# Patient Record
Sex: FEMALE | Race: WHITE | Employment: OTHER | URBAN - METROPOLITAN AREA
[De-identification: names, ages, dates, MRNs, and addresses within clinical notes are randomized per-mention and may not be internally consistent; named-entity substitution may affect disease eponyms.]

---

## 2017-01-13 ENCOUNTER — HOSPITAL ENCOUNTER (OUTPATIENT)
Dept: NUCLEAR MEDICINE | Age: 82
Discharge: HOME | End: 2017-01-13
Attending: ORTHOPAEDIC SURGERY
Payer: MEDICARE

## 2017-01-13 DIAGNOSIS — S32.000A COMPRESSION OF LUMBAR VERTEBRA (HCC): ICD-10-CM

## 2017-01-13 PROCEDURE — 78300 BONE IMAGING LIMITED AREA: CPT

## 2017-01-20 ENCOUNTER — OFFICE VISIT (OUTPATIENT)
Dept: INTERNAL MEDICINE CLINIC | Age: 82
End: 2017-01-20

## 2017-01-20 VITALS
WEIGHT: 111 LBS | BODY MASS INDEX: 20.43 KG/M2 | DIASTOLIC BLOOD PRESSURE: 66 MMHG | OXYGEN SATURATION: 99 % | HEART RATE: 84 BPM | SYSTOLIC BLOOD PRESSURE: 124 MMHG | HEIGHT: 62 IN | RESPIRATION RATE: 16 BRPM | TEMPERATURE: 98.3 F

## 2017-01-20 DIAGNOSIS — M51.36 DDD (DEGENERATIVE DISC DISEASE), LUMBAR: Primary | ICD-10-CM

## 2017-01-20 DIAGNOSIS — G62.9 NEUROPATHY: ICD-10-CM

## 2017-01-20 DIAGNOSIS — Z71.89 ACP (ADVANCE CARE PLANNING): ICD-10-CM

## 2017-01-20 NOTE — MR AVS SNAPSHOT
Visit Information Date & Time Provider Department Dept. Phone Encounter #  
 1/20/2017 12:30 PM Otoniel King, [address] [place] [address] Internal Medicine [phone] [id number] Follow-up Instructions Return in about 6 months (around 7/20/2017) for Regular follow up. Upcoming Health Maintenance Date Due DTaP/Tdap/Td series (1 - Tdap) 9/16/1956 ZOSTER VACCINE AGE 60> 9/16/1995 Pneumococcal 65+ High/Highest Risk (1 of 2 - PCV13) 9/16/2000 MEDICARE YEARLY EXAM 9/16/2000 GLAUCOMA SCREENING Q2Y 2/8/2018 Allergies as of 1/20/2017  Review Complete On: 1/20/2017 By: Otoniel King MD  
  
 Severity Noted Reaction Type Reactions Latex  06/21/2016    Itching Dilaudid [Hydromorphone (Bulk)] High 05/19/2010    Other (comments) ? loss of consciousness Altace [Ramipril]  12/12/2010    Unknown (comments) Ambien [Zolpidem]  12/12/2010    Unknown (comments) Ascensia Autodisc Test [Blood Sugar Diagnostic, Disc-type]  12/12/2010    Unknown (comments) Aspirin  12/12/2010    Unknown (comments) Astelin [Azelastine]  12/12/2010    Unknown (comments) Atenolol  12/12/2010    Unknown (comments) Banex La  12/12/2010    Unknown (comments) Benicar [Olmesartan]  12/12/2010    Unknown (comments) Carbocaine [Mepivacaine]  12/12/2010    Unknown (comments) Cleocin [Clindamycin Hcl]  03/25/2009    Hives Codeine  12/12/2010    Nausea and Vomiting Codeine-asa-salicyl-acetam-caf  70/33/2645    Other (comments) Stomach pain Cortisporin [Neomy-polymyxinb-bacitracin-hc]  12/12/2010    Unknown (comments) Cymbalta [Duloxetine]  12/12/2010    Other (comments) Cytotec [Misoprostol]  12/12/2010    Unknown (comments) Yair Oats [Propoxyphene N-acetaminophen]  12/12/2010    Unknown (comments) Darvon [Propoxyphene]  12/12/2010    Unknown (comments) Dilaudid [Hydromorphone]  01/03/2011    Unknown (comments) Patient states that she was unable to respond, but was breathing Effexor [Venlafaxine]  12/12/2010    Unknown (comments) Epinephrine  12/12/2010    Unknown (comments) Heart racing Estra-d  12/12/2010    Unknown (comments) Estrace [Estradiol]  12/12/2010    Unknown (comments) Fentanyl  12/12/2010    Nausea and Vomiting, Unknown (comments)  
 shakes Flexeril [Cyclobenzaprine]  12/12/2010    Unable to Obtain \"drugs me\" Flonase [Fluticasone]  12/12/2010    Unknown (comments) Flunisolide  12/12/2010    Unknown (comments) Guiatussin W/codeine  12/12/2010    Unknown (comments) Ketek [Telithromycin]  03/25/2009    Nausea Only Klonopin [Clonazepam]  12/12/2010    Unknown (comments) Lexapro [Escitalopram]  12/12/2010    Nausea and Vomiting Lodine [Etodolac]  12/12/2010    Nausea and Vomiting Morphine  12/12/2010    Unknown (comments) Nsaids (Non-steroidal Anti-inflammatory Drug)  04/12/2010    Nausea and Vomiting Other Medication  12/12/2010    Unknown (comments) Magnolia Lopes Paxil [Paroxetine Hcl]  12/12/2010    Nausea and Vomiting Pcn [Penicillins]  03/25/2009    Hives Percocet [Oxycodone-acetaminophen]  12/12/2010    Unknown (comments) Percodan [Oxycodone Hcl-oxycodone-asa]  12/12/2010    Unknown (comments) Pneumococcal 23-davida Ps Vaccine  09/14/2012    Swelling Proctocream-hc [Hydrocortisone]  12/12/2010    Unknown (comments) Prozac [Fluoxetine]  04/12/2010    Nausea Only Suicidal visions Pseudofed  12/12/2010    Unknown (comments) Quinidine  12/12/2010    Unknown (comments) Reglan [Metoclopramide]  12/12/2010    Unknown (comments) Remeron [Mirtazapine]  12/12/2010    Other (comments) Resaid  12/12/2010    Other (comments)  
 inflammed eyes Robaxin [Methocarbamol]  12/12/2010    Unknown (comments) Sonata [Zaleplon]  12/12/2010    Unknown (comments) Sudafed [Pseudoephedrine Hcl]  12/12/2010    Unknown (comments) Sulfa (Sulfonamide Antibiotics)  03/25/2009    Hives Surmontil [Trimipramine]  12/12/2010    Nausea and Vomiting Talwin [Pentazocine Lactate]  12/12/2010    Unknown (comments) Toradol [Ketorolac Tromethamine]  12/12/2010    Nausea and Vomiting Ultram [Tramadol]  12/12/2010    Unknown (comments) Vasoconstrictor Drug  12/12/2010    Unknown (comments) Vigamox [Moxifloxacin]  06/21/2016    Rash, Swelling Vioxx [Rofecoxib]  12/12/2010    Unknown (comments) Voltaren [Diclofenac Sodium]  06/21/2016    Itching Wellbutrin [Bupropion Hcl]  12/12/2010    Nausea and Vomiting Wygesic  12/12/2010    Unknown (comments) Zelnorm [Tegaserod Hydrogen Maleate]  12/12/2010    Unknown (comments) Zoloft [Sertraline]  12/12/2010    Nausea and Vomiting Current Immunizations  Reviewed on 1/20/2017 Name Date Influenza High Dose Vaccine PF 10/14/2016 Influenza Vaccine Whole 10/22/2012, 10/5/2011 Reviewed by Marija Reese RN on 1/20/2017 at 12:42 PM  
You Were Diagnosed With   
  
 Codes Comments DDD (degenerative disc disease), lumbar    -  Primary ICD-10-CM: M51.36 
ICD-9-CM: 722.52 Neuropathy     ICD-10-CM: G62.9 ICD-9-CM: 355.9 ACP (advance care planning)     ICD-10-CM: Z71.89 ICD-9-CM: V65.49 Vitals BP Pulse Temp Resp Height(growth percentile) Weight(growth percentile) 124/66 84 98.3 °F (36.8 °C) (Oral) 16 5' 2\" (1.575 m) 111 lb (50.3 kg) SpO2 BMI OB Status Smoking Status 99% 20.3 kg/m2 Hysterectomy Never Smoker Vitals History BMI and BSA Data Body Mass Index Body Surface Area  
 20.3 kg/m 2 1.48 m 2 Preferred Pharmacy Pharmacy Name Phone [facility] # [address] [phone] Your Updated Medication List  
  
   
This list is accurate as of: 1/20/17  1:32 PM.  Always use your most recent med list.  
 amLODIPine 5 mg tablet Commonly known as:  Voncile Oxford Take 1 Tab by mouth daily. * ATIVAN 0.5 mg tablet Generic drug:  LORazepam  
Take 0.25 mg by mouth daily. 0.25 mg every morning. 1 mg at bedtime. 0.25 mg PRN. * LORazepam 1 mg tablet Commonly known as:  ATIVAN  
  
 b complex vitamins tablet Take 1 Tab by mouth daily. B.infantis-B.ani-B.long-B.bifi 10-15 mg Tbec Take  by mouth. bismuth subsalicylate 897 mg Chew Commonly known as:  PEPTO-BISMOL Take 2 Tabs by mouth every three (3) hours as needed. calcium carbonate 300 mg (750 mg) chewable tablet Commonly known as:  TUMS EX Take 1 Tab by mouth daily. cholecalciferol 1,000 unit Cap Commonly known as:  VITAMIN D3 Take  by mouth daily. FIBER CHOICE PO Take  by mouth. hyoscyamine SL 0.125 mg SL tablet Commonly known as:  LEVSIN/SL  
0.125 mg by SubLINGual route Before breakfast, lunch, and dinner. metoprolol tartrate 25 mg tablet Commonly known as:  LOPRESSOR Take one quarter tablet by mouth everyday MIRALAX 17 gram packet Generic drug:  polyethylene glycol Take 17 g by mouth daily as needed. raNITIdine 150 mg tablet Commonly known as:  ZANTAC Take 150 mg by mouth two (2) times a day. * TYLENOL EXTRA STRENGTH 500 mg tablet Generic drug:  acetaminophen Take 500 mg by mouth two (2) times a day. * TYLENOL ARTHRITIS 650 mg CR tablet Generic drug:  acetaminophen Take 650 mg by mouth two (2) times a day. * Notice: This list has 4 medication(s) that are the same as other medications prescribed for you. Read the directions carefully, and ask your doctor or other care provider to review them with you. We Performed the Following REFERRAL TO PHYSICAL THERAPY [QOG87 Custom] Comments:  
 Please evaluate patient for back pain. Follow-up Instructions Return in about 6 months (around 7/20/2017) for Regular follow up. Referral Information Referral ID Referred By Referred To  
  
 [id number] LEXIE GOLD Not Available Visits Status Start Date End Date 1 New Request 1/20/17 1/20/18 If your referral has a status of pending review or denied, additional information will be sent to support the outcome of this decision. Patient Instructions PHYSICAL THERAPY [address] [facility] [address] [facility]. [address]  
[phone] L[facility] [place] [address] [facility] [address] [place], [address] [facility] [address]  
[phone] Physical Therapy & Sports Performance at [facility] [address] [place], [address]  
[phone] [facility] [address] [facility], [address]  
[phone] [facility] [address] [facility] [address]  
[phone]  
[address] [facility]  
[phone] [facility] [address]  
[phone] [facility] [address] [facility] ([place])  
[phone] [facility] Multiple Locations  
[id number] [facility]! Dear [place]: 
Thank you for requesting a [employer] account. Our records indicate that you have previously registered for a [employer] account but its currently inactive. Please call our [employer] support line at [phone]. Additional Information If you have questions, please visit the Frequently Asked Questions section of the [employer] website at [email or web address] Remember, [employer] is NOT to be used for urgent needs. For medical emergencies, dial 911. Now available from your iPhone and Android! Please provide this summary of care documentation to your next provider. Your primary care clinician is listed as Richrd Paget. If you have any questions after today's visit, please call [phone].

## 2017-01-20 NOTE — PROGRESS NOTES
Arvind Sellers is a 80 y.o. female who was seen in clinic today (1/20/2017). She RTC w/ her . Assessment & Plan:  [place] was seen today for hypertension. Diagnoses and all orders for this visit:    DDD (degenerative disc disease), lumbar- this is an acute on chronic problem, differential dx reviewed with the patient, sounds muscular more than radicular (due to injections not helping). No med options due allergies. Will treat with TENS unit, heat, and stretching.     -     REFERRAL TO PHYSICAL THERAPY    Neuropathy- this is a chronic problem. Will treat with nothing. Reviewed Gabapentin & Lyrica, but refused due to concerns about allergies. Red flags were reviewed with the patient to RTC or notify me, expected time course for resolution reviewed. ACP (advance care planning)- End of Life Planning: This was discussed with her today and she has an advanced directive - a copy has been provided. Reviewed DNR/DNI and patient is not interested. Body mass index (BMI) 20.0-20.9, adult- I have reviewed/discussed the below normal BMI with the patient and spouse. I have recommended the following interventions: nothing. The plan is as follows: I have counseled this patient on diet and exercise regimens. .             Follow-up Disposition:  Return in about 6 months (around 7/20/2017) for Regular follow up.       ----------------------------------------------------------------------    Subjective:  Chronic Pain  She RTC today to discuss her arthralgias that is primarily affecting the back. This is new to me, but a chronic issue. She has seen ortho (Dr Loni Petit). She has seen pain specialist (Dr Vinod Quesada). She reports having injections & ablations w/o any help. She reports pain has been worse over the last 3 weeks. She is not using TENS unit. She reports severe, throbbing pain. Located on upper L buttock. No radiation. She reports she is not able to do her normal daily activities. Neurological Review  She is here today to talk about neuropathy. This is a chronic problem, symptoms are slightly worse, and have been present for > 1 year. Associated symptoms: sharp pains, shooting up the legs at time. No changes since last visit. She has plantar fasciitis and this is improving. She feels like her balance if off slightly. Medical treatment has included: she is massaging & keeping her feet most.         Immunizations:    Influenza: up to date. Tetanus: she reports allergy. Shingles: she has received (waiting on records). Pneumonia: not UTD, allergic. Cancer screening:    Cervical: reviewed guidelines, n/a. Breast: reviewed guidelines, UTD, reviewed stopping is she desires, she will think about it. Colon: n/a. Prior to Admission medications    Medication Sig Start Date End Date Taking? Authorizing Provider   amLODIPine (NORVASC) 5 mg tablet Take 1 Tab by mouth daily. 10/25/16  Yes Radha Aguilear MD   metoprolol tartrate (LOPRESSOR) 25 mg tablet Take one quarter tablet by mouth everyday 7/18/16  Yes Ally Farias MD   LORazepam (ATIVAN) 1 mg tablet  4/28/16  Yes Historical Provider   hyoscyamine SL (LEVSIN/SL) 0.125 mg SL tablet 0.125 mg by SubLINGual route Before breakfast, lunch, and dinner. Yes Historical Provider   ranitidine (ZANTAC) 150 mg tablet Take 150 mg by mouth two (2) times a day. Yes Historical Provider   bismuth subsalicylate (PEPTO-BISMOL) 262 mg chew Take 2 Tabs by mouth every three (3) hours as needed. Yes Historical Provider   calcium carbonate (TUMS EX) 300 mg (750 mg) chewable tablet Take 1 Tab by mouth daily. Yes Historical Provider   cholecalciferol (VITAMIN D3) 1,000 unit cap Take  by mouth daily. Yes Historical Provider   b complex vitamins tablet Take 1 Tab by mouth daily. Yes Historical Provider   FIBER CHOICE PO Take  by mouth. Yes Historical Provider   LORazepam (ATIVAN) 0.5 mg tablet Take 0.25 mg by mouth daily.  0.25 mg every morning. 1 mg at bedtime. 0.25 mg PRN. Yes Historical Provider   polyethylene glycol (MIRALAX) 17 gram packet Take 17 g by mouth daily as needed. Yes Historical Provider   acetaminophen (TYLENOL ARTHRITIS) 650 mg CR tablet Take 650 mg by mouth two (2) times a day. Yes Historical Provider   acetaminophen (TYLENOL EXTRA STRENGTH) 500 mg tablet Take 500 mg by mouth two (2) times a day. Yes Historical Provider   B.infantis-B.ani-B.long-B.bifi 10-15 mg TbEC Take  by mouth. Historical Provider          Allergies   Allergen Reactions    Latex Itching    Dilaudid [Hydromorphone (Bulk)] Other (comments)     ? loss of consciousness    Altace [Ramipril] Unknown (comments)    Ambien [Zolpidem] Unknown (comments)    Ascensia Autodisc Test [Blood Sugar Diagnostic, Disc-Type] Unknown (comments)    Aspirin Unknown (comments)    Astelin [Azelastine] Unknown (comments)    Atenolol Unknown (comments)    Banex La Unknown (comments)    Benicar [Olmesartan] Unknown (comments)    Carbocaine [Mepivacaine] Unknown (comments)    Cleocin [Clindamycin Hcl] Hives    Codeine Nausea and Vomiting    Codeine-Asa-Salicyl-Acetam-Caf Other (comments)     Stomach pain    Cortisporin [Neomy-Polymyxinb-Bacitracin-Hc] Unknown (comments)    Cymbalta [Duloxetine] Other (comments)    Cytotec [Misoprostol] Unknown (comments)    Darvocet A500 [Propoxyphene N-Acetaminophen] Unknown (comments)    Darvon [Propoxyphene] Unknown (comments)    Dilaudid [Hydromorphone] Unknown (comments)     Patient states that she was unable to respond, but was breathing    Effexor [Venlafaxine] Unknown (comments)    Epinephrine Unknown (comments)     Heart racing    Estra-D Unknown (comments)    Estrace [Estradiol] Unknown (comments)    Fentanyl Nausea and Vomiting and Unknown (comments)     shakes    Flexeril [Cyclobenzaprine] Unable to Obtain     \"drugs me\"    Flonase [Fluticasone] Unknown (comments)    Flunisolide Unknown (comments)  Guiatussin W/Codeine Unknown (comments)    Ketek [Telithromycin] Nausea Only    Klonopin [Clonazepam] Unknown (comments)    Lexapro [Escitalopram] Nausea and Vomiting    Lodine [Etodolac] Nausea and Vomiting    Morphine Unknown (comments)    Nsaids (Non-Steroidal Anti-Inflammatory Drug) Nausea and Vomiting    Other Medication Unknown (comments)     Gareth Alford    Paxil [Paroxetine Hcl] Nausea and Vomiting    Pcn [Penicillins] Hives    Percocet [Oxycodone-Acetaminophen] Unknown (comments)    Percodan [Oxycodone Hcl-Oxycodone-Asa] Unknown (comments)    Pneumococcal 23-Mary Ps Vaccine Swelling    Proctocream-Hc [Hydrocortisone] Unknown (comments)    Prozac [Fluoxetine] Nausea Only     Suicidal visions      Pseudofed Unknown (comments)    Quinidine Unknown (comments)    Reglan [Metoclopramide] Unknown (comments)    Remeron [Mirtazapine] Other (comments)    Resaid Other (comments)     inflammed eyes    Robaxin [Methocarbamol] Unknown (comments)    Sonata [Zaleplon] Unknown (comments)    Sudafed [Pseudoephedrine Hcl] Unknown (comments)    Sulfa (Sulfonamide Antibiotics) Hives    Surmontil [Trimipramine] Nausea and Vomiting    Talwin [Pentazocine Lactate] Unknown (comments)    Toradol [Ketorolac Tromethamine] Nausea and Vomiting    Ultram [Tramadol] Unknown (comments)    Vasoconstrictor Drug Unknown (comments)    Vigamox [Moxifloxacin] Rash and Swelling    Vioxx [Rofecoxib] Unknown (comments)    Voltaren [Diclofenac Sodium] Itching    Wellbutrin [Bupropion Hcl] Nausea and Vomiting    Wygesic Unknown (comments)    Zelnorm [Tegaserod Hydrogen Maleate] Unknown (comments)    Zoloft [Sertraline] Nausea and Vomiting           ROS      Objective:   Physical Exam      Visit Vitals    /66    Pulse 84    Temp 98.3 °F (36.8 °C) (Oral)    Resp 16    Ht 5' 2\" (1.575 m)    Wt 111 lb (50.3 kg)    SpO2 99%    BMI 20.3 kg/m2         Disclaimer:  Advised her to call back or return to office if symptoms worsen/change/persist.  Discussed expected course/resolution/complications of diagnosis in detail with patient. Medication risks/benefits/costs/interactions/alternatives discussed with patient. She was given an after visit summary which includes diagnoses, current medications, & vitals. She expressed understanding with the diagnosis and plan.         Carolina Manley MD

## 2017-01-20 NOTE — PATIENT INSTRUCTIONS
PHYSICAL THERAPY     [facility]   [address]    [place], [address] [place] [address] Road   [phone]     [facility] [address] [facility] [address] [facility], [id number] [place] [address]   [phone]     Physical Therapy & Sports Performance at [facility] [address], [address]   [place], [place]   [phone]       [facility]   [address] [place]   [phone]     [facility] [address]   [address]    [phone]   [address]   [phone]     [facility]   [address]   [phone]     [address] ([place])   [phone]     [facility]   Multiple Locations   [phone]

## 2017-01-21 NOTE — ACP (ADVANCE CARE PLANNING)
Advance Care Planning (ACP) Provider Note - Comprehensive     Date of ACP Conversation: 01/20/17  Persons included in Conversation:  patient and family  Length of ACP Conversation in minutes: <16 minutes (Non-Billable)    Authorized Decision Maker (if patient is incapable of making informed decisions): This person is:  Healthcare Agent/Medical Power of  under Advance Directive        General ACP for ALL Patients with Decision Making Capacity:  Importance of advance care planning, including choosing a healthcare agent to communicate patient's healthcare decisions if patient lost the ability to make decisions, such as after a sudden illness or accident  Understanding of the healthcare agent role was assessed and information provided  Opportunity offered to explore how cultural, Latter day, spiritual, or personal beliefs would affect decisions for future care     For Serious or Chronic Illness:  Her medical problems were reviewed with them. Understanding of medical condition    Understanding of CPR, goals and expected outcomes, benefits and burdens discussed. Information on CPR success rates provided (e.g. for CPR in hospital, survival to d/c at two weeks is 22%, for chronically ill or elderly/frail survival is less than 3%); Individual asked to communicate understanding of information in his/her own words. Explored fears and concerns regarding CPR or possible outcomes    Interventions Provided:  Reviewed existing Advance Directive   Recommended communicating the plan and making copies for the healthcare agent, personal physician, and others as appropriate (e.g., health system)  Recommended review of completed ACP document annually or upon change in health status      she has an advanced directive - a copy has been provided & still reflects her wishes. Reviewed DNR/DNI and patient is not interested but will think about it.

## 2017-01-23 ENCOUNTER — TELEPHONE (OUTPATIENT)
Dept: INTERNAL MEDICINE CLINIC | Age: 82
End: 2017-01-23

## 2017-01-23 NOTE — TELEPHONE ENCOUNTER
Called pt and notified her she can see any physical therapist. Her referral is not for anyone specific. Pt verbalized understanding.

## 2017-02-27 ENCOUNTER — HOSPITAL ENCOUNTER (OUTPATIENT)
Dept: MRI IMAGING | Age: 82
Discharge: HOME | End: 2017-02-27
Attending: SPECIALIST
Payer: MEDICARE

## 2017-02-27 DIAGNOSIS — M47.816 DEGENERATIVE ARTHRITIS OF LUMBAR SPINE: ICD-10-CM

## 2017-02-27 PROCEDURE — 72148 MRI LUMBAR SPINE W/O DYE: CPT

## 2017-03-08 LAB
CREATININE, EXTERNAL: 1.17
HBA1C MFR BLD HPLC: 5.9 %
MICROALBUMIN UR TEST STR-MCNC: 8.8 MG/DL

## 2017-04-12 DIAGNOSIS — I10 ESSENTIAL HYPERTENSION, BENIGN: ICD-10-CM

## 2017-04-13 RX ORDER — METOPROLOL TARTRATE 25 MG/1
TABLET, FILM COATED ORAL
Qty: 90 TAB | Refills: 1 | Status: SHIPPED | OUTPATIENT
Start: 2017-04-13 | End: 2018-05-22 | Stop reason: SDUPTHER

## 2017-05-08 DIAGNOSIS — I10 ESSENTIAL HYPERTENSION, BENIGN: ICD-10-CM

## 2017-05-08 RX ORDER — AMLODIPINE BESYLATE 5 MG/1
TABLET ORAL
Qty: 90 TAB | Refills: 1 | Status: SHIPPED | OUTPATIENT
Start: 2017-05-08 | End: 2017-11-07 | Stop reason: SDUPTHER

## 2017-07-14 ENCOUNTER — OFFICE VISIT (OUTPATIENT)
Dept: INTERNAL MEDICINE CLINIC | Age: 82
End: 2017-07-14

## 2017-07-14 VITALS
HEART RATE: 74 BPM | SYSTOLIC BLOOD PRESSURE: 134 MMHG | TEMPERATURE: 97.8 F | BODY MASS INDEX: 20.24 KG/M2 | RESPIRATION RATE: 20 BRPM | HEIGHT: 62 IN | WEIGHT: 110 LBS | DIASTOLIC BLOOD PRESSURE: 66 MMHG | OXYGEN SATURATION: 96 %

## 2017-07-14 DIAGNOSIS — K31.84 GASTROPARESIS: ICD-10-CM

## 2017-07-14 DIAGNOSIS — E78.1 HYPERTRIGLYCERIDEMIA: ICD-10-CM

## 2017-07-14 DIAGNOSIS — F41.8 DEPRESSION WITH ANXIETY: ICD-10-CM

## 2017-07-14 DIAGNOSIS — Z23 ENCOUNTER FOR IMMUNIZATION: ICD-10-CM

## 2017-07-14 DIAGNOSIS — R73.03 PRE-DIABETES: ICD-10-CM

## 2017-07-14 DIAGNOSIS — Z80.9 FAMILY HISTORY OF CANCER: ICD-10-CM

## 2017-07-14 DIAGNOSIS — K58.0 IRRITABLE BOWEL SYNDROME WITH DIARRHEA: ICD-10-CM

## 2017-07-14 DIAGNOSIS — Z71.89 ACP (ADVANCE CARE PLANNING): ICD-10-CM

## 2017-07-14 DIAGNOSIS — Z00.00 MEDICARE ANNUAL WELLNESS VISIT, SUBSEQUENT: Primary | ICD-10-CM

## 2017-07-14 DIAGNOSIS — I10 ESSENTIAL HYPERTENSION, BENIGN: ICD-10-CM

## 2017-07-14 DIAGNOSIS — Z13.39 SCREENING FOR ALCOHOLISM: ICD-10-CM

## 2017-07-14 DIAGNOSIS — Z13.31 SCREENING FOR DEPRESSION: ICD-10-CM

## 2017-07-14 NOTE — PROGRESS NOTES
Aurora Ulloa is a 80 y.o. female who was seen in clinic today (7/14/2017) for a full physical.      Assessment & Plan:   Diagnoses and all orders for this visit:    1. Medicare annual wellness visit, subsequent    2. ACP (advance care planning)    3. Encounter for immunization  -     diph,Pertuss,Acell,,Tet Vac-PF (ADACEL) 2 Lf-(2.5-5-3-5 mcg)-5Lf/0.5 mL susp; 0.5 mL by IntraMUSCular route once for 1 dose. 4. Screening for alcoholism    5. Screening for depression  -     Depression Screen Annual    6. Family history of cancer- she reports brother has some blood cancer, getting transfusions every 2 wks, father did also. -     CBC W/O DIFF    7. Pre-diabetes- stable, no meds, will defer to labs at this time, will work on diet    8. Hypertriglyceridemia- reviewed previous labs, cont w/ diet, no meds, reassured     9. Irritable bowel syndrome with diarrhea- uncontrolled, fluctuating, defer to GI, watch for diet triggers    10. Gastroparesis- difficult to gauge control, sounds like it is fluctuating, reviewed diet triggers    11. Essential hypertension, benign- well controlled, continue current treatment as she is taking her meds as directed & without any side effects. 12. Depression with anxiety- stable, not ideally controlled, declined trial of meds, reviewed counseling if it gets worse         Follow-up Disposition:  Return in about 1 year (around 7/14/2018). ------------------------------------------------------------------------------------------    Subjective: Annual Wellness Visit- Subsequent Visit    End of Life Planning: This was discussed with her today and she has an advanced directive - a copy has been provided. Reviewed DNR/DNI and patient is interested in remaining a DNR, no changes made. She reports having DNR at home, nothing on file her, she will bring me a copy.       Depression Screen:  PHQ over the last two weeks 7/14/2017   PHQ Not Done -   Little interest or pleasure in doing things Not at all   Feeling down, depressed or hopeless Nearly every day   Total Score PHQ 2 3   Trouble falling or staying asleep, or sleeping too much Nearly every day   Feeling tired or having little energy Nearly every day   Poor appetite or overeating Not at all   Feeling bad about yourself - or that you are a failure or have let yourself or your family down Not at all   Trouble concentrating on things such as school, work, reading or watching TV Not at all   Moving or speaking so slowly that other people could have noticed; or the opposite being so fidgety that others notice Not at all   Thoughts of being better off dead, or hurting yourself in some way Not at all   PHQ 9 Score 9   How difficult have these problems made it for you to do your work, take care of your home and get along with others Somewhat difficult         Fall Risk:   Fall Risk Assessment, last 12 mths 7/14/2017   Able to walk? Yes   Fall in past 12 months? No   Fall with injury? -   Number of falls in past 12 months -   Fall Risk Score -       Abuse Screen:  Abuse Screening Questionnaire 7/14/2017   Do you ever feel afraid of your partner? N   Are you in a relationship with someone who physically or mentally threatens you? N   Is it safe for you to go home? Y       Alcohol Risk Factor Screening: On any occasion during the past 3 months, have you had more than 3 drinks containing alcohol? No  Do you average more than 7 drinks per week? No    Hearing Loss:  wears hearing aides, wears rarely    Activities of Daily Living:  Self-care. Requires assistance with: no ADLs  Exercise: moderately active    Cognition Screen:  appropriate for age attention/concentration and appropriate safety awareness    Adult Nutrition Screen:  No risk factors noted. Health Maintenance  Daily Aspirin: n/a  Bone Density: UTD - done in 10/15  Glaucoma Screening: UTD    Immunizations:    Influenza: up to date. Tetanus: not UTD- script provided.   Shingles: up to date.  Pneumonia: not up to date - she has an allergy. Cancer screening:    Cervical: n/a, reviewed guidelines. Breast: n/a, reviewed guidelines. Colon: n/a, guidelines reviewed. Patient Care Team:  Namrata French MD as PCP - General (Internal Medicine)  Jez Barrientos MD (Gastroenterology)  Alec Mansfield MD (Nephrology)  Juliet Junior MD (Dermatology)  Benny Lewis MD (Ophthalmology)  Dorathy Kanner, NP (Psychiatry)  Erasmo Cid (Pain Management)       The following sections were reviewed & updated as appropriate: PMH, PSH, FH, and SH. Patient Active Problem List   Diagnosis Code    Essential hypertension, benign I10    DDD (degenerative disc disease), cervical M50.30    Hypertriglyceridemia E78.1    Depression with anxiety F41.8    Allergic rhinitis J30.9    DDD (degenerative disc disease), lumbar M51.36    Other and unspecified noninfectious gastroenteritis and colitis K52.9    CKD (chronic kidney disease) stage 3, GFR 30-59 ml/min N18.3    Pre-diabetes R73.03    History of pulmonary embolism Z86.711    Gastroparesis K31.84    Spinal stenosis, lumbar M48.06    Fibromyalgia M79.7    Neuropathy ([address] G62.9    Gastroesophageal reflux disease without esophagitis K21.9    Irritable bowel syndrome with diarrhea K58.0          Prior to Admission medications    Medication Sig Start Date End Date Taking? Authorizing Provider   amLODIPine (NORVASC) 5 mg tablet TAKE 1 TAB BY MOUTH DAILY. 5/8/17  Yes Namrata French MD   metoprolol tartrate (LOPRESSOR) 25 mg tablet TAKE ONE-QUARTER TABLET BY MOUTH EVERY DAY 4/13/17  Yes Namrata French MD   LORazepam (ATIVAN) 1 mg tablet  4/28/16  Yes Historical Provider   hyoscyamine SL (LEVSIN/SL) 0.125 mg SL tablet 0.125 mg by SubLINGual route Before breakfast, lunch, and dinner. Yes Historical Provider   B.infantis-B.ani-B.long-B.bifi 10-15 mg TbEC Take  by mouth.    Yes Historical Provider   ranitidine (ZANTAC) 150 mg tablet Take 150 mg by mouth two (2) times a day. Yes Historical Provider   bismuth subsalicylate (PEPTO-BISMOL) 262 mg chew Take 2 Tabs by mouth every three (3) hours as needed. Yes Historical Provider   cholecalciferol (VITAMIN D3) 1,000 unit cap Take  by mouth daily. Yes Historical Provider   b complex vitamins tablet Take 1 Tab by mouth daily. Yes Historical Provider   FIBER CHOICE PO Take  by mouth. Yes Historical Provider   LORazepam (ATIVAN) 0.5 mg tablet Take 0.25 mg by mouth daily. 0.25 mg every morning. 1 mg at bedtime. 0.25 mg PRN. Yes Historical Provider   polyethylene glycol (MIRALAX) 17 gram packet Take 17 g by mouth daily as needed. Yes Historical Provider   acetaminophen (TYLENOL EXTRA STRENGTH) 500 mg tablet Take 500 mg by mouth two (2) times a day. Yes Historical Provider          Allergies   Allergen Reactions    Latex Itching    Dilaudid [Hydromorphone (Bulk)] Other (comments)     ? loss of consciousness    Altace [Ramipril] Unknown (comments)    Ambien [Zolpidem] Unknown (comments)    [employer] Test [Blood Sugar Diagnostic, Disc-Type] Unknown (comments)    Aspirin Unknown (comments)    Astelin [Azelastine] Unknown (comments)    Atenolol Unknown (comments)    Banex La Unknown (comments)    Benicar [Olmesartan] Unknown (comments)    Carbocaine [Mepivacaine] Unknown (comments)    Cleocin [Clindamycin Hcl] Hives    Codeine Nausea and Vomiting    Codeine-Asa-Salicyl-Acetam-Caf Other (comments)     Stomach pain    Cortisporin [Neomy-Polymyxinb-Bacitracin-Hc] Unknown (comments)    Cymbalta [Duloxetine] Other (comments)    Cytotec [Misoprostol] Unknown (comments)    Darvocet A500 [Propoxyphene N-Acetaminophen] Unknown (comments)    Darvon [Propoxyphene] Unknown (comments)    Dilaudid [Hydromorphone] Unknown (comments)     Patient states that she was unable to respond, but was breathing    Effexor [Venlafaxine] Unknown (comments)    Epinephrine Unknown (comments)     Heart racing    Estra-D Unknown (comments)    Estrace [Estradiol] Unknown (comments)    Fentanyl Nausea and Vomiting and Unknown (comments)     shakes    Flexeril [Cyclobenzaprine] Unable to Obtain     \"drugs me\"    Flonase [Fluticasone] Unknown (comments)    Flunisolide Unknown (comments)    Guiatussin W/Codeine Unknown (comments)    Ketek [Telithromycin] Nausea Only    Klonopin [Clonazepam] Unknown (comments)    Lexapro [Escitalopram] Nausea and Vomiting    Lodine [Etodolac] Nausea and Vomiting    Morphine Unknown (comments)    Nsaids (Non-Steroidal Anti-Inflammatory Drug) Nausea and Vomiting    Other Medication Unknown (comments)     Gareth Alford    Paxil [Paroxetine Hcl] Nausea and Vomiting    Pcn [Penicillins] Hives    Percocet [Oxycodone-Acetaminophen] Unknown (comments)    Percodan [Oxycodone Hcl-Oxycodone-Asa] Unknown (comments)    Pneumococcal 23-Mary Ps Vaccine Swelling    Proctocream-Hc [Hydrocortisone] Unknown (comments)    Prozac [Fluoxetine] Nausea Only     Suicidal visions      Pseudofed Unknown (comments)    Quinidine Unknown (comments)    Reglan [Metoclopramide] Unknown (comments)    Remeron [Mirtazapine] Other (comments)    Resaid Other (comments)     inflammed eyes    Robaxin [Methocarbamol] Unknown (comments)    Sonata [Zaleplon] Unknown (comments)    Sudafed [Pseudoephedrine Hcl] Unknown (comments)    Sulfa (Sulfonamide Antibiotics) Hives    Surmontil [Trimipramine] Nausea and Vomiting    Talwin [Pentazocine Lactate] Unknown (comments)    Toradol [Ketorolac Tromethamine] Nausea and Vomiting    Ultram [Tramadol] Unknown (comments)    Vasoconstrictor Drug Unknown (comments)    Vigamox [Moxifloxacin] Rash and Swelling    Vioxx [Rofecoxib] Unknown (comments)    Voltaren [Diclofenac Sodium] Itching    Wellbutrin [Bupropion Hcl] Nausea and Vomiting    Wygesic Unknown (comments)    Zelnorm [Tegaserod Hydrogen Maleate] Unknown (comments)  Zoloft [Sertraline] Nausea and Vomiting              Review of Systems   Constitutional: Negative for malaise/fatigue and weight loss. Respiratory: Negative for cough and shortness of breath. Cardiovascular: Negative for chest pain, palpitations and leg swelling. Gastrointestinal: Positive for constipation, diarrhea and nausea. Negative for abdominal pain, heartburn and vomiting. Genitourinary: Negative for frequency. Musculoskeletal: Negative for joint pain and myalgias. Skin: Negative for rash. Neurological: Positive for tingling (both feet). Negative for sensory change, focal weakness and headaches. Psychiatric/Behavioral: Negative for depression. The patient is not nervous/anxious and does not have insomnia. Objective:   Physical Exam   Constitutional: No distress. thin   Eyes: Conjunctivae are normal. No scleral icterus. Cardiovascular: Regular rhythm and normal heart sounds. No murmur heard. Pulmonary/Chest: Effort normal and breath sounds normal. She has no wheezes. She has no rales. Abdominal: Bowel sounds are normal. She exhibits no mass. There is no hepatosplenomegaly. There is tenderness in the right upper quadrant and epigastric area. Musculoskeletal: She exhibits no edema. Psychiatric: Her mood appears anxious. Visit Vitals    /66    Pulse 74    Temp 97.8 °F (36.6 °C) (Oral)    Resp 20    Ht 5' 1.8\" (1.57 m)    Wt 110 lb (49.9 kg)    SpO2 96%    BMI 20.25 kg/m2          Advised her to call back or return to office if symptoms worsen/change/persist.  Discussed expected course/resolution/complications of diagnosis in detail with patient. Medication risks/benefits/costs/interactions/alternatives discussed with patient. She was given an after visit summary which includes diagnoses, current medications, & vitals. She expressed understanding with the diagnosis and plan.         Monica Fry MD

## 2017-07-14 NOTE — PATIENT INSTRUCTIONS
Medicare Wellness Visit, Female    The best way to live healthy is to have a healthy lifestyle by eating a well-balanced diet, exercising regularly, limiting alcohol and stopping smoking. Regular physical exams and screening tests are another way to keep healthy. Preventive exams provided by your health care provider can find health problems before they become diseases or illnesses. Preventive services including immunizations, screening tests, monitoring and exams can help you take care of your own health. All people over age 72 should have a pneumovax  and and a prevnar shot to prevent pneumonia. These are once in a lifetime unless you and your provider decide differently. All people over 65 should have a yearly flu shot and a tetanus vaccine every 10 years. A bone mass density to screen for osteoporosis or thinning of the bones should be done every 2 years after 65. Screening for diabetes mellitus with a blood sugar test should be done every year. Glaucoma is a disease of the eye due to increased ocular pressure that can lead to blindness and it should be done every year by an eye professional.    Cardiovascular screening tests that check for elevated lipids (fatty part of blood) which can lead to heart disease and strokes should be done every 5 years. Colorectal screening that evaluates for blood or polyps in your colon should be done yearly as a stool test or every five years as a flexible sigmoidoscope or every 10 years as a colonoscopy up to age 76. Breast cancer screening with a mammogram is recommended biennially  for women age 54-69. Screening for cervical cancer with a pap smear and pelvic exam is recommended for women after age 72 years every 2 years up to age 79 or when the provider and patient decide to stop. If there is a history of cervical abnormalities or other increased risk for cancer then the test is recommended yearly.     Hepatitis C screening is also recommended for anyone born between [birth year] through [address]. A shingles vaccine is also recommended once in a lifetime after age 61. Your Medicare Wellness Exam is recommended annually. Here is a list of your current Health Maintenance items with a due date:  Health Maintenance Due   Topic Date Due    DTaP/Tdap/Td  (1 - Tdap) 09/16/1956    Shingles Vaccine  09/16/1995    Annual Well Visit  09/16/2000            Advance Directives: Care Instructions  Your Care Instructions  An advance directive is a legal way to state your wishes at the end of your life. It tells your family and your doctor what to do if you can no longer say what you want. There are two main types of advance directives. You can change them any time that your wishes change. · A living will tells your family and your doctor your wishes about life support and other treatment. · A durable power of  for health care lets you name a person to make treatment decisions for you when you can't speak for yourself. This person is called a health care agent. If you do not have an advance directive, decisions about your medical care may be made by a doctor or a  who doesn't know you. It may help to think of an advance directive as a gift to the people who care for you. If you have one, they won't have to make tough decisions by themselves. Follow-up care is a key part of your treatment and safety. Be sure to make and go to all appointments, and call your doctor if you are having problems. It's also a good idea to know your test results and keep a list of the medicines you take. How can you care for yourself at home? · Discuss your wishes with your loved ones and your doctor. This way, there are no surprises. · Many states have a unique form. Or you might use a universal form that has been approved by many states. This kind of form can sometimes be completed and stored online.  Your electronic copy will then be available wherever you have a connection to the Internet. In most cases, doctors will respect your wishes even if you have a form from a different state. · You don't need a  to do an advance directive. But you may want to get legal advice. · Think about these questions when you prepare an advance directive:  ¨ Who do you want to make decisions about your medical care if you are not able to? Many people choose a family member or close friend. ¨ Do you know enough about life support methods that might be used? If not, talk to your doctor so you understand. ¨ What are you most afraid of that might happen? You might be afraid of having pain, losing your independence, or being kept alive by machines. ¨ Where would you prefer to die? Choices include your home, a hospital, or a nursing home. ¨ Would you like to have information about hospice care to support you and your family? ¨ Do you want to donate organs when you die? ¨ Do you want certain Rastafarian practices performed before you die? If so, put your wishes in the advance directive. · Read your advance directive every year, and make changes as needed. When should you call for help? Be sure to contact your doctor if you have any questions. Where can you learn more? Go to http://yolyGlobevestorjackie.info/. Enter R264 in the search box to learn more about \"Advance Directives: Care Instructions. \"  Current as of: November 17, 2016  Content Version: 11.3  © 3773-1352 [employer]. Care instructions adapted under license by [employer] (which disclaims liability or warranty for this information).  If you have questions about a medical condition or this instruction, always ask your healthcare professional. [address] any warranty or liability for your use of this information.    -----------------------------  EXAM:  DUPLEX CAROTID BILATERAL     INDICATION:  h/o abnormal screening test     COMPARISON:None.     TECHNIQUE:      Duplex gray scale and color Doppler sonography of the carotid arteries was  performed and multiple static images were obtained.      FINDINGS:     CAROTID DOPPLER SCREEN:     Common Carotid Artery (CCA) peak systolic velocities:    Right: 103.8    Left:  127.5  Internal Carotid Artery (ICA) peak systolic velocities:         Right: 75.3     Left: 85.7  ICA/CCA Peak Systolic Ratios:                                            Right:  0.7    Left: 0.7        % STENOSIS-----ICA PEAK VELOCITY-----ICA/CCA  ----0-50-----------<125 cm/s----------------------<2.0  ----50-69----------125-230 cm/s ---------------------<2.0-4.0  ---->70---------->230 cm/s --------------------->4. 0        There is minimal plaque formation in the left carotid bifurcation. There is no  significant plaque in the right carotid bifurcation. The vertebral arteries  demonstrate bilateral antegrade flow.     IMPRESSION  IMPRESSION:     Minimal plaque in the left carotid bifurcation. No flow-limiting stenosis.   Bilateral antegrade vertebral artery flow.

## 2017-07-14 NOTE — MR AVS SNAPSHOT
Visit Information Date & Time Provider Department Dept. Phone Encounter #  
 7/14/2017  2:00 PM Valdo Chong, [facility] Internal Medicine [phone] [id number] Follow-up Instructions Return in about 1 year (around 7/14/2018). Upcoming Health Maintenance Date Due DTaP/Tdap/Td series (1 - Tdap) 9/16/1956 ZOSTER VACCINE AGE 60> 9/16/1995 MEDICARE YEARLY EXAM 9/16/2000 Pneumococcal 65+ Low/Medium Risk (1 of 2 - PCV13) 7/14/2018* INFLUENZA AGE 9 TO ADULT 8/1/2017 GLAUCOMA SCREENING Q2Y 2/8/2018 *Topic was postponed. The date shown is not the original due date. Allergies as of 7/14/2017  Review Complete On: 7/14/2017 By: Valdo Chong MD  
  
 Severity Noted Reaction Type Reactions Latex  06/21/2016    Itching Dilaudid [Hydromorphone (Bulk)] High 05/19/2010    Other (comments) ? loss of consciousness Altace [Ramipril]  12/12/2010    Unknown (comments) Ambien [Zolpidem]  12/12/2010    Unknown (comments) Ascensia Autodisc Test [Blood Sugar Diagnostic, Disc-type]  12/12/2010    Unknown (comments) Aspirin  12/12/2010    Unknown (comments) Astelin [Azelastine]  12/12/2010    Unknown (comments) Atenolol  12/12/2010    Unknown (comments) Banex La  12/12/2010    Unknown (comments) Benicar [Olmesartan]  12/12/2010    Unknown (comments) Carbocaine [Mepivacaine]  12/12/2010    Unknown (comments) Cleocin [Clindamycin Hcl]  03/25/2009    Hives Codeine  12/12/2010    Nausea and Vomiting Codeine-asa-salicyl-acetam-caf  39/49/9639    Other (comments) Stomach pain Cortisporin [Neomy-polymyxinb-bacitracin-hc]  12/12/2010    Unknown (comments) Cymbalta [Duloxetine]  12/12/2010    Other (comments) Cytotec [Misoprostol]  12/12/2010    Unknown (comments) Daisy Kaska [Propoxyphene N-acetaminophen]  12/12/2010    Unknown (comments) Darvon [Propoxyphene]  12/12/2010    Unknown (comments) Dilaudid [Hydromorphone]  01/03/2011    Unknown (comments) Patient states that she was unable to respond, but was breathing Effexor [Venlafaxine]  12/12/2010    Unknown (comments) Epinephrine  12/12/2010    Unknown (comments) Heart racing Estra-d  12/12/2010    Unknown (comments) Estrace [Estradiol]  12/12/2010    Unknown (comments) Fentanyl  12/12/2010    Nausea and Vomiting, Unknown (comments)  
 shakes Flexeril [Cyclobenzaprine]  12/12/2010    Unable to Obtain \"drugs me\" Flonase [Fluticasone]  12/12/2010    Unknown (comments) Flunisolide  12/12/2010    Unknown (comments) Guiatussin W/codeine  12/12/2010    Unknown (comments) Ketek [Telithromycin]  03/25/2009    Nausea Only Klonopin [Clonazepam]  12/12/2010    Unknown (comments) Lexapro [Escitalopram]  12/12/2010    Nausea and Vomiting Lodine [Etodolac]  12/12/2010    Nausea and Vomiting Morphine  12/12/2010    Unknown (comments) Nsaids (Non-steroidal Anti-inflammatory Drug)  04/12/2010    Nausea and Vomiting Other Medication  12/12/2010    Unknown (comments) Ricki Halo Paxil [Paroxetine Hcl]  12/12/2010    Nausea and Vomiting Pcn [Penicillins]  03/25/2009    Hives Percocet [Oxycodone-acetaminophen]  12/12/2010    Unknown (comments) Percodan [Oxycodone Hcl-oxycodone-asa]  12/12/2010    Unknown (comments) Pneumococcal 23-davida Ps Vaccine  09/14/2012    Swelling Proctocream-hc [Hydrocortisone]  12/12/2010    Unknown (comments) Prozac [Fluoxetine]  04/12/2010    Nausea Only Suicidal visions Pseudofed  12/12/2010    Unknown (comments) Quinidine  12/12/2010    Unknown (comments) Reglan [Metoclopramide]  12/12/2010    Unknown (comments) Remeron [Mirtazapine]  12/12/2010    Other (comments) Resaid  12/12/2010    Other (comments)  
 inflammed eyes Robaxin [Methocarbamol]  12/12/2010    Unknown (comments) Sonata [Zaleplon]  12/12/2010    Unknown (comments) Sudafed [Pseudoephedrine Hcl]  12/12/2010    Unknown (comments) Sulfa (Sulfonamide Antibiotics)  03/25/2009    Hives Surmontil [Trimipramine]  12/12/2010    Nausea and Vomiting Talwin [Pentazocine Lactate]  12/12/2010    Unknown (comments) Toradol [Ketorolac Tromethamine]  12/12/2010    Nausea and Vomiting Ultram [Tramadol]  12/12/2010    Unknown (comments) Vasoconstrictor Drug  12/12/2010    Unknown (comments) Vigamox [Moxifloxacin]  06/21/2016    Rash, Swelling Vioxx [Rofecoxib]  12/12/2010    Unknown (comments) Voltaren [Diclofenac Sodium]  06/21/2016    Itching Wellbutrin [Bupropion Hcl]  12/12/2010    Nausea and Vomiting Wygesic  12/12/2010    Unknown (comments) Zelnorm [Tegaserod Hydrogen Maleate]  12/12/2010    Unknown (comments) Zoloft [Sertraline]  12/12/2010    Nausea and Vomiting Current Immunizations  Reviewed on 7/14/2017 Name Date Influenza High Dose Vaccine PF 10/14/2016 Influenza Vaccine Whole 10/22/2012, 10/5/2011 Td 6/25/2007 Zoster Vaccine, Live 1/1/2010 Reviewed by Cady Boone RN on 7/14/2017 at  2:06 PM  
 Reviewed by Lonnie Mcgarry MD on 7/14/2017 at  2:30 PM  
 Reviewed by Lonnie Mcgarry MD on 7/14/2017 at  2:30 PM  
You Were Diagnosed With   
  
 Codes Comments Medicare annual wellness visit, subsequent    -  Primary ICD-10-CM: Z00.00 ICD-9-CM: V70.0 ACP (advance care planning)     ICD-10-CM: Z71.89 ICD-9-CM: V65.49 Encounter for immunization     ICD-10-CM: Y22 ICD-9-CM: V03.89 Screening for alcoholism     ICD-10-CM: Z13.89 ICD-9-CM: V79.1 Screening for depression     ICD-10-CM: Z13.89 ICD-9-CM: V79.0 Family history of cancer     ICD-10-CM: Z80.9 ICD-9-CM: V16.9 Pre-diabetes     ICD-10-CM: R73.03 
ICD-9-CM: 790.29 Hypertriglyceridemia     ICD-10-CM: E78.1 ICD-9-CM: 272.1 Irritable bowel syndrome with diarrhea     ICD-10-CM: K58.0 ICD-9-CM: 466.7 Gastroparesis     ICD-10-CM: K31.84 ICD-9-CM: 536.3 Essential hypertension, benign     ICD-10-CM: I10 
ICD-9-CM: 401.1 Vitals BP Pulse Temp Resp Height(growth percentile) Weight(growth percentile) 134/66 74 97.8 °F (36.6 °C) (Oral) 20 5' 1.8\" (1.57 m) 110 lb (49.9 kg) SpO2 BMI OB Status Smoking Status 96% 20.25 kg/m2 Hysterectomy Never Smoker BMI and BSA Data Body Mass Index Body Surface Area  
 20.25 kg/m 2 1.48 m 2 Preferred Pharmacy Pharmacy Name Phone [facility], I[facility]. [phone] Your Updated Medication List  
  
   
This list is accurate as of: 7/14/17  3:03 PM.  Always use your most recent med list. amLODIPine 5 mg tablet Commonly known as:  Didi Lamar TAKE 1 TAB BY MOUTH DAILY. * ATIVAN 0.5 mg tablet Generic drug:  LORazepam  
Take 0.25 mg by mouth daily. 0.25 mg every morning. 1 mg at bedtime. 0.25 mg PRN. * LORazepam 1 mg tablet Commonly known as:  ATIVAN  
  
 b complex vitamins tablet Take 1 Tab by mouth daily. B.infantis-B.ani-B.long-B.bifi 10-15 mg Tbec Take  by mouth. bismuth subsalicylate 080 mg Chew Commonly known as:  PEPTO-BISMOL Take 2 Tabs by mouth every three (3) hours as needed. cholecalciferol 1,000 unit Cap Commonly known as:  VITAMIN D3 Take  by mouth daily. diph,Pertuss(Acell),Tet Vac-PF 2 Lf-(2.5-5-3-5 mcg)-5Lf/0.5 mL susp Commonly known as:  ADACEL  
0.5 mL by IntraMUSCular route once for 1 dose. FIBER CHOICE PO Take  by mouth. hyoscyamine SL 0.125 mg SL tablet Commonly known as:  LEVSIN/SL  
0.125 mg by SubLINGual route Before breakfast, lunch, and dinner. metoprolol tartrate 25 mg tablet Commonly known as:  LOPRESSOR  
TAKE ONE-QUARTER TABLET BY MOUTH EVERY DAY  
  
 MIRALAX 17 gram packet Generic drug:  polyethylene glycol Take 17 g by mouth daily as needed. raNITIdine 150 mg tablet Commonly known as:  ZANTAC Take 150 mg by mouth two (2) times a day. TYLENOL EXTRA STRENGTH 500 mg tablet Generic drug:  acetaminophen Take 500 mg by mouth two (2) times a day. * Notice: This list has 2 medication(s) that are the same as other medications prescribed for you. Read the directions carefully, and ask your doctor or other care provider to review them with you. Prescriptions Printed Refills diph,Pertuss,Acell,,Tet Vac-PF (ADACEL) 2 Lf-(2.5-5-3-5 mcg)-5Lf/0.5 mL susp 0 Si.5 mL by IntraMUSCular route once for 1 dose. Class: Print Route: IntraMUSCular We Performed the Following CBC W/O DIFF [70891 CPT(R)] [id number] Follow-up Instructions Return in about 1 year (around 2018). Patient Instructions Medicare Wellness Visit, Female The best way to live healthy is to have a healthy lifestyle by eating a well-balanced diet, exercising regularly, limiting alcohol and stopping smoking. Regular physical exams and screening tests are another way to keep healthy. Preventive exams provided by your health care provider can find health problems before they become diseases or illnesses. Preventive services including immunizations, screening tests, monitoring and exams can help you take care of your own health. All people over age 72 should have a pneumovax  and and a prevnar shot to prevent pneumonia. These are once in a lifetime unless you and your provider decide differently. All people over 65 should have a yearly flu shot and a tetanus vaccine every 10 years. A bone mass density to screen for osteoporosis or thinning of the bones should be done every 2 years after 65.  
 
Screening for diabetes mellitus with a blood sugar test should be done every year. Glaucoma is a disease of the eye due to increased ocular pressure that can lead to blindness and it should be done every year by an eye professional. 
 
Cardiovascular screening tests that check for elevated lipids (fatty part of blood) which can lead to heart disease and strokes should be done every 5 years. Colorectal screening that evaluates for blood or polyps in your colon should be done yearly as a stool test or every five years as a flexible sigmoidoscope or every 10 years as a colonoscopy up to age 76. Breast cancer screening with a mammogram is recommended biennially  for women age 54-69. Screening for cervical cancer with a pap smear and pelvic exam is recommended for women after age 72 years every 2 years up to age 79 or when the provider and patient decide to stop. If there is a history of cervical abnormalities or other increased risk for cancer then the test is recommended yearly. Hepatitis C screening is also recommended for anyone born between 80 through [address]. A shingles vaccine is also recommended once in a lifetime after age 61. Your Medicare Wellness Exam is recommended annually. Here is a list of your current Health Maintenance items with a due date: 
Health Maintenance Due Topic Date Due  
 DTaP/Tdap/Td  (1 - Tdap) 09/16/1956  Shingles Vaccine  09/16/1995  Annual Well Visit  09/16/2000 Advance Directives: Care Instructions Your Care Instructions An advance directive is a legal way to state your wishes at the end of your life. It tells your family and your doctor what to do if you can no longer say what you want. There are two main types of advance directives. You can change them any time that your wishes change. · A living will tells your family and your doctor your wishes about life support and other treatment.  
· A durable power of  for health care lets you name a person to make treatment decisions for you when you can't speak for yourself. This person is called a health care agent. If you do not have an advance directive, decisions about your medical care may be made by a doctor or a  who doesn't know you. It may help to think of an advance directive as a gift to the people who care for you. If you have one, they won't have to make tough decisions by themselves. Follow-up care is a key part of your treatment and safety. Be sure to make and go to all appointments, and call your doctor if you are having problems. It's also a good idea to know your test results and keep a list of the medicines you take. How can you care for yourself at home? · Discuss your wishes with your loved ones and your doctor. This way, there are no surprises. · Many states have a unique form. Or you might use a universal form that has been approved by many states. This kind of form can sometimes be completed and stored online. Your electronic copy will then be available wherever you have a connection to the Internet. In most cases, doctors will respect your wishes even if you have a form from a different state. · You don't need a  to do an advance directive. But you may want to get legal advice. · Think about these questions when you prepare an advance directive: ¨ Who do you want to make decisions about your medical care if you are not able to? Many people choose a family member or close friend. ¨ Do you know enough about life support methods that might be used? If not, talk to your doctor so you understand. ¨ What are you most afraid of that might happen? You might be afraid of having pain, losing your independence, or being kept alive by machines. ¨ Where would you prefer to die? Choices include your home, a hospital, or a nursing home. ¨ Would you like to have information about hospice care to support you and your family? ¨ Do you want to donate organs when you die? ¨ Do you want certain [employer] practices performed before you die? If so, put your wishes in the advance directive. · Read your advance directive every year, and make changes as needed. When should you call for help? Be sure to contact your doctor if you have any questions. Where can you learn more? Go to http://yoly-ajckie.info/. Enter R264 in the search box to learn more about \"Advance Directives: Care Instructions. \" Current as of: November 17, 2016 Content Version: 11.3 © 5887-6754 [employer]. Care instructions adapted under license by [employer] (which disclaims liability or warranty for this information). If you have questions about a medical condition or this instruction, always ask your healthcare professional. [address] any warranty or liability for your use of this information. 
 
----------------------------- EXAM:  DUPLEX CAROTID BILATERAL 
  
INDICATION:  h/o abnormal screening test 
  
COMPARISON:None. 
  
TECHNIQUE:  
  
Duplex gray scale and color Doppler sonography of the carotid arteries was 
performed and multiple static images were obtained.  
  
FINDINGS: 
  
CAROTID DOPPLER SCREEN: 
  
Common Carotid Artery (CCA) peak systolic velocities:    Right: 103.8    Left: 
127.5 Internal Carotid Artery (ICA) peak systolic velocities:         Right: 75.3 Left: 85.7 ICA/CCA Peak Systolic Ratios:                                            Right: 
0.7    Left: 0.7 
  
  
% STENOSIS-----ICA PEAK VELOCITY-----ICA/CCA 
----0-50-----------<125 cm/s----------------------<2.0 
----50-69----------125-230 cm/s ---------------------<2.0-4.0 
---->70---------->230 cm/s --------------------->4. 0 
  
  
There is minimal plaque formation in the left carotid bifurcation. There is no 
significant plaque in the right carotid bifurcation. The vertebral arteries 
demonstrate bilateral antegrade flow. 
  
IMPRESSION IMPRESSION: 
  
 Minimal plaque in the left carotid bifurcation. No flow-limiting stenosis. Bilateral antegrade vertebral artery flow. 
  
 
 
  
Introducing hospitals & HEALTH SERVICES! Dear [place]: 
Thank you for requesting a [employer] account. Our records indicate that you have previously registered for a [employer] account but its currently inactive. Please call our [employer] support line at [phone]. Additional Information If you have questions, please visit the Frequently Asked Questions section of the [employer] website at [email or web address] Remember, [employer] is NOT to be used for urgent needs. For medical emergencies, dial 911. Now available from your iPhone and Android! Please provide this summary of care documentation to your next provider. Your primary care clinician is listed as Jose Contreras. If you have any questions after today's visit, please call [phone].

## 2017-07-15 NOTE — ACP (ADVANCE CARE PLANNING)
Advance Care Planning (ACP) Provider Note - Comprehensive     Date of ACP Conversation: 07/14/17  Persons included in Conversation:  patient      Authorized Decision Maker (if patient is incapable of making informed decisions): This person is:  Healthcare Agent/Medical Power of  under Advance Directive        General ACP for ALL Patients with Decision Making Capacity:  Importance of advance care planning, including choosing a healthcare agent to communicate patient's healthcare decisions if patient lost the ability to make decisions, such as after a sudden illness or accident  Understanding of the healthcare agent role was assessed and information provided  Opportunity offered to explore how cultural, Faith, spiritual, or personal beliefs would affect decisions for future care  Exploration of values, goals, and preferences if recovery is not expected, even with continued medical treatment in the event of: Imminent death or severe, permanent brain injury     For Serious or Chronic Illness:  Her medical problems were reviewed with them. Understanding of CPR, goals and expected outcomes, benefits and burdens discussed. Information on CPR success rates provided (e.g. for CPR in hospital, survival to d/c at two weeks is 22%, for chronically ill or elderly/frail survival is less than 3%); Individual asked to communicate understanding of information in his/her own words. Understanding of medical conditions    Interventions Provided:  Reviewed existing Advance Directive   Recommended communicating the plan and making copies for the healthcare agent, personal physician, and others as appropriate (e.g., health system)  Recommended review of completed ACP document annually or upon change in health status      This was discussed with her today and she has an advanced directive - a copy has been provided. Reviewed DNR/DNI and patient is interested in remaining a DNR, no changes made.   She reports having DNR at home, nothing on file her, she will bring me a copy.

## 2017-08-02 ENCOUNTER — APPOINTMENT (OUTPATIENT)
Dept: GENERAL RADIOLOGY | Age: 82
DRG: 372 | End: 2017-08-02
Attending: NURSE PRACTITIONER
Payer: MEDICARE

## 2017-08-02 ENCOUNTER — APPOINTMENT (OUTPATIENT)
Dept: CT IMAGING | Age: 82
DRG: 372 | End: 2017-08-02
Attending: NURSE PRACTITIONER
Payer: MEDICARE

## 2017-08-02 ENCOUNTER — HOSPITAL ENCOUNTER (INPATIENT)
Age: 82
LOS: 2 days | Discharge: HOME | DRG: 372 | End: 2017-08-04
Attending: EMERGENCY MEDICINE | Admitting: HOSPITALIST
Payer: MEDICARE

## 2017-08-02 ENCOUNTER — TELEPHONE (OUTPATIENT)
Dept: INTERNAL MEDICINE CLINIC | Age: 82
End: 2017-08-02

## 2017-08-02 DIAGNOSIS — G45.9 TRANSIENT CEREBRAL ISCHEMIA, UNSPECIFIED TYPE: ICD-10-CM

## 2017-08-02 DIAGNOSIS — A04.72 C. DIFFICILE DIARRHEA: Primary | ICD-10-CM

## 2017-08-02 DIAGNOSIS — G45.1 HEMISPHERIC CAROTID ARTERY SYNDROME: ICD-10-CM

## 2017-08-02 LAB
ALBUMIN SERPL BCP-MCNC: 3.7 G/DL (ref 3.5–5)
ALBUMIN/GLOB SERPL: 1.2 {RATIO} (ref 1.1–2.2)
ALP SERPL-CCNC: 44 U/L (ref 45–117)
ALT SERPL-CCNC: 21 U/L (ref 12–78)
ANION GAP BLD CALC-SCNC: 7 MMOL/L (ref 5–15)
APPEARANCE UR: CLEAR
AST SERPL W P-5'-P-CCNC: 21 U/L (ref 15–37)
ATRIAL RATE: 63 BPM
BACTERIA URNS QL MICRO: NEGATIVE /HPF
BASOPHILS # BLD AUTO: 0 K/UL (ref 0–0.1)
BASOPHILS # BLD: 0 % (ref 0–1)
BILIRUB SERPL-MCNC: 0.6 MG/DL (ref 0.2–1)
BILIRUB UR QL: NEGATIVE
BUN SERPL-MCNC: 16 MG/DL (ref 6–20)
BUN/CREAT SERPL: 12 (ref 12–20)
C DIFF TOX GENS STL QL NAA+PROBE: POSITIVE
CALCIUM SERPL-MCNC: 9.3 MG/DL (ref 8.5–10.1)
CALCULATED P AXIS, ECG09: 72 DEGREES
CALCULATED R AXIS, ECG10: 70 DEGREES
CALCULATED T AXIS, ECG11: 74 DEGREES
CHLORIDE SERPL-SCNC: 98 MMOL/L (ref 97–108)
CO2 SERPL-SCNC: 26 MMOL/L (ref 21–32)
COLOR UR: NORMAL
CREAT SERPL-MCNC: 1.37 MG/DL (ref 0.55–1.02)
DIAGNOSIS, 93000: NORMAL
EOSINOPHIL # BLD: 0 K/UL (ref 0–0.4)
EOSINOPHIL NFR BLD: 1 % (ref 0–7)
EPITH CASTS URNS QL MICRO: NORMAL /LPF
ERYTHROCYTE [DISTWIDTH] IN BLOOD BY AUTOMATED COUNT: 12.7 % (ref 11.5–14.5)
GLOBULIN SER CALC-MCNC: 3 G/DL (ref 2–4)
GLUCOSE BLD STRIP.AUTO-MCNC: 107 MG/DL (ref 65–100)
GLUCOSE SERPL-MCNC: 91 MG/DL (ref 65–100)
GLUCOSE UR STRIP.AUTO-MCNC: NEGATIVE MG/DL
HCT VFR BLD AUTO: 34.6 % (ref 35–47)
HGB BLD-MCNC: 12 G/DL (ref 11.5–16)
HGB UR QL STRIP: NEGATIVE
HYALINE CASTS URNS QL MICRO: NORMAL /LPF (ref 0–5)
INR BLD: 1 (ref 0.9–1.2)
KETONES UR QL STRIP.AUTO: NEGATIVE MG/DL
LEUKOCYTE ESTERASE UR QL STRIP.AUTO: NEGATIVE
LIPASE SERPL-CCNC: 205 U/L (ref 73–393)
LYMPHOCYTES # BLD AUTO: 32 % (ref 12–49)
LYMPHOCYTES # BLD: 1.9 K/UL (ref 0.8–3.5)
MCH RBC QN AUTO: 30.8 PG (ref 26–34)
MCHC RBC AUTO-ENTMCNC: 34.7 G/DL (ref 30–36.5)
MCV RBC AUTO: 88.9 FL (ref 80–99)
MONOCYTES # BLD: 0.6 K/UL (ref 0–1)
MONOCYTES NFR BLD AUTO: 10 % (ref 5–13)
NEUTS SEG # BLD: 3.3 K/UL (ref 1.8–8)
NEUTS SEG NFR BLD AUTO: 57 % (ref 32–75)
NITRITE UR QL STRIP.AUTO: NEGATIVE
P-R INTERVAL, ECG05: 198 MS
PH UR STRIP: 7 [PH] (ref 5–8)
PLATELET # BLD AUTO: 263 K/UL (ref 150–400)
POTASSIUM SERPL-SCNC: 4 MMOL/L (ref 3.5–5.1)
PROT SERPL-MCNC: 6.7 G/DL (ref 6.4–8.2)
PROT UR STRIP-MCNC: NEGATIVE MG/DL
Q-T INTERVAL, ECG07: 398 MS
QRS DURATION, ECG06: 76 MS
QTC CALCULATION (BEZET), ECG08: 407 MS
RBC # BLD AUTO: 3.89 M/UL (ref 3.8–5.2)
RBC #/AREA URNS HPF: NORMAL /HPF (ref 0–5)
SERVICE CMNT-IMP: ABNORMAL
SODIUM SERPL-SCNC: 131 MMOL/L (ref 136–145)
SP GR UR REFRACTOMETRY: <1.005 (ref 1–1.03)
TROPONIN I SERPL-MCNC: <0.04 NG/ML
UROBILINOGEN UR QL STRIP.AUTO: 0.2 EU/DL (ref 0.2–1)
VENTRICULAR RATE, ECG03: 63 BPM
WBC # BLD AUTO: 5.8 K/UL (ref 3.6–11)
WBC URNS QL MICRO: NORMAL /HPF (ref 0–4)

## 2017-08-02 PROCEDURE — 85610 PROTHROMBIN TIME: CPT

## 2017-08-02 PROCEDURE — 87045 FECES CULTURE AEROBIC BACT: CPT | Performed by: NURSE PRACTITIONER

## 2017-08-02 PROCEDURE — 74011000250 HC RX REV CODE- 250: Performed by: HOSPITALIST

## 2017-08-02 PROCEDURE — 82962 GLUCOSE BLOOD TEST: CPT

## 2017-08-02 PROCEDURE — 74011250636 HC RX REV CODE- 250/636: Performed by: HOSPITALIST

## 2017-08-02 PROCEDURE — 36415 COLL VENOUS BLD VENIPUNCTURE: CPT | Performed by: NURSE PRACTITIONER

## 2017-08-02 PROCEDURE — 85025 COMPLETE CBC W/AUTO DIFF WBC: CPT | Performed by: NURSE PRACTITIONER

## 2017-08-02 PROCEDURE — 74176 CT ABD & PELVIS W/O CONTRAST: CPT

## 2017-08-02 PROCEDURE — 84484 ASSAY OF TROPONIN QUANT: CPT | Performed by: NURSE PRACTITIONER

## 2017-08-02 PROCEDURE — 93005 ELECTROCARDIOGRAM TRACING: CPT

## 2017-08-02 PROCEDURE — 96360 HYDRATION IV INFUSION INIT: CPT

## 2017-08-02 PROCEDURE — 81001 URINALYSIS AUTO W/SCOPE: CPT | Performed by: NURSE PRACTITIONER

## 2017-08-02 PROCEDURE — 65660000000 HC RM CCU STEPDOWN

## 2017-08-02 PROCEDURE — 74011250637 HC RX REV CODE- 250/637: Performed by: HOSPITALIST

## 2017-08-02 PROCEDURE — 83690 ASSAY OF LIPASE: CPT | Performed by: NURSE PRACTITIONER

## 2017-08-02 PROCEDURE — 99285 EMERGENCY DEPT VISIT HI MDM: CPT

## 2017-08-02 PROCEDURE — 70450 CT HEAD/BRAIN W/O DYE: CPT

## 2017-08-02 PROCEDURE — 74011250636 HC RX REV CODE- 250/636: Performed by: EMERGENCY MEDICINE

## 2017-08-02 PROCEDURE — 71010 XR CHEST PORT: CPT

## 2017-08-02 PROCEDURE — 80053 COMPREHEN METABOLIC PANEL: CPT | Performed by: NURSE PRACTITIONER

## 2017-08-02 PROCEDURE — 87493 C DIFF AMPLIFIED PROBE: CPT | Performed by: NURSE PRACTITIONER

## 2017-08-02 RX ORDER — FAMOTIDINE 20 MG/1
20 TABLET, FILM COATED ORAL DAILY
Status: DISCONTINUED | OUTPATIENT
Start: 2017-08-03 | End: 2017-08-04 | Stop reason: HOSPADM

## 2017-08-02 RX ORDER — LORAZEPAM 1 MG/1
1 TABLET ORAL
Status: DISCONTINUED | OUTPATIENT
Start: 2017-08-02 | End: 2017-08-04 | Stop reason: HOSPADM

## 2017-08-02 RX ORDER — CALCIUM CARBONATE 200(500)MG
300 TABLET,CHEWABLE ORAL DAILY
Status: DISCONTINUED | OUTPATIENT
Start: 2017-08-03 | End: 2017-08-04 | Stop reason: HOSPADM

## 2017-08-02 RX ORDER — SODIUM CHLORIDE 9 MG/ML
100 INJECTION, SOLUTION INTRAVENOUS CONTINUOUS
Status: DISCONTINUED | OUTPATIENT
Start: 2017-08-02 | End: 2017-08-04 | Stop reason: HOSPADM

## 2017-08-02 RX ORDER — LORAZEPAM 0.5 MG/1
0.5 TABLET ORAL
COMMUNITY
End: 2019-08-30

## 2017-08-02 RX ORDER — BISMUTH SUBSALICYLATE 262 MG/1
2 TABLET, CHEWABLE ORAL
COMMUNITY
End: 2017-08-04

## 2017-08-02 RX ORDER — CALCIUM CARBONATE 750 MG/1
1 TABLET, CHEWABLE ORAL DAILY
COMMUNITY
End: 2018-12-06

## 2017-08-02 RX ORDER — MELATONIN
1000 DAILY
COMMUNITY
End: 2019-12-02

## 2017-08-02 RX ORDER — DEXTROMETHORPHAN HYDROBROMIDE, GUAIFENESIN 5; 100 MG/5ML; MG/5ML
650 LIQUID ORAL
COMMUNITY
End: 2019-08-30 | Stop reason: SDUPTHER

## 2017-08-02 RX ORDER — LORAZEPAM 1 MG/1
1 TABLET ORAL
COMMUNITY
End: 2017-08-04

## 2017-08-02 RX ORDER — LORAZEPAM 0.5 MG/1
0.5 TABLET ORAL
Status: DISCONTINUED | OUTPATIENT
Start: 2017-08-03 | End: 2017-08-04 | Stop reason: HOSPADM

## 2017-08-02 RX ORDER — RANITIDINE 150 MG/1
150 TABLET, FILM COATED ORAL
COMMUNITY
End: 2019-12-02

## 2017-08-02 RX ORDER — ACETAMINOPHEN 325 MG/1
650 TABLET ORAL
Status: DISCONTINUED | OUTPATIENT
Start: 2017-08-02 | End: 2017-08-04 | Stop reason: HOSPADM

## 2017-08-02 RX ORDER — AMLODIPINE BESYLATE 5 MG/1
5 TABLET ORAL DAILY
Status: DISCONTINUED | OUTPATIENT
Start: 2017-08-03 | End: 2017-08-04 | Stop reason: HOSPADM

## 2017-08-02 RX ORDER — HYOSCYAMINE SULFATE 0.12 MG/1
0.12 TABLET SUBLINGUAL
COMMUNITY
End: 2019-12-02

## 2017-08-02 RX ORDER — RANITIDINE 150 MG/1
150 TABLET, FILM COATED ORAL
Status: DISCONTINUED | OUTPATIENT
Start: 2017-08-02 | End: 2017-08-02 | Stop reason: CLARIF

## 2017-08-02 RX ORDER — MELATONIN
1000 DAILY
Status: DISCONTINUED | OUTPATIENT
Start: 2017-08-03 | End: 2017-08-04 | Stop reason: HOSPADM

## 2017-08-02 RX ORDER — MULTIVIT WITH MINERALS/HERBS
1 TABLET ORAL DAILY
Status: DISCONTINUED | OUTPATIENT
Start: 2017-08-03 | End: 2017-08-04 | Stop reason: HOSPADM

## 2017-08-02 RX ORDER — METRONIDAZOLE 500 MG/100ML
500 INJECTION, SOLUTION INTRAVENOUS EVERY 8 HOURS
Status: DISCONTINUED | OUTPATIENT
Start: 2017-08-02 | End: 2017-08-04 | Stop reason: CLARIF

## 2017-08-02 RX ADMIN — LORAZEPAM 1 MG: 1 TABLET ORAL at 21:29

## 2017-08-02 RX ADMIN — METRONIDAZOLE 500 MG: 500 INJECTION, SOLUTION INTRAVENOUS at 18:36

## 2017-08-02 RX ADMIN — VANCOMYCIN HYDROCHLORIDE 250 MG: 1 INJECTION, POWDER, LYOPHILIZED, FOR SOLUTION INTRAVENOUS at 18:35

## 2017-08-02 RX ADMIN — SODIUM CHLORIDE 1000 ML: 900 INJECTION, SOLUTION INTRAVENOUS at 14:33

## 2017-08-02 RX ADMIN — SODIUM CHLORIDE 100 ML/HR: 900 INJECTION, SOLUTION INTRAVENOUS at 18:03

## 2017-08-02 NOTE — ED NOTES
Pt 20G IV in R AC is patent. Site is clean dry and intact. VSS. O2 sats 98% on RA. NIH 0 at this time. Pt in stable condition at the time of transport to inpatient.  Pt to [facility] on monitor with paramedic

## 2017-08-02 NOTE — Clinical Note
Patient Class[de-identified] Observation [095] Type of Bed: Telemetry [19] Reason for Observation: work up for diarrhea and TIA Admitting Diagnosis: TIA (transient ischemic attack) [454011] Admitting Physician: Maddi Doll [[id number]] Attending Physician: Maddi Doll [[id number]]

## 2017-08-02 NOTE — PROGRESS NOTES
Admission Medication Reconciliation:    Information obtained from: Patient, patient's list (see electronically uploaded file on 8/2)    Significant PMH/Disease States:   Past Medical History:   Diagnosis Date    Allergic rhinitis, cause unspecified     Anxiety     Back pain     Cancer (HCC)     basal cell face, back, neck    Cancer ([facility] [address]     squamous cell leg    DJD (degenerative joint disease) of cervical spine     Fibromyalgia     Hypercholesterolemia     Hypertension     Hyponatremia     IBS (irritable bowel syndrome)     Irritable bowel syndrome with diarrhea 6/29/2016    GI- Dr Tali Deras Kidney disease, chronic, stage III (GFR 30-59 ml/min) 6/2011    Dr. Valderrama [facility]       Chief Complaint for this Admission:  Abdominal Pain      Allergies:  Latex; Dilaudid [hydromorphone (bulk)]; Altace [ramipril]; Ambien [zolpidem]; Ascensia autodisc test [blood sugar diagnostic, disc-type]; Aspirin; Astelin [azelastine]; Atenolol; Banex la; Arnetha Crease; Carbocaine [mepivacaine]; Cleocin [clindamycin hcl]; Codeine; Codeine-asa-salicyl-acetam-caf; Cortisporin [neomy-polymyxinb-bacitracin-hc]; Cymbalta [duloxetine]; Cytotec [misoprostol]; Darvocet a500 [propoxyphene n-acetaminophen]; Darvon [propoxyphene]; Dilaudid [hydromorphone]; Effexor [venlafaxine]; Epinephrine; Estra-d; Estrace [estradiol]; Fentanyl; Flexeril [cyclobenzaprine]; Flonase [fluticasone]; Flunisolide; Guiatussin w/codeine; Ketek [telithromycin]; Bonnita Hof; Lexapro [escitalopram]; Lodine [etodolac]; Morphine; Nsaids (non-steroidal anti-inflammatory drug); Other medication; Paxil [paroxetine hcl]; Pcn [penicillins]; Percocet [oxycodone-acetaminophen]; Percodan [oxycodone hcl-oxycodone-asa]; Pneumococcal 23-davida ps vaccine; Proctocream-hc [hydrocortisone]; Prozac [fluoxetine]; Pseudofed; Quinidine; Reglan [metoclopramide]; Remeron [mirtazapine]; Resaid; Robaxin [methocarbamol]; Sonata [zaleplon];  Sudafed [pseudoephedrine hcl]; Sulfa (sulfonamide antibiotics); Surmontil [trimipramine]; Talwin [pentazocine lactate]; Toradol [ketorolac tromethamine]; Ultram [tramadol]; Vasoconstrictor drug; Vigamox [moxifloxacin]; Vioxx [rofecoxib]; Voltaren [diclofenac sodium]; Wellbutrin [bupropion hcl]; Wygesic; Zelnorm [tegaserod hydrogen maleate]; and Zoloft [sertraline]    Prior to Admission Medications:   Prior to Admission Medications   Prescriptions Last Dose Informant Patient Reported? Taking? BENEFIBER, GUAR GUM, PO 2017 at Unknown time  Yes Yes   Sig: Take  by mouth every morning. FERROUS FUMARATE/VIT BCOMP,C (SUPER B COMPLEX PO) 2017 at Unknown time  Yes Yes   Sig: Take  by mouth daily (after dinner). LACTOBACILLUS ACIDOPHILUS (PROBIOTIC PO) 2017 at Unknown time  Yes Yes   Sig: Take  by mouth daily. LORazepam (ATIVAN) 0.5 mg tablet 2017 at AM  Yes Yes   Sig: Take 0.5 mg by mouth every morning. LORazepam (ATIVAN) 1 mg tablet 2017 at PM  Yes Yes   Sig: Take 1 mg by mouth nightly. acetaminophen (TYLENOL ARTHRITIS PAIN) 650 mg CR tablet 2017 at PM  Yes Yes   Sig: Take 650 mg by mouth every six (6) hours as needed for Pain. amLODIPine (NORVASC) 5 mg tablet 2017 at AM  No Yes   Sig: TAKE 1 TAB BY MOUTH DAILY. bismuth subsalicylate (PEPTO-BISMOL) 262 mg chew 2017 at AM  Yes Yes   Sig: Take 2 Tabs by mouth every three (3) hours as needed. calcium carbonate (TUMS) 300 mg (750 mg) chewable tablet 2017 at Unknown time  Yes Yes   Sig: Take 1 Tab by mouth daily. cholecalciferol (VITAMIN D3) 1,000 unit tablet 2017 at Unknown time  Yes Yes   Sig: Take 1,000 Units by mouth daily. hyoscyamine SL (LEVSIN/SL) 0.125 mg SL tablet 2017 at AM  Yes Yes   Si.125 mg by SubLINGual route Before breakfast, lunch, and dinner.    metoprolol tartrate (LOPRESSOR) 25 mg tablet 2017 at PM  No Yes   Sig: TAKE ONE-QUARTER TABLET BY MOUTH EVERY DAY   polyethylene glycol (MIRALAX) 17 gram packet Unknown at Unknown time  Yes No   Sig: Take 17 g by mouth daily as needed. raNITIdine (ZANTAC) 150 mg tablet Unknown at Unknown time  Yes No   Sig: Take 150 mg by mouth Before breakfast and dinner. Facility-Administered Medications: None         Comments/Recommendations:   Patient was engaged during the interview. She only reported taking Pepto-Bismol this morning. When questioned on her drug allergies, she deferred  to her preprinted drug allergy list (see Media).     Pt reported \"cutting back\" on some of her medications (except: for lorazepam, metoprolol, amlodipine, hyoscyamine)        Padilla Killian  Student Pharmacist, Class of 2017

## 2017-08-02 NOTE — ED NOTES
Pt speaking in incomprehensible words onset 1155. Tiarra Cleary NP notified and at bedside. Code S called. Pt to CT on monitor with RN.  [facility] [[email or web address]] 5

## 2017-08-02 NOTE — PROGRESS NOTES
Primary Nurse Sammy Chapa RN and Cathy Sullivan RN performed a dual skin assessment on this patient No impairment noted  Magan score is 23

## 2017-08-02 NOTE — ED PROVIDER NOTES
HPI Comments: This is an 79 y/o female PMHx fibromyalgia, anxiety, IBS, hyponatremia, DJD, HTN, allergic rhinitis, hypercholesterolemia, skin cancer, back pain, CKD, IBS, gastroparesis, PE, depression, c. diff who presents to the ED with a chief complaint of diarrhea. Patient states onset of diarrhea 5 days ago. She has several non bloody loose stools in the morning with improvement of the diarrhea over the course of the day. She notes associated lower abdominal discomfort that feels like cramping. She rates current pain as 5/10. She notes associated nausea. She denies vomiting, urinary sx, CP, SOB, sick contacts, recent abx use. She has taken Pepto Bismol for her sx. SurgHx: rectocele repair, hysterectomy, knee arthroscopy, bladder suspension, knee replacement, cholecystectomy, EGD and colonoscopy  SocHx: non smoker,      Patient is a 80 y.o. female presenting with abdominal pain. Abdominal Pain    Associated symptoms include diarrhea and nausea. Pertinent negatives include no fever, no vomiting, no dysuria and no chest pain.         Past Medical History:   Diagnosis Date    Allergic rhinitis, cause unspecified     Anxiety     Back pain     Cancer (HCC)     basal cell face, back, neck    Cancer (HCC)     squamous cell leg    DJD (degenerative joint disease) of cervical spine     Fibromyalgia     Hypercholesterolemia     Hypertension     Hyponatremia     IBS (irritable bowel syndrome)     Irritable bowel syndrome with diarrhea 6/29/2016    GI- Dr [facility]    Kidney disease, chronic, stage III (GFR 30-59 ml/min) 6/2011    Dr. Yumiko Rodríguez) Braydon Yoder       Past Surgical History:   Procedure Laterality Date    COLONOSCOPY  1/20/11    diverticulosis    EGD  1/20/11    schatzki's ring--clear on barium swallow 7/11    HX CHOLECYSTECTOMY  5/10    HX HYSTERECTOMY      hysterectomy    HX KNEE ARTHROSCOPY Left 1990    HX KNEE REPLACEMENT Right 9/29/09    HX OTHER SURGICAL      skin cancer removed: L leg    HX UROLOGICAL  1995    bladder suspension    REPAIR OF RECTOCELE           Family History:   Problem Relation Age of Onset    Cancer Mother      lung    Cancer Maternal Aunt      lung    Cancer Maternal Uncle      lung    Cancer Sister      breast    Other Brother      has to have blood tx every other week    Cancer Son 36     prostate, recurrence 7 yrs later   [employer] Arthritis-osteo Son      hips    Arthritis-osteo Daughter     Arthritis-osteo Daughter        Social History     Social History    Marital status:      Spouse name: N/A    Number of children: N/A    Years of education: N/A     Occupational History    Not on file. Social History Main Topics    Smoking status: Never Smoker    Smokeless tobacco: Never Used    Alcohol use No      Comment: rarely    Drug use: No    Sexual activity: No     Other Topics Concern    Not on file     Social History Narrative         ALLERGIES: Latex; Dilaudid [hydromorphone (bulk)]; Altace [ramipril]; Ambien [zolpidem]; Ascensia autodisc test [blood sugar diagnostic, disc-type]; Aspirin; Astelin [azelastine]; Atenolol; Banex la; Gonzella Older; Carbocaine [mepivacaine]; Cleocin [clindamycin hcl]; Codeine; Codeine-asa-salicyl-acetam-caf; Cortisporin [neomy-polymyxinb-bacitracin-hc]; Cymbalta [duloxetine]; Cytotec [misoprostol]; Darvocet a500 [propoxyphene n-acetaminophen]; Darvon [propoxyphene]; Dilaudid [hydromorphone]; Effexor [venlafaxine]; Epinephrine; Estra-d; Estrace [estradiol]; Fentanyl; Flexeril [cyclobenzaprine]; Flonase [fluticasone]; Flunisolide; Guiatussin w/codeine; Ketek [telithromycin]; Billie Fraction; Lexapro [escitalopram]; Lodine [etodolac]; Morphine; Nsaids (non-steroidal anti-inflammatory drug); Other medication; Paxil [paroxetine hcl]; Pcn [penicillins]; Percocet [oxycodone-acetaminophen]; Percodan [oxycodone hcl-oxycodone-asa]; Pneumococcal 23-davida ps vaccine; Proctocream-hc [hydrocortisone];  Prozac [fluoxetine]; Pseudofed; Quinidine; Reglan [metoclopramide]; Remeron [mirtazapine]; Resaid; Robaxin [methocarbamol]; Sonata [zaleplon]; Sudafed [pseudoephedrine hcl]; Sulfa (sulfonamide antibiotics); Surmontil [trimipramine]; Talwin [pentazocine lactate]; Toradol [ketorolac tromethamine]; Ultram [tramadol]; Vasoconstrictor drug; Vigamox [moxifloxacin]; Vioxx [rofecoxib]; Voltaren [diclofenac sodium]; Wellbutrin [bupropion hcl]; Wygesic; Zelnorm [tegaserod hydrogen maleate]; and Zoloft [sertraline]    Review of Systems   Constitutional: Negative for fever. Respiratory: Negative for shortness of breath. Cardiovascular: Negative for chest pain. Gastrointestinal: Positive for abdominal pain, diarrhea and nausea. Negative for blood in stool and vomiting. Genitourinary: Negative for dysuria. Allergic/Immunologic: Negative for immunocompromised state. All other systems reviewed and are negative. Vitals:    08/02/17 1057   BP: 141/75   Pulse: 77   Resp: 16   Temp: 97.8 °F (36.6 °C)   SpO2: 100%   Weight: 48.5 kg (107 lb)   Height: 5' 1.75\" (1.568 m)            Physical Exam   Constitutional: She is oriented to person, place, and time. She appears well-developed and well-nourished. Appears uncomfortable  Non toxic   HENT:   Head: Normocephalic and atraumatic. Eyes: Conjunctivae are normal. Right eye exhibits no discharge. Left eye exhibits no discharge. Neck: Normal range of motion. Neck supple. Cardiovascular: Normal rate, regular rhythm and normal heart sounds. Exam reveals no gallop and no friction rub. No murmur heard. Pulmonary/Chest: Effort normal and breath sounds normal. No respiratory distress. She has no wheezes. She has no rales. Abdominal: Soft. Bowel sounds are normal. She exhibits no distension. There is no rebound and no guarding. LLQ, suprapubic, RLQ tenderness  Soft  No peritoneal signs   Musculoskeletal: Normal range of motion. She exhibits no edema or tenderness. Neurological: She is alert and oriented to person, place, and time. She has normal strength. She displays no atrophy and no tremor. She exhibits normal muscle tone. She displays no seizure activity. GCS eye subscore is 4. GCS verbal subscore is 5. GCS motor subscore is 6. Skin: Skin is warm and dry. She is not diaphoretic. Psychiatric: She has a normal mood and affect. Her behavior is normal. Judgment and thought content normal.   Nursing note and vitals reviewed. MDM  Number of Diagnoses or Management Options     Amount and/or Complexity of Data Reviewed  Clinical lab tests: reviewed and ordered  Tests in the radiology section of CPT®: ordered and reviewed  Discuss the patient with other providers: yes (ED attending, Dr. Duane Ritchie)  Independent visualization of images, tracings, or specimens: yes      ED Course     79 y/o female with lower abd pain, nausea, diarrhea x 5 days. CT abd/pelvis, cbc, cmp, lipase, urinalysis ordered. Progress note: At  RN notified me of onset of aphasia. I went immediately to the bedside, pt moderately aphasic. Patient also with limb ataxia. NIH 5. Code S called immediately, patient to CT. RN to CT with patient. Symptoms resolved completely at 1209. Patient re-evaluated. Aphasia resolved, all sx resolved. Normal strength, no drift. Pt resting comfortably. No distress. I spoke with Dr. Charlene Cardoza, tele neurologist. As sx have resolved, does not require acute intervention but recommends pursuing further TIA workup, re-consult if any new sx/changes/concerns. Will do frequent neuro checks. Pt was discussed with ED attending Dr. Duane Ritchie. Joe Vaz NP      79 y/o with abd pain and diarrhea - C.diff positive - will admit  Also with aphasic episode. D/w hospitalist Dr. Magdiel Harry  the teleneurologist's recommendation of pursuing stroke workup. Joe Vaz NP  4:54 PM      Procedures        ED EKG interpretation: 1226  Rhythm: normal sinus rhythm; and regular .  Rate (approx.): 63 bpm; Axis: normal; ST/T wave: non-specific changes; Note written by Azra Welch. Alphia Leyden, as dictated by Mu Hester MD 12:29 PM

## 2017-08-02 NOTE — IP AVS SNAPSHOT
[address] [facility]. [address] 
[phone] Patient: Colby Galvez MRN: [id number] You are allergic to the following Allergen Reactions Latex Itching Dilaudid (Hydromorphone (Bulk)) Other (comments) ? loss of consciousness Altace (Ramipril) Unknown (comments) Ambien (Zolpidem) Unknown (comments) Ascensia Autodisc Test (Blood Sugar Diagnostic, Disc-Type) Unknown (comments) Aspirin Unknown (comments) Astelin (Azelastine) Unknown (comments) Atenolol Unknown (comments) Banex La Unknown (comments) Benicar (Olmesartan) Unknown (comments) Carbocaine (Mepivacaine) Unknown (comments) Cleocin (Clindamycin Hcl) Hives Codeine Nausea and Vomiting Codeine-Asa-Salicyl-Acetam-Caf Other (comments) Stomach pain Cortisporin (Neomy-Polymyxinb-Bacitracin-Hc) Unknown (comments) Cymbalta (Duloxetine) Other (comments) Cytotec (Misoprostol) Unknown (comments) Dilaudid (Hydromorphone) Unknown (comments) Patient states that she was unable to respond, but was breathing Effexor (Venlafaxine) Unknown (comments) Epinephrine Unknown (comments) Heart racing Estra-D Unknown (comments) Estrace (Estradiol) Unknown (comments) Fentanyl Nausea and Vomiting Unknown (comments)  
 shakes Flexeril (Cyclobenzaprine) Unable to Obtain \"drugs me\" Flonase (Fluticasone) Unknown (comments) Flunisolide Unknown (comments) Guiatussin W/Codeine Unknown (comments) Ketek (Telithromycin) Nausea Only Klonopin (Clonazepam) Unknown (comments) Lexapro (Escitalopram) Nausea and Vomiting Lodine (Etodolac) Nausea and Vomiting Morphine Unknown (comments) Nsaids (Non-Steroidal Anti-Inflammatory Drug) Nausea and Vomiting Other Medication Unknown (comments) Gideon Car Paxil (Paroxetine Hcl) Nausea and Vomiting Pcn (Penicillins) Hives Percocet (Oxycodone-Acetaminophen) Unknown (comments) Percodan (Oxycodone Hcl-Oxycodone-Asa) Unknown (comments) Pneumococcal 23-Mary Ps Vaccine Swelling Proctocream-Hc (Hydrocortisone) Unknown (comments) Prozac (Fluoxetine) Nausea Only Suicidal visions Pseudoephedrine Other (comments) Quinidine Unknown (comments) Reglan (Metoclopramide) Unknown (comments) Remeron (Mirtazapine) Other (comments) Resaid Other (comments)  
 inflammed eyes Robaxin (Methocarbamol) Unknown (comments) Sonata (Zaleplon) Unknown (comments) Sulfa (Sulfonamide Antibiotics) Hives Surmontil (Trimipramine) Nausea and Vomiting Talwin (Pentazocine Lactate) Unknown (comments) Toradol (Ketorolac Tromethamine) Nausea and Vomiting Ultram (Tramadol) Unknown (comments) Vasoconstrictor Drug Unknown (comments) Vigamox (Moxifloxacin) Rash Swelling Vioxx (Rofecoxib) Unknown (comments) Voltaren (Diclofenac Sodium) Itching Wellbutrin (Bupropion Hcl) Nausea and Vomiting Zelnorm (Tegaserod Hydrogen Maleate) Unknown (comments) Zoloft (Sertraline) Nausea and Vomiting Recent Documentation Height Weight Breastfeeding? BMI OB Status Smoking Status 1.575 m 56.1 kg No 22.63 kg/m2 Hysterectomy Never Smoker Unresulted Labs Order Current Status CULTURE, STOOL Preliminary result Emergency Contacts Name Discharge Info Relation Home Work Mobile Andrew Longoria DISCHARGE CAREGIVER [3] Spouse [3] [phone] Ocde Soulier  Daughter [21] [phone] About your hospitalization You were admitted on:  August 2, 2017 You last received care in the:  [facility] 6S NEURO-SCI TELE You were discharged on:  August 4, 2017 Unit phone number:  [phone] Why you were hospitalized Your primary diagnosis was:  C. Difficile Diarrhea Your diagnoses also included:  Tia (Transient Ischemic Attack) Providers Seen During Your Hospitalizations Provider Role Specialty Primary office phone Krupa Rincon MD Attending Provider Emergency Medicine [phone] Echo Marsh MD Attending Provider Internal Medicine [phone] Sesar Barajas MD Attending Provider Internal Medicine [phone] Your Primary Care Physician (PCP) Primary Care Physician Office Phone Office Fax Nora Cortes [phone] Follow-up Information Follow up With Details Comments Contact [facility] Go on 8/8/2017 Appointment Time:  4:00 pm [address] [facility] [address] 
[phone] Pa Laughlin DO  follow up in 4-6 weeks [address] Neurology [facility] at [facility] [address] [place] [address] 
[phone] Erickson Ayala MD  follow up in 2 weeks 200 [facility] [address] [place] [address] 
[phone] Bang Alvarez MD   [place] [address] [address] [facility] Internal Medicine [facility] 57 
[phone] Your Appointments Tuesday August 08, 2017  4:00 PM EDT TRANSITIONAL CARE MANAGEMENT with Bang Alvarez MD  
[facility] Internal Medicine [address] [place] [address]) [address] [facility]  
[phone] Current Discharge Medication List  
  
START taking these medications Dose & Instructions Dispensing Information Comments Morning Noon Evening Bedtime  
 aspirin delayed-release 81 mg tablet Your last dose was: Your next dose is: Dose:  81 mg Take 1 Tab by mouth daily. Quantity:  30 Tab Refills:  0  
     
   
   
   
  
 atorvastatin 20 mg tablet Commonly known as:  LIPITOR Your last dose was: Your next dose is:    
   
   
 Dose:  20 mg Take 1 Tab by mouth daily. Quantity:  30 Tab Refills:  0  
     
   
   
   
  
 metroNIDAZOLE 500 mg tablet Commonly known as:  FLAGYL Your last dose was: Your next dose is:    
   
   
 Dose:  500 mg Take 1 Tab by mouth three (3) times daily for 12 days. Quantity:  36 Tab Refills:  0  
     
   
   
   
  
 vancomycin 250 mg capsule Commonly known as:  [employer] Your last dose was: Your next dose is:    
   
   
 Dose:  250 mg Take 1 Cap by mouth four (4) times daily for 12 days. Quantity:  48 Cap Refills:  0 CONTINUE these medications which have CHANGED Dose & Instructions Dispensing Information Comments Morning Noon Evening Bedtime ATIVAN 0.5 mg tablet Generic drug:  LORazepam  
What changed:  Another medication with the same name was removed. Continue taking this medication, and follow the directions you see here. Your last dose was: Your next dose is:    
   
   
 Dose:  0.5 mg Take 0.5 mg by mouth every morning. Refills:  0 CONTINUE these medications which have NOT CHANGED Dose & Instructions Dispensing Information Comments Morning Noon Evening Bedtime  
 amLODIPine 5 mg tablet Commonly known as:  Remonia Grates Your last dose was: Your next dose is: TAKE 1 TAB BY MOUTH DAILY. Quantity:  90 Tab Refills:  1 BENEFIBER (GUAR GUM) PO Your last dose was: Your next dose is: Take  by mouth every morning. Refills:  0 LEVSIN/SL 0.125 mg SL tablet Generic drug:  hyoscyamine SL Your last dose was: Your next dose is: Dose:  0.125 mg  
0.125 mg by SubLINGual route Before breakfast, lunch, and dinner. Refills:  0  
     
   
   
   
  
 metoprolol tartrate 25 mg tablet Commonly known as:  LOPRESSOR Your last dose was: Your next dose is: TAKE ONE-QUARTER TABLET BY MOUTH EVERY DAY Quantity:  90 Tab Refills:  1 PROBIOTIC PO Your last dose was: Your next dose is: Take  by mouth daily. Refills:  0 SUPER B COMPLEX PO Your last dose was: Your next dose is: Take  by mouth daily (after dinner). Refills:  0  
     
   
   
   
  
 TUMS 300 mg (750 mg) chewable tablet Generic drug:  calcium carbonate Your last dose was: Your next dose is:    
   
   
 Dose:  1 Tab Take 1 Tab by mouth daily. Refills:  0  
     
   
   
   
  
 TYLENOL ARTHRITIS PAIN 650 mg CR tablet Generic drug:  acetaminophen Your last dose was: Your next dose is:    
   
   
 Dose:  650 mg Take 650 mg by mouth every six (6) hours as needed for Pain. Refills:  0  
     
   
   
   
  
 VITAMIN D3 1,000 unit tablet Generic drug:  cholecalciferol Your last dose was: Your next dose is:    
   
   
 Dose:  1000 Units Take 1,000 Units by mouth daily. Refills:  0  
     
   
   
   
  
 ZANTAC 150 mg tablet Generic drug:  raNITIdine Your last dose was: Your next dose is:    
   
   
 Dose:  150 mg Take 150 mg by mouth Before breakfast and dinner. Refills:  0 STOP taking these medications   
 bismuth subsalicylate 861 mg Chew Commonly known as:  PEPTO-BISMOL MIRALAX 17 gram packet Generic drug:  polyethylene glycol Where to Get Your Medications Information on where to get these meds will be given to you by the nurse or doctor. ! Ask your nurse or doctor about these medications aspirin delayed-release 81 mg tablet  
 atorvastatin 20 mg tablet  
 metroNIDAZOLE 500 mg tablet  
 vancomycin 250 mg capsule Discharge Instructions Discharge Instructions PATIENT ID: Efrain Le MRN: [id number] YOB: 1935 DATE OF ADMISSION: 8/2/2017 10:57 AM   
DATE OF DISCHARGE: 8/4/2017 PRIMARY CARE PROVIDER: Ryland Ledezma MD  
 
ATTENDING PHYSICIAN: Dale Marino MD 
DISCHARGING PROVIDER: Cortney Doyle NP To contact this individual call [phone] and ask the  to page. If unavailable ask to be transferred the Adult Hospitalist Department. DISCHARGE DIAGNOSES: C-Diff CONSULTATIONS: IP CONSULT TO HOSPITALIST 
IP CONSULT TO NEUROLOGY PROCEDURES/SURGERIES: * No surgery found * PENDING TEST RESULTS:  
At the time of discharge the following test results are still pending: none FOLLOW UP APPOINTMENTS:  
Follow-up Information Follow up With Details Comments Contact [facility] INTERNAL MEDICINE  on 8/8/2017 Appointment Time:  4:00 pm 330 [facility] [address] [facility] [address] 
[phone] Nikos Aguirre DO  follow up in 4-6 weeks [address] [place] [address] Neurology Clinic at [address] 
[phone] Liseth Valenzuela MD  follow up in 2 weeks [address] [place] [address] [address] [place] [address] 
[phone] ADDITIONAL CARE RECOMMENDATIONS: 
Complete antibiotics for your C-Diff, make sure to finish all of them, follow up with Dr. Nayeli He in 2 weeks Your MRI was negative for stroke, take a baby aspirin daily for stroke prevention and atorvastatin 20 mg daily for your LDL which is your bad cholesterol, your level was 86.2, the recommendation is below 70. DIET: As you were previously ACTIVITY: As previouisly WOUND CARE: none EQUIPMENT needed: none DISCHARGE MEDICATIONS: 
 See Medication Reconciliation Form · It is important that you take the medication exactly as they are prescribed. · Keep your medication in the bottles provided by the pharmacist and keep a list of the medication names, dosages, and times to be taken in your wallet. · Do not take other medications without consulting your doctor. NOTIFY YOUR PHYSICIAN FOR ANY OF THE FOLLOWING:  
Fever over 101 degrees for 24 hours. Chest pain, shortness of breath, fever, chills, nausea, vomiting, diarrhea, change in mentation, falling, weakness, bleeding. Severe pain or pain not relieved by medications. Or, any other signs or symptoms that you may have questions about. DISPOSITION: 
xx  Home With: 
 OT  PT  [facility]  RN  
  
 SNF/Inpatient Rehab/LTAC Independent/assisted living Hospice Other: CDMP Checked:  
Yes x PROBLEM LIST Updated: 
Yes x Signed:  
Jac Bumpers, NP 
8/4/2017 
9:42 AM 
 
 
 
  
Clostridium Difficile Colitis: Care Instructions Your Care Instructions Clostridium difficile (also called C. difficile) are bacteria that can cause swelling and irritation of the large intestine, or colon. This inflammation is also called colitis. It can cause diarrhea, fever, and belly cramps. You may get C. difficile colitis if you take antibiotics. The infection is most common in people who are taking antibiotics while in the hospital. It is also common in older people in hospitals and nursing homes. Severe disease could cause the colon to swell to many times its normal size (toxic megacolon). This can cause death and needs emergency treatment. You may have a swollen belly that is painful or tender, a rapid heartbeat, and a fever. Follow-up care is a key part of your treatment and safety. Be sure to make and go to all appointments, and call your doctor if you are having problems. It's also a good idea to know your test results and keep a list of the medicines you take. How can you care for yourself at home? · Your doctor may give you antibiotics to treat C. difficile colitis. If your doctor prescribes an antibiotic, he or she will give you a different antibiotic than the one that caused your infection. Take your antibiotics as directed. Do not stop taking them just because you feel better. You need to take the full course of antibiotics. · To prevent dehydration, drink plenty of fluids, enough so that your urine is light yellow or clear like water. Choose water and other caffeine-free clear liquids until you feel better. If you have kidney, heart, or liver disease and have to limit fluids, talk with your doctor before you increase the amount of fluids you drink. · Begin eating small amounts of mild foods, if you feel like it. Try yogurt that has live cultures of lactobacillus (check the label). ¨ Avoid spicy foods, fruits, alcohol, and caffeine until 48 hours after all symptoms go away. ¨ Avoid chewing gum that contains sorbitol. ¨ Avoid dairy products (except for yogurt with lactobacillus) while you have diarrhea and for 3 days after symptoms go away. · To prevent the spread of C. difficile, practice good hygiene. Keep your hands clean by washing them well and often with soap and clean, running water. Alcohol-based hand sanitizers do not kill C. difficile. When should you call for help? Call 911 if: 
· You passed out (lost consciousness). Call your doctor now or seek immediate medical care if: 
· You have a fever over 101°F or shaking chills. · You feel lightheaded or have a fast heart rate. · You pass stools that are almost always bloody. · You have signs of needing more fluids. You have sunken eyes and a dry mouth, and you pass only a little dark urine. · You have severe belly pain with or without bloating. · You have severe vomiting and cannot keep down liquids. · You are not passing any stools or gas. Watch closely for changes in your health, and be sure to contact your doctor if: · You do not get better as expected. Where can you learn more? Go to http://yoly-jackie.info/. Enter ([phone] in the search box to learn more about \"Clostridium Difficile Colitis: Care Instructions. \" Current as of: March 3, 2017 Content Version: 11.3 © 9007-0226 [employer]. Care instructions adapted under license by [employer] (which disclaims liability or warranty for this information). If you have questions about a medical condition or this instruction, always ask your healthcare professional. [address] any warranty or liability for your use of this information. Vancomycin (By mouth) Vancomycin (tsc-pje-ECW-sin) Treats infections. Brand Name(s): Vancocin, Vancomycin HCl Novaplus There may be other brand names for this medicine. When This Medicine Should Not Be Used: Do not use this medicine if you have had an allergic reaction to vancomycin. How to Use This Medicine:  
Capsule, Liquid · Your doctor will tell you how much medicine to use. Do not use more than directed. · Swallow the capsule whole. Do not crush, open, or chew it. · Shake the oral liquid well before each use. Measure the oral liquid medicine with a marked measuring spoon, oral syringe, or medicine cup. · You may mix your oral liquid dose with 2 tablespoons of water. You may add a flavoring syrup to improve the taste of the medicine. · Take all of the medicine in your prescription to clear up your infection, even if you feel better after the first few doses. If a dose is missed: · Take a dose as soon as you remember. If it is almost time for your next dose, wait until then and take a regular dose. Do not take extra medicine to make up for a missed dose. How to Store and Dispose of This Medicine: · Store your capsules at room temperature, away from heat, moisture, and direct light. · Store the oral liquid in a refrigerator for up to 14 days.  Do not freeze. After 14 days throw away any medicine you do not use. · Ask your pharmacist, doctor, or health caregiver about the best way to dispose of any outdated medicine or medicine no longer needed. · Keep all medicine out of the reach of children. Never share your medicine with anyone. Drugs and Foods to Avoid: Ask your doctor or pharmacist before using any other medicine, including over-the-counter medicines, vitamins, and herbal products. · Make sure your doctor knows if you also use amikacin, gentamicin, streptomycin, Amikin®, or Garamycin®. Warnings While Using This Medicine: · Make sure your doctor knows if you are pregnant or breastfeeding or if you have kidney disease, hearing problems, or other bowel problems. · Check with your doctor right away if you have the following symptoms during or after treatment with this medicine: bloody urine, change in how much or how often you urinate, nausea, vomiting, swelling, or weakness. These could be symptoms of kidney or electrolyte problems. · Check with your doctor right away if you have changes in hearing, dizziness or ringing in the ears, or a feeling of movement or spinning. These may be symptoms of damage to your ears. · Call your doctor if your symptoms do not improve or if they get worse. · Your doctor will do lab tests at regular visits to check on the effects of this medicine. Keep all appointments. · Use this medicine only to treat the infection your doctor has prescribed it for. Do not use this medicine for any infection or condition that has not been checked by a doctor. This medicine will not treat the flu or the common cold. Possible Side Effects While Using This Medicine:  
Call your doctor right away if you notice any of these side effects: · Allergic reaction: Itching or hives, swelling in your face or hands, swelling or tingling in your mouth or throat, chest tightness, trouble breathing · Blistering, peeling, or red skin rash · Bloody urine, change in how much or how often you urinate · Loss of hearing, ringing or buzzing in the ears, dizziness · Dry mouth, increased thirst, muscle cramps, unusual tiredness or weakness · Fever · Swelling of your feet or lower legs If you notice these less serious side effects, talk with your doctor: · Back pain · Diarrhea, nausea, vomiting, gas, or stomach pain · Headache If you notice other side effects that you think are caused by this medicine, tell your doctor. Call your doctor for medical advice about side effects. You may report side effects to FDA at 3-588-FDA-4368 © 2017 [address] Information is for End User's use only and may not be sold, redistributed or otherwise used for commercial purposes. The above information is an  only. It is not intended as medical advice for individual conditions or treatments. Talk to your doctor, nurse or pharmacist before following any medical regimen to see if it is safe and effective for you. Metronidazole (By mouth) Metronidazole (met-annette-NICOLETTE-da-zole) Treats bacterial infections. Brand Name(s): Flagyl, Flagyl 375, Flagyl ER There may be other brand names for this medicine. When This Medicine Should Not Be Used: This medicine is not right for everyone. Do not use if you had an allergic reaction to metronidazole or similar medicines. Do not use this medicine to treat trichomoniasis if you are in the first 3 months of pregnancy. How to Use This Medicine:  
Capsule, Tablet, Long Acting Tablet · Take this medicine as directed, and take it at the same time each day. · Capsule or Tablet: Take with food or milk to avoid stomach upset. · Extended-release tablet: Take it on an empty stomach, 1 hour before or 2 hours after a meal. 
· Swallow the extended-release tablet whole. Do not crush, break, or chew it.  
· Take all of the medicine in your prescription to clear up your infection, even if you feel better after the first few doses. · Missed dose: Take a dose as soon as you remember. If it is almost time for your next dose, wait until then and take a regular dose. Do not take extra medicine to make up for a missed dose. · Store the medicine in a closed container at room temperature, away from heat, moisture, and direct light. Drugs and Foods to Avoid: Ask your doctor or pharmacist before using any other medicine, including over-the-counter medicines, vitamins, and herbal products. · Do not use this medicine if you have taken disulfiram within the last 2 weeks. Do not drink alcohol or use medicine that contains alcohol or propylene glycol while using this medicine and for at least 3 days after you have finished metronidazole treatment. · Some foods and medicines can affect how metronidazole works. Tell your doctor if you are using busulfan, cimetidine, lithium, phenobarbital, phenytoin, or warfarin or another blood thinner. Warnings While Using This Medicine: · Tell your doctor if you are pregnant or breastfeeding, or if you have kidney disease, liver disease, blood or bone marrow problems, oral thrush, yeast infection, or a history of seizures. · This medicine may cause the following problems: 
¨ Brain or nervous system problems, including seizures, meningitis, or vision problems · Trichomoniasis treatment:  Your doctor may want to also treat your sexual partner, even if he or she has no symptoms. Also, you may want to use a condom during sexual intercourse. These measures will help keep you from getting the infection back again from your partner. If you have any questions, ask your doctor. · Call your doctor if your symptoms do not improve or if they get worse. · Your doctor will do lab tests at regular visits to check on the effects of this medicine. Keep all appointments.  
· Tell any doctor or dentist who treats you that you are using this medicine. This medicine may affect certain medical test results. · Keep all medicine out of the reach of children. Never share your medicine with anyone. Possible Side Effects While Using This Medicine:  
Call your doctor right away if you notice any of these side effects: · Allergic reaction: Itching or hives, swelling in your face or hands, swelling or tingling in your mouth or throat, chest tightness, trouble breathing · Confusion, drowsiness, fever, headache, loss of appetite, nausea or vomiting, stiff neck or back · Dizziness, problems with muscle control, clumsiness, shakiness, trouble talking · Fever, chills, cough, sore throat, and body aches · Numbness, tingling, or burning pain in your hands, arms, legs, or feet · Seizures If you notice these less serious side effects, talk with your doctor: · Mild nausea · Unusual or unpleasant taste in your mouth If you notice other side effects that you think are caused by this medicine, tell your doctor. Call your doctor for medical advice about side effects. You may report side effects to FDA at 2-647-FDA-1921 © 2017 [facility] Information is for End User's use only and may not be sold, redistributed or otherwise used for commercial purposes. The above information is an  only. It is not intended as medical advice for individual conditions or treatments. Talk to your doctor, nurse or pharmacist before following any medical regimen to see if it is safe and effective for you. Atorvastatin (By mouth) Atorvastatin (a-tor-va-STAT-in) Treats high cholesterol and triglyceride levels. Reduces the risk of angina, stroke, heart attack, or certain heart and blood vessel problems. This medicine is a statin. Brand Name(s): Lipitor There may be other brand names for this medicine. When This Medicine Should Not Be Used: This medicine is not right for everyone.  Do not use it if you had an allergic reaction to atorvastatin, if you have active liver disease, or if you are pregnant or breastfeeding. How to Use This Medicine:  
Tablet · Take your medicine as directed. Your dose may need to be changed several times to find what works best for you. · Take this medicine at the same time each day. · Swallow the tablet whole. Do not break it. · Missed dose: Take a dose as soon as you remember. If it is less than 12 hours until your next dose, skip the missed dose and take the next dose at the regular time. Do not take 2 doses of this medicine within 12 hours. · Read and follow the patient instructions that come with this medicine. Talk to your doctor or pharmacist if you have any questions. · Store the medicine in a closed container at room temperature, away from heat, moisture, and direct light. Drugs and Foods to Avoid: Ask your doctor or pharmacist before using any other medicine, including over-the-counter medicines, vitamins, and herbal products. · Some medicines can affect how atorvastatin works. Tell your doctor if you also use birth control pills, boceprevir, cimetidine, colchicine, cyclosporine, digoxin, niacin, rifampin, spironolactone, telaprevir, medicine to treat an infection, or medicine to treat HIV/AIDS. Warnings While Using This Medicine: · It is not safe to take this medicine during pregnancy. It could harm an unborn baby. Tell your doctor right away if you become pregnant. · Tell your doctor if you have kidney disease, diabetes, muscle pain or weakness, thyroid problems, have recently had a stroke or TIA (transient ischemic attack), or have a history of liver disease. Tell your doctor if you drink grapefruit juice or drink alcohol regularly. · This medicine can cause muscle problems, which can lead to kidney problems. · Tell any doctor or dentist who treats you that you use this medicine.  You may need to stop using it if you have surgery, have an injury, or develop serious health problems. · Your doctor will do lab tests at regular visits to check on the effects of this medicine. Keep all appointments. · Keep all medicine out of the reach of children. Never share your medicine with anyone. Possible Side Effects While Using This Medicine:  
Call your doctor right away if you notice any of these side effects: · Allergic reaction: Itching or hives, swelling in your face or hands, swelling or tingling in your mouth or throat, chest tightness, trouble breathing · Blistering, peeling, red skin rash · Change in how much or how often you urinate · Dark urine or pale stools, nausea, vomiting, loss of appetite, stomach pain, yellow skin or eyes · Fever · Muscle pain, tenderness, or weakness · Unusual tiredness If you notice these less serious side effects, talk with your doctor: · Diarrhea · Joint pain If you notice other side effects that you think are caused by this medicine, tell your doctor. Call your doctor for medical advice about side effects. You may report side effects to FDA at 8-108-KOY-4103 © 2017 [facility] Information is for End User's use only and may not be sold, redistributed or otherwise used for commercial purposes. The above information is an  only. It is not intended as medical advice for individual conditions or treatments. Talk to your doctor, nurse or pharmacist before following any medical regimen to see if it is safe and effective for you. Discharge Orders None Introducing [facility]! Dear [place]: 
Thank you for requesting a [facility] account. Our records indicate that you have previously registered for a [facility] account but its currently inactive. Please call our [facility] support line at [phone]. Additional Information If you have questions, please visit the Frequently Asked Questions section of the [facility] website at [email or web address] Remember, MyChart is NOT to be used for urgent needs. For medical emergencies, dial 911. Now available from your iPhone and Android! General Information Please provide this summary of care documentation to your next provider. Patient Signature:  ____________________________________________________________ Date:  ____________________________________________________________  
  
Sissy Lapine Provider Signature:  ____________________________________________________________ Date:  ____________________________________________________________

## 2017-08-02 NOTE — ED TRIAGE NOTES
Triage note: pt with lower abdomen pain, nausea and diarrhea since Friday . Pt with history of IBS and gastroparesis.

## 2017-08-02 NOTE — CONSULTS
[address] [facility] [address]    [facility]       Name:  Maira Foster   MR#:  [id number]   :  1935   Account #:  [de-identified]    Date of Consultation:  2017   Date of Adm:  2017       CHIEF COMPLAINT: Diarrhea and aphasia, transient. HISTORY OF PRESENT ILLNESS: The patient has a history of IBS   with alternating diarrhea and constipation along with gastroparesis,   who has a history in the distant past of H pylori as well as a history of a   Campylobacter and salmonella gastroenteritis. The patient noted since   Friday, the patient noted over the last 4-5 days, multiple persistent   watery loose stools without blood, some cramping, some nausea that   was exceeding the number stools that she normally and it was very   different in character. She denies any fever or chills. There has been   no vomiting. The pain cramping has been 5/10 in severity. She denies   any urinary symptoms, no chest pain, no shortness of breath,   emphatically she denies any antibiotic use. No visits to nursing homes,   no hospitals. Her  has got no symptoms. She was in a family   gathering prior to the onset of this last weekend and no one has had   the same symptoms. Pepto-Bismol was used for the symptoms and   was unsuccessful. She has not tried any other agents. The patient just   had stools collected and she is positive for C difficile. PAST MEDICAL HISTORY: Extensive and includes fibromyalgia,   anxiety disorder, IBS - constipation alternating with diarrhea,   hyponatremia, DJD, hypertension, allergic rhinitis,   hypercholesterolemia, skin cancer, back pain, chronic kidney disease,   gastroparesis, pulmonary embolus, depression, history of Clostridium   difficile, GERD, Schatzki ring, diverticulosis, history of ischemic colitis,   high blood pressure. PAST SURGICAL HISTORY: Positive for hysterectomy and   gallbladder.       While in the emergency room, the patient had dysarthria with   expressive aphasia as well as some focal weakness which cleared   while still in the emergency room and while undergoing a CT scan of   the head. She is in the process of undergoing further workup for this. The patient's last colonoscopy and EGD was in 2011 and   demonstrated severe sigmoid diverticulosis with severe redundancy,   but no polyps or ischemia was noted at that time and an EGD that   demonstrated a 4-cm hiatal hernia, Schatzki ring that required dilation   up to 45 mm. FAMILY HISTORY: Positive for lung cancer, breast cancer, prostate   cancer, osteomyelitis. SOCIAL HISTORY: She is . Nonsmoker, nondrinker. ALLERGIES: MULTIPLE AND YOU ARE REFERRED TO THE ER   LIST OF ALLERGIES. REVIEW OF SYSTEMS   As noted above, otherwise negative. PHYSICAL EXAMINATION   VITAL SIGNS: Temperature is 98.3, pulse 70, blood pressure is   144/56, respirations 17. GENERAL: Elderly woman, alert, oriented x3, in no acute distress with   no focal weakness. SKIN: Warm and dry. No petechia or ecchymoses. HEENT: Sclerae are anicteric. Conjunctivae are pink. Moist mucous   membranes. NECK: Supple. There is no adenopathy in neck or groin. CHEST: Clear to auscultation and percussion. HEART: Reveals a regular rate and rhythm. ABDOMEN: Soft with active bowel sounds. She does have some mild   lower abdominal tenderness more severe than some very mild upper   abdominal tenderness. EXTREMITIES: Without cyanosis, clubbing, or edema. LABORATORY DATA: WBC is 5.8, hemoglobin is 12.0 with   normochromic, normocytic indices. PT/INR is 1.0. The CMP reveals a   creatinine of 1.37. GFR of 37. Normal liver function tests, normal   troponin and normal lipase. Stool for C difficile is positive. Stool for   other bacterial pathogens are pending.      CT of the abdomen and pelvis demonstrates a small hiatal hernia,   normal small bowel, and normal colon, normal appendix and peritoneum. Chest x-ray, possible calcified granuloma in the right lung   base. CT of the head demonstrates mild small vessel ischemic   changes, right basal ganglia lacunar infarcts, acute abnormalities   identified. An EKG shows normal sinus rhythm, nonspecific ST   abnormality. IMPRESSION AND PLAN:    1. Clostridium difficile colitis, mild. Agree with vancomycin p.o. and   Flagyl IV for treatment. 2. Irritable bowel syndrome alternating diarrhea and constipation. 3. Gastroparesis. 4. Possible transient ischemic attack, workup is in progress. 5. Hypertension. MD Sam Hopkins / Avinash Yañez   D:  08/02/2017   18:48   T:  08/02/2017   19:40   Job #:  [id number]

## 2017-08-02 NOTE — ROUTINE PROCESS
TRANSFER - OUT REPORT:    Verbal report given to [facility]) on [place]  [facility]       Report consisted of patients Situation, Background, Assessment and   Recommendations(SBAR). Information from the following report(s) SBAR, ED Summary, MAR, Recent Results and Cardiac Rhythm NSR was reviewed with the receiving nurse. Lines:   Peripheral IV 08/02/17 Right Antecubital (Active)   Site Assessment Clean, dry, & intact 8/2/2017 11:46 AM   Phlebitis Assessment 0 8/2/2017 11:46 AM   Infiltration Assessment 0 8/2/2017 11:46 AM   Dressing Status Clean, dry, & intact 8/2/2017 11:46 AM   Dressing Type 4 X 4;Tape;Transparent 8/2/2017 11:46 AM   Hub Color/Line Status Pink;Flushed;Patent 8/2/2017 11:46 AM   Action Taken Blood drawn 8/2/2017 11:46 AM        Opportunity for questions and clarification was provided.       Patient transported with:   monitor   emt

## 2017-08-02 NOTE — IP AVS SNAPSHOT
[address] 
[phone] Patient: José Luis Jarvis MRN: [id number] Current Discharge Medication List  
  
START taking these medications Dose & Instructions Dispensing Information Comments Morning Noon Evening Bedtime  
 aspirin delayed-release 81 mg tablet Your last dose was: Your next dose is:    
   
   
 Dose:  81 mg Take 1 Tab by mouth daily. Quantity:  30 Tab Refills:  0  
     
   
   
   
  
 atorvastatin 20 mg tablet Commonly known as:  LIPITOR Your last dose was: Your next dose is:    
   
   
 Dose:  20 mg Take 1 Tab by mouth daily. Quantity:  30 Tab Refills:  0  
     
   
   
   
  
 metroNIDAZOLE 500 mg tablet Commonly known as:  FLAGYL Your last dose was: Your next dose is:    
   
   
 Dose:  500 mg Take 1 Tab by mouth three (3) times daily for 12 days. Quantity:  36 Tab Refills:  0  
     
   
   
   
  
 vancomycin 250 mg capsule Commonly known as:  [employer] Your last dose was: Your next dose is:    
   
   
 Dose:  250 mg Take 1 Cap by mouth four (4) times daily for 12 days. Quantity:  48 Cap Refills:  0 CONTINUE these medications which have CHANGED Dose & Instructions Dispensing Information Comments Morning Noon Evening Bedtime ATIVAN 0.5 mg tablet Generic drug:  LORazepam  
What changed:  Another medication with the same name was removed. Continue taking this medication, and follow the directions you see here. Your last dose was: Your next dose is:    
   
   
 Dose:  0.5 mg Take 0.5 mg by mouth every morning. Refills:  0 CONTINUE these medications which have NOT CHANGED Dose & Instructions Dispensing Information Comments Morning Noon Evening Bedtime  
 amLODIPine 5 mg tablet Commonly known as:  [place] Your last dose was: Your next dose is: TAKE 1 TAB BY MOUTH DAILY. Quantity:  90 Tab Refills:  1 BENEFIBER (GUAR GUM) PO Your last dose was: Your next dose is: Take  by mouth every morning. Refills:  0 LEVSIN/SL 0.125 mg SL tablet Generic drug:  hyoscyamine SL Your last dose was: Your next dose is:    
   
   
 Dose:  0.125 mg  
0.125 mg by SubLINGual route Before breakfast, lunch, and dinner. Refills:  0  
     
   
   
   
  
 metoprolol tartrate 25 mg tablet Commonly known as:  LOPRESSOR Your last dose was: Your next dose is: TAKE ONE-QUARTER TABLET BY MOUTH EVERY DAY Quantity:  90 Tab Refills:  1 PROBIOTIC PO Your last dose was: Your next dose is: Take  by mouth daily. Refills:  0 SUPER B COMPLEX PO Your last dose was: Your next dose is: Take  by mouth daily (after dinner). Refills:  0  
     
   
   
   
  
 TUMS 300 mg (750 mg) chewable tablet Generic drug:  calcium carbonate Your last dose was: Your next dose is:    
   
   
 Dose:  1 Tab Take 1 Tab by mouth daily. Refills:  0  
     
   
   
   
  
 TYLENOL ARTHRITIS PAIN 650 mg CR tablet Generic drug:  acetaminophen Your last dose was: Your next dose is:    
   
   
 Dose:  650 mg Take 650 mg by mouth every six (6) hours as needed for Pain. Refills:  0  
     
   
   
   
  
 VITAMIN D3 1,000 unit tablet Generic drug:  cholecalciferol Your last dose was: Your next dose is:    
   
   
 Dose:  1000 Units Take 1,000 Units by mouth daily. Refills:  0  
     
   
   
   
  
 ZANTAC 150 mg tablet Generic drug:  raNITIdine Your last dose was:     
   
Your next dose is:    
   
   
 Dose:  150 mg  
 Take 150 mg by mouth Before breakfast and dinner. Refills:  0 STOP taking these medications   
 bismuth subsalicylate 033 mg Chew Commonly known as:  PEPTO-BISMOL MIRALAX 17 gram packet Generic drug:  polyethylene glycol Where to Get Your Medications Information on where to get these meds will be given to you by the nurse or doctor. ! Ask your nurse or doctor about these medications  
  aspirin delayed-release 81 mg tablet  
 atorvastatin 20 mg tablet  
 metroNIDAZOLE 500 mg tablet  
 vancomycin 250 mg capsule

## 2017-08-02 NOTE — TELEPHONE ENCOUNTER
Pt is calling the office stating she has had diarrhea since 5 am last Friday morning. Pt tried to get an appt with her GI doctor but has not been able to get an appt to be seen. Informed pt can she come to our office today to be seen and pt stated no that she would prefer to go to ED. Informed pt yes she should go because of possible dehydration. Pt stated ok she will go to ED.

## 2017-08-03 ENCOUNTER — APPOINTMENT (OUTPATIENT)
Dept: MRI IMAGING | Age: 82
DRG: 372 | End: 2017-08-03
Attending: HOSPITALIST
Payer: MEDICARE

## 2017-08-03 LAB
ANION GAP BLD CALC-SCNC: 9 MMOL/L (ref 5–15)
BUN SERPL-MCNC: 9 MG/DL (ref 6–20)
BUN/CREAT SERPL: 8 (ref 12–20)
CALCIUM SERPL-MCNC: 8.2 MG/DL (ref 8.5–10.1)
CHLORIDE SERPL-SCNC: 106 MMOL/L (ref 97–108)
CHOLEST SERPL-MCNC: 186 MG/DL
CO2 SERPL-SCNC: 23 MMOL/L (ref 21–32)
CREAT SERPL-MCNC: 1.12 MG/DL (ref 0.55–1.02)
EST. AVERAGE GLUCOSE BLD GHB EST-MCNC: 126 MG/DL
GLUCOSE SERPL-MCNC: 88 MG/DL (ref 65–100)
HBA1C MFR BLD: 6 % (ref 4.2–6.3)
HDLC SERPL-MCNC: 86 MG/DL
HDLC SERPL: 2.2 {RATIO} (ref 0–5)
LDLC SERPL CALC-MCNC: 86.2 MG/DL (ref 0–100)
LIPID PROFILE,FLP: NORMAL
POTASSIUM SERPL-SCNC: 3.6 MMOL/L (ref 3.5–5.1)
SODIUM SERPL-SCNC: 138 MMOL/L (ref 136–145)
TRIGL SERPL-MCNC: 69 MG/DL (ref ?–150)
VLDLC SERPL CALC-MCNC: 13.8 MG/DL

## 2017-08-03 PROCEDURE — 36415 COLL VENOUS BLD VENIPUNCTURE: CPT | Performed by: HOSPITALIST

## 2017-08-03 PROCEDURE — 83036 HEMOGLOBIN GLYCOSYLATED A1C: CPT | Performed by: HOSPITALIST

## 2017-08-03 PROCEDURE — 74011250637 HC RX REV CODE- 250/637: Performed by: HOSPITALIST

## 2017-08-03 PROCEDURE — 93880 EXTRACRANIAL BILAT STUDY: CPT

## 2017-08-03 PROCEDURE — 80048 BASIC METABOLIC PNL TOTAL CA: CPT

## 2017-08-03 PROCEDURE — 74011250637 HC RX REV CODE- 250/637: Performed by: FAMILY MEDICINE

## 2017-08-03 PROCEDURE — 93306 TTE W/DOPPLER COMPLETE: CPT

## 2017-08-03 PROCEDURE — 80061 LIPID PANEL: CPT | Performed by: HOSPITALIST

## 2017-08-03 PROCEDURE — 97116 GAIT TRAINING THERAPY: CPT | Performed by: PHYSICAL THERAPIST

## 2017-08-03 PROCEDURE — 65660000000 HC RM CCU STEPDOWN

## 2017-08-03 PROCEDURE — 70551 MRI BRAIN STEM W/O DYE: CPT

## 2017-08-03 PROCEDURE — 74011250636 HC RX REV CODE- 250/636: Performed by: HOSPITALIST

## 2017-08-03 PROCEDURE — 97161 PT EVAL LOW COMPLEX 20 MIN: CPT | Performed by: PHYSICAL THERAPIST

## 2017-08-03 PROCEDURE — 74011000250 HC RX REV CODE- 250: Performed by: HOSPITALIST

## 2017-08-03 RX ORDER — ATORVASTATIN CALCIUM 20 MG/1
20 TABLET, FILM COATED ORAL DAILY
Status: DISCONTINUED | OUTPATIENT
Start: 2017-08-04 | End: 2017-08-04 | Stop reason: HOSPADM

## 2017-08-03 RX ORDER — ASPIRIN 81 MG/1
81 TABLET ORAL DAILY
Status: DISCONTINUED | OUTPATIENT
Start: 2017-08-04 | End: 2017-08-04 | Stop reason: HOSPADM

## 2017-08-03 RX ADMIN — METRONIDAZOLE 500 MG: 500 INJECTION, SOLUTION INTRAVENOUS at 01:06

## 2017-08-03 RX ADMIN — SODIUM CHLORIDE 100 ML/HR: 900 INJECTION, SOLUTION INTRAVENOUS at 07:19

## 2017-08-03 RX ADMIN — LORAZEPAM 1 MG: 1 TABLET ORAL at 22:25

## 2017-08-03 RX ADMIN — METRONIDAZOLE 500 MG: 500 INJECTION, SOLUTION INTRAVENOUS at 17:01

## 2017-08-03 RX ADMIN — VANCOMYCIN HYDROCHLORIDE 250 MG: 1 INJECTION, POWDER, LYOPHILIZED, FOR SOLUTION INTRAVENOUS at 12:52

## 2017-08-03 RX ADMIN — LORAZEPAM 0.5 MG: 0.5 TABLET ORAL at 07:21

## 2017-08-03 RX ADMIN — VANCOMYCIN HYDROCHLORIDE 250 MG: 1 INJECTION, POWDER, LYOPHILIZED, FOR SOLUTION INTRAVENOUS at 07:48

## 2017-08-03 RX ADMIN — VANCOMYCIN HYDROCHLORIDE 250 MG: 1 INJECTION, POWDER, LYOPHILIZED, FOR SOLUTION INTRAVENOUS at 19:00

## 2017-08-03 RX ADMIN — VANCOMYCIN HYDROCHLORIDE 250 MG: 1 INJECTION, POWDER, LYOPHILIZED, FOR SOLUTION INTRAVENOUS at 01:04

## 2017-08-03 RX ADMIN — METRONIDAZOLE 500 MG: 500 INJECTION, SOLUTION INTRAVENOUS at 09:28

## 2017-08-03 RX ADMIN — VITAMIN D, TAB 1000IU (100/BT) 1000 UNITS: 25 TAB at 09:30

## 2017-08-03 RX ADMIN — AMLODIPINE BESYLATE 5 MG: 5 TABLET ORAL at 09:31

## 2017-08-03 RX ADMIN — FAMOTIDINE 20 MG: 20 TABLET ORAL at 09:32

## 2017-08-03 NOTE — PROGRESS NOTES
Hospitalist Progress Note  Noel Gong NP  Office: [phone]  Cell: [phone]      Date of Service:  8/3/2017  NAME:  Pedro Arenas  :  1935  MRN:  [id number]      Admission Summary:   Mrs. Patricia Deal is an 80-year-old  with PMHx of  fibromyalgia, anxiety, IBS, DJD, HTN,   skin cancer, chronic back pain, CKD, gastroparesis, history of PE, depression and C. Difficile who presented to the ED on  with cc of abdominal pain and diarrhea 5 days' duration. Interval history / Subjective:      No diarrhea since yesterday. No focal neuro deficits on exam. Denies any neuro sx's  Pending MRI  Asking to go home tomorrow morning    Assessment & Plan:     Diarrhea 2/2 C. difficile infection  - pt does have a hx of previous c diff ~ 10 years ago. Also has hx c diff and IBS  - + c diff dna  - continue ivf and abx  - GI following    TIA  - witnessed dysarthria and ataxia x 15 minutes in the ED, pt claims dysarthria was r/t dry mouth and throat  - ct head negative  - pending MRI brain  - A1c 6.0  - lipid panel reviewed, ldl 86.2  - pending neuro consult  - ef 60%, NRWA, no shunt     CKD Stage III  - follows with Dr. Becerra Back  - cr 1.12 today from 1.37    Mild Hyponatremia: Resolved  - likely 2/2 diarrhea due to intravascular volume depletion. Dehydration  - 2/2 diarrhea  - continue IVF    HTN  - continue home medication, Norvasc and metoprolol    Hx of anxiety/depression: stable  - continue home lorazepam    Hx of gastroparesis  - gi following    Code status: Full  DVT prophylaxis: [address] discussed with: Patient/Family  Disposition: lives with  at home      Hospital Problems  Date Reviewed: 2017          Codes Class Noted POA    TIA (transient ischemic attack) ICD-10-CM: G45.9  ICD-9-CM: 435.9  2017 Unknown        * (Principal)C.  difficile diarrhea ICD-10-CM: A04.7  ICD-9-CM: 008.45  2017 Unknown Review of Systems:   Denies diarrhea today  Denies abd pain, n/v  Denies fevers, chills, rigors  Denies cp/sob        Vital Signs:    Last 24hrs VS reviewed since prior progress note. Most recent are:  Visit Vitals    /55    Pulse 71    Temp 97.5 °F (36.4 °C)    Resp 14    Ht 5' 2\" (1.575 m)    Wt 46.9 kg (103 lb 6.4 oz)    SpO2 100%    Breastfeeding No    BMI 18.91 kg/m2         Intake/Output Summary (Last 24 hours) at 08/03/17 1614  Last data filed at 08/02/17 2045   Gross per 24 hour   Intake              370 ml   Output              300 ml   Net               70 ml        Physical Examination:             Constitutional:  No acute distress, cooperative, pleasant    ENT:  Oral mucous moist, oropharynx benign. Neck supple   Resp:  CTA bilaterally. No wheezing/rhonchi/rales. No accessory muscle use   CV:  Regular rhythm, normal rate, no murmurs, gallops, rubs    GI:  Soft, non distended, non tender. normoactive bowel sounds, no hepatosplenomegaly     Musculoskeletal:  No edema, warm, 2+ pulses throughout    Neurologic:  Moves all extremities. AAOx3, CN II-XII reviewed     Psych:  Good insight, Not anxious nor agitated. Skin:  Good turgor, no rashes or ulcers       Data Review:    Review and/or order of clinical lab test  Review and/or order of tests in the radiology section of CPT  Review and/or order of tests in the medicine section of CPT      Labs:     Recent Labs      08/02/17   1141   WBC  5.8   HGB  12.0   HCT  34.6*   PLT  263     Recent Labs      08/03/17   0318  08/02/17   1141   NA  138  131*   K  3.6  4.0   CL  106  98   CO2  23  26   BUN  9  16   CREA  1.12*  1.37*   GLU  88  91   CA  8.2*  9.3     Recent Labs      08/02/17   1141   SGOT  21   ALT  21   AP  44*   TBILI  0.6   TP  6.7   ALB  3.7   GLOB  3.0   LPSE  205     Recent Labs      08/02/17   1217   INR  1.0      No results for input(s): FE, TIBC, PSAT, FERR in the last 72 hours.    No results found for: FOL, RBCF   No results for input(s): PH, PCO2, PO2 in the last 72 hours.   Recent Labs      08/02/17   1141   TROIQ  <0.04     Lab Results   Component Value Date/Time    Cholesterol, total 186 08/03/2017 03:18 AM    HDL Cholesterol 86 08/03/2017 03:18 AM    LDL, calculated 86.2 08/03/2017 03:18 AM    Triglyceride 69 08/03/2017 03:18 AM    CHOL/HDL Ratio 2.2 08/03/2017 03:18 AM     Lab Results   Component Value Date/Time    Glucose (POC) 107 08/02/2017 12:16 PM     Lab Results   Component Value Date/Time    Color YELLOW/STRAW 08/02/2017 11:41 AM    Appearance CLEAR 08/02/2017 11:41 AM    Specific gravity <1.005 08/02/2017 11:41 AM    pH (UA) 7.0 08/02/2017 11:41 AM    Protein NEGATIVE  08/02/2017 11:41 AM    Glucose NEGATIVE  08/02/2017 11:41 AM    Ketone NEGATIVE  08/02/2017 11:41 AM    Bilirubin NEGATIVE  08/02/2017 11:41 AM    Urobilinogen 0.2 08/02/2017 11:41 AM    Nitrites NEGATIVE  08/02/2017 11:41 AM    Leukocyte Esterase NEGATIVE  08/02/2017 11:41 AM    Epithelial cells FEW 08/02/2017 11:41 AM    Bacteria NEGATIVE  08/02/2017 11:41 AM    WBC 0-4 08/02/2017 11:41 AM    RBC 0-5 08/02/2017 11:41 AM         Medications Reviewed:     Current Facility-Administered Medications   Medication Dose Route Frequency    [START ON 8/4/2017] aspirin delayed-release tablet 81 mg  81 mg Oral DAILY    acetaminophen (TYLENOL) tablet 650 mg  650 mg Oral Q6H PRN    amLODIPine (NORVASC) tablet 5 mg  5 mg Oral DAILY    calcium carbonate (TUMS) chewable tablet 300 mg [elemental]  300 mg Oral DAILY    cholecalciferol (VITAMIN D3) tablet 1,000 Units  1,000 Units Oral DAILY    vitamin B complex tablet  1 Tab Oral DAILY    vancomycin 50 mg/mL oral solution (compounded) 250 mg  250 mg Oral Q6H    metroNIDAZOLE (FLAGYL) IVPB premix 500 mg  500 mg IntraVENous Q8H    0.9% sodium chloride infusion  100 mL/hr IntraVENous CONTINUOUS    meperidine (DEMEROL) injection 12.5 mg  12.5 mg IntraVENous Q4H PRN    LORazepam (ATIVAN) tablet 0.5 mg  0.5 mg Oral 7am    LORazepam (ATIVAN) tablet 1 mg  1 mg Oral QHS    famotidine (PEPCID) tablet 20 mg  20 mg Oral DAILY     ______________________________________________________________________  EXPECTED LENGTH OF STAY: 4d 7h  ACTUAL LENGTH OF STAY:          1                 Neda Torres V, NP

## 2017-08-03 NOTE — CONSULTS
NEUROLOGY CONSULT NOTE    Name Efrain Le Age 80 y.o. MRN [id number]  1935     Consulting Physician: Dale Marino MD      Chief Complaint:  Aphasia     Assessment:     · Principal Problem:  ·   C. difficile diarrhea (2017)  ·   · Active Problems:  ·   TIA (transient ischemic attack) (2017)  ·   ·   80year old female h/o IBS, gastroparesis, pre-DM, HTN, CKD, fibromyalgia, anxiety d/o, remote PE admitted with Cdiff. Neurology consulted due to transient episode of expressive aphasia, possible dysarthria noted in the ED on admission lasting < 5 minutes with complete resolution. CT head without acute focal ischemia, however noted remote R BG infarction. Neurological examination is presently non-focal.  Presentation is concerning for TIA of the left hemisphere. She has a long list of allergies to medication including GI upset with ASA in the past but is willing to re-attempt antiplatelet therapy for stroke prevention. Recommendations:   MRI Brain, CUS pending  Start ASA 81mg, low dose statin therapy  Stroke education  No skilled needs  Please arrange for neurology F/U in 4-6 weeks    Thank you very much for this referral. I appreciate the opportunity to participate in this patient's care. History of Present Illness: This is a 80 y.o.  right handed  female, we were asked to see for aphasia. PMH notable for IBS, gastroparesis, pre-DM, HTN, CKD, fibromyalgia, anxiety d/o, remote PE. She is admitted due to C diff and associated diarrhea/abdominal pain. While in the ED, code stroke called due to acute onset expressive aphasia, dysarthria and reported ataxia lasting <5 minutes total without recurrence since admission. Denied associated focal weakness, numbness, vision loss or headache. No known h/o stroke, although review of head CT did reveal remote R BG infarct. Evidence of hyponatremia on admission labs. She feels she is at her baseline today.   She does not take antiplatelet therapy at home (reports a h/o GI upset on medication). Allergies   Allergen Reactions    Latex Itching    Dilaudid [Hydromorphone (Bulk)] Other (comments)     ? loss of consciousness    Altace [Ramipril] Unknown (comments)    Ambien [Zolpidem] Unknown (comments)    Asc[employer]ia [employer] Test [Blood Sugar Diagnostic, Disc-Type] Unknown (comments)    Aspirin Unknown (comments)    Astelin [Azelastine] Unknown (comments)    Atenolol Unknown (comments)    Banex La Unknown (comments)    Benicar [Olmesartan] Unknown (comments)    Carbocaine [Mepivacaine] Unknown (comments)    Cleocin [Clindamycin Hcl] Hives    Codeine Nausea and Vomiting    Codeine-Asa-Salicyl-Acetam-Caf Other (comments)     Stomach pain    Cortisporin [Neomy-Polymyxinb-Bacitracin-Hc] Unknown (comments)    Cymbalta [Duloxetine] Other (comments)    Cytotec [Misoprostol] Unknown (comments)    Dilaudid [Hydromorphone] Unknown (comments)     Patient states that she was unable to respond, but was breathing    Effexor [Venlafaxine] Unknown (comments)    Epinephrine Unknown (comments)     Heart racing    Estra-D Unknown (comments)    Estrace [Estradiol] Unknown (comments)    Fentanyl Nausea and Vomiting and Unknown (comments)     shakes    Flexeril [Cyclobenzaprine] Unable to Obtain     \"drugs me\"    Flonase [Fluticasone] Unknown (comments)    Flunisolide Unknown (comments)    Guiatussin W/Codeine Unknown (comments)    Ketek [Telithromycin] Nausea Only    Klonopin [Clonazepam] Unknown (comments)    Lexapro [Escitalopram] Nausea and Vomiting    Lodine [Etodolac] Nausea and Vomiting    Morphine Unknown (comments)    Nsaids (Non-Steroidal Anti-Inflammatory Drug) Nausea and Vomiting    Other Medication Unknown (comments)     Gareth Alford    Paxil [Paroxetine Hcl] Nausea and Vomiting    Pcn [Penicillins] Hives    Percocet [Oxycodone-Acetaminophen] Unknown (comments)    Percodan [Oxycodone Hcl-Oxycodone-Asa] Unknown (comments)    Pneumococcal 23-Mary Ps Vaccine Swelling    Proctocream-Hc [Hydrocortisone] Unknown (comments)    Prozac [Fluoxetine] Nausea Only     Suicidal visions      Pseudoephedrine Other (comments)    Quinidine Unknown (comments)    Reglan [Metoclopramide] Unknown (comments)    Remeron [Mirtazapine] Other (comments)    Resaid Other (comments)     inflammed eyes    Robaxin [Methocarbamol] Unknown (comments)    Sonata [Zaleplon] Unknown (comments)    Sulfa (Sulfonamide Antibiotics) Hives    Surmontil [Trimipramine] Nausea and Vomiting    Talwin [Pentazocine Lactate] Unknown (comments)    Toradol [Ketorolac Tromethamine] Nausea and Vomiting    Ultram [Tramadol] Unknown (comments)    Vasoconstrictor Drug Unknown (comments)    Vigamox [Moxifloxacin] Rash and Swelling    Vioxx [Rofecoxib] Unknown (comments)    Voltaren [Diclofenac Sodium] Itching    Wellbutrin [Bupropion Hcl] Nausea and Vomiting    Zelnorm [Tegaserod Hydrogen Maleate] Unknown (comments)    Zoloft [Sertraline] Nausea and Vomiting        Prior to Admission medications    Medication Sig Start Date End Date Taking? Authorizing Provider   LORazepam (ATIVAN) 1 mg tablet Take 1 mg by mouth nightly. Yes Historical Provider   LORazepam (ATIVAN) 0.5 mg tablet Take 0.5 mg by mouth every morning. Yes Historical Provider   hyoscyamine SL (LEVSIN/SL) 0.125 mg SL tablet 0.125 mg by SubLINGual route Before breakfast, lunch, and dinner. Yes Historical Provider   acetaminophen (TYLENOL ARTHRITIS PAIN) 650 mg CR tablet Take 650 mg by mouth every six (6) hours as needed for Pain. Yes Historical Provider   LACTOBACILLUS ACIDOPHILUS (PROBIOTIC PO) Take  by mouth daily. Yes Historical Provider   bismuth subsalicylate (PEPTO-BISMOL) 262 mg chew Take 2 Tabs by mouth every three (3) hours as needed. Yes Historical Provider   calcium carbonate (TUMS) 300 mg (750 mg) chewable tablet Take 1 Tab by mouth daily.    Yes Historical Provider   cholecalciferol (VITAMIN D3) 1,000 unit tablet Take 1,000 Units by mouth daily. Yes Historical Provider   FERROUS FUMARATE/VIT BCOMP,C (SUPER B COMPLEX PO) Take  by mouth daily (after dinner). Yes Historical Provider   BENEFIBER, GUAR GUM, PO Take  by mouth every morning. Yes Historical Provider   amLODIPine (NORVASC) 5 mg tablet TAKE 1 TAB BY MOUTH DAILY. 5/8/17  Yes Lonnie Mcgarry MD   metoprolol tartrate (LOPRESSOR) 25 mg tablet TAKE ONE-QUARTER TABLET BY MOUTH EVERY DAY 4/13/17  Yes Lonnie Mcgarry MD   raNITIdine (ZANTAC) 150 mg tablet Take 150 mg by mouth Before breakfast and dinner. Historical Provider   polyethylene glycol (MIRALAX) 17 gram packet Take 17 g by mouth daily as needed.     Historical Provider       Past Medical History:   Diagnosis Date    Allergic rhinitis, cause unspecified     Anxiety     Back pain     Cancer ([facility] [address]     basal cell face, back, neck    Cancer (HCC)     squamous cell leg    DJD (degenerative joint disease) of cervical spine     Fibromyalgia     Hypercholesterolemia     Hypertension     Hyponatremia     IBS (irritable bowel syndrome)     Irritable bowel syndrome with diarrhea 6/29/2016    GI- Dr Sav Toscano    Kidney disease, chronic, stage III (GFR 30-59 ml/min) 6/2011    Dr. Luis Roberts) [facility]        Past Surgical History:   Procedure Laterality Date    COLONOSCOPY  1/20/11    diverticulosis    EGD  1/20/11    schatzki's ring--clear on barium swallow 7/11    HX CHOLECYSTECTOMY  5/10    HX HYSTERECTOMY      hysterectomy    HX KNEE ARTHROSCOPY Left 1990    HX KNEE REPLACEMENT Right 9/29/09    HX OTHER SURGICAL      skin cancer removed: L leg    HX UROLOGICAL  1995    bladder suspension    REPAIR OF RECTOCELE          Social History   Substance Use Topics    Smoking status: Never Smoker    Smokeless tobacco: Never Used    Alcohol use No      Comment: rarely        Family History   Problem Relation Age of Onset    Cancer Mother      lung    Cancer Maternal Aunt      lung    Cancer Maternal Uncle      lung    Cancer Sister      breast    Other Brother      has to have blood tx every other week    Cancer Son 36     prostate, recurrence 7 yrs later   [de-identified] Arthritis-osteo Son      hips    Arthritis-osteo Daughter     Arthritis-osteo Daughter         Review of Systems:   Comprehensive review of systems performed and negative except for as listed above. Exam:     Visit Vitals    /55    Pulse 71    Temp 97.5 °F (36.4 °C)    Resp 14    Ht 5' 2\" (1.575 m)    Wt 46.9 kg (103 lb 6.4 oz)    SpO2 100%    Breastfeeding No    BMI 18.91 kg/m2        General: Well developed, well nourished. Patient in no apparent distress   Head: Normocephalic, atraumatic, anicteric sclera   Lungs:  Clear to auscultation bilaterally, No wheezes or rubs   Cardiac: Regular rate and rhythm with no murmurs. Abd: Bowel sounds were audible. No tenderness on palpation   Ext: No pedal edema   Skin: No overt signs of rash     Neurological Exam:  Mental Status: Alert and oriented to person place and time   Speech: Fluent no aphasia or dysarthria. Cranial Nerves:   Intact visual fields. Facial sensation is normal. Facial movement is symmetric. Palate is midline. Normal sternocleidomastoid strength. Tongue is midline. Hearing is intact bilaterally. Eyes: PERRL, EOM's full, no nystagmus, no ptosis. Motor:  Full and symmetric strength of upper and lower proximal and distal muscles. Normal bulk and tone. Reflexes:   Deep tendon reflexes 1+/4 and symmetrical.  Plantar response is downgoing b/l. Sensory:   Symmetrically intact  with no perceived deficits modalities involving small or large fibers. Gait:  Gait is deferred    Tremor:   No tremor noted. Cerebellar:  Finger to nose and heel over shin to knee was demonstrated competently   Neurovascular: No carotid bruits. No JVD       Imaging  CT Results (maximum last 3):     Results from Hospital Encounter encounter on 08/02/17   CT ABD PELV WO CONT   Narrative EXAM:  CT ABD PELV WO CONT    INDICATION: low abdominal pain    COMPARISON: 2013    CONTRAST:  None. TECHNIQUE:   Thin axial images were obtained through the abdomen and pelvis. Coronal and  sagittal reconstructions were generated. Oral contrast was not administered. CT  dose reduction was achieved through use of a standardized protocol tailored for  this examination and automatic exposure control for dose modulation. The absence of intravenous contrast material reduces the sensitivity for  evaluation of the solid parenchymal organs of the abdomen. FINDINGS:   LUNG BASES: Clear. INCIDENTALLY IMAGED HEART AND MEDIASTINUM: Unremarkable. LIVER: No mass or biliary dilatation. Calcification in spleen is unchanged. GALLBLADDER: Patient status post cholecystectomy  SPLEEN: No mass. PANCREAS: No mass or ductal dilatation. ADRENALS: Unremarkable. KIDNEYS/URETERS: No mass, calculus, or hydronephrosis. STOMACH: Unremarkable. There is a small hiatal hernia. SMALL BOWEL: No dilatation or wall thickening. COLON: No dilatation or wall thickening. APPENDIX: Unremarkable. PERITONEUM: No ascites or pneumoperitoneum. RETROPERITONEUM: No lymphadenopathy or aortic aneurysm. Atherosclerotic changes  are noted. REPRODUCTIVE ORGANS: Patient status post hysterectomy. URINARY BLADDER: No mass or calculus. BONES: No destructive bone lesion. There is facet arthropathy lumbar spine. ADDITIONAL COMMENTS: N/A         Impression IMPRESSION:    1. No acute abnormality identified. CT CODE NEURO HEAD WO CONTRAST   Narrative EXAM:  CT CODE NEURO HEAD WO CONTRAST    INDICATION:   Acute altered mental status and altered speech    COMPARISON: 7/23/2007. CONTRAST:  None. TECHNIQUE: Unenhanced CT of the head was performed using 5 mm images. Brain and  bone windows were generated.   CT dose reduction was achieved through use of a  standardized protocol tailored for this examination and automatic exposure  control for dose modulation. FINDINGS:  The ventricles and sulci are normal in size, shape and configuration and  midline. Mild small vessel ischemic changes are seen in the periventricular  white matter. Right basal ganglia lacunar infarct is noted. There is no  intracranial hemorrhage, extra-axial collection, mass, mass effect or midline  shift. The basilar cisterns are open. No acute infarct is identified. The bone  windows demonstrate no abnormalities. The visualized portions of the paranasal  sinuses and mastoid air cells are clear. Vascular calcification is evident. Impression IMPRESSION: Mild small vessel ischemic changes. Right basal ganglia lacunar  infarcts. No acute abnormality is identified. Lab Review  Lab Results   Component Value Date/Time    WBC 5.8 08/02/2017 11:41 AM    HCT 34.6 08/02/2017 11:41 AM    HGB 12.0 08/02/2017 11:41 AM    PLATELET 941 56/93/5012 11:41 AM     Lab Results   Component Value Date/Time    Sodium 138 08/03/2017 03:18 AM    Potassium 3.6 08/03/2017 03:18 AM    Chloride 106 08/03/2017 03:18 AM    CO2 23 08/03/2017 03:18 AM    Glucose 88 08/03/2017 03:18 AM    BUN 9 08/03/2017 03:18 AM    Creatinine 1.12 08/03/2017 03:18 AM    Calcium 8.2 08/03/2017 03:18 AM     No components found for: TROPQUANT  No results found for: EVERTON    Signed:  Olu Damon DO  8/3/2017  4:54 PM

## 2017-08-03 NOTE — PROGRESS NOTES
Spoke with Brianne Arriaga, Nurse Navigator at [facility]/Patient's PCP at [phone]. Appointment made for Tuesday, August 8th at 4pm.    AVS updated.     Segundo Ritter RN

## 2017-08-03 NOTE — PROCEDURES
[address]  *** FINAL REPORT ***    Name: Lorie Jacobo  MRN: [id number]    Inpatient  : 16 Sep 1935  HIS Order #: [id number]  [address] [facility] Visit #: [id number]  Date: 03 Aug 2017    TYPE OF TEST: Cerebrovascular Duplex    REASON FOR TEST  Transient ischemic attacks    Right Carotid:-             Proximal               Mid                 Distal  cm/s  Systolic  Diastolic  Systolic  Diastolic  Systolic  Diastolic  CCA:     51.5       8.0                            75.0       9.0  Bulb:  ECA:     67.0       4.0  ICA:     70.0      10.0                            45.0      14.0  ICA/CCA:  0.9       1.1    ICA Stenosis:    Right Vertebral:-  Finding: Antegrade  Sys:       74.0  Fay:       12.0    Right Subclavian:    Left Carotid:-            Proximal                Mid                 Distal  cm/s  Systolic  Diastolic  Systolic  Diastolic  Systolic  Diastolic  CCA:     72.0      11.0                            73.0      10.0  Bulb:  ECA:     79.0       5.0  ICA:     62.0      14.0                            74.0      21.0  ICA/CCA:  0.8       1.4    ICA Stenosis:    Left Vertebral:-  Finding: Antegrade  Sys:       51.0  Fay:        8.0    Left Subclavian:    INTERPRETATION/FINDINGS  PROCEDURE:  Color duplex ultrasound imaging of extracranial  cerebrovascular arteries. FINDINGS:       Right:  Internal carotid velocity is within normal limits. There   is narrowing of the internal carotid flow channel on color Doppler  imaging and  non-calcific plaque on B-mode imaging, consistent with  less than 50 percent stenosis (lower portion of the 0 to 49 percent  range). The common and external carotid arteries are patent and  without evidence of hemodynamically significant stenosis. Left:  Internal carotid velocity is within normal limits, there  is no observed narrowing of the flow channel on color Doppler imaging,   and no plaque is visualized on B-mode imaging.   The common and  external carotid arteries are patent and without evidence of  hemodynamically significant stenosis. IMPRESSION:  Consistent with minimal stenosis of the right internal  carotid and no stenosis of the left internal carotid. Vertebrals are  patent with antegrade flow. ADDITIONAL COMMENTS    I have personally reviewed the data relevant to the interpretation of  this  study.     TECHNOLOGIST: Nellie Rice RVT  Signed: 08/03/2017 06:59 PM    PHYSICIAN: Alize Urias., MD  Signed: 08/04/2017 07:35 AM

## 2017-08-03 NOTE — PROGRESS NOTES
[address]  [address] [facility] [address] [facility], [address]  [phone]                GI PROGRESS NOTE  Vonda Rivers, [facility]  Work Cell: [phone]      NAME:   Shane Kulkarni   :    1935   MRN:    [id number]     Assessment/Plan   1. C. Difficile colitis- symptoms have improved. No further episodes of diarrhea thus far today. - Continue Flagyl and Vancomycin  2. IBS with alternating diarrhea and constipation  3. Gastroparesis  4. Possible TIA- with transient aphasia yesterday. No recurrence of symptoms.   - MRI of brain pending  5. Hypertension      Patient Active Problem List   Diagnosis Code    Essential hypertension, benign I10    DDD (degenerative disc disease), cervical M50.30    Hypertriglyceridemia E78.1    Depression with anxiety F41.8    Allergic rhinitis J30.9    DDD (degenerative disc disease), lumbar M51.36    Other and unspecified noninfectious gastroenteritis and colitis K52.9    CKD (chronic kidney disease) stage 3, GFR 30-59 ml/min N18.3    Pre-diabetes R73.03    History of pulmonary embolism Z86.711    Gastroparesis K31.84    Spinal stenosis, lumbar M48.06    Fibromyalgia M79.7    Neuropathy (HCC) G62.9    Gastroesophageal reflux disease without esophagitis K21.9    Irritable bowel syndrome with diarrhea K58.0    TIA (transient ischemic attack) G45.9    C. difficile diarrhea A04.7       Subjective:     Feeling better. Reports some mild queasiness and abdominal cramping. Denies any further diarrhea.         Review of Systems    Constitutional: negative fever, negative chills, negative weight loss  Eyes:   negative visual changes  ENT:   negative sore throat, tongue or lip swelling  Respiratory:  negative cough, negative dyspnea  Cards:  negative for chest pain, palpitations, lower extremity edema  GI:   See HPI  :  negative for frequency, dysuria  Integument:  negative for rash and pruritus  Heme:  negative for easy bruising and gum/nose bleeding  Musculoskel: negative for myalgias, back pain and muscle weakness  Neuro: negative for headaches, dizziness, vertigo  Psych:  negative for feelings of anxiety, depression     Objective:     VITALS:   Last 24hrs VS reviewed since prior hospitalist progress note. Most recent are:  Visit Vitals    /56    Pulse 65    Temp 98.1 °F (36.7 °C)    Resp 20    Ht 5' 2\" (1.575 m)    Wt 46.9 kg (103 lb 6.4 oz)    SpO2 95%    Breastfeeding No    BMI 18.91 kg/m2       Intake/Output Summary (Last 24 hours) at 08/03/17 1113  Last data filed at 08/02/17 2045   Gross per 24 hour   Intake              370 ml   Output             1000 ml   Net             -630 ml        PHYSICAL EXAM:  General   well developed, elderly, thin, alert, in no acute distress  EENT  Normocephalic, Atraumatic, PERRLA, EOMI, sclera clear  Respiratory   Clear To Auscultation bilaterally - no wheezes, rales, rhonchi, or crackles  Cardiology  Regular Rate and Rythmn  - no murmurs, rubs or gallops  Abdominal  Soft, non-tender, non-distended, positive bowel sounds, no hepatosplenomegaly, no palpable mass  Extremities  No clubbing, cyanosis, or edema. Pulses intact. Neurological  No focal neurological deficits noted  Psychological  Oriented x 3. Normal affect.        Lab Data   Recent Results (from the past 12 hour(s))   LIPID PANEL    Collection Time: 08/03/17  3:18 AM   Result Value Ref Range    LIPID PROFILE          Cholesterol, total 186 <200 MG/DL    Triglyceride 69 <150 MG/DL    HDL Cholesterol 86 MG/DL    LDL, calculated 86.2 0 - 100 MG/DL    VLDL, calculated 13.8 MG/DL    CHOL/HDL Ratio 2.2 0 - 5.0     HEMOGLOBIN A1C WITH EAG    Collection Time: 08/03/17  3:18 AM   Result Value Ref Range    Hemoglobin A1c 6.0 4.2 - 6.3 %    Est. average glucose 556 mg/dL   METABOLIC PANEL, BASIC    Collection Time: 08/03/17  3:18 AM   Result Value Ref Range    Sodium 138 136 - 145 mmol/L    Potassium 3.6 3.5 - 5.1 mmol/L    Chloride 106 97 - 108 mmol/L    CO2 23 21 - 32 mmol/L    Anion gap 9 5 - 15 mmol/L    Glucose 88 65 - 100 mg/dL    BUN 9 6 - 20 MG/DL    Creatinine 1.12 (H) 0.55 - 1.02 MG/DL    BUN/Creatinine ratio 8 (L) 12 - 20      GFR est AA 57 (L) >60 ml/min/1.73m2    GFR est non-AA 47 (L) >60 ml/min/1.73m2    Calcium 8.2 (L) 8.5 - 10.1 MG/DL         Medications: Reviewed    PMH/SH reviewed - no change compared to H&P  Mid-Level Provider: Olga Rasmussen NP   Date/Time:  8/3/2017

## 2017-08-03 NOTE — PROGRESS NOTES
physical Therapy EVALUATION/DISCHARGE  Patient: Bart Nieto (80 y.o. female)  Date: 8/3/2017  Primary Diagnosis: TIA (transient ischemic attack)  C. difficile diarrhea        Precautions:   Contact  ASSESSMENT :  Based on the objective data described below, the patient presents with near baseline functional mobility. Prior to admit patient was independent with mobility and reports that she walks daily and swims approx 20 laps at the pool daily. Lives with family in a 1 level home with 3 KELLEN. Patient currently reports some weakness secondary to C-diff but otherwise \"moving well\". Currently independent with bed mobility and transfers. Amb approx 30 feet with steady gait with no overt LOB in the room. Observed patient reaching to floor and for objects out of base of support with no difficulty. Patient does not require skilled PT at this time. Please re-consult if patient status changes. Skilled physical therapy is not indicated at this time. PLAN :  Discharge Recommendations: None  Further Equipment Recommendations for Discharge: none     SUBJECTIVE:   Patient stated Aside from being weak I think I am doing fine.     OBJECTIVE DATA SUMMARY:   HISTORY:    Past Medical History:   Diagnosis Date    Allergic rhinitis, cause unspecified     Anxiety     Back pain     Cancer (HCC)     basal cell face, back, neck    Cancer (HCC)     squamous cell leg    DJD (degenerative joint disease) of cervical spine     Fibromyalgia     Hypercholesterolemia     Hypertension     Hyponatremia     IBS (irritable bowel syndrome)     Irritable bowel syndrome with diarrhea 6/29/2016    GI- Dr Zahraa Myers    Kidney disease, chronic, stage III (GFR 30-59 ml/min) 6/2011    Dr. Gerardo Matos     Past Surgical History:   Procedure Laterality Date    COLONOSCOPY  1/20/11    diverticulosis    EGD  1/20/11    schatzki's ring--clear on barium swallow 7/11    HX CHOLECYSTECTOMY  5/10    HX HYSTERECTOMY hysterectomy    HX KNEE ARTHROSCOPY Left 1990    HX KNEE REPLACEMENT Right 9/29/09    HX OTHER SURGICAL      skin cancer removed: L leg    HX UROLOGICAL  1995    bladder suspension    REPAIR OF RECTOCELE       Prior Level of Function/Home Situation: Independent with functional mobility at baseline  Personal factors and/or comorbidities impacting plan of care:     Home Situation  Home Environment: Private residence  # Steps to Enter: 3  One/Two Story Residence: One story  Living Alone: No  Support Systems: Family member(s)  Patient Expects to be Discharged to[de-identified] Private residence  Current DME Used/Available at Home: elvis Smith    EXAMINATION/PRESENTATION/DECISION MAKING:   Critical Behavior:  Neurologic State: Alert  Orientation Level: Oriented X4        Hearing: Auditory  Auditory Impairment: Hard of hearing, bilateral, Hearing aid(s)  Hearing Aids/Status: At home    Range Of Motion:  AROM: Generally decreased, functional                       Strength:    Strength: Generally decreased, functional      Functional Mobility:  Bed Mobility:     Supine to Sit: Independent  Sit to Supine: Independent  Scooting: Independent  Transfers:  Sit to Stand: Modified independent  Stand to Sit: Modified independent                       Balance:   Sitting: Intact  Standing: Intact  Ambulation/Gait Training:  Distance (ft): 30 Feet (ft)     Ambulation - Level of Assistance: Modified independent                 Base of Support: Widened     Speed/Emerald: Pace decreased (<100 feet/min); Slow  Step Length: Left shortened;Right shortened       Functional Measure:  Timed up and go:    Timed Get Up And Go Test: 8     Timed Up and Go and G-code impairment scale:  Percentage of Impairment CH    0%   CI    1-19% CJ    20-39% CK    40-59% CL    60-79% CM    80-99% CN     100%   Timed   Score 0-56 10 11-12 13-14 15-16 17-18 19 20       < than 10 seconds=Normal  Greater then 13.5 seconds (in elderly)=Increased fall risk Tomasz MARINELLI. Predicting the probability for falls in community dwelling older adults using the Timed Up and Go Test. Phys Ther. 2000;80:896-903. G codes: In compliance with CMSs Claims Based Outcome Reporting, the following G-code set was chosen for this patient based on their primary functional limitation being treated: The outcome measure chosen to determine the severity of the functional limitation was the Tug with a score of 8 secs which was correlated with the impairment scale. ? Mobility - Walking and Moving Around:     - CURRENT STATUS: CH - 0% impaired, limited or restricted    - GOAL STATUS: CH - 0% impaired, limited or restricted    - D/C STATUS:  CH - 0% impaired, limited or restricted           Pain:    no c/o pain     Activity Tolerance:   VSS  Please refer to the flowsheet for vital signs taken during this treatment. After treatment:   [x]   Patient left in no apparent distress sitting up in chair  []   Patient left in no apparent distress in bed  [x]   Call bell left within reach  [x]   Nursing notified  []   Caregiver present  []   Bed alarm activated    COMMUNICATION/EDUCATION:   Communication/Collaboration:  [x]   Fall prevention education was provided and the patient/caregiver indicated understanding. [x]   Patient/family have participated as able and agree with findings and recommendations. []   Patient is unable to participate in plan of care at this time.   Findings and recommendations were discussed with: Physical Therapist, Occupational Therapist and Registered Nurse    Thank you for this referral.  Florencio Block, PT, DPT   Time Calculation: 24 mins

## 2017-08-03 NOTE — PROGRESS NOTES
Bedside and Verbal shift change report given to Mona Cervantes (oncoming nurse) by Quang Pyle (offgoing nurse). Report included the following information SBAR, Kardex, ED Summary, Procedure Summary, Intake/Output, MAR, Recent Results, Med Rec Status and Cardiac Rhythm NSR.

## 2017-08-03 NOTE — PROGRESS NOTES
Occupational Therapy     Orders received for Occupational therapy and chart reviewed. Pt observed during physical therapy evaluation and demonstrated ability to ambulate in/out of the bathroom and complete toilet transfer/toileting/grooming standing at the sink as well as LB dressing tasks safely and independently. Pt typically lives at home with her  and reports  No concerns about discharging home. Recommend pt continue to ambulate to the bathroom with nursing supervision and sit OOB in the chair for all meals.     Will complete OT orders at this time; please re consult OT if there is a DECLINE in function    [employer] OTR/L

## 2017-08-03 NOTE — CDMP QUERY
Patient is noted to have a BMI of 18.91. Please clarify if this patient is:     =>Underweight  =>Cachexia  =>Failure to Thrive  =>Other explanation of clinical findings  =>Unable to determine (no explanation for clinical findings)    Presentation: 5' 2\", 103 lbs = BMI 18.91    Please clarify and document your clinical opinion in the progress notes and discharge summary, including the definitive and or presumptive diagnosis, (suspected or probable), related to the above clinical findings. Please include clinical findings supporting your diagnosis.    Thank [address] [place] [address] W. Yohana Rodríguez, [address] [place] [address] Rd     [phone]

## 2017-08-03 NOTE — PROGRESS NOTES
Consult received and appreciated. Reviewed chart and discussed case with nsg bedside. Patient with resolution of speech deficits while in ED and able to carry on a fluent conversation with nursing about meds bedside. Tolerating PO without difficulty. Formal SLP needs not identified at this time. Please re-consult if further needs arise. Thanks. Fady Lopez M.CD.  CCC-SLP

## 2017-08-03 NOTE — PROGRESS NOTES
Assumed care of pt this afternoon. Pt is down for testing. Pt is now back in bed resting with no known needs at this time. Spoke with patient and NP about pt allergie to aspirin, pt states it hurt her stomach once, but agrees to take.

## 2017-08-03 NOTE — INTERDISCIPLINARY ROUNDS
IDR/SLIDR Summary          Patient: Daria Pérez MRN: [id number]    Age: 80 y.o. YOB: 1935 Room[facility]Bed[facility]   Admit Diagnosis: TIA (transient ischemic attack)  C. difficile diarrhea  Principal Diagnosis: C. difficile diarrhea   Goals: IV antibotics  Readmission: NO  Quality Measure: Not applicable  VTE Prophylaxis: Mechanical  Influenza Vaccine screening completed? YES  Pneumococcal Vaccine screening completed? NO  Mobility needs: No   Nutrition plan:No  Consults:Case Management    Financial concerns:No  Escalated to ? YES  RRAT Score: 19   Interventions:H2H  Testing due for pt today?  NO  LOS: 1 days Expected length of stay 3 days  Discharge plan: to home   PCP: Natacha Damon MD  Transportation needs: No    Days before discharge:two or more days before discharge   Discharge disposition: Home    Signed:     Hali Hdez RN  8/3/2017  4:25 AM

## 2017-08-03 NOTE — PROGRESS NOTES
Bedside and Verbal shift change report given to Hali Bobo RN (oncoming nurse) by Staci Chacon RN (offgoing nurse). Report included the following information SBAR, Kardex, Intake/Output, MAR, Recent Results and Cardiac Rhythm SR 1degree AV block.

## 2017-08-03 NOTE — PROGRESS NOTES
Pt discussed in rounds. CM went to meet with the patient, but she was off the floor. CM spoke with her , Brittani Silva ([phone]) to introduce him to the role of CM and transition of care. He verbalized understanding. This pt lives at home with her . Per the , she was independent with ADL's and IADL's prior to admission, still driving. He stated that she has a walker, but does not use it presently. Pt has PT and OT consults. CM will wait for therapy recommendations. [address] Management Interventions  PCP Verified by CM: Yes (Dr. Galdino Ross [phone])  Transition of Care Consult (CM Consult): Discharge Planning  MyChart Signup: No  Discharge Durable Medical Equipment: No (Pt has a walker.)  Physical Therapy Consult: Yes  Occupational Therapy Consult: Yes  Speech Therapy Consult: Yes  Current Support Network: Lives with Spouse Augustin [place] [phone])  Confirm Follow Up Transport: Family  Plan discussed with Pt/Family/Caregiver: Yes  Freedom of Choice Offered:  Yes

## 2017-08-04 VITALS
RESPIRATION RATE: 12 BRPM | BODY MASS INDEX: 22.76 KG/M2 | SYSTOLIC BLOOD PRESSURE: 131 MMHG | TEMPERATURE: 98 F | WEIGHT: 123.7 LBS | OXYGEN SATURATION: 97 % | HEIGHT: 62 IN | HEART RATE: 69 BPM | DIASTOLIC BLOOD PRESSURE: 61 MMHG

## 2017-08-04 PROBLEM — G45.9 TIA (TRANSIENT ISCHEMIC ATTACK): Status: RESOLVED | Noted: 2017-08-02 | Resolved: 2017-08-04

## 2017-08-04 LAB
ANION GAP BLD CALC-SCNC: 9 MMOL/L (ref 5–15)
BACTERIA SPEC CULT: NORMAL
BUN SERPL-MCNC: 10 MG/DL (ref 6–20)
BUN/CREAT SERPL: 11 (ref 12–20)
C JEJUNI+C COLI AG STL QL: NEGATIVE
CALCIUM SERPL-MCNC: 7.5 MG/DL (ref 8.5–10.1)
CHLORIDE SERPL-SCNC: 111 MMOL/L (ref 97–108)
CO2 SERPL-SCNC: 23 MMOL/L (ref 21–32)
CREAT SERPL-MCNC: 0.9 MG/DL (ref 0.55–1.02)
E COLI SXT1+2 STL IA: NEGATIVE
GLUCOSE SERPL-MCNC: 96 MG/DL (ref 65–100)
POTASSIUM SERPL-SCNC: 3.5 MMOL/L (ref 3.5–5.1)
SERVICE CMNT-IMP: NORMAL
SODIUM SERPL-SCNC: 143 MMOL/L (ref 136–145)

## 2017-08-04 PROCEDURE — 36415 COLL VENOUS BLD VENIPUNCTURE: CPT

## 2017-08-04 PROCEDURE — 74011250637 HC RX REV CODE- 250/637: Performed by: HOSPITALIST

## 2017-08-04 PROCEDURE — 80048 BASIC METABOLIC PNL TOTAL CA: CPT

## 2017-08-04 PROCEDURE — 74011250637 HC RX REV CODE- 250/637: Performed by: PSYCHIATRY & NEUROLOGY

## 2017-08-04 PROCEDURE — 74011250637 HC RX REV CODE- 250/637: Performed by: FAMILY MEDICINE

## 2017-08-04 PROCEDURE — 74011000250 HC RX REV CODE- 250: Performed by: HOSPITALIST

## 2017-08-04 PROCEDURE — 74011250637 HC RX REV CODE- 250/637: Performed by: NURSE PRACTITIONER

## 2017-08-04 RX ORDER — METRONIDAZOLE 500 MG/1
500 TABLET ORAL 3 TIMES DAILY
Qty: 36 TAB | Refills: 0 | Status: SHIPPED | OUTPATIENT
Start: 2017-08-04 | End: 2017-08-16

## 2017-08-04 RX ORDER — ASPIRIN 81 MG/1
81 TABLET ORAL DAILY
Qty: 30 TAB | Refills: 0 | Status: SHIPPED
Start: 2017-08-04

## 2017-08-04 RX ORDER — VANCOMYCIN HYDROCHLORIDE 250 MG/1
250 CAPSULE ORAL 4 TIMES DAILY
Qty: 48 CAP | Refills: 0 | Status: SHIPPED | OUTPATIENT
Start: 2017-08-04 | End: 2017-08-08 | Stop reason: SINTOL

## 2017-08-04 RX ORDER — METRONIDAZOLE 250 MG/1
500 TABLET ORAL 3 TIMES DAILY
Status: DISCONTINUED | OUTPATIENT
Start: 2017-08-04 | End: 2017-08-04 | Stop reason: HOSPADM

## 2017-08-04 RX ORDER — ATORVASTATIN CALCIUM 20 MG/1
20 TABLET, FILM COATED ORAL DAILY
Qty: 30 TAB | Refills: 0 | Status: SHIPPED | OUTPATIENT
Start: 2017-08-04 | End: 2017-09-14

## 2017-08-04 RX ADMIN — METRONIDAZOLE 500 MG: 500 INJECTION, SOLUTION INTRAVENOUS at 01:15

## 2017-08-04 RX ADMIN — VANCOMYCIN HYDROCHLORIDE 250 MG: 1 INJECTION, POWDER, LYOPHILIZED, FOR SOLUTION INTRAVENOUS at 08:56

## 2017-08-04 RX ADMIN — FAMOTIDINE 20 MG: 20 TABLET ORAL at 08:47

## 2017-08-04 RX ADMIN — LORAZEPAM 0.5 MG: 0.5 TABLET ORAL at 07:00

## 2017-08-04 RX ADMIN — AMLODIPINE BESYLATE 5 MG: 5 TABLET ORAL at 08:48

## 2017-08-04 RX ADMIN — CALCIUM CARBONATE (ANTACID) CHEW TAB 500 MG 300 MG: 500 CHEW TAB at 08:47

## 2017-08-04 RX ADMIN — VANCOMYCIN HYDROCHLORIDE 250 MG: 1 INJECTION, POWDER, LYOPHILIZED, FOR SOLUTION INTRAVENOUS at 01:14

## 2017-08-04 RX ADMIN — VITAMIN D, TAB 1000IU (100/BT) 1000 UNITS: 25 TAB at 08:48

## 2017-08-04 RX ADMIN — Medication 1 TABLET: at 08:47

## 2017-08-04 RX ADMIN — METRONIDAZOLE 500 MG: 500 INJECTION, SOLUTION INTRAVENOUS at 09:41

## 2017-08-04 RX ADMIN — ATORVASTATIN CALCIUM 20 MG: 20 TABLET, FILM COATED ORAL at 08:48

## 2017-08-04 RX ADMIN — ASPIRIN 81 MG: 81 TABLET, COATED ORAL at 08:48

## 2017-08-04 NOTE — DISCHARGE SUMMARY
Discharge Summary       PATIENT ID: Hansel Fox  MRN: [id number]   YOB: 1935    DATE OF ADMISSION: 8/2/2017 10:57 AM    DATE OF DISCHARGE: 8/4/17   PRIMARY CARE PROVIDER: Lynn Lee MD     ATTENDING PHYSICIAN: Dr. Bruce Dominguez  DISCHARGING PROVIDER: Bang Otto NP    To contact this individual call [phone] and ask the  to page. If unavailable ask to be transferred the Adult Hospitalist Department. CONSULTATIONS: IP CONSULT TO NEUROLOGY    PROCEDURES/SURGERIES: * No surgery found *    ADMITTING DIAGNOSES & HOSPITAL COURSE:   Mrs. Ben Todd is an 66-year-old  with PMHx of  fibromyalgia, anxiety, IBS, DJD, HTN, skin cancer, chronic back pain, CKD, gastroparesis, history of PE, depression and C. Difficile who presented to the ED on 8/2 with cc of abdominal pain and diarrhea 5 days' duration. 8/4/2017  MRI negative for stroke, possible she had a TIA with witnessed dysarthria and ataxia x 15 minutes in the ED, pt claims dysarthria was r/t dry mouth and throat. Pt was positive for C diff and is d/c'ed home with flagyl and vancomycin. Has f/u with Dr. Albina Howell. DISCHARGE DIAGNOSES / PLAN:      Diarrhea 2/2 C. difficile infection  - pt does have a hx of previous c diff ~ 10 years ago.   Also has hx c diff and IBS  - + c diff dna  - continue ivf and abx  - GI following     TIA  - witnessed dysarthria and ataxia x 15 minutes in the ED, pt claims dysarthria was r/t dry mouth and throat  - ct head negative  - MRI brain negative  - A1c 6.0  - lipid panel reviewed, ldl 86.2  - pending neuro consult  - ef 60%, NRWA, no shunt      CKD Stage III  - follows with Dr. Terra Ac  - cr 1.12 today from 1.37     Mild Hyponatremia: Resolved  - likely 2/2 diarrhea due to intravascular volume depletion.      Dehydration - Resolved  - 2/2 diarrhea  - continue IVF     HTN  - continue home medication, Norvasc and metoprolol     Hx of anxiety/depression: stable  - continue home lorazepam     Hx of gastroparesis  - gi following          PENDING TEST RESULTS:   At the time of discharge the following test results are still pending: none    FOLLOW UP APPOINTMENTS:    Follow-up Information     Follow up With Details Comments Contact [facility] INTERNAL MEDICINE Go on 2017 Appointment Time:  4:00 pm [address] [place] [address] Dr Teresita Delacruz [phone]  [phone]    Nadiya Cortez, DO  follow up in 4-6 weeks [address] [facility] at [address] [facility]      Yarelis Arvizu MD  follow up in 2 weeks [address] [place] [address]  [id number] [place] [address], [facility] [facility]  [phone]             ADDITIONAL CARE RECOMMENDATIONS:  Complete antibiotics for your C-Diff, make sure to finish all of them, follow up with Dr. Odessa Apgar in 2 weeks  Your MRI was negative for stroke, take a baby aspirin daily for stroke prevention and atorvastatin 20 mg daily for your LDL which is your bad cholesterol, your level was 86.2, the recommendation is below 70.      DIET: As you were previously     ACTIVITY: As previouisly     WOUND CARE: none     EQUIPMENT needed: none      DISCHARGE MEDICATIONS:  Discharge Medication List as of 2017 10:01 AM      START taking these medications    Details   aspirin delayed-release 81 mg tablet Take 1 Tab by mouth daily. , No Print, Disp-30 Tab, R-0      atorvastatin (LIPITOR) 20 mg tablet Take 1 Tab by mouth daily. , Print, Disp-30 Tab, R-0      vancomycin (VANCOCIN) 250 mg capsule Take 1 Cap by mouth four (4) times daily for 12 days. , Print, Disp-48 Cap, R-0      metroNIDAZOLE (FLAGYL) 500 mg tablet Take 1 Tab by mouth three (3) times daily for 12 days. , Print, Disp-36 Tab, R-0         CONTINUE these medications which have NOT CHANGED    Details   LORazepam (ATIVAN) 0.5 mg tablet Take 0.5 mg by mouth every morning., Historical Med      hyoscyamine SL (LEVSIN/SL) 0.125 mg SL tablet 0.125 mg by SubLINGual route Before breakfast, lunch, and dinner., Historical Med      acetaminophen (TYLENOL ARTHRITIS PAIN) 650 mg CR tablet Take 650 mg by mouth every six (6) hours as needed for Pain., Historical Med      LACTOBACILLUS ACIDOPHILUS (PROBIOTIC PO) Take  by mouth daily. , Historical Med      raNITIdine (ZANTAC) 150 mg tablet Take 150 mg by mouth Before breakfast and dinner., Historical Med      calcium carbonate (TUMS) 300 mg (750 mg) chewable tablet Take 1 Tab by mouth daily. , Historical Med      cholecalciferol (VITAMIN D3) 1,000 unit tablet Take 1,000 Units by mouth daily. , Historical Med      FERROUS FUMARATE/VIT BCOMP,C (SUPER B COMPLEX PO) Take  by mouth daily (after dinner). , Historical Med      BENEFIBER, GUAR GUM, PO Take  by mouth every morning., Historical Med      amLODIPine (NORVASC) 5 mg tablet TAKE 1 TAB BY MOUTH DAILY., Normal, Disp-90 Tab, R-1      metoprolol tartrate (LOPRESSOR) 25 mg tablet TAKE ONE-QUARTER TABLET BY MOUTH EVERY DAY, Normal, Disp-90 Tab, R-1         STOP taking these medications       bismuth subsalicylate (PEPTO-BISMOL) 262 mg chew Comments:   Reason for Stopping:         polyethylene glycol (MIRALAX) 17 gram packet Comments:   Reason for Stopping:                 NOTIFY YOUR PHYSICIAN FOR ANY OF THE FOLLOWING:   Fever over 101 degrees for 24 hours. Chest pain, shortness of breath, fever, chills, nausea, vomiting, diarrhea, change in mentation, falling, weakness, bleeding. Severe pain or pain not relieved by medications. Or, any other signs or symptoms that you may have questions about.     DISPOSITION:  xx  Home With:   OT  PT  HH  RN       Long term SNF/Inpatient Rehab    Independent/assisted living    Hospice    Other:       PATIENT CONDITION AT DISCHARGE:     Functional status    Poor     Deconditioned    xx Independent      Cognition   xx  Lucid     Forgetful Dementia      Catheters/lines (plus indication)    Garcia     PICC     PEG    xx None      Code status    xx Full code     DNR      PHYSICAL EXAMINATION AT DISCHARGE:    Constitutional:  No acute distress, cooperative, pleasant    ENT:  Oral mucous moist, oropharynx benign. Neck supple   Resp:  CTA bilaterally. No wheezing/rhonchi/rales. No accessory muscle use   CV:  Regular rhythm, normal rate, no murmurs, gallops, rubs    GI:  Soft, non distended, non tender. normoactive bowel sounds, no hepatosplenomegaly     Musculoskeletal:  No edema, warm, 2+ pulses throughout    Neurologic:  Moves all extremities. AAOx3, CN II-XII reviewed                                             Psych:  Good insight, Not anxious nor agitated. Skin:  Good turgor, no rashes or ulcers      CHRONIC MEDICAL DIAGNOSES:  Problem List as of 8/4/2017  Date Reviewed: 8/4/2017          Codes Class Noted - Resolved    * (Principal)C.  difficile diarrhea ICD-10-CM: A04.7  ICD-9-CM: 008.45  8/2/2017 - Present        Irritable bowel syndrome with diarrhea ICD-10-CM: K58.0  ICD-9-CM: 564.1  6/29/2016 - Present    Overview Signed 6/29/2016  7:46 AM by Tiera Rodríguez MD     GI- Dr Rose Guerra             Gastroparesis ICD-10-CM: K31.84  ICD-9-CM: 536.3  6/22/2016 - Present        Spinal stenosis, lumbar ICD-10-CM: M48.06  ICD-9-CM: 724.02  6/22/2016 - Present        Fibromyalgia ICD-10-CM: M79.7  ICD-9-CM: 729.1  6/22/2016 - Present        Neuropathy ([address] ICD-10-CM: G62.9  ICD-9-CM: 355.9  6/22/2016 - Present    Overview Signed 6/22/2016  7:41 AM by Tiera Rodríguez MD     Both feet             Gastroesophageal reflux disease without esophagitis ICD-10-CM: K21.9  ICD-9-CM: 530.81  6/22/2016 - Present    Overview Signed 9/7/2016  6:17 AM by Tiera Rodríguez MD     + hiatal hernia, EGD in '11             History of pulmonary embolism ICD-10-CM: Z86.711  ICD-9-CM: V12.55  6/21/2016 - Present    Overview Signed 6/21/2016  6:15 AM by Syeda Isaac MD     Ad[facility] to [facility]             Pre-diabetes ICD-10-CM: R73.03  ICD-9-CM: 790.29  12/9/2011 - Present        CKD (chronic kidney disease) stage 3, GFR 30-59 ml/min ICD-10-CM: N18.3  ICD-9-CM: 585.3  7/21/2011 - Present        Other and unspecified noninfectious gastroenteritis and colitis ICD-10-CM: K52.9  ICD-9-CM: 558.9  12/14/2010 - Present        DDD (degenerative disc disease), lumbar ICD-10-CM: M51.36  ICD-9-CM: 722.52  10/12/2010 - Present    Overview Signed 10/12/2010  8:06 AM by Chico Cole             Allergic rhinitis ICD-10-CM: J30.9  ICD-9-CM: 477.9  10/11/2010 - Present        Depression with anxiety ICD-10-CM: F41.8  ICD-9-CM: 300.4  8/4/2010 - Present        Essential hypertension, benign ICD-10-CM: I10  ICD-9-CM: 401.1  4/12/2010 - Present        DDD (degenerative disc disease), cervical ICD-10-CM: M50.30  ICD-9-CM: 722.4  4/12/2010 - Present        Hypertriglyceridemia ICD-10-CM: E78.1  ICD-9-CM: 272.1  4/12/2010 - Present        RESOLVED: TIA (transient ischemic attack) ICD-10-CM: G45.9  ICD-9-CM: 435.9  8/2/2017 - 8/4/2017        RESOLVED: Advance directive on file ICD-10-CM: Z78.9  ICD-9-CM: V49.89  7/18/2016 - 1/20/2017        RESOLVED: Pulmonary emboli ([facility] [address] ICD-10-CM: I26.99  ICD-9-CM: 415.19  10/24/2013 - 10/28/2013        RESOLVED: PAD (peripheral artery disease) ([facility] [address] ICD-10-CM: I73.9  ICD-9-CM: 443.9  9/14/2012 - 9/15/2012              Greater than 30 minutes were spent with the patient on counseling and coordination of care    Signed:   Justine Torres NP  8/4/2017  4:51 PM

## 2017-08-04 NOTE — PROGRESS NOTES
Clinical Pharmacy Note: Re: IV to PO Automatic Conversion - Antibiotic    Please note: Anna Olvera medication: metronidazole has/have been changed from IV to PO based on the following criteria:    The patient:  1. Has received IV therapy for at least 48 hours   2. Has a functioning GI tract  - Taking scheduled oral medications  - Tolerating tube feeds at goal rate or a full liquid, soft, or regular diet         3. Is clinically stable        - Temperature < 100.4F for at least 24 hours        - WBC is trending down    This IV to PO conversion is based on the [employer] approved automatic conversion policy for eligible patients. Please call with questions.

## 2017-08-04 NOTE — DISCHARGE INSTRUCTIONS
Discharge Instructions       PATIENT ID: Aurora Ulloa  MRN: [id number]   YOB: 1935    DATE OF ADMISSION: 8/2/2017 10:57 AM    DATE OF DISCHARGE: 8/4/2017    PRIMARY CARE PROVIDER: Valdo Chong MD     ATTENDING PHYSICIAN: Sunita Lopez MD  DISCHARGING PROVIDER: Vern Queen NP    To contact this individual call [phone] and ask the  to page. If unavailable ask to be transferred the Adult Hospitalist Department. DISCHARGE DIAGNOSES: C-Diff    CONSULTATIONS: IP CONSULT TO HOSPITALIST  IP CONSULT TO NEUROLOGY    PROCEDURES/SURGERIES: * No surgery found *    PENDING TEST RESULTS:   At the time of discharge the following test results are still pending: none    FOLLOW UP APPOINTMENTS:   Follow-up Information     Follow up With Details Comments Contact [facility]  on 8/8/2017 Appointment Time:  4:00 pm [address] [place] [id number]  [facility], DO  follow up in 4-6 weeks [address] [facility] at [address] [place] [address]      Juliano Jacobs MD  follow up in 2 weeks [place] [address]  [phone]             ADDITIONAL CARE RECOMMENDATIONS:  Complete antibiotics for your C-Diff, make sure to finish all of them, follow up with Dr. Shaunna Coleman in 2 weeks  Your MRI was negative for stroke, take a baby aspirin daily for stroke prevention and atorvastatin 20 mg daily for your LDL which is your bad cholesterol, your level was 86.2, the recommendation is below 70. DIET: As you were previously    ACTIVITY: As previouisly    WOUND CARE: none    EQUIPMENT needed: none      DISCHARGE MEDICATIONS:   See Medication Reconciliation Form    · It is important that you take the medication exactly as they are prescribed.    · Keep your medication in the bottles provided by the pharmacist and keep a list of the medication names, dosages, and times to be taken in your wallet. · Do not take other medications without consulting your doctor. NOTIFY YOUR PHYSICIAN FOR ANY OF THE FOLLOWING:   Fever over 101 degrees for 24 hours. Chest pain, shortness of breath, fever, chills, nausea, vomiting, diarrhea, change in mentation, falling, weakness, bleeding. Severe pain or pain not relieved by medications. Or, any other signs or symptoms that you may have questions about. DISPOSITION:  xx  Home With:   OT  PT  HH  RN       SNF/Inpatient Rehab/LTAC    Independent/assisted living    Hospice    Other:     CDMP Checked:   Yes x     PROBLEM LIST Updated:  Yes x       Signed:   Fabriceedilson FISHER NP  8/4/2017  9:42 AM           Clostridium Difficile Colitis: Care Instructions  Your Care Instructions  Clostridium difficile (also called C. difficile) are bacteria that can cause swelling and irritation of the large intestine, or colon. This inflammation is also called colitis. It can cause diarrhea, fever, and belly cramps. You may get C. difficile colitis if you take antibiotics. The infection is most common in people who are taking antibiotics while in the hospital. It is also common in older people in hospitals and nursing homes. Severe disease could cause the colon to swell to many times its normal size (toxic megacolon). This can cause death and needs emergency treatment. You may have a swollen belly that is painful or tender, a rapid heartbeat, and a fever. Follow-up care is a key part of your treatment and safety. Be sure to make and go to all appointments, and call your doctor if you are having problems. It's also a good idea to know your test results and keep a list of the medicines you take. How can you care for yourself at home? · Your doctor may give you antibiotics to treat C. difficile colitis. If your doctor prescribes an antibiotic, he or she will give you a different antibiotic than the one that caused your infection.  Take your antibiotics as directed. Do not stop taking them just because you feel better. You need to take the full course of antibiotics. · To prevent dehydration, drink plenty of fluids, enough so that your urine is light yellow or clear like water. Choose water and other caffeine-free clear liquids until you feel better. If you have kidney, heart, or liver disease and have to limit fluids, talk with your doctor before you increase the amount of fluids you drink. · Begin eating small amounts of mild foods, if you feel like it. Try yogurt that has live cultures of lactobacillus (check the label). ¨ Avoid spicy foods, fruits, alcohol, and caffeine until 48 hours after all symptoms go away. ¨ Avoid chewing gum that contains sorbitol. ¨ Avoid dairy products (except for yogurt with lactobacillus) while you have diarrhea and for 3 days after symptoms go away. · To prevent the spread of C. difficile, practice good hygiene. Keep your hands clean by washing them well and often with soap and clean, running water. Alcohol-based hand sanitizers do not kill C. difficile. When should you call for help? Call 911 if:  · You passed out (lost consciousness). Call your doctor now or seek immediate medical care if:  · You have a fever over 101°F or shaking chills. · You feel lightheaded or have a fast heart rate. · You pass stools that are almost always bloody. · You have signs of needing more fluids. You have sunken eyes and a dry mouth, and you pass only a little dark urine. · You have severe belly pain with or without bloating. · You have severe vomiting and cannot keep down liquids. · You are not passing any stools or gas. Watch closely for changes in your health, and be sure to contact your doctor if:  · You do not get better as expected. Where can you learn more? Go to http://yoly-jackie.info/.   Enter [phone] [phone] in the search box to learn more about \"Clostridium Difficile Colitis: Care Instructions. \"  Current as of: March 3, 2017  Content Version: 11.3  © 8154-2043 [employer]. Care instructions adapted under license by [employer] (which disclaims liability or warranty for this information). If you have questions about a medical condition or this instruction, always ask your healthcare professional. [address] any warranty or liability for your use of this information. Vancomycin (By mouth)   Vancomycin (prg-pjl-DYT-sin)  Treats infections. Brand Name(s): Vancocin, Vancomycin HCl Novaplus   There may be other brand names for this medicine. When This Medicine Should Not Be Used:   Do not use this medicine if you have had an allergic reaction to vancomycin. How to Use This Medicine:   Capsule, Liquid  · Your doctor will tell you how much medicine to use. Do not use more than directed. · Swallow the capsule whole. Do not crush, open, or chew it. · Shake the oral liquid well before each use. Measure the oral liquid medicine with a marked measuring spoon, oral syringe, or medicine cup. · You may mix your oral liquid dose with 2 tablespoons of water. You may add a flavoring syrup to improve the taste of the medicine. · Take all of the medicine in your prescription to clear up your infection, even if you feel better after the first few doses. If a dose is missed:   · Take a dose as soon as you remember. If it is almost time for your next dose, wait until then and take a regular dose. Do not take extra medicine to make up for a missed dose. How to Store and Dispose of This Medicine:   · Store your capsules at room temperature, away from heat, moisture, and direct light. · Store the oral liquid in a refrigerator for up to 14 days. Do not freeze. After 14 days throw away any medicine you do not use. · Ask your pharmacist, doctor, or health caregiver about the best way to dispose of any outdated medicine or medicine no longer needed.   · Keep all medicine out of the reach of children. Never share your medicine with anyone. Drugs and Foods to Avoid:   Ask your doctor or pharmacist before using any other medicine, including over-the-counter medicines, vitamins, and herbal products. · Make sure your doctor knows if you also use amikacin, gentamicin, streptomycin, Amikin®, or Garamycin®. Warnings While Using This Medicine:   · Make sure your doctor knows if you are pregnant or breastfeeding or if you have kidney disease, hearing problems, or other bowel problems. · Check with your doctor right away if you have the following symptoms during or after treatment with this medicine: bloody urine, change in how much or how often you urinate, nausea, vomiting, swelling, or weakness. These could be symptoms of kidney or electrolyte problems. · Check with your doctor right away if you have changes in hearing, dizziness or ringing in the ears, or a feeling of movement or spinning. These may be symptoms of damage to your ears. · Call your doctor if your symptoms do not improve or if they get worse. · Your doctor will do lab tests at regular visits to check on the effects of this medicine. Keep all appointments. · Use this medicine only to treat the infection your doctor has prescribed it for. Do not use this medicine for any infection or condition that has not been checked by a doctor. This medicine will not treat the flu or the common cold.   Possible Side Effects While Using This Medicine:   Call your doctor right away if you notice any of these side effects:  · Allergic reaction: Itching or hives, swelling in your face or hands, swelling or tingling in your mouth or throat, chest tightness, trouble breathing  · Blistering, peeling, or red skin rash  · Bloody urine, change in how much or how often you urinate  · Loss of hearing, ringing or buzzing in the ears, dizziness  · Dry mouth, increased thirst, muscle cramps, unusual tiredness or weakness  · Fever  · Swelling of your feet or lower legs  If you notice these less serious side effects, talk with your doctor:   · Back pain  · Diarrhea, nausea, vomiting, gas, or stomach pain  · Headache  If you notice other side effects that you think are caused by this medicine, tell your doctor. Call your doctor for medical advice about side effects. You may report side effects to FDA at 4-450-HNI-9359  © 2017 [facility] Information is for End User's use only and may not be sold, redistributed or otherwise used for commercial purposes. The above information is an  only. It is not intended as medical advice for individual conditions or treatments. Talk to your doctor, nurse or pharmacist before following any medical regimen to see if it is safe and effective for you. Metronidazole (By mouth)   Metronidazole (met-annette-NICOLETTE-da-zole)  Treats bacterial infections. Brand Name(s): Flagyl, Flagyl 375, Flagyl ER   There may be other brand names for this medicine. When This Medicine Should Not Be Used: This medicine is not right for everyone. Do not use if you had an allergic reaction to metronidazole or similar medicines. Do not use this medicine to treat trichomoniasis if you are in the first 3 months of pregnancy. How to Use This Medicine:   Capsule, Tablet, Long Acting Tablet  · Take this medicine as directed, and take it at the same time each day. · Capsule or Tablet: Take with food or milk to avoid stomach upset. · Extended-release tablet: Take it on an empty stomach, 1 hour before or 2 hours after a meal.  · Swallow the extended-release tablet whole. Do not crush, break, or chew it. · Take all of the medicine in your prescription to clear up your infection, even if you feel better after the first few doses. · Missed dose: Take a dose as soon as you remember. If it is almost time for your next dose, wait until then and take a regular dose.  Do not take extra medicine to make up for a missed dose. · Store the medicine in a closed container at room temperature, away from heat, moisture, and direct light. Drugs and Foods to Avoid:   Ask your doctor or pharmacist before using any other medicine, including over-the-counter medicines, vitamins, and herbal products. · Do not use this medicine if you have taken disulfiram within the last 2 weeks. Do not drink alcohol or use medicine that contains alcohol or propylene glycol while using this medicine and for at least 3 days after you have finished metronidazole treatment. · Some foods and medicines can affect how metronidazole works. Tell your doctor if you are using busulfan, cimetidine, lithium, phenobarbital, phenytoin, or warfarin or another blood thinner. Warnings While Using This Medicine:   · Tell your doctor if you are pregnant or breastfeeding, or if you have kidney disease, liver disease, blood or bone marrow problems, oral thrush, yeast infection, or a history of seizures. · This medicine may cause the following problems:  ¨ Brain or nervous system problems, including seizures, meningitis, or vision problems  · Trichomoniasis treatment:  Your doctor may want to also treat your sexual partner, even if he or she has no symptoms. Also, you may want to use a condom during sexual intercourse. These measures will help keep you from getting the infection back again from your partner. If you have any questions, ask your doctor. · Call your doctor if your symptoms do not improve or if they get worse. · Your doctor will do lab tests at regular visits to check on the effects of this medicine. Keep all appointments. · Tell any doctor or dentist who treats you that you are using this medicine. This medicine may affect certain medical test results. · Keep all medicine out of the reach of children. Never share your medicine with anyone.   Possible Side Effects While Using This Medicine:   Call your doctor right away if you notice any of these side effects:  · Allergic reaction: Itching or hives, swelling in your face or hands, swelling or tingling in your mouth or throat, chest tightness, trouble breathing  · Confusion, drowsiness, fever, headache, loss of appetite, nausea or vomiting, stiff neck or back  · Dizziness, problems with muscle control, clumsiness, shakiness, trouble talking  · Fever, chills, cough, sore throat, and body aches  · Numbness, tingling, or burning pain in your hands, arms, legs, or feet  · Seizures  If you notice these less serious side effects, talk with your doctor:   · Mild nausea  · Unusual or unpleasant taste in your mouth  If you notice other side effects that you think are caused by this medicine, tell your doctor. Call your doctor for medical advice about side effects. You may report side effects to FDA at 1-879-FDA-3526  © 2017 [address] Information is for End User's use only and may not be sold, redistributed or otherwise used for commercial purposes. The above information is an  only. It is not intended as medical advice for individual conditions or treatments. Talk to your doctor, nurse or pharmacist before following any medical regimen to see if it is safe and effective for you. Atorvastatin (By mouth)   Atorvastatin (a-tor-va-STAT-in)  Treats high cholesterol and triglyceride levels. Reduces the risk of angina, stroke, heart attack, or certain heart and blood vessel problems. This medicine is a statin. Brand Name(s): Lipitor   There may be other brand names for this medicine. When This Medicine Should Not Be Used: This medicine is not right for everyone. Do not use it if you had an allergic reaction to atorvastatin, if you have active liver disease, or if you are pregnant or breastfeeding. How to Use This Medicine:   Tablet  · Take your medicine as directed. Your dose may need to be changed several times to find what works best for you.   · Take this medicine at the same time each day. · Swallow the tablet whole. Do not break it. · Missed dose: Take a dose as soon as you remember. If it is less than 12 hours until your next dose, skip the missed dose and take the next dose at the regular time. Do not take 2 doses of this medicine within 12 hours. · Read and follow the patient instructions that come with this medicine. Talk to your doctor or pharmacist if you have any questions. · Store the medicine in a closed container at room temperature, away from heat, moisture, and direct light. Drugs and Foods to Avoid:   Ask your doctor or pharmacist before using any other medicine, including over-the-counter medicines, vitamins, and herbal products. · Some medicines can affect how atorvastatin works. Tell your doctor if you also use birth control pills, boceprevir, cimetidine, colchicine, cyclosporine, digoxin, niacin, rifampin, spironolactone, telaprevir, medicine to treat an infection, or medicine to treat HIV/AIDS. Warnings While Using This Medicine:   · It is not safe to take this medicine during pregnancy. It could harm an unborn baby. Tell your doctor right away if you become pregnant. · Tell your doctor if you have kidney disease, diabetes, muscle pain or weakness, thyroid problems, have recently had a stroke or TIA (transient ischemic attack), or have a history of liver disease. Tell your doctor if you drink grapefruit juice or drink alcohol regularly. · This medicine can cause muscle problems, which can lead to kidney problems. · Tell any doctor or dentist who treats you that you use this medicine. You may need to stop using it if you have surgery, have an injury, or develop serious health problems. · Your doctor will do lab tests at regular visits to check on the effects of this medicine. Keep all appointments. · Keep all medicine out of the reach of children. Never share your medicine with anyone.   Possible Side Effects While Using This Medicine:   Call your doctor right away if you notice any of these side effects:  · Allergic reaction: Itching or hives, swelling in your face or hands, swelling or tingling in your mouth or throat, chest tightness, trouble breathing  · Blistering, peeling, red skin rash  · Change in how much or how often you urinate  · Dark urine or pale stools, nausea, vomiting, loss of appetite, stomach pain, yellow skin or eyes  · Fever  · Muscle pain, tenderness, or weakness  · Unusual tiredness  If you notice these less serious side effects, talk with your doctor:   · Diarrhea  · Joint pain  If you notice other side effects that you think are caused by this medicine, tell your doctor. Call your doctor for medical advice about side effects. You may report side effects to FDA at 5-838-FDA-5217  © 2017 [address] [employer] Information is for End User's use only and may not be sold, redistributed or otherwise used for commercial purposes. The above information is an  only. It is not intended as medical advice for individual conditions or treatments. Talk to your doctor, nurse or pharmacist before following any medical regimen to see if it is safe and effective for you.

## 2017-08-04 NOTE — PROGRESS NOTES
Assumed care of pt this am. Pt is alert and oriented with no known needs at this time. Pt is hopeful of discharge today. Pt discharged home by discharge nurse.

## 2017-08-04 NOTE — PROGRESS NOTES
[address] [facility] [address]  [address] [facility] [address] [place]  [place], [address]  [phone]                GI PROGRESS NOTE  Tamera Mclean, [employer]  Work Cell: [phone]      NAME:   Esme Chanel   :    1935   MRN:    [id number]     Assessment/Plan   1. C. Difficile colitis- symptoms have improved. No further episodes of diarrhea. - Continue Flagyl and Vancomycin  - Okay to be discharged from GI standpoint to keep already scheduled OV in 2 weeks   2. IBS with alternating diarrhea and constipation  3. Gastroparesis  4. Possible TIA- with transient aphasia yesterday. No recurrence of symptoms. MRI was unremarkable. 5. Hypertension      Patient Active Problem List   Diagnosis Code    Essential hypertension, benign I10    DDD (degenerative disc disease), cervical M50.30    Hypertriglyceridemia E78.1    Depression with anxiety F41.8    Allergic rhinitis J30.9    DDD (degenerative disc disease), lumbar M51.36    Other and unspecified noninfectious gastroenteritis and colitis K52.9    CKD (chronic kidney disease) stage 3, GFR 30-59 ml/min N18.3    Pre-diabetes R73.03    History of pulmonary embolism Z86.711    Gastroparesis K31.84    Spinal stenosis, lumbar M48.06    Fibromyalgia M79.7    Neuropathy (HCC) G62.9    Gastroesophageal reflux disease without esophagitis K21.9    Irritable bowel syndrome with diarrhea K58.0    TIA (transient ischemic attack) G45.9    C. difficile diarrhea A04.7       Subjective:     Feeling well and would like to go home. Denies any abdominal pain. Tolerating diet without nausea or vomiting. No further episodes of diarrhea. No fever or chills.        Review of Systems    Constitutional: negative fever, negative chills, negative weight loss  Eyes:   negative visual changes  ENT:   negative sore throat, tongue or lip swelling  Respiratory:  negative cough, negative dyspnea  Cards:  negative for chest pain, palpitations, lower extremity edema  GI:   See HPI  :  negative for frequency, dysuria  Integument:  negative for rash and pruritus  Heme:  negative for easy bruising and gum/nose bleeding  Musculoskel: negative for myalgias, back pain and muscle weakness  Neuro: negative for headaches, dizziness, vertigo  Psych:  negative for feelings of anxiety, depression     Objective:     VITALS:   Last 24hrs VS reviewed since prior hospitalist progress note. Most recent are:  Visit Vitals    /61 (BP 1 Location: Right arm, BP Patient Position: At rest)    Pulse 69    Temp 98 °F (36.7 °C)    Resp 12    Ht 5' 2\" (1.575 m)    Wt 56.1 kg (123 lb 11.2 oz)    SpO2 97%    Breastfeeding No    BMI 22.63 kg/m2     No intake or output data in the 24 hours ending 08/04/17 0911        PHYSICAL EXAM:  General   well developed, elderly, thin, alert, in no acute distress  EENT  Normocephalic, Atraumatic, PERRLA, EOMI, sclera clear  Respiratory   Clear To Auscultation bilaterally - no wheezes, rales, rhonchi, or crackles  Cardiology  Regular Rate and Rythmn  - no murmurs, rubs or gallops  Abdominal  Soft, non-tender, non-distended, positive bowel sounds, no hepatosplenomegaly, no palpable mass  Extremities  No clubbing, cyanosis, or edema. Pulses intact. Neurological  No focal neurological deficits noted  Psychological  Oriented x 3. Normal affect.        Lab Data   Recent Results (from the past 12 hour(s))   METABOLIC PANEL, BASIC    Collection Time: 08/04/17  1:24 AM   Result Value Ref Range    Sodium 143 136 - 145 mmol/L    Potassium 3.5 3.5 - 5.1 mmol/L    Chloride 111 (H) 97 - 108 mmol/L    CO2 23 21 - 32 mmol/L    Anion gap 9 5 - 15 mmol/L    Glucose 96 65 - 100 mg/dL    BUN 10 6 - 20 MG/DL    Creatinine 0.90 0.55 - 1.02 MG/DL    BUN/Creatinine ratio 11 (L) 12 - 20      GFR est AA >60 >60 ml/min/1.73m2    GFR est non-AA >60 >60 ml/min/1.73m2    Calcium 7.5 (L) 8.5 - 10.1 MG/DL         Medications: Reviewed    PMH/SH reviewed - no change compared to H&P  Mid-Level Provider: Alma Rosa Power NP   Date/Time:  8/4/2017

## 2017-08-04 NOTE — PROGRESS NOTES
Bedside and Verbal shift change report given to Antonella Russ (oncoming nurse) by Audrey Bella (offgoing nurse). Report included the following information SBAR, Kardex, Procedure Summary, MAR, Recent Results and Cardiac Rhythm NSR.

## 2017-08-04 NOTE — PROGRESS NOTES
I have reviewed discharge instructions with the patient. The patient verbalized understanding. IV and telemetry discontinued. Picked up by  at discharge.

## 2017-08-04 NOTE — INTERDISCIPLINARY ROUNDS
IDR/SLIDR Summary          Patient: Ashley Nava MRN: [id number]    Age: 80 y.o. YOB: 1935 Room/Bed: [facility]   Admit Diagnosis: TIA (transient ischemic attack)  C. difficile diarrhea  Principal Diagnosis: C. difficile diarrhea   Goals: Continue Antibiotic Treatment; No Incident of Loose Stool  Readmission: NO  Quality Measure: Not applicable  VTE Prophylaxis: Mechanical  Influenza Vaccine screening completed? YES  Pneumococcal Vaccine screening completed? NO  Mobility needs: No   Nutrition plan:No  Consults:Case Management    Financial concerns:No  Escalated to CM? NO  RRAT Score: 19   Interventions:Home Health  Testing due for pt today?  NO  LOS: 2 days Expected length of stay 3 days  Discharge plan: Home   PCP: Tish Talley MD  Transportation needs: No    Days before discharge:one day until discharge   Discharge disposition: Home    Signed:     Chaya Wilburn  8/4/2017  5:55 AM

## 2017-08-05 ENCOUNTER — PATIENT OUTREACH (OUTPATIENT)
Dept: INTERNAL MEDICINE CLINIC | Age: 82
End: 2017-08-05

## 2017-08-05 DIAGNOSIS — A04.72 CLOSTRIDIUM DIFFICILE DIARRHEA: Primary | ICD-10-CM

## 2017-08-05 DIAGNOSIS — R11.0 NAUSEA: ICD-10-CM

## 2017-08-05 NOTE — PROGRESS NOTES
NNTOCIP    NN Transitions of Care Note:  Hospital Follow Up for [facility] Admission from 8/2/17 - 8/4/17 for C-Diff/TIA. RRAT score: 19 Moderate  Total Hospitalizations/ED visits last 12 months? 0    Advance Medical Directive on file in EMR? yes has an advanced directive - a copy has been provided    This represents Transitions of Care because NN spoke with patient and/or caregiver within 1 business days of discharge. Pt's TCM follow up appt is scheduled with Dr. Malena Dunn for 30 minutes as noted below and is within 5 days of discharge. Future Appointments  Date Time Provider Americo Rizo   8/8/2017 4:00 PM Marium Mayer MD [address] [place] [address] Dr patient on 8/7/17 and verified with 2 identifiers. Reports that she started experiencing nausea on Sunday, 8/6/17 and stopped taking her vanco and flagyl. Now she has return of watery stools. She was currently eating some oatmeal when I called. Pt sounded anxious about this during call. Advised pt to stop eating and take her zantac, levsin and ativan and then wait 20 minutes before resuming meal and I would call her back. Reviewed with Dr. Malena Dunn and script for Zofran called to pt's pharmacy. Called pt again and she reported taking the zantac, levsin and ativan as directed. She was finishing eating a yogurt as well. Advised pt of script called to pharmacy for her and she will have her  . Provided instructions on zofran and reviewed her allergy list in detail. Pt was advised to start taking ASA due to possible TIA as well- ASA is on her allergy list and therefore she has not started since it was low dose. She would like to discuss this with Dr. Malena Dunn at her f/u appt tomorrow. Advised pt to take the rest of her morning meds including the antibx at this time. Red flags reviewed, she will call back with any questions or concerns.        Medical History:     Past Medical History:   Diagnosis Date    Allergic rhinitis, cause unspecified     Anxiety     Back pain     Cancer (HCC)     basal cell face, back, neck    Cancer (HCC)     squamous cell leg    DJD (degenerative joint disease) of cervical spine     Fibromyalgia     Hypercholesterolemia     Hypertension     Hyponatremia     IBS (irritable bowel syndrome)     Irritable bowel syndrome with diarrhea 6/29/2016    GI- Dr Kathy Stringer Kidney disease, chronic, stage III (GFR 30-59 ml/min) 6/2011    Dr. Josef Boswell) Garrick Babin        Diagnosed with C-Diff/TIA. Admitted to Hospitalist Service with consults from GI and Neuro. Dr. Hussein Landrum Neuro Consult Note:  Chief Complaint:  Aphasia      Assessment:      · Principal Problem:  ·   C. difficile diarrhea (8/2/2017)  ·    · Active Problems:  ·   TIA (transient ischemic attack) (8/2/2017)    80year old female h/o IBS, gastroparesis, pre-DM, HTN, CKD, fibromyalgia, anxiety d/o, remote PE admitted with Cdiff. Neurology consulted due to transient episode of expressive aphasia, possible dysarthria noted in the ED on admission lasting < 5 minutes with complete resolution. CT head without acute focal ischemia, however noted remote R BG infarction. Neurological examination is presently non-focal.  Presentation is concerning for TIA of the left hemisphere. She has a long list of allergies to medication including GI upset with ASA in the past but is willing to re-attempt antiplatelet therapy for stroke prevention. Recommendations:   MRI Brain, CUS pending  Start ASA 81mg, low dose statin therapy  Stroke education  No skilled needs  Please arrange for neurology F/U in 4-6 weeks     Thank you very much for this referral. I appreciate the opportunity to participate in this patient's care.      Devi Cheema NP GI Consult Note:  Assessment/Plan    1. C. Difficile colitis- symptoms have improved. No further episodes of diarrhea. - Continue Flagyl and Vancomycin  - Okay to be discharged from GI standpoint to keep already scheduled OV in 2 weeks   2.  IBS with alternating diarrhea and constipation  3. Gastroparesis  4. Possible TIA- with transient aphasia yesterday. No recurrence of symptoms. MRI was unremarkable. 5. Hypertension          Course of current Hospitalization (referenced by Marcio Brush NP Hospitalist Discharge Summary):    Discharge Summary         PATIENT ID: Pelon Boggs  MRN: [id number]   YOB: 1935    DATE OF ADMISSION: 8/2/2017 10:57 AM    DATE OF DISCHARGE: 8/4/17   PRIMARY CARE PROVIDER: Mary Coats MD      ATTENDING PHYSICIAN: Dr. Diana Polk  DISCHARGING PROVIDER: Sandra He NP    To contact this individual call [phone] and ask the  to page. If unavailable ask to be transferred the Adult Hospitalist Department.     CONSULTATIONS: IP CONSULT TO NEUROLOGY     PROCEDURES/SURGERIES: * No surgery found *     ADMITTING DIAGNOSES & HOSPITAL COURSE:   Mrs. Kimmie Otoole an 80-year-old  with PMHx of  fibromyalgia, anxiety, IBS, DJD, HTN, skin cancer, chronic back pain, CKD, gastroparesis, history of PE, depression and C. Difficile who presented to the ED on 8/2 with cc of abdominal pain and diarrhea 5 days' duration.     8/4/2017  MRI negative for stroke, possible she had a TIA with witnessed dysarthria and ataxia x 15 minutes in the ED, pt claims dysarthria was r/t dry mouth and throat. Pt was positive for C diff and is d/c'ed home with flagyl and vancomycin.  Has f/u with Dr. Joseph Phipps / PLAN:       Diarrhea 2/2 C. difficile infection  - pt does have a hx of previous c diff ~ 10 years ago. Itzel Parks has hx c diff and IBS  - + c diff dna  - continue ivf and abx  - GI following      TIA  - witnessed dysarthria and ataxia x 15 minutes in the ED, pt claims dysarthria was r/t dry mouth and throat  - ct head negative  - MRI brain negative  - A1c 6.0  - lipid panel reviewed, ldl 86.2  - pending neuro consult  - ef 60%, NRWA, no shunt       CKD Stage III  - follows with Dr. Sudheer Munoz  - cr 1.12 today from 1.37      Mild Hyponatremia: Resolved  - likely 2/2 diarrhea due to intravascular volume depletion.       Dehydration - Resolved  - 2/2 diarrhea  - continue IVF      HTN  - continue home medication, Norvasc and metoprolol      Hx of anxiety/depression: stable  - continue home lorazepam      Hx of gastroparesis  - gi following            PENDING TEST RESULTS:   At the time of discharge the following test results are still pending: none     FOLLOW UP APPOINTMENTS:    Follow-up Information     Follow up With Details Comments Contact [facility] Go on 8/8/2017 Appointment Time:  4:00 pm [address] [place] [address] Dr [address] [facility], DO   follow up in 4-6 weeks [facility] at [address]  [phone]     Merlin Mo, MD   follow up in 2 weeks [address] [place] [address]  [address] [place] [address] [address], [facility] [address] [place] [address] [address] [facility] [address] [phone]            ADDITIONAL CARE RECOMMENDATIONS:  Complete antibiotics for your C-Diff, make sure to finish all of them, follow up with Dr. Victorino Allen in 2 weeks  Your MRI was negative for stroke, take a baby aspirin daily for stroke prevention and atorvastatin 20 mg daily for your LDL which is your bad cholesterol, your level was 86.2, the recommendation is below 79.       DIET: As you were previously      ACTIVITY: As previouisly      WOUND CARE: none      EQUIPMENT needed: none        DISCHARGE MEDICATIONS:        Discharge Medication List as of 8/4/2017 10:01 AM             START taking these medications     Details   aspirin delayed-release 81 mg tablet Take 1 Tab by mouth daily. , No Print, Disp-30 Tab, R-0       atorvastatin (LIPITOR) 20 mg tablet Take 1 Tab by mouth daily. , Print, Disp-30 Tab, R-0       vancomycin (VANCOCIN) 250 mg capsule Take 1 Cap by mouth four (4) times daily for 12 days. , Print, Disp-48 Cap, R-0       metroNIDAZOLE (FLAGYL) 500 mg tablet Take 1 Tab by mouth three (3) times daily for 12 days. , Print, Disp-36 Tab, R-0                 CONTINUE these medications which have NOT CHANGED     Details   LORazepam (ATIVAN) 0.5 mg tablet Take 0.5 mg by mouth every morning., Historical Med       hyoscyamine SL (LEVSIN/SL) 0.125 mg SL tablet 0.125 mg by SubLINGual route Before breakfast, lunch, and dinner., Historical Med       acetaminophen (TYLENOL ARTHRITIS PAIN) 650 mg CR tablet Take 650 mg by mouth every six (6) hours as needed for Pain., Historical Med       LACTOBACILLUS ACIDOPHILUS (PROBIOTIC PO) Take  by mouth daily. , Historical Med       raNITIdine (ZANTAC) 150 mg tablet Take 150 mg by mouth Before breakfast and dinner., Historical Med       calcium carbonate (TUMS) 300 mg (750 mg) chewable tablet Take 1 Tab by mouth daily. , Historical Med       cholecalciferol (VITAMIN D3) 1,000 unit tablet Take 1,000 Units by mouth daily. , Historical Med       FERROUS FUMARATE/VIT BCOMP,C (SUPER B COMPLEX PO) Take  by mouth daily (after dinner). , Historical Med       BENEFIBER, GUAR GUM, PO Take  by mouth every morning., Historical Med       amLODIPine (NORVASC) 5 mg tablet TAKE 1 TAB BY MOUTH DAILY., Normal, Disp-90 Tab, R-1       metoprolol tartrate (LOPRESSOR) 25 mg tablet TAKE ONE-QUARTER TABLET BY MOUTH EVERY DAY, Normal, Disp-90 Tab, R-1                STOP taking these medications         bismuth subsalicylate (PEPTO-BISMOL) 262 mg chew Comments:   Reason for Stopping:            polyethylene glycol (MIRALAX) 17 gram packet Comments:   Reason for Stopping:                       NOTIFY YOUR PHYSICIAN FOR ANY OF THE FOLLOWING:   Fever over 101 degrees for 24 hours. Chest pain, shortness of breath, fever, chills, nausea, vomiting, diarrhea, change in mentation, falling, weakness, bleeding.  Severe pain or pain not relieved by medications. Or, any other signs or symptoms that you may have questions about.     DISPOSITION:  xx  Home With:    OT   PT   HH   RN        Long term SNF/Inpatient Rehab     Independent/assisted living     Hospice     Other:         PATIENT CONDITION AT DISCHARGE:      Functional status     Poor      Deconditioned    xx Independent       Cognition   xx  Lucid      Forgetful      Dementia       Catheters/lines (plus indication)     Garcia      PICC      PEG    xx None       Code status    xx Full code      DNR       PHYSICAL EXAMINATION AT DISCHARGE:     Constitutional:  No acute distress, cooperative, pleasant    ENT:  Oral mucous moist, oropharynx benign. Neck supple   Resp:  CTA bilaterally. No wheezing/rhonchi/rales. No accessory muscle use   CV:  Regular rhythm, normal rate, no murmurs, gallops, rubs    GI:  Soft, non distended, non tender. normoactive bowel sounds, no hepatosplenomegaly     Musculoskeletal:  No edema, warm, 2+ pulses throughout    Neurologic:  Moves all extremities.   AAOx3, CN II-XII reviewed                                             Psych:  Good insight, Not anxious nor agitated.                             Skin:  Good turgor, no rashes or ulcers       Signed:   Neda Torres V, NP  8/4/2017  4:51 PM         Significant Lab/Diagnostic Findings:   Lab Results  Component Value Date/Time   WBC 5.8 08/02/2017 11:41 AM   HGB 12.0 08/02/2017 11:41 AM   HCT 34.6 08/02/2017 11:41 AM   PLATELET 862 47/62/9870 11:41 AM   MCV 88.9 08/02/2017 11:41 AM     Lab Results   Component Value Date/Time    Sodium 143 08/04/2017 01:24 AM    Potassium 3.5 08/04/2017 01:24 AM    Chloride 111 08/04/2017 01:24 AM    CO2 23 08/04/2017 01:24 AM    Anion gap 9 08/04/2017 01:24 AM    Glucose 96 08/04/2017 01:24 AM    BUN 10 08/04/2017 01:24 AM    Creatinine 0.90 08/04/2017 01:24 AM    BUN/Creatinine ratio 11 08/04/2017 01:24 AM    GFR est AA >60 08/04/2017 01:24 AM    GFR est non-AA >60 08/04/2017 01:24 AM    Calcium 7.5 08/04/2017 01:24 AM    Bilirubin, total 0.6 08/02/2017 11:41 AM    ALT (SGPT) 21 08/02/2017 11:41 AM    AST (SGOT) 21 08/02/2017 11:41 AM    Alk. phosphatase 44 08/02/2017 11:41 AM    Protein, total 6.7 08/02/2017 11:41 AM    Albumin 3.7 08/02/2017 11:41 AM    Globulin 3.0 08/02/2017 11:41 AM    A-G Ratio 1.2 08/02/2017 11:41 AM      Home Health orders at discharge? no      Medication Reconciliation completed: yes New medications at discharge include Flagyl, Vanco, ASA, Atorvastatin  Prescription Medication total: >10 (pharmacy consult for polypharm needed?) potential     Support System consists of:  and 3 children    Barriers to care?   Understanding of medications and staying adherent on taking all meds    Adherence to previous treatment and likelihood for f/u: guarded

## 2017-08-06 ENCOUNTER — TELEPHONE (OUTPATIENT)
Dept: INTERNAL MEDICINE CLINIC | Age: 82
End: 2017-08-06

## 2017-08-07 ENCOUNTER — TELEPHONE (OUTPATIENT)
Dept: INTERNAL MEDICINE CLINIC | Age: 82
End: 2017-08-07

## 2017-08-07 RX ORDER — ONDANSETRON 4 MG/1
4 TABLET, ORALLY DISINTEGRATING ORAL
COMMUNITY
End: 2017-08-07 | Stop reason: CLARIF

## 2017-08-07 RX ORDER — ONDANSETRON 4 MG/1
4 TABLET, ORALLY DISINTEGRATING ORAL
Qty: 30 TAB | Refills: 1 | Status: SHIPPED | OUTPATIENT
Start: 2017-08-07 | End: 2017-09-14

## 2017-08-07 NOTE — TELEPHONE ENCOUNTER
Patient calls after getting out of the hospital 2 days ago for C diff. Her stools are now solid but frequent and she has nausea but no vomiting with the vancomycin and flagyl. Instructed her to take the meds with food and try drinking lots of liquids but if vomiting she should return to the ER. No other medication options at this point for the C diff. She understand and will do so.

## 2017-08-08 ENCOUNTER — OFFICE VISIT (OUTPATIENT)
Dept: INTERNAL MEDICINE CLINIC | Age: 82
End: 2017-08-08

## 2017-08-08 VITALS
OXYGEN SATURATION: 98 % | TEMPERATURE: 98.1 F | SYSTOLIC BLOOD PRESSURE: 124 MMHG | DIASTOLIC BLOOD PRESSURE: 62 MMHG | BODY MASS INDEX: 20.06 KG/M2 | RESPIRATION RATE: 16 BRPM | HEIGHT: 62 IN | WEIGHT: 109 LBS | HEART RATE: 70 BPM

## 2017-08-08 DIAGNOSIS — A04.72 C. DIFFICILE DIARRHEA: Primary | ICD-10-CM

## 2017-08-08 DIAGNOSIS — R11.0 NAUSEA: ICD-10-CM

## 2017-08-08 DIAGNOSIS — G45.8 OTHER SPECIFIED TRANSIENT CEREBRAL ISCHEMIAS: ICD-10-CM

## 2017-08-08 NOTE — MR AVS SNAPSHOT
Visit Information Date & Time Provider Department Dept. Phone Encounter #  
 8/8/2017  4:00 PM Michi Infante, [address] [facility] [phone] [id number] Follow-up Instructions Return if symptoms worsen or fail to improve. Your Appointments 9/15/2017 10:00 AM  
Follow Up with DO [facility]) Appt Note: f/u from the hospital 8/8/2017 CW; f/u from the hospital 8/8/2017 [address] [address]  
[phone]  
  
   
 [address] [place] [address] [address] [id number] Upcoming Health Maintenance Date Due Pneumococcal 65+ Low/Medium Risk (1 of 2 - PCV13) 9/16/2000 DTaP/Tdap/Td series (1 - Tdap) 6/26/2007 INFLUENZA AGE 9 TO ADULT 8/1/2017 GLAUCOMA SCREENING Q2Y 2/8/2018 MEDICARE YEARLY EXAM 7/15/2018 Allergies as of 8/8/2017  Review Complete On: 8/8/2017 By: iMchi Infante MD  
  
 Severity Noted Reaction Type Reactions Latex  06/21/2016    Itching Dilaudid [Hydromorphone (Bulk)] High 05/19/2010    Other (comments) ? loss of consciousness Altace [Ramipril]  12/12/2010    Unknown (comments) Ambien [Zolpidem]  12/12/2010    Unknown (comments) [employer]ens[employer] Autodisc Test [Blood Sugar Diagnostic, Disc-type]  12/12/2010    Unknown (comments) Aspirin  12/12/2010    Unknown (comments) Astelin [Azelastine]  12/12/2010    Unknown (comments) Atenolol  12/12/2010    Unknown (comments) Banex La  12/12/2010    Unknown (comments) Benicar [Olmesartan]  12/12/2010    Unknown (comments) Carbocaine [Mepivacaine]  12/12/2010    Unknown (comments) Cleocin [Clindamycin Hcl]  03/25/2009    Hives Codeine  12/12/2010    Nausea and Vomiting Codeine-asa-salicyl-acetam-caf  88/79/0714    Other (comments) Stomach pain Cortisporin [Neomy-polymyxinb-bacitracin-hc]  12/12/2010    Unknown (comments) Cymbalta [Duloxetine]  12/12/2010    Other (comments) Cytotec [Misoprostol]  12/12/2010    Unknown (comments) Dilaudid [Hydromorphone]  01/03/2011    Unknown (comments) Patient states that she was unable to respond, but was breathing Effexor [Venlafaxine]  12/12/2010    Unknown (comments) Epinephrine  12/12/2010    Unknown (comments) Heart racing Estra-d  12/12/2010    Unknown (comments) Estrace [Estradiol]  12/12/2010    Unknown (comments) Fentanyl  12/12/2010    Nausea and Vomiting, Unknown (comments)  
 shakes Flexeril [Cyclobenzaprine]  12/12/2010    Unable to Obtain \"drugs me\" Flonase [Fluticasone]  12/12/2010    Unknown (comments) Flunisolide  12/12/2010    Unknown (comments) Guiatussin W/codeine  12/12/2010    Unknown (comments) Ketek [Telithromycin]  03/25/2009    Nausea Only Klonopin [Clonazepam]  12/12/2010    Unknown (comments) Lexapro [Escitalopram]  12/12/2010    Nausea and Vomiting Lodine [Etodolac]  12/12/2010    Nausea and Vomiting Morphine  12/12/2010    Unknown (comments) Nsaids (Non-steroidal Anti-inflammatory Drug)  04/12/2010    Nausea and Vomiting Other Medication  12/12/2010    Unknown (comments) Aubrey Madhavis Paxil [Paroxetine Hcl]  12/12/2010    Nausea and Vomiting Pcn [Penicillins]  03/25/2009    Hives Percocet [Oxycodone-acetaminophen]  12/12/2010    Unknown (comments) Percodan [Oxycodone Hcl-oxycodone-asa]  12/12/2010    Unknown (comments) Pneumococcal 23-davida Ps Vaccine  09/14/2012    Swelling Proctocream-hc [Hydrocortisone]  12/12/2010    Unknown (comments) Prozac [Fluoxetine]  04/12/2010    Nausea Only Suicidal visions Pseudoephedrine  08/02/2017    Other (comments) Quinidine  12/12/2010    Unknown (comments) Reglan [Metoclopramide]  12/12/2010    Unknown (comments) Remeron [Mirtazapine]  12/12/2010    Other (comments) Resaid  12/12/2010    Other (comments) inflammed eyes Robaxin [Methocarbamol]  12/12/2010    Unknown (comments) Sonata [Zaleplon]  12/12/2010    Unknown (comments) Sulfa (Sulfonamide Antibiotics)  03/25/2009    Hives Surmontil [Trimipramine]  12/12/2010    Nausea and Vomiting Talwin [Pentazocine Lactate]  12/12/2010    Unknown (comments) Toradol [Ketorolac Tromethamine]  12/12/2010    Nausea and Vomiting Ultram [Tramadol]  12/12/2010    Unknown (comments) Vasoconstrictor Drug  12/12/2010    Unknown (comments) Vigamox [Moxifloxacin]  06/21/2016    Rash, Swelling Vioxx [Rofecoxib]  12/12/2010    Unknown (comments) Voltaren [Diclofenac Sodium]  06/21/2016    Itching Wellbutrin [Bupropion Hcl]  12/12/2010    Nausea and Vomiting Zelnorm [Tegaserod Hydrogen Maleate]  12/12/2010    Unknown (comments) Zoloft [Sertraline]  12/12/2010    Nausea and Vomiting Current Immunizations  Reviewed on 8/8/2017 Name Date Influenza High Dose Vaccine PF 10/14/2016 Influenza Vaccine Whole 10/22/2012, 10/5/2011 Td 6/25/2007 Zoster Vaccine, Live 1/1/2010 Reviewed by Elmo Gutierrez RN on 8/8/2017 at  4:01 PM  
You Were Diagnosed With   
  
 Codes Comments C. difficile diarrhea    -  Primary ICD-10-CM: A04.7 ICD-9-CM: 008.45 Nausea     ICD-10-CM: R11.0 ICD-9-CM: 787.02 Other specified transient cerebral ischemias     ICD-10-CM: G45.8 ICD-9-CM: 435.8 Vitals BP Pulse Temp Resp Height(growth percentile) Weight(growth percentile) 124/62 70 98.1 °F (36.7 °C) (Oral) 16 5' 2\" (1.575 m) 109 lb (49.4 kg) SpO2 BMI OB Status Smoking Status 98% 19.94 kg/m2 Hysterectomy Never Smoker Vitals History BMI and BSA Data Body Mass Index Body Surface Area 19.94 kg/m 2 1.47 m 2 Preferred Pharmacy Pharmacy Name Phone [facility] [address] [place], [facility] Caroline Braxton RDS. [phone] Your Updated Medication List  
  
 This list is accurate as of: 8/8/17  4:42 PM.  Always use your most recent med list. amLODIPine 5 mg tablet Commonly known as:  Unknown Morrill TAKE 1 TAB BY MOUTH DAILY. aspirin delayed-release 81 mg tablet Take 1 Tab by mouth daily. ATIVAN 0.5 mg tablet Generic drug:  LORazepam  
Take 0.5 mg by mouth every morning. atorvastatin 20 mg tablet Commonly known as:  LIPITOR Take 1 Tab by mouth daily. BENEFIBER (GUAR GUM) PO Take  by mouth every morning. LEVSIN/SL 0.125 mg SL tablet Generic drug:  hyoscyamine SL  
0.125 mg by SubLINGual route Before breakfast, lunch, and dinner. metoprolol tartrate 25 mg tablet Commonly known as:  LOPRESSOR  
TAKE ONE-QUARTER TABLET BY MOUTH EVERY DAY  
  
 metroNIDAZOLE 500 mg tablet Commonly known as:  FLAGYL Take 1 Tab by mouth three (3) times daily for 12 days. ondansetron 4 mg disintegrating tablet Commonly known as:  ZOFRAN ODT Take 1 Tab by mouth every eight (8) hours as needed for Nausea. PROBIOTIC PO Take  by mouth daily. SUPER B COMPLEX PO Take  by mouth daily (after dinner). TUMS 300 mg (750 mg) chewable tablet Generic drug:  calcium carbonate Take 1 Tab by mouth daily. TYLENOL ARTHRITIS PAIN 650 mg CR tablet Generic drug:  acetaminophen Take 650 mg by mouth every six (6) hours as needed for Pain. VITAMIN D3 1,000 unit tablet Generic drug:  cholecalciferol Take 1,000 Units by mouth daily. ZANTAC 150 mg tablet Generic drug:  raNITIdine Take 150 mg by mouth Before breakfast and dinner. Follow-up Instructions Return if symptoms worsen or fail to improve. Patient Instructions Learning About Clostridium Difficile Infection What is C. diff? Clostridium difficile (also called C. diff) is a type of bacteria. It can cause swelling and irritation of the colon (colitis).  Colitis can cause watery diarrhea, fever, loss of appetite, nausea, dehydration, and belly pain and tenderness. C. diff infection is most common in people who are taking antibiotics or have taken them in the past few weeks. It is also more likely to infect older people and people who are getting chemotherapy for cancer. Though colitis can be mild, it can become serious, especially for people who have a weak immune system. The large intestine normally contains many good bacteria that keep it healthy and do not cause disease. When you take an antibiotic to kill bacteria that do cause disease, your medicine may also kill the good bacteria. This can allow C. diff bacteria to grow and release harmful toxins. If you are still taking an antibiotic, your doctor may have you stop taking it, because it may have led to the C. diff infection. Your doctor may then treat C. diff colitis with a different antibiotic. It's important that your C. diff does not spread to other people. Preventing the spread of C. diff is a top health concern in hospitals and long-term care facilities. How does C. diff get passed to other people? C. diff can be passed from person to person. It can also be passed from person to object to person. If you have a C. diff infection, you can spread it if you don't wash your hands well enough after you use the bathroom. Anything you touch, like a door handle, bed rail, or phone, can then carry the bacteria. C. diff can live on objects for a very long time. C. diff infection spreads to other people when they touch an object that has C. diff on it and then use their hands to eat or rub their faces. Health care workers can pass C. diff from room to room in a hospital or a long-term care facility. Visitors can also spread it. How can you avoid spreading C. diff infection? When you have C. diff, you and everyone around you must take special care to avoid spreading it. You must use extra care after you use the bathroom. The best way to prevent spreading C. diff is to wash your hands well and often. The rubbing and rinsing action helps wash the bacteria from your skin. · Wash your hands with running water and soap. · Rub your hands together for at least 20 seconds. · Pay special attention to your wrists, the backs of your hands, between your fingers, and under your fingernails. · Don't touch the faucet with your hand. Use a paper towel to turn off the water. Don't use an alcohol-based hand  instead of washing your hands with soap and water.  will not kill C. diff. In the hospital 
· Ask your nurse if visitors need to wear special gowns and gloves. · Remind your visitors to wash their hands before they visit and as they leave your room. · Expect the hospital staff to use extra precautions. That means following safe hand-washing routines and wearing gloves and gowns. They may wear masks when cleaning. If it seems that a staff person isn't being as careful as you expect, ask what steps the hospital uses to control the spread of your infection. This might help remind him or her to be more careful. At home After your diarrhea is better, you are much less likely to spread a C. diff infection. Still, you must: 
· Regularly clean bathroom surfaces with bleach water to kill any C. diff spores. Use 1 part bleach to 10 parts water. · Wash your hands often and well, especially after you use the bathroom and before you make and eat food. · Remind everyone in your home to also wash hands often. If you start having diarrhea again, call your doctor right away. Your doctor will tell you when you no longer need to take special care to prevent spreading C. diff. Follow-up care is a key part of your treatment and safety. Be sure to make and go to all appointments, and call your doctor if you are having problems. It's also a good idea to know your test results and keep a list of the medicines you take. Where can you learn more? Go to http://yoly-jackie.info/. Enter W271 in the search box to learn more about \"Learning About Clostridium Difficile Infection. \" Current as of: March 3, 2017 Content Version: 11.3 © 4688-0037 [employer]. Care instructions adapted under license by [employer] (which disclaims liability or warranty for this information). If you have questions about a medical condition or this instruction, always ask your healthcare professional. [address] any warranty or liability for your use of this information. Introducing [facility]! Dear [place]: 
Thank you for requesting a [employer] account. Our records indicate that you have previously registered for a [employer] account but its currently inactive. Please call our [employer] support line at [phone]. Additional Information If you have questions, please visit the Frequently Asked Questions section of the [employer] website at [email or web address] Remember, [employer] is NOT to be used for urgent needs. For medical emergencies, dial 911. Now available from your iPhone and Android! Please provide this summary of care documentation to your next provider. Your primary care clinician is listed as Suhas Winchester. If you have any questions after today's visit, please call [phone].

## 2017-08-08 NOTE — PROGRESS NOTES
Nikolas Mckay is a 80 y.o. female who was seen in clinic today (8/8/2017). Assessment & Plan:  Diagnoses and all orders for this visit:    1. 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 Due to her extensive allergy list this is plausible. Reviewed expectations. She has f/u with GI already scheduled. 2. Nausea- new dx, was not an issue prior to hospitalization so likely med induced, will stop meds as above, will cont Zofran prn, will update me in 2-3 days. 3. 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 Strongly recommend to start ASA and statin. She is resistant but will start statin after GI issues improve. Red flags were reviewed with the patient to RTC or notify me, expected time course for resolution reviewed. Follow-up Disposition:  Return if symptoms worsen or fail to improve.       ----------------------------------------------------------------------    Subjective:  [place] was seen today for Hospital Follow Up. She was seen less then 1 month ago for a [facility]. Transition Care Management:    Admission: 8/2/2017  Discharge: 8/4/2017  Location: [facility]. Kendy's  Diagnosis: C. Diff colitis/diarrhea  Nurse Navigator note: reviewed    We reviewed the records. She presented with abdominal pain and diarrhea. She is taking her medications as directed & had the following side effects- nausea. Abdominal pain is improved but still present. She reports nausea is improved w/ zofran (she had to call to get a Rx) but is still present. It is constant. She reports having been on Flagyl in the past w/o side effects. She does not think she has been on Vancomycin before. She has f/u with Dr Danny Galeana in 2 weeks.     In the ER she had a TIA episode (dysphagia) lasing for < 30 minutes. Work up negative. Neurology records reviewed. Has not started ASA or Statin.    ----------------------------------------------------------------------      Prior to Admission medications    Medication Sig Start Date End Date Taking? Authorizing Provider   ondansetron (ZOFRAN ODT) 4 mg disintegrating tablet Take 1 Tab by mouth every eight (8) hours as needed for Nausea. 8/7/17  Yes Serafin Hashimoto, MD   vancomycin (VANCOCIN) 250 mg capsule Take 1 Cap by mouth four (4) times daily for 12 days. 8/4/17 8/16/17 Yes Neda FISHER NP   metroNIDAZOLE (FLAGYL) 500 mg tablet Take 1 Tab by mouth three (3) times daily for 12 days. 8/4/17 8/16/17 Yes Neda FISHER NP   LORazepam (ATIVAN) 0.5 mg tablet Take 0.5 mg by mouth every morning. Yes Historical Provider   hyoscyamine SL (LEVSIN/SL) 0.125 mg SL tablet 0.125 mg by SubLINGual route Before breakfast, lunch, and dinner. Yes Historical Provider   acetaminophen (TYLENOL ARTHRITIS PAIN) 650 mg CR tablet Take 650 mg by mouth every six (6) hours as needed for Pain. Yes Historical Provider   raNITIdine (ZANTAC) 150 mg tablet Take 150 mg by mouth Before breakfast and dinner. Yes Historical Provider   cholecalciferol (VITAMIN D3) 1,000 unit tablet Take 1,000 Units by mouth daily. Yes Historical Provider   FERROUS FUMARATE/VIT BCOMP,C (SUPER B COMPLEX PO) Take  by mouth daily (after dinner). Yes Historical Provider   BENEFIBER, GUAR GUM, PO Take  by mouth every morning. Yes Historical Provider   amLODIPine (NORVASC) 5 mg tablet TAKE 1 TAB BY MOUTH DAILY. 5/8/17  Yes Serafin Hashimoto, MD   metoprolol tartrate (LOPRESSOR) 25 mg tablet TAKE ONE-QUARTER TABLET BY MOUTH EVERY DAY 4/13/17  Yes Serafin Hashimoto, MD   aspirin delayed-release 81 mg tablet Take 1 Tab by mouth daily. 8/4/17   Neda FISHER NP   atorvastatin (LIPITOR) 20 mg tablet Take 1 Tab by mouth daily.  8/4/17   Nadiya Torres V, NP   LACTOBACILLUS ACIDOPHILUS (PROBIOTIC PO) Take  by mouth daily.    Historical Provider   calcium carbonate (TUMS) 300 mg (750 mg) chewable tablet Take 1 Tab by mouth daily. Historical Provider          Allergies   Allergen Reactions    Latex Itching    Dilaudid [Hydromorphone (Bulk)] Other (comments)     ? loss of consciousness    Altace [Ramipril] Unknown (comments)    Ambien [Zolpidem] Unknown (comments)    [employer] Autodisc Test [Blood Sugar Diagnostic, Disc-Type] Unknown (comments)    Aspirin Unknown (comments)    Astelin [Azelastine] Unknown (comments)    Atenolol Unknown (comments)    Banex La Unknown (comments)    Benicar [Olmesartan] Unknown (comments)    Carbocaine [Mepivacaine] Unknown (comments)    Cleocin [Clindamycin Hcl] Hives    Codeine Nausea and Vomiting    Codeine-Asa-Salicyl-Acetam-Caf Other (comments)     Stomach pain    Cortisporin [Neomy-Polymyxinb-Bacitracin-Hc] Unknown (comments)    Cymbalta [Duloxetine] Other (comments)    Cytotec [Misoprostol] Unknown (comments)    Dilaudid [Hydromorphone] Unknown (comments)     Patient states that she was unable to respond, but was breathing    Effexor [Venlafaxine] Unknown (comments)    Epinephrine Unknown (comments)     Heart racing    Estra-D Unknown (comments)    Estrace [Estradiol] Unknown (comments)    Fentanyl Nausea and Vomiting and Unknown (comments)     shakes    Flexeril [Cyclobenzaprine] Unable to Obtain     \"drugs me\"    Flonase [Fluticasone] Unknown (comments)    Flunisolide Unknown (comments)    Guiatussin W/Codeine Unknown (comments)    Ketek [Telithromycin] Nausea Only    Klonopin [Clonazepam] Unknown (comments)    Lexapro [Escitalopram] Nausea and Vomiting    Lodine [Etodolac] Nausea and Vomiting    Morphine Unknown (comments)    Nsaids (Non-Steroidal Anti-Inflammatory Drug) Nausea and Vomiting    Other Medication Unknown (comments)     Gareth Alford    Paxil [Paroxetine Hcl] Nausea and Vomiting    Pcn [Penicillins] Hives    Percocet [Oxycodone-Acetaminophen] Unknown (comments)    Percodan [Oxycodone Hcl-Oxycodone-Asa] Unknown (comments)    Pneumococcal 23-Mary Ps Vaccine Swelling    Proctocream-Hc [Hydrocortisone] Unknown (comments)    Prozac [Fluoxetine] Nausea Only     Suicidal visions      Pseudoephedrine Other (comments)    Quinidine Unknown (comments)    Reglan [Metoclopramide] Unknown (comments)    Remeron [Mirtazapine] Other (comments)    Resaid Other (comments)     inflammed eyes    Robaxin [Methocarbamol] Unknown (comments)    Sonata [Zaleplon] Unknown (comments)    Sulfa (Sulfonamide Antibiotics) Hives    Surmontil [Trimipramine] Nausea and Vomiting    Talwin [Pentazocine Lactate] Unknown (comments)    Toradol [Ketorolac Tromethamine] Nausea and Vomiting    Ultram [Tramadol] Unknown (comments)    Vasoconstrictor Drug Unknown (comments)    Vigamox [Moxifloxacin] Rash and Swelling    Vioxx [Rofecoxib] Unknown (comments)    Voltaren [Diclofenac Sodium] Itching    Wellbutrin [Bupropion Hcl] Nausea and Vomiting    Zelnorm [Tegaserod Hydrogen Maleate] Unknown (comments)    Zoloft [Sertraline] Nausea and Vomiting           Review of Systems   Constitutional: Negative for chills, fever, malaise/fatigue and weight loss. Respiratory: Negative for cough and shortness of breath. Cardiovascular: Negative for chest pain, palpitations and leg swelling. Gastrointestinal: Positive for abdominal pain, constipation and nausea. Negative for blood in stool, diarrhea, heartburn, melena and vomiting. Genitourinary: Negative for frequency. Neurological: Negative for headaches. Objective:   Physical Exam   Cardiovascular: Regular rhythm and normal heart sounds. No murmur heard. Pulmonary/Chest: Effort normal and breath sounds normal. She has no wheezes. She has no rales. Abdominal: Bowel sounds are normal. She exhibits no mass. There is no hepatosplenomegaly. There is no tenderness.          Visit Vitals    /62    Pulse 70    Temp 98.1 °F (36.7 °C) (Oral)    Resp 16    Ht 5' 2\" (1.575 m)    Wt 109 lb (49.4 kg)    SpO2 98%    BMI 19.94 kg/m2         Disclaimer:  Advised her to call back or return to office if symptoms worsen/change/persist.  Discussed expected course/resolution/complications of diagnosis in detail with patient. Medication risks/benefits/costs/interactions/alternatives discussed with patient. She was given an after visit summary which includes diagnoses, current medications, & vitals. She expressed understanding with the diagnosis and plan.         Ijeoma Renee MD

## 2017-08-08 NOTE — PATIENT INSTRUCTIONS
Learning About Clostridium Difficile Infection  What is C. diff? Clostridium difficile (also called C. diff) is a type of bacteria. It can cause swelling and irritation of the colon (colitis). Colitis can cause watery diarrhea, fever, loss of appetite, nausea, dehydration, and belly pain and tenderness. C. diff infection is most common in people who are taking antibiotics or have taken them in the past few weeks. It is also more likely to infect older people and people who are getting chemotherapy for cancer. Though colitis can be mild, it can become serious, especially for people who have a weak immune system. The large intestine normally contains many good bacteria that keep it healthy and do not cause disease. When you take an antibiotic to kill bacteria that do cause disease, your medicine may also kill the good bacteria. This can allow C. diff bacteria to grow and release harmful toxins. If you are still taking an antibiotic, your doctor may have you stop taking it, because it may have led to the C. diff infection. Your doctor may then treat C. diff colitis with a different antibiotic. It's important that your C. diff does not spread to other people. Preventing the spread of C. diff is a top health concern in hospitals and long-term care facilities. How does C. diff get passed to other people? C. diff can be passed from person to person. It can also be passed from person to object to person. If you have a C. diff infection, you can spread it if you don't wash your hands well enough after you use the bathroom. Anything you touch, like a door handle, bed rail, or phone, can then carry the bacteria. C. diff can live on objects for a very long time. C. diff infection spreads to other people when they touch an object that has C. diff on it and then use their hands to eat or rub their faces. Health care workers can pass C. diff from room to room in a hospital or a long-term care facility.  Visitors can also spread it. How can you avoid spreading C. diff infection? When you have C. diff, you and everyone around you must take special care to avoid spreading it. You must use extra care after you use the bathroom. The best way to prevent spreading C. diff is to wash your hands well and often. The rubbing and rinsing action helps wash the bacteria from your skin. · Wash your hands with running water and soap. · Rub your hands together for at least 20 seconds. · Pay special attention to your wrists, the backs of your hands, between your fingers, and under your fingernails. · Don't touch the faucet with your hand. Use a paper towel to turn off the water. Don't use an alcohol-based hand  instead of washing your hands with soap and water.  will not kill C. diff. In the hospital  · Ask your nurse if visitors need to wear special gowns and gloves. · Remind your visitors to wash their hands before they visit and as they leave your room. · Expect the hospital staff to use extra precautions. That means following safe hand-washing routines and wearing gloves and gowns. They may wear masks when cleaning. If it seems that a staff person isn't being as careful as you expect, ask what steps the hospital uses to control the spread of your infection. This might help remind him or her to be more careful. At home  After your diarrhea is better, you are much less likely to spread a C. diff infection. Still, you must:  · Regularly clean bathroom surfaces with bleach water to kill any C. diff spores. Use 1 part bleach to 10 parts water. · Wash your hands often and well, especially after you use the bathroom and before you make and eat food. · Remind everyone in your home to also wash hands often. If you start having diarrhea again, call your doctor right away. Your doctor will tell you when you no longer need to take special care to prevent spreading C. diff.   Follow-up care is a key part of your treatment and safety. Be sure to make and go to all appointments, and call your doctor if you are having problems. It's also a good idea to know your test results and keep a list of the medicines you take. Where can you learn more? Go to http://yoly-jackie.info/. Enter D681 in the search box to learn more about \"Learning About Clostridium Difficile Infection. \"  Current as of: March 3, 2017  Content Version: 11.3  © 6590-9670 [employer]. Care instructions adapted under license by [employer] (which disclaims liability or warranty for this information). If you have questions about a medical condition or this instruction, always ask your healthcare professional. [address] any warranty or liability for your use of this information.

## 2017-08-23 ENCOUNTER — PATIENT OUTREACH (OUTPATIENT)
Dept: INTERNAL MEDICINE CLINIC | Age: 82
End: 2017-08-23

## 2017-08-24 NOTE — PROGRESS NOTES
NNTOCIP    NN GABRIEL Post PCP Follow Up Note:  Pt attended TCM f/u appt with Dr. Diana Matos on 8/8/17. Called pt- verified with 2 identifiers. Reports she is feeling better- has completed antibx and had f/u with GI. She is still having some nausea, but has not needed any zofran today. Zofran is helping. Is having \"a couple\" loose stools daily, but no diarrhea. She is scheduled for f/u with Dr. Rafat Gannon on 9/15/17. She has started taking BASA but has not started statin at this point since she does not feel at baseline yet. She has started back swimming at [facility] a few times/week and states that this is helping to keep her \"sane\". She is very appreciative of the call and agreed to follow up call again after Labor Day weekend. She knows to call [facility] for any questions/concerns. Red flags reviewed. MD's pt assessment and plan stated,   Assessment & Plan:  Diagnoses and all orders for this visit:     1. 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 Due to her extensive allergy list this is plausible. Reviewed expectations. She has f/u with GI already scheduled.      2. Nausea- new dx, was not an issue prior to hospitalization so likely med induced, will stop meds as above, will cont Zofran prn, will update me in 2-3 days.     3. 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 Strongly recommend to start ASA and statin. She is resistant but will start statin after GI issues improve.   Red flags were reviewed with the patient to RTC or notify me, expected time course for resolution reviewed.       Future Appointments  Date Time Provider Americo Rizo   9/15/2017 10:00 AM Kranthi Blas, [address]

## 2017-09-07 ENCOUNTER — PATIENT OUTREACH (OUTPATIENT)
Dept: INTERNAL MEDICINE CLINIC | Age: 82
End: 2017-09-07

## 2017-09-07 NOTE — PROGRESS NOTES
[id number]    Transitional Care follow up call for completion of 30 day transition of care. Called pt- no answer- [facility] to [facility]. Received call back from pt- verified with 2 identifiers. States that she is doing better as far as diarrhea, but is actually having issues now with constipation. She has restarted benefiber- reminded her to drink plenty of fluids as this will help whenever taking fiber supp. She is still swimming a [employer] but is not back to her baseline workout activity. She is taking her ASA daily but has not started statin and is waiting to speak to Neuro prior to starting. She is scheduled with Dr. Yari Lopez on 9/15/17. Reviewed that she needs to be well informed of risks or taking vs not taking before making final decision. Scheduled her for her 1 year f/u with Dr. Camilo Wells. No other needs or concerns. GABIREL resolved.    Future Appointments  Date Time Provider Americo Rizo   9/15/2017 10:00 AM DO JACEK Vivas/ Usha George   7/17/2018 10:30 AM Tierra Morrison MD [id number] [facility]

## 2017-09-14 ENCOUNTER — OFFICE VISIT (OUTPATIENT)
Dept: NEUROLOGY | Age: 82
End: 2017-09-14

## 2017-09-14 VITALS
DIASTOLIC BLOOD PRESSURE: 70 MMHG | RESPIRATION RATE: 18 BRPM | BODY MASS INDEX: 20.12 KG/M2 | SYSTOLIC BLOOD PRESSURE: 120 MMHG | WEIGHT: 110 LBS | OXYGEN SATURATION: 98 % | HEART RATE: 73 BPM

## 2017-09-14 DIAGNOSIS — E78.1 HYPERTRIGLYCERIDEMIA: ICD-10-CM

## 2017-09-14 DIAGNOSIS — G45.1 HEMISPHERIC CAROTID ARTERY SYNDROME: Primary | ICD-10-CM

## 2017-09-14 DIAGNOSIS — I10 ESSENTIAL HYPERTENSION, BENIGN: ICD-10-CM

## 2017-09-14 DIAGNOSIS — R20.2 PARESTHESIA OF BOTH FEET: ICD-10-CM

## 2017-09-14 DIAGNOSIS — R20.9 SKIN SENSATION DISTURBANCE: ICD-10-CM

## 2017-09-14 DIAGNOSIS — R53.83 FATIGUE, UNSPECIFIED TYPE: ICD-10-CM

## 2017-09-14 DIAGNOSIS — R73.09 OTHER ABNORMAL GLUCOSE: ICD-10-CM

## 2017-09-14 NOTE — PROGRESS NOTES
Neurology Clinic Follow up Note    Patient ID:  Elizabeth Medrano  [id number]  47 y.o.  1935      Ms. Chelsea Rivera is here for follow up today of TIA       Last Appointment With Me:  Hospital F/U last seen 8/2/17    Transient episode of expressive aphasia, possible dysarthria noted in the ED on admission lasting < 5 minutes with complete resolution. Interval History:   Patient returns for hospital F/U TIA. Denies recurrence of aphasia, dysarthria. No focal weakness, paresthesias, vision abnormalities since discharge. She is compliant with ASA 81mg daily but not statin therapy (scared of side effects). LDL 86. No side effects with medication. She reports some fatigue/insomnia possibly due to nocturia. Also states she feels she is walking on \"rocks\". Concerned she may have neuropathy. Occasional tingling into the LE. PMHx/ PSHx/ FHx/ SHx:  Reviewed and unchanged previous visit. Past Medical History:   Diagnosis Date    Allergic rhinitis, cause unspecified     Anxiety     Back pain     Cancer (HCC)     basal cell face, back, neck    Cancer (HCC)     squamous cell leg    DJD (degenerative joint disease) of cervical spine     Fibromyalgia     Hypercholesterolemia     Hypertension     Hyponatremia     IBS (irritable bowel syndrome)     Irritable bowel syndrome with diarrhea 6/29/2016    GI- Dr David Thomson Kidney disease, chronic, stage III (GFR 30-59 ml/min) 6/2011    Dr. Neo García) Len Colldanielito         ROS:  Comprehensive review of systems negative except for as noted above. Objective:       Meds:  Current Outpatient Prescriptions   Medication Sig Dispense Refill    aspirin delayed-release 81 mg tablet Take 1 Tab by mouth daily. 30 Tab 0    LORazepam (ATIVAN) 0.5 mg tablet Take 0.5 mg by mouth every morning.  hyoscyamine SL (LEVSIN/SL) 0.125 mg SL tablet 0.125 mg by SubLINGual route Before breakfast, lunch, and dinner.       acetaminophen (TYLENOL ARTHRITIS PAIN) 650 mg CR tablet Take 650 mg by mouth every six (6) hours as needed for Pain.  LACTOBACILLUS ACIDOPHILUS (PROBIOTIC PO) Take  by mouth daily.  raNITIdine (ZANTAC) 150 mg tablet Take 150 mg by mouth Before breakfast and dinner.  calcium carbonate (TUMS) 300 mg (750 mg) chewable tablet Take 1 Tab by mouth daily.  cholecalciferol (VITAMIN D3) 1,000 unit tablet Take 1,000 Units by mouth daily.  FERROUS FUMARATE/VIT BCOMP,C (SUPER B COMPLEX PO) Take  by mouth daily (after dinner).  amLODIPine (NORVASC) 5 mg tablet TAKE 1 TAB BY MOUTH DAILY. 90 Tab 1    metoprolol tartrate (LOPRESSOR) 25 mg tablet TAKE ONE-QUARTER TABLET BY MOUTH EVERY DAY 90 Tab 1       Exam:  Visit Vitals    /70    Pulse 73    Resp 18    Wt 49.9 kg (110 lb)    SpO2 98%    BMI 20.12 kg/m2     NEUROLOGICAL EXAM:  General: Awake, alert, speech fluent  CN: PERRL, EOMI without nystagmus, VFF to confrontation, facial sensation and strength are normal and symmetric, hearing is intact to finger rub bilaterally, palate and tongue movements are intact and symmetric. Motor: Normal tone, bulk and strength bilaterally. Reflexes: 1/4 and symmetric, plantar stimulation is flexor. Coordination: FNF, CARLA, HTS intact. Sensation: LT intact throughout. Gait: Normal-based and steady.     LABS  Results for orders placed or performed during the hospital encounter of 08/02/17   CULTURE, STOOL   Result Value Ref Range    Special Requests: NO SPECIAL REQUESTS      Campylobacter antigen NEGATIVE      Shiga toxin-producing E. coli Ag NEGATIVE      Culture result:        NO ROUTINE ENTERIC PATHOGENS ISOLATED INCLUDING SALMONELLA, SHIGELLA, YERSINIA, VIBRIO OR SHIGA TOXIN PRODUCING E. COLI   C. DIFFICILE (DNA)   Result Value Ref Range    C. difficile (DNA) POSITIVE (A) NEG     CBC WITH AUTOMATED DIFF   Result Value Ref Range    WBC 5.8 3.6 - 11.0 K/uL    RBC 3.89 3.80 - 5.20 M/uL    HGB 12.0 11.5 - 16.0 g/dL    HCT 34.6 (L) 35.0 - 47.0 %    MCV 88.9 80.0 - 99.0 FL    MCH 30.8 26.0 - 34.0 PG    MCHC 34.7 30.0 - 36.5 g/dL    RDW 12.7 11.5 - 14.5 %    PLATELET 714 141 - 902 K/uL    NEUTROPHILS 57 32 - 75 %    LYMPHOCYTES 32 12 - 49 %    MONOCYTES 10 5 - 13 %    EOSINOPHILS 1 0 - 7 %    BASOPHILS 0 0 - 1 %    ABS. NEUTROPHILS 3.3 1.8 - 8.0 K/UL    ABS. LYMPHOCYTES 1.9 0.8 - 3.5 K/UL    ABS. MONOCYTES 0.6 0.0 - 1.0 K/UL    ABS. EOSINOPHILS 0.0 0.0 - 0.4 K/UL    ABS. BASOPHILS 0.0 0.0 - 0.1 K/UL   METABOLIC PANEL, COMPREHENSIVE   Result Value Ref Range    Sodium 131 (L) 136 - 145 mmol/L    Potassium 4.0 3.5 - 5.1 mmol/L    Chloride 98 97 - 108 mmol/L    CO2 26 21 - 32 mmol/L    Anion gap 7 5 - 15 mmol/L    Glucose 91 65 - 100 mg/dL    BUN 16 6 - 20 MG/DL    Creatinine 1.37 (H) 0.55 - 1.02 MG/DL    BUN/Creatinine ratio 12 12 - 20      GFR est AA 45 (L) >60 ml/min/1.73m2    GFR est non-AA 37 (L) >60 ml/min/1.73m2    Calcium 9.3 8.5 - 10.1 MG/DL    Bilirubin, total 0.6 0.2 - 1.0 MG/DL    ALT (SGPT) 21 12 - 78 U/L    AST (SGOT) 21 15 - 37 U/L    Alk.  phosphatase 44 (L) 45 - 117 U/L    Protein, total 6.7 6.4 - 8.2 g/dL    Albumin 3.7 3.5 - 5.0 g/dL    Globulin 3.0 2.0 - 4.0 g/dL    A-G Ratio 1.2 1.1 - 2.2     LIPASE   Result Value Ref Range    Lipase 205 73 - 393 U/L   URINALYSIS W/MICROSCOPIC   Result Value Ref Range    Color YELLOW/STRAW      Appearance CLEAR CLEAR      Specific gravity <1.005 1.003 - 1.030    pH (UA) 7.0 5.0 - 8.0      Protein NEGATIVE  NEG mg/dL    Glucose NEGATIVE  NEG mg/dL    Ketone NEGATIVE  NEG mg/dL    Bilirubin NEGATIVE  NEG      Blood NEGATIVE  NEG      Urobilinogen 0.2 0.2 - 1.0 EU/dL    Nitrites NEGATIVE  NEG      Leukocyte Esterase NEGATIVE  NEG      WBC 0-4 0 - 4 /hpf    RBC 0-5 0 - 5 /hpf    Epithelial cells FEW FEW /lpf    Bacteria NEGATIVE  NEG /hpf    Hyaline cast 0-2 0 - 5 /lpf   TROPONIN I   Result Value Ref Range    Troponin-I, Qt. <0.04 <0.05 ng/mL   LIPID PANEL   Result Value Ref Range    LIPID PROFILE          Cholesterol, total 186 <200 MG/DL    Triglyceride 69 <150 MG/DL    HDL Cholesterol 86 MG/DL    LDL, calculated 86.2 0 - 100 MG/DL    VLDL, calculated 13.8 MG/DL    CHOL/HDL Ratio 2.2 0 - 5.0     HEMOGLOBIN A1C WITH EAG   Result Value Ref Range    Hemoglobin A1c 6.0 4.2 - 6.3 %    Est. average glucose 450 mg/dL   METABOLIC PANEL, BASIC   Result Value Ref Range    Sodium 138 136 - 145 mmol/L    Potassium 3.6 3.5 - 5.1 mmol/L    Chloride 106 97 - 108 mmol/L    CO2 23 21 - 32 mmol/L    Anion gap 9 5 - 15 mmol/L    Glucose 88 65 - 100 mg/dL    BUN 9 6 - 20 MG/DL    Creatinine 1.12 (H) 0.55 - 1.02 MG/DL    BUN/Creatinine ratio 8 (L) 12 - 20      GFR est AA 57 (L) >60 ml/min/1.73m2    GFR est non-AA 47 (L) >60 ml/min/1.73m2    Calcium 8.2 (L) 8.5 - 24.9 MG/DL   METABOLIC PANEL, BASIC   Result Value Ref Range    Sodium 143 136 - 145 mmol/L    Potassium 3.5 3.5 - 5.1 mmol/L    Chloride 111 (H) 97 - 108 mmol/L    CO2 23 21 - 32 mmol/L    Anion gap 9 5 - 15 mmol/L    Glucose 96 65 - 100 mg/dL    BUN 10 6 - 20 MG/DL    Creatinine 0.90 0.55 - 1.02 MG/DL    BUN/Creatinine ratio 11 (L) 12 - 20      GFR est AA >60 >60 ml/min/1.73m2    GFR est non-AA >60 >60 ml/min/1.73m2    Calcium 7.5 (L) 8.5 - 10.1 MG/DL   GLUCOSE, POC   Result Value Ref Range    Glucose (POC) 107 (H) 65 - 100 mg/dL    Performed by [employer]    POC INR   Result Value Ref Range    INR (POC) 1.0 <1.2     EKG, 12 LEAD, INITIAL   Result Value Ref Range    Ventricular Rate 63 BPM    Atrial Rate 63 BPM    P-R Interval 198 ms    QRS Duration 76 ms    Q-T Interval 398 ms    QTC Calculation (Bezet) 407 ms    Calculated P Axis 72 degrees    Calculated R Axis 70 degrees    Calculated T Axis 74 degrees    Diagnosis       Normal sinus rhythm  Nonspecific ST abnormality  When compared with ECG of 12-DEC-2010 10:56,  No significant change was found  Confirmed by Flori Perry M.D., Feli [place] ([id number]) on 8/2/2017 7:53:44 PM         IMAGING:  MRI Results (most recent):    Results from [facility] Encounter encounter on 08/02/17   MRI BRAIN WO CONT   Narrative *PRELIMINARY REPORT*    MR the brain demonstrates slight changes small vessel disease periventricular  white matter. No acute abnormality identified. Preliminary report was provided by Dr. Daisy Lord, the on-call radiologist, at 721    Final report to follow. *END PRELIMINARY REPORT*    Study: MRI BRAIN WO CONT    Indication:  TIA    Additional clinical history:    Comparison: . Contrast:  None administered. Technique: The following sequences were obtained: Axial T1, T2, T2 FLAIR,  gradient echo; sagittal T1;   Coronal T2; diffusion-weighted imaging. Findings:  The ventricles and sulci are normal in appearance for the patient's age. . There  is no mass effect or midline shift. There is no intracranial hemorrhage. Mild  periventricular and subcortical white matter T2 hyperintense signal changes are  noted, most consistent with small vessel ischemic disease. A remote lacunar  infarct is seen adjacent to the right caudate nucleus. The basilar cisterns  remain widely patent. Diffusion-weighted imaging demonstrates no evidence of diffusion restriction. There are normal appearing vascular flow voids. The lenses have been replaced. Patchy mucosal thickening is noted throughout the  ethmoid air cells. Impression Impression:  No acute intracranial abnormality, including no evidence of ischemia. Chronic  findings described above. 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 Essential hypertension, benign I10 401.1   3. Hypertriglyceridemia E78.1 272.1   4. Other abnormal glucose R73.09 790.29   5. Paresthesia of both feet R20.2 782.0   6. Skin sensation disturbance R20.9 782.0   7.  Fatigue, unspecified type R53.83 66.79     80year old female h/o IBS, gastroparesis, pre-DM, HTN, CKD, fibromyalgia, anxiety d/o, remote PE, Cdiff here for hospital f/u after recent episode of expressive aphasia, dysarthria <5 minutes 8/2/17 c/w TIA. MRI Brain reviewed without acute ischemia, remote right lacunar infarction. CUS without significant stenosis. No recurrence of stroke sx since discharge. Compliant with ASA daily. Refusing statin therapy due to concern for side effects. LDL 86 and not terribly uncontrolled. BP well controlled today. Reporting distal LE paresthesias, concern for possible polyneuropathy. She elects to assess further with electrodiagnostic testing. Plan:   Cont.  ASA 81mg daily for stroke prevention  EMG PN protocol  HbA1C, B12, TSH, LUCIANO    Follow-up Disposition: TBD after EMG    Signed:  Cindy Galdamez DO  9/14/2017

## 2017-09-14 NOTE — PATIENT INSTRUCTIONS

## 2017-09-14 NOTE — MR AVS SNAPSHOT
Visit Information Date & Time Provider Department Dept. Phone Encounter #  
 9/14/2017  1:30 PM Adrianne Edge, [facility] at [address] [id number] Your Appointments 7/17/2018 10:30 AM  
PHYSICAL with Tri Ayala MD  
[facility] [address]) Appt Note: [address] [facility] [address]  
[address] [facility] Upcoming Health Maintenance Date Due Pneumococcal 65+ Low/Medium Risk (1 of 2 - PCV13) 9/16/2000 DTaP/Tdap/Td series (1 - Tdap) 6/26/2007 INFLUENZA AGE 9 TO ADULT 8/1/2017 GLAUCOMA SCREENING Q2Y 2/8/2018 MEDICARE YEARLY EXAM 7/15/2018 Allergies as of 9/14/2017  Review Complete On: 9/14/2017 By: Adrianne Edge, DO Severity Noted Reaction Type Reactions Latex  06/21/2016    Itching Dilaudid [Hydromorphone (Bulk)] High 05/19/2010    Other (comments) ? loss of consciousness Altace [Ramipril]  12/12/2010    Unknown (comments) Ambien [Zolpidem]  12/12/2010    Unknown (comments) Ascensia Autodisc Test [Blood Sugar Diagnostic, Disc-type]  12/12/2010    Unknown (comments) Aspirin  12/12/2010    Unknown (comments) Astelin [Azelastine]  12/12/2010    Unknown (comments) Atenolol  12/12/2010    Unknown (comments) Banex La  12/12/2010    Unknown (comments) Benicar [Olmesartan]  12/12/2010    Unknown (comments) Carbocaine [Mepivacaine]  12/12/2010    Unknown (comments) Cleocin [Clindamycin Hcl]  03/25/2009    Hives Codeine  12/12/2010    Nausea and Vomiting Codeine-asa-salicyl-acetam-caf  95/57/6827    Other (comments) Stomach pain Cortisporin [Neomy-polymyxinb-bacitracin-hc]  12/12/2010    Unknown (comments) Cymbalta [Duloxetine]  12/12/2010    Other (comments) Cytotec [Misoprostol]  12/12/2010    Unknown (comments) Dilaudid [Hydromorphone]  01/03/2011    Unknown (comments) Patient states that she was unable to respond, but was breathing Effexor [Venlafaxine]  12/12/2010    Unknown (comments) Epinephrine  12/12/2010    Unknown (comments) Heart racing Estra-d  12/12/2010    Unknown (comments) Estrace [Estradiol]  12/12/2010    Unknown (comments) Fentanyl  12/12/2010    Nausea and Vomiting, Unknown (comments)  
 shakes Flexeril [Cyclobenzaprine]  12/12/2010    Unable to Obtain \"drugs me\" Flonase [Fluticasone]  12/12/2010    Unknown (comments) Flunisolide  12/12/2010    Unknown (comments) Guiatussin W/codeine  12/12/2010    Unknown (comments) Ketek [Telithromycin]  03/25/2009    Nausea Only Klonopin [Clonazepam]  12/12/2010    Unknown (comments) Lexapro [Escitalopram]  12/12/2010    Nausea and Vomiting Lodine [Etodolac]  12/12/2010    Nausea and Vomiting Morphine  12/12/2010    Unknown (comments) Nsaids (Non-steroidal Anti-inflammatory Drug)  04/12/2010    Nausea and Vomiting Other Medication  12/12/2010    Unknown (comments) Laura Meade Paxil [Paroxetine Hcl]  12/12/2010    Nausea and Vomiting Pcn [Penicillins]  03/25/2009    Hives Percocet [Oxycodone-acetaminophen]  12/12/2010    Unknown (comments) Percodan [Oxycodone Hcl-oxycodone-asa]  12/12/2010    Unknown (comments) Pneumococcal 23-davida Ps Vaccine  09/14/2012    Swelling Proctocream-hc [Hydrocortisone]  12/12/2010    Unknown (comments) Prozac [Fluoxetine]  04/12/2010    Nausea Only Suicidal visions Pseudoephedrine  08/02/2017    Other (comments) Quinidine  12/12/2010    Unknown (comments) Reglan [Metoclopramide]  12/12/2010    Unknown (comments) Remeron [Mirtazapine]  12/12/2010    Other (comments) Resaid  12/12/2010    Other (comments)  
 inflammed eyes Robaxin [Methocarbamol]  12/12/2010    Unknown (comments) Sonata [Zaleplon]  12/12/2010    Unknown (comments) Sulfa (Sulfonamide Antibiotics)  03/25/2009    Hives Surmontil [Trimipramine]  12/12/2010    Nausea and Vomiting Talwin [Pentazocine Lactate]  12/12/2010    Unknown (comments) Toradol [Ketorolac Tromethamine]  12/12/2010    Nausea and Vomiting Ultram [Tramadol]  12/12/2010    Unknown (comments) Vasoconstrictor Drug  12/12/2010    Unknown (comments) Vigamox [Moxifloxacin]  06/21/2016    Rash, Swelling Vioxx [Rofecoxib]  12/12/2010    Unknown (comments) Voltaren [Diclofenac Sodium]  06/21/2016    Itching Wellbutrin [Bupropion Hcl]  12/12/2010    Nausea and Vomiting Zelnorm [Tegaserod Hydrogen Maleate]  12/12/2010    Unknown (comments) Zoloft [Sertraline]  12/12/2010    Nausea and Vomiting Current Immunizations  Reviewed on 8/8/2017 Name Date Influenza High Dose Vaccine PF 10/14/2016 Influenza Vaccine Whole 10/22/2012, 10/5/2011 Td 6/25/2007 Zoster Vaccine, Live 1/1/2010 Not reviewed this visit You Were Diagnosed With   
  
 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 
ICD-9-CM: 401.1 Hypertriglyceridemia     ICD-10-CM: E78.1 ICD-9-CM: 272.1 Other abnormal glucose     ICD-10-CM: R73.09 
ICD-9-CM: 790.29 Paresthesia of both feet     ICD-10-CM: R20.2 ICD-9-CM: 782.0 Skin sensation disturbance     ICD-10-CM: R20.9 ICD-9-CM: 782.0 Fatigue, unspecified type     ICD-10-CM: R53.83 ICD-9-CM: 780.79 Vitals BP Pulse Resp Weight(growth percentile) SpO2 BMI  
 120/70 73 18 110 lb (49.9 kg) 98% 20.12 kg/m2 OB Status Smoking Status Hysterectomy Never Smoker Vitals History BMI and BSA Data Body Mass Index Body Surface Area  
 20.12 kg/m 2 1.48 m 2 Preferred Pharmacy Pharmacy Name Phone [facility] [place] [phone] Your Updated Medication List  
  
   
This list is accurate as of: 9/14/17  1:56 PM.  Always use your most recent med list. amLODIPine 5 mg tablet Commonly known as:  Sherrye Acron TAKE 1 TAB BY MOUTH DAILY. aspirin delayed-release 81 mg tablet Take 1 Tab by mouth daily. ATIVAN 0.5 mg tablet Generic drug:  LORazepam  
Take 0.5 mg by mouth every morning. LEVSIN/SL 0.125 mg SL tablet Generic drug:  hyoscyamine SL  
0.125 mg by SubLINGual route Before breakfast, lunch, and dinner. metoprolol tartrate 25 mg tablet Commonly known as:  LOPRESSOR  
TAKE ONE-QUARTER TABLET BY MOUTH EVERY DAY  
  
 PROBIOTIC PO Take  by mouth daily. SUPER B COMPLEX PO Take  by mouth daily (after dinner). TUMS 300 mg (750 mg) chewable tablet Generic drug:  calcium carbonate Take 1 Tab by mouth daily. TYLENOL ARTHRITIS PAIN 650 mg CR tablet Generic drug:  acetaminophen Take 650 mg by mouth every six (6) hours as needed for Pain. VITAMIN D3 1,000 unit tablet Generic drug:  cholecalciferol Take 1,000 Units by mouth daily. ZANTAC 150 mg tablet Generic drug:  raNITIdine Take 150 mg by mouth Before breakfast and dinner. We Performed the Following HEMOGLOBIN A1C WITH EAG [02323 CPT(R)] IMMUNOELECTROPHORESIS [facility].) [id number] CPT(R)] TSH 3RD GENERATION [55401 CPT(R)] VITAMIN B12 [id number] CPT(R)] To-Do List   
 09/14/2017 Neurology:  EMG LIMITED Patient Instructions A Healthy Lifestyle: Care Instructions Your Care Instructions A healthy lifestyle can help you feel good, stay at a healthy weight, and have plenty of energy for both work and play. A healthy lifestyle is something you can share with your whole family.  
A healthy lifestyle also can lower your risk for serious health problems, such as high blood pressure, heart disease, and diabetes. You can follow a few steps listed below to improve your health and the health of your family. Follow-up care is a key part of your treatment and safety. Be sure to make and go to all appointments, and call your doctor if you are having problems. Its also a good idea to know your test results and keep a list of the medicines you take. How can you care for yourself at home? · Do not eat too much sugar, fat, or fast foods. You can still have dessert and treats now and then. The goal is moderation. · Start small to improve your eating habits. Pay attention to portion sizes, drink less juice and soda pop, and eat more fruits and vegetables. ¨ Eat a healthy amount of food. A 3-ounce serving of meat, for example, is about the size of a deck of cards. Fill the rest of your plate with vegetables and whole grains. ¨ Limit the amount of soda and sports drinks you have every day. Drink more water when you are thirsty. ¨ Eat at least 5 servings of fruits and vegetables every day. It may seem like a lot, but it is not hard to reach this goal. A serving or helping is 1 piece of fruit, 1 cup of vegetables, or 2 cups of leafy, raw vegetables. Have an apple or some carrot sticks as an afternoon snack instead of a candy bar. Try to have fruits and/or vegetables at every meal. 
· Make exercise part of your daily routine. You may want to start with simple activities, such as walking, bicycling, or slow swimming. Try to be active 30 to 60 minutes every day. You do not need to do all 30 to 60 minutes all at once. For example, you can exercise 3 times a day for 10 or 20 minutes. Moderate exercise is safe for most people, but it is always a good idea to talk to your doctor before starting an exercise program. 
· Keep moving. Benedicto Giraldo the lawn, work in the garden, or [employer]. Take the stairs instead of the elevator at work. · If you smoke, quit. People who smoke have an increased risk for heart attack, stroke, cancer, and other lung illnesses. Quitting is hard, but there are ways to boost your chance of quitting tobacco for good. ¨ Use nicotine gum, patches, or lozenges. ¨ Ask your doctor about stop-smoking programs and medicines. ¨ Keep trying. In addition to reducing your risk of diseases in the future, you will notice some benefits soon after you stop using tobacco. If you have shortness of breath or asthma symptoms, they will likely get better within a few weeks after you quit. · Limit how much alcohol you drink. Moderate amounts of alcohol (up to 2 drinks a day for men, 1 drink a day for women) are okay. But drinking too much can lead to liver problems, high blood pressure, and other health problems. Family health If you have a family, there are many things you can do together to improve your health. · Eat meals together as a family as often as possible. · Eat healthy foods. This includes fruits, vegetables, lean meats and dairy, and whole grains. · Include your family in your fitness plan. Most people think of activities such as jogging or tennis as the way to fitness, but there are many ways you and your family can be more active. Anything that makes you breathe hard and gets your heart pumping is exercise. Here are some tips: 
¨ Walk to do errands or to take your child to school or the bus. ¨ Go for a family bike ride after dinner instead of watching TV. Where can you learn more? Go to http://yoly-jackie.info/. Enter W749 in the search box to learn more about \"A Healthy Lifestyle: Care Instructions. \" Current as of: July 26, 2016 Content Version: 11.3 © 0279-3223 [employer]. Care instructions adapted under license by [employer] (which disclaims liability or warranty for this information).  If you have questions about a medical condition or this instruction, always ask your healthcare professional. [address] any warranty or liability for your use of this information. Introducing [facility] & [facility] SERVICES! Dear [place]: 
Thank you for requesting a [employer] account. Our records indicate that you have previously registered for a [employer] account but its currently inactive. Please call our [employer] support line at [phone]. Additional Information If you have questions, please visit the Frequently Asked Questions section of the [employer] website at [email or web address] Remember, [employer]Library is NOT to be used for urgent needs. For medical emergencies, dial 911. Now available from your iPhone and Android! Please provide this summary of care documentation to your next provider. Your primary care clinician is listed as Tish Talley. If you have any questions after today's visit, please call [phone].

## 2017-09-21 LAB
CREATININE, EXTERNAL: 1.29
HBA1C MFR BLD HPLC: 5.6 %

## 2017-09-27 ENCOUNTER — OFFICE VISIT (OUTPATIENT)
Dept: NEUROLOGY | Age: 82
End: 2017-09-27

## 2017-09-27 DIAGNOSIS — R20.2 PARESTHESIA OF BOTH FEET: Primary | ICD-10-CM

## 2017-10-11 ENCOUNTER — OFFICE VISIT (OUTPATIENT)
Dept: NEUROLOGY | Age: 82
End: 2017-10-11

## 2017-10-11 DIAGNOSIS — G62.9 SENSORY NEUROPATHY: Primary | ICD-10-CM

## 2017-10-11 NOTE — PROGRESS NOTES
[address] [facility]  [address] [place] [address]  Phone [phone] Fax [phone]  Test Date:  10/11/2017    Patient: Ramila Romo : 1935 Physician: Bishop Mccracken    Sex: Female Height: 5'2 \" Ref Phys: MANUEL CARLOS   ID#: [id number] Weight: 110 lbs. Technician: Walker Knife     Patient Complaints:  CC:R/O PERIPHERAL NEUROPATHY, LOWER EXTREMITY PARESTHESIAS    NCV & EMG Findings:  Evaluation of the left Sup Peroneal sensory nerve showed no response (14 cm) and no response (Site 2). The right Sup Peroneal sensory nerve showed no response (14 cm). The left sural sensory nerve showed no response (Calf) and no response (Site 2). The right sural sensory nerve showed no response (Calf). All remaining nerves  were within normal limits. All left vs. right side differences were within normal limits. All H Reflex left vs. right side latency differences were within normal limits. Needle evaluation of the left extensor digitorum brevis muscle showed slightly increased polyphasic potentials and very decreased interference pattern. All remaining muscles (as indicated in the following table) showed no evidence of electrical instability. Impression:  Extensive electrodiagnostic evaluation of the left lower extremity with additional nerve conduction studies of the right lower extremity reveals the followin. 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 2. No evidence of a left lumbosacral radiculopathy. ___________________________  Era Ishan Hinton DO  Diplomate, American Board of Psychiatry & Neurology             Nerve Conduction Studies  Anti Sensory Summary Table     Stim Site NR Peak (ms) Norm Peak (ms) P-T Amp (µV) Norm P-T Amp Onset (ms) Site1 Site2 Delta-P (ms) Dist (cm) Antonio (m/s) Norm Antonio (m/s)   Left Sup Peroneal Anti Sensory (Ant Lat Mall)  34.2°C   14 cm NR  <4.4  >5.0  14 cm Ant Lat Mall  14.0  >32   Site 2 NR              Right Sup Peroneal Anti Sensory (Ant Lat Mall)  33.6°C   14 cm NR  <4.4  >5.0  14 cm Ant Lat Mall  14.0  >32   Left Sural Anti Sensory (Lat Mall)  32.5°C   Calf NR  <4.0  >5.0  Calf Lat Mall  14.0  >35   Site 2 NR              Right Sural Anti Sensory (Lat Mall)  33.1°C   Calf NR  <4.0  >5.0  Calf Lat Mall  14.0  >35     Motor Summary Table     Stim Site NR Onset (ms) Norm Onset (ms) O-P Amp (mV) Norm O-P Amp Site1 Site2 Delta-0 (ms) Dist (cm) Antonio (m/s) Norm Antonio (m/s)   Left Peroneal Motor (Ext Dig Brev)  31.5°C   Ankle    3.8 <6.1 3.6 >2.5 B Fib Ankle 6.9 30.0 43 >38   B Fib    10.7  2.7  Poplt B Fib 2.3 10.0 43 >40   Poplt    13.0  3.0          Right Peroneal Motor (Ext Dig Brev)  32.8°C   Ankle    3.8 <6.1 4.3 >2.5 B Fib Ankle 6.9 30.0 43 >38   B Fib    10.7  3.9  Poplt B Fib 2.0 10.0 50 >40   Poplt    12.7  3.8          Left Tibial Motor (Abd Murphy Brev)  31.1°C   Ankle    3.8 <6.1 8.3 >3.0 Knee Ankle 8.8 38.0 43 >35   Knee    12.6  3.8          Right Tibial Motor (Abd Murphy Brev)  32.4°C   Ankle    3.8 <6.1 4.7 >3.0 Knee Ankle 9.1 37.0 41 >35   Knee    12.9  2.7            H Reflex Studies     NR H-Lat (ms) L-R H-Lat (ms) L-R Lat Norm   Left Tibial (Gastroc)  30.7°C      54.76 0.00 <2.0   Right Tibial (Gastroc)  32.2°C      54.76 0.00 <2.0     EMG     Side Muscle Nerve Root Ins Act Fibs Psw Amp Dur Poly Recrt Int Pat Comment   Left Ext Dig Brev Dp Br Peronel L5, S1 Nml Nml Nml Nml Nml 1+ Nml 25%    Left AbdHallucis MedPlantar S1-2 Nml Nml Nml Nml Nml 0 Nml Nml    Left PostTibialis Tibial L5, S1 Nml Nml Nml Nml Nml 0 Nml Nml    Left Gastroc Tibial S1-2 Nml Nml Nml Nml Nml 0 Nml Nml    Left AntTibialis Dp Br Peronel L4-5 Nml Nml Nml Nml Nml 0 Nml Nml    Left RectFemoris Femoral L2-4 Nml Nml Nml Nml Nml 0 Nml Nml          Waveforms:

## 2017-10-12 ENCOUNTER — TELEPHONE (OUTPATIENT)
Dept: NEUROLOGY | Age: 82
End: 2017-10-12

## 2017-10-12 NOTE — TELEPHONE ENCOUNTER
----- Message from Beth Chirinos sent at 10/12/2017 11:31 AM EDT -----  Regarding: Dr Leana Velasco, kidney specialist did the lab work 9/18/17 that Dr. Myriam Meehan ordered for the pt to have done. Pt has a copy of the lab reports and is willing to mail a copy to Dr. Myriam Meehan or Edvin Mg can contact Dr.Dep Galindo's office obtain the results. Pt can be reached at [phone].

## 2017-10-12 NOTE — TELEPHONE ENCOUNTER
Spoke with patient, informed her labs were received this morning via fax from [facility]. She would like to know if they indicate she has neuropathy.

## 2017-10-13 NOTE — TELEPHONE ENCOUNTER
Spoke with patient, informed her that the EMG confirmed she had neuropathy per . She wants to know what the next steps are.

## 2017-10-16 NOTE — TELEPHONE ENCOUNTER
Called patient to inform her no need for follow up was needed at this time per , no answer. Left message to call back.

## 2017-10-17 NOTE — TELEPHONE ENCOUNTER
Spoke with patient, informed her of diagnosis per . She wants to know if there are any other treatments other than medications for neuropathy.

## 2017-11-07 DIAGNOSIS — I10 ESSENTIAL HYPERTENSION, BENIGN: ICD-10-CM

## 2017-11-07 RX ORDER — AMLODIPINE BESYLATE 5 MG/1
TABLET ORAL
Qty: 90 TAB | Refills: 2 | Status: SHIPPED | OUTPATIENT
Start: 2017-11-07 | End: 2018-07-18 | Stop reason: SDUPTHER

## 2017-11-15 LAB — CREATININE, EXTERNAL: 1.17

## 2017-11-17 ENCOUNTER — TELEPHONE (OUTPATIENT)
Dept: INTERNAL MEDICINE CLINIC | Age: 82
End: 2017-11-17

## 2017-11-17 NOTE — TELEPHONE ENCOUNTER
Chart reviewed. 3 of last 9 sodium levels have been low, so normally her sodium is okay. Her sodium level must be really low if they are holding up surgery. Please get labs. She can get a copy of her labs, but I don't think it will help.

## 2017-11-17 NOTE — TELEPHONE ENCOUNTER
Pt calling and stating she is supposed to have surgery today on her right middle finger for trigger finger. Pt stated Dr Ryder Pedersen at [facility] now will not do the surgery because pt sodium level is running low. Pt wanted to show Dr Ryder Pedersen that her sodium usually runs low that this is a normal for the pt. Pt wanted us to send lab results from Aug 2nd from her chart to Dr Ryder Pedersen showing pt labs show that her sodium level ran low at that time.

## 2017-11-17 NOTE — TELEPHONE ENCOUNTER
Pt states she received a call about her sodium level and last draw was 128 and previously was 130. Pt stated she is going again to get labs drawn and if sill running low surgery will be canceled for now. Pt stated thank you for your help.

## 2017-11-21 ENCOUNTER — HOSPITAL ENCOUNTER (OUTPATIENT)
Dept: MRI IMAGING | Age: 82
Discharge: HOME | End: 2017-11-21
Attending: ORTHOPAEDIC SURGERY
Payer: MEDICARE

## 2017-11-21 DIAGNOSIS — M77.8 TENDINITIS OF LEFT SHOULDER: ICD-10-CM

## 2017-11-21 DIAGNOSIS — M75.42 IMPINGEMENT SYNDROME OF LEFT SHOULDER: ICD-10-CM

## 2017-11-21 DIAGNOSIS — M25.512 LEFT SHOULDER PAIN: ICD-10-CM

## 2017-11-21 PROCEDURE — 73221 MRI JOINT UPR EXTREM W/O DYE: CPT

## 2018-03-20 LAB — CREATININE, EXTERNAL: 1.3

## 2018-05-22 DIAGNOSIS — I10 ESSENTIAL HYPERTENSION, BENIGN: ICD-10-CM

## 2018-05-22 RX ORDER — METOPROLOL TARTRATE 25 MG/1
TABLET, FILM COATED ORAL
Qty: 90 TAB | Refills: 0 | Status: SHIPPED | OUTPATIENT
Start: 2018-05-22 | End: 2019-04-09 | Stop reason: SDUPTHER

## 2018-07-17 ENCOUNTER — OFFICE VISIT (OUTPATIENT)
Dept: INTERNAL MEDICINE CLINIC | Age: 83
End: 2018-07-17

## 2018-07-17 VITALS
TEMPERATURE: 98 F | HEIGHT: 62 IN | HEART RATE: 98 BPM | DIASTOLIC BLOOD PRESSURE: 70 MMHG | WEIGHT: 111 LBS | SYSTOLIC BLOOD PRESSURE: 130 MMHG | OXYGEN SATURATION: 98 % | BODY MASS INDEX: 20.43 KG/M2 | RESPIRATION RATE: 18 BRPM

## 2018-07-17 DIAGNOSIS — Z00.00 MEDICARE ANNUAL WELLNESS VISIT, SUBSEQUENT: Primary | ICD-10-CM

## 2018-07-17 DIAGNOSIS — K21.9 GASTROESOPHAGEAL REFLUX DISEASE WITHOUT ESOPHAGITIS: ICD-10-CM

## 2018-07-17 DIAGNOSIS — M48.061 SPINAL STENOSIS OF LUMBAR REGION WITHOUT NEUROGENIC CLAUDICATION: ICD-10-CM

## 2018-07-17 DIAGNOSIS — K31.84 GASTROPARESIS: ICD-10-CM

## 2018-07-17 DIAGNOSIS — I10 ESSENTIAL HYPERTENSION, BENIGN: ICD-10-CM

## 2018-07-17 DIAGNOSIS — Z23 ENCOUNTER FOR IMMUNIZATION: ICD-10-CM

## 2018-07-17 DIAGNOSIS — N18.30 CKD (CHRONIC KIDNEY DISEASE) STAGE 3, GFR 30-59 ML/MIN (HCC): ICD-10-CM

## 2018-07-17 DIAGNOSIS — K58.0 IRRITABLE BOWEL SYNDROME WITH DIARRHEA: ICD-10-CM

## 2018-07-17 DIAGNOSIS — F33.40 RECURRENT MAJOR DEPRESSIVE DISORDER, IN REMISSION (HCC): ICD-10-CM

## 2018-07-17 DIAGNOSIS — G62.9 NEUROPATHY: ICD-10-CM

## 2018-07-17 DIAGNOSIS — Z13.39 SCREENING FOR ALCOHOLISM: ICD-10-CM

## 2018-07-17 PROBLEM — A04.72 C. DIFFICILE DIARRHEA: Status: RESOLVED | Noted: 2017-08-02 | Resolved: 2018-07-17

## 2018-07-17 PROBLEM — F33.9 RECURRENT MAJOR DEPRESSIVE DISORDER (HCC): Status: ACTIVE | Noted: 2018-07-17

## 2018-07-17 RX ORDER — POLYETHYLENE GLYCOL 3350 17 G/17G
17 POWDER, FOR SOLUTION ORAL AS NEEDED
COMMUNITY
End: 2019-02-20 | Stop reason: ALTCHOICE

## 2018-07-17 NOTE — PATIENT INSTRUCTIONS
Preventing Falls: Care Instructions Your Care Instructions Getting around your home safely can be a challenge if you have injuries or health problems that make it easy for you to fall. Loose rugs and furniture in walkways are among the dangers for many older people who have problems walking or who have poor eyesight. People who have conditions such as arthritis, osteoporosis, or dementia also have to be careful not to fall. You can make your home safer with a few simple measures. Follow-up care is a key part of your treatment and safety. Be sure to make and go to all appointments, and call your doctor if you are having problems. It's also a good idea to know your test results and keep a list of the medicines you take. How can you care for yourself at home? Taking care of yourself · You may get dizzy if you do not drink enough water. To prevent dehydration, drink plenty of fluids, enough so that your urine is light yellow or clear like water. Choose water and other caffeine-free clear liquids. If you have kidney, heart, or liver disease and have to limit fluids, talk with your doctor before you increase the amount of fluids you drink. · Exercise regularly to improve your strength, muscle tone, and balance. Walk if you can. Swimming may be a good choice if you cannot walk easily. · Have your vision and hearing checked each year or any time you notice a change. If you have trouble seeing and hearing, you might not be able to avoid objects and could lose your balance. · Know the side effects of the medicines you take. Ask your doctor or pharmacist whether the medicines you take can affect your balance. Sleeping pills or sedatives can affect your balance. · Limit the amount of alcohol you drink. Alcohol can impair your balance and other senses. · Ask your doctor whether calluses or corns on your feet need to be removed.  If you wear loose-fitting shoes because of calluses or corns, you can lose your balance and fall. · Talk to your doctor if you have numbness in your feet. Preventing falls at home · Remove raised doorway thresholds, throw rugs, and clutter. Repair loose carpet or raised areas in the floor. · Move furniture and electrical cords to keep them out of walking paths. · Use nonskid floor wax, and wipe up spills right away, especially on ceramic tile floors. · If you use a walker or cane, put rubber tips on it. If you use crutches, clean the bottoms of them regularly with an abrasive pad, such as steel wool. · Keep your house well lit, especially [place], and outside walkways. Use night-lights in areas such as hallways and bathrooms. Add extra light switches or use remote switches (such as switches that go on or off when you clap your hands) to make it easier to turn lights on if you have to get up during the night. · Install sturdy handrails on stairways. · Move items in your cabinets so that the things you use a lot are on the lower shelves (about waist level). · Keep a cordless phone and a flashlight with new batteries by your bed. If possible, put a phone in each of the main rooms of your house, or carry a cell phone in case you fall and cannot reach a phone. Or, you can wear a device around your neck or wrist. You push a button that sends a signal for help. · Wear low-heeled shoes that fit well and give your feet good support. Use footwear with nonskid soles. Check the heels and soles of your shoes for wear. Repair or replace worn heels or soles. · Do not wear socks without shoes on wood floors. · Walk on the grass when the sidewalks are slippery. If you live in an area that gets snow and ice in the winter, sprinkle salt on slippery steps and sidewalks. Preventing falls in the bath · Install grab bars and nonskid mats inside and outside your shower or tub and near the toilet and sinks. · Use shower chairs and bath benches.  
· Use a hand-held shower head that will allow you to sit while showering. · Get into a tub or shower by putting the weaker leg in first. Get out of a tub or shower with your strong side first. 
· Repair loose toilet seats and consider installing a raised toilet seat to make getting on and off the toilet easier. · Keep your bathroom door unlocked while you are in the shower. Where can you learn more? Go to http://yoly-jackie.info/. Enter [id number] in the search box to learn more about \"Preventing Falls: Care Instructions. \" Current as of: May 12, 2017 Content Version: 11.7 © 3011-2744 [employer]. Care instructions adapted under license by [employer] (which disclaims liability or warranty for this information). If you have questions about a medical condition or this instruction, always ask your healthcare professional. [place] [address] any warranty or liability for your use of this information. Medicare Wellness Visit, Female The best way to live healthy is to have a lifestyle where you eat a well-balanced diet, exercise regularly, limit alcohol use, and quit all forms of tobacco/nicotine, if applicable. Regular preventive services are another way to keep healthy. Preventive services (vaccines, screening tests, monitoring & exams) can help personalize your care plan, which helps you manage your own care. Screening tests can find health problems at the earliest stages, when they are easiest to treat. [facility]  follows the current, evidence-based guidelines published by the [facility] [place] Rosales (USPSTF) when recommending preventive services for our patients. Because we follow these guidelines, sometimes recommendations change over time as research supports it. (For example, mammograms used to be recommended annually.  Even though Medicare will still pay for an annual mammogram, the newer guidelines recommend a mammogram every two years for women of average risk.) Of course, you and your provider may decide to screen more often for some diseases, based on your risk and co-morbidities (chronic disease you are already diagnosed with). Preventive services for you include: - Medicare offers their members a free annual wellness visit, which is time for you and your primary care provider to discuss and plan for your preventive service needs. Take advantage of this benefit every year! 
 
-All people over age 72 should receive the recommended pneumonia vaccines. Current USPSTF guidelines recommend a series of two vaccines for the best pneumonia protection.  
 
-All adults should have a yearly flu vaccine and a tetanus vaccine every 10 years. All adults age 61 years should receive a shingles vaccine once in their lifetime.   
 
-A bone mass density test is recommended when a woman turns 65 to screen for osteoporosis. This test is only recommended once as a screening. Some providers will use this same test as a disease monitoring tool if you already have osteoporosis. -All adults age 38-68 years who are overweight should have a diabetes screening test once every three years.  
 
-Other screening tests & preventive services for persons with diabetes include: an eye exam to screen for diabetic retinopathy, a kidney function test, a foot exam, and stricter control over your cholesterol.  
 
-Cardiovascular screening for adults with routine risk involves an electrocardiogram (ECG) at intervals determined by the provider.  
 
-Colorectal cancer screenings should be done for adults age 54-65 years with normal risk. There are a number of acceptable methods of screening for this type of cancer. Each test has its own benefits and drawbacks. Discuss with your provider what is most appropriate for you during your annual wellness visit.  The different tests include: colonoscopy (considered the best screening method), a fecal occult blood test, a fecal DNA test, and sigmoidoscopy. -Breast cancer screenings are recommended every other year for women of normal risk age 54-69 years.  
 
-Cervical cancer screenings for women over age 72 are only recommended with certain risk factors.  
 
-All adults born between [facility] should be screened once for Hepatitis C. Here is a list of your current Health Maintenance items (your personalized list of preventive services) with a due date: 
Health Maintenance Due Topic Date Due  Pneumococcal Vaccine (1 of 2 - PCV13) 09/16/2000  
 DTaP/Tdap/Td  (1 - Tdap) 06/26/2007  Glaucoma Screening   02/08/2018 Matt Angulo Annual Well Visit  07/15/2018 Marlene Oseguera [id number] What is a living will? A living will is a legal form you use to write down the kind of care you want at the end of your life. It is used by the health professionals who will treat you if you aren't able to decide for yourself. If you put your wishes in writing, your loved ones and others will know what kind of care you want. They won't need to guess. This can ease your mind and be helpful to others. A living will is not the same as an estate or property will. An estate will explains what you want to happen with your money and property after you die. Is a living will a legal document? A living will is a legal document. Each state has its own laws about living lin. If you move to another state, make sure that your living will is legal in the state where you now live. Or you might use a universal form that has been approved by many states. This kind of form can sometimes be completed and stored online. Your electronic copy will then be available wherever you have a connection to the Internet. In most cases, doctors will respect your wishes even if you have a form from a different state. · You don't need an  to complete a living will.  But legal advice can be helpful if your state's laws are unclear, your health history is complicated, or your family can't agree on what should be in your living will. · You can change your living will at any time. Some people find that their wishes about end-of-life care change as their health changes. · In addition to making a living will, think about completing a medical power of  form. This form lets you name the person you want to make end-of-life treatment decisions for you (your \"health care agent\") if you're not able to. Many hospitals and nursing homes will give you the forms you need to complete a living will and a medical power of . · Your living will is used only if you can't make or communicate decisions for yourself anymore. If you become able to make decisions again, you can accept or refuse any treatment, no matter what you wrote in your living will. · Your state may offer an online registry. This is a place where you can store your living will online so the doctors and nurses who need to treat you can find it right away. What should you think about when creating a living will? Talk about your end-of-life wishes with your family members and your doctor. Let them know what you want. That way the people making decisions for you won't be surprised by your choices. Think about these questions as you make your living will: · Do you know enough about life support methods that might be used? If not, talk to your doctor so you know what might be done if you can't breathe on your own, your heart stops, or you're unable to swallow. · What things would you still want to be able to do after you receive life-support methods? Would you want to be able to walk? To speak? To eat on your own? To live without the help of machines? · If you have a choice, where do you want to be cared for? In your home? At a hospital or nursing home? · Do you want certain [employer] practices performed if you become very ill? · If you have a choice at the end of your life, where would you prefer to die? At home?  In a hospital or nursing home? Somewhere else? · Would you prefer to be buried or cremated? · Do you want your organs to be donated after you die? What should you do with your living will? · Make sure that your family members and your health care agent have copies of your living will. · Give your doctor a copy of your living will to keep in your medical record. If you have more than one doctor, make sure that each one has a copy. · You may want to put a copy of your living will where it can be easily found. Where can you learn more? Go to http://yoly-jackie.info/. Enter C867 in the search box to learn more about \"Learning About Living [place]. \" Current as of: August 8, 2016 Content Version: 11.3 © 9617-5450 [employer]. Care instructions adapted under license by [employer] (which disclaims liability or warranty for this information). If you have questions about a medical condition or this instruction, always ask your healthcare professional. [address] any warranty or liability for your use of this information.

## 2018-07-17 NOTE — ACP (ADVANCE CARE PLANNING)
Advance Care Planning    Advance Care Planning (ACP) Provider Note - Comprehensive     Date of ACP Conversation: 07/17/18  Persons included in Conversation:  patient  Length of ACP Conversation in minutes: <16 minutes (Non-Billable)    Authorized Decision Maker (if patient is incapable of making informed decisions): This person is: Healthcare Agent/Medical Power of  under Advance Directive      She has an advanced directive - a copy HAS NOT been provided. Reviewed DNR/DNI and patient is interested in remaining a DNR, no changes made. She has a copy at home. General ACP for ALL Patients with Decision Making Capacity:  Importance of advance care planning, including choosing a healthcare agent to communicate patient's healthcare decisions if patient lost the ability to make decisions, such as after a sudden illness or accident  Understanding of the healthcare agent role was assessed and information provided  Opportunity offered to explore how cultural, Hoahaoism, spiritual, or personal beliefs would affect decisions for future care  Exploration of values, goals, and preferences if recovery is not expected, even with continued medical treatment in the event of: Imminent death or severe, permanent brain injury    For Serious or Chronic Illness:  Understanding of CPR, goals and expected outcomes, benefits and burdens discussed.   Understanding of medical condition  Information on CPR success rates provided (e.g. for CPR in hospital, survival to d/c at two weeks is 22%, for chronically ill or elderly/frail survival is less than 3%)    Interventions Provided:  Recommended communicating the plan and making copies for the healthcare agent, personal physician, and others as appropriate (e.g., health system)  Recommended review of completed ACP document annually or upon change in health status

## 2018-07-17 NOTE — MR AVS SNAPSHOT
[address] [place] [address] 
[phone] Patient: Toby Mccartney MRN:  [id number] Visit Information Date & Time Provider Department Dept. Phone Encounter #  
 7/17/2018 10:30 AM Bayron Carson, [address] [place] [address] Internal Medicine [phone] [id number] Follow-up Instructions Return in about 1 year (around 7/17/2019), or if symptoms worsen or fail to improve, for FULL PHYSICAL - 30 minutes. Upcoming Health Maintenance Date Due Pneumococcal 65+ Low/Medium Risk (1 of 2 - PCV13) 9/16/2000 DTaP/Tdap/Td series (1 - Tdap) 6/26/2007 GLAUCOMA SCREENING Q2Y 2/8/2018 MEDICARE YEARLY EXAM 7/15/2018 Influenza Age 5 to Adult 8/1/2018 Allergies as of 7/17/2018  Review Complete On: 7/17/2018 By: Bayron Carson MD  
  
 Severity Noted Reaction Type Reactions Latex  06/21/2016    Itching Dilaudid [Hydromorphone (Bulk)] High 05/19/2010    Other (comments) ? loss of consciousness Altace [Ramipril]  12/12/2010    Unknown (comments) Ambien [Zolpidem]  12/12/2010    Unknown (comments) Ascensia Autodisc Test [Blood Sugar Diagnostic, Disc-type]  12/12/2010    Unknown (comments) Aspirin  12/12/2010    Unknown (comments) Astelin [Azelastine]  12/12/2010    Unknown (comments) Atenolol  12/12/2010    Unknown (comments) Banex La  12/12/2010    Unknown (comments) Benicar [Olmesartan]  12/12/2010    Unknown (comments) Carbocaine [Mepivacaine]  12/12/2010    Unknown (comments) Cleocin [Clindamycin Hcl]  03/25/2009    Hives Codeine  12/12/2010    Nausea and Vomiting Codeine-asa-salicyl-acetam-caf  26/24/1454    Other (comments) Stomach pain Cortisporin [Neomy-polymyxinb-bacitracin-hc]  12/12/2010    Unknown (comments) Cymbalta [Duloxetine]  12/12/2010    Other (comments) Cytotec [Misoprostol]  12/12/2010    Unknown (comments) Dilaudid [Hydromorphone]  01/03/2011    Unknown (comments) Patient states that she was unable to respond, but was breathing Effexor [Venlafaxine]  12/12/2010    Unknown (comments) Epinephrine  12/12/2010    Unknown (comments) Heart racing Estra-d  12/12/2010    Unknown (comments) Estrace [Estradiol]  12/12/2010    Unknown (comments) Fentanyl  12/12/2010    Nausea and Vomiting, Unknown (comments)  
 shakes Flexeril [Cyclobenzaprine]  12/12/2010    Unable to Obtain \"drugs me\" Flonase [Fluticasone]  12/12/2010    Unknown (comments) Flunisolide  12/12/2010    Unknown (comments) Guiatussin W/codeine  12/12/2010    Unknown (comments) Ketek [Telithromycin]  03/25/2009    Nausea Only Klonopin [Clonazepam]  12/12/2010    Unknown (comments) Lexapro [Escitalopram]  12/12/2010    Nausea and Vomiting Lodine [Etodolac]  12/12/2010    Nausea and Vomiting Morphine  12/12/2010    Unknown (comments) Nsaids (Non-steroidal Anti-inflammatory Drug)  04/12/2010    Nausea and Vomiting Other Medication  12/12/2010    Unknown (comments) Jyl Abelson Paxil [Paroxetine Hcl]  12/12/2010    Nausea and Vomiting Pcn [Penicillins]  03/25/2009    Hives Percocet [Oxycodone-acetaminophen]  12/12/2010    Unknown (comments) Percodan [Oxycodone Hcl-oxycodone-asa]  12/12/2010    Unknown (comments) Pneumococcal 23-davida Ps Vaccine  09/14/2012    Swelling Proctocream-hc [Hydrocortisone]  12/12/2010    Unknown (comments) Prozac [Fluoxetine]  04/12/2010    Nausea Only Suicidal visions Pseudoephedrine  08/02/2017    Other (comments) Quinidine  12/12/2010    Unknown (comments) Reglan [Metoclopramide]  12/12/2010    Unknown (comments) Remeron [Mirtazapine]  12/12/2010    Other (comments) Resaid  12/12/2010    Other (comments)  
 inflammed eyes Robaxin [Methocarbamol]  12/12/2010    Unknown (comments) Sonata [Zaleplon]  12/12/2010    Unknown (comments) Sulfa (Sulfonamide Antibiotics)  03/25/2009    Hives Surmontil [Trimipramine]  12/12/2010    Nausea and Vomiting Talwin [Pentazocine Lactate]  12/12/2010    Unknown (comments) Toradol [Ketorolac Tromethamine]  12/12/2010    Nausea and Vomiting Ultram [Tramadol]  12/12/2010    Unknown (comments) Vancomycin  07/17/2018    Nausea Only Vasoconstrictor Drug  12/12/2010    Unknown (comments) Vigamox [Moxifloxacin]  06/21/2016    Rash, Swelling Vioxx [Rofecoxib]  12/12/2010    Unknown (comments) Voltaren [Diclofenac Sodium]  06/21/2016    Itching Wellbutrin [Bupropion Hcl]  12/12/2010    Nausea and Vomiting Zelnorm [Tegaserod Hydrogen Maleate]  12/12/2010    Unknown (comments) Zoloft [Sertraline]  12/12/2010    Nausea and Vomiting Current Immunizations  Reviewed on 7/17/2018 Name Date Influenza High Dose Vaccine PF 10/2/2017 12:00 AM, 10/14/2016 Influenza Vaccine Whole 10/22/2012, 10/5/2011 Td 6/25/2007 Zoster Vaccine, Live 1/1/2010 Reviewed by Maggie Lozano RN on 7/17/2018 at 10:42 AM  
You Were Diagnosed With   
  
 Codes Comments Medicare annual wellness visit, subsequent    -  Primary ICD-10-CM: Z00.00 ICD-9-CM: V70.0 Encounter for immunization     ICD-10-CM: S93 ICD-9-CM: V03.89 Screening for alcoholism     ICD-10-CM: Z13.89 ICD-9-CM: V79.1 Essential hypertension, benign     ICD-10-CM: I10 
ICD-9-CM: 401.1 Gastroesophageal reflux disease without esophagitis     ICD-10-CM: K21.9 ICD-9-CM: 530.81 Gastroparesis     ICD-10-CM: K31.84 ICD-9-CM: 536.3 Irritable bowel syndrome with diarrhea     ICD-10-CM: K58.0 ICD-9-CM: 162.5 Neuropathy     ICD-10-CM: G62.9 ICD-9-CM: 355.9 Spinal stenosis of lumbar region without neurogenic claudication     ICD-10-CM: M48.061 
ICD-9-CM: 724.02   
 CKD (chronic kidney disease) stage 3, GFR 30-59 ml/min     ICD-10-CM: N18.3 ICD-9-CM: 175. 3 Recurrent major depressive disorder, in remission [facility])     ICD-10-CM: F33.40 ICD-9-CM: 296.35 Vitals BP Pulse Temp Resp Height(growth percentile) Weight(growth percentile) 130/70 98 98 °F (36.7 °C) (Oral) 18 5' 2.1\" (1.577 m) 111 lb (50.3 kg) SpO2 BMI OB Status Smoking Status 98% 20.24 kg/m2 Hysterectomy Never Smoker Vitals History BMI and BSA Data Body Mass Index Body Surface Area  
 20.24 kg/m 2 1.48 m 2 Preferred Pharmacy Pharmacy Name Phone [facility] [place] [phone] Your Updated Medication List  
  
   
This list is accurate as of 7/17/18 11:29 AM.  Always use your most recent med list. amLODIPine 5 mg tablet Commonly known as:  Ayush Conine TAKE 1 TAB BY MOUTH DAILY. aspirin delayed-release 81 mg tablet Take 1 Tab by mouth daily. ATIVAN 0.5 mg tablet Generic drug:  LORazepam  
Take 0.5 mg by mouth every morning. Benefiber Clear 3 gram/3.5 gram Pwpk Generic drug:  Wheat Dextrin Take  by mouth daily. LEVSIN/SL 0.125 mg SL tablet Generic drug:  hyoscyamine SL  
0.125 mg by SubLINGual route Before breakfast, lunch, and dinner. metoprolol tartrate 25 mg tablet Commonly known as:  LOPRESSOR  
TAKE ONE-QUARTER TABLET BY MOUTH EVERY DAY  
  
 MIRALAX 17 gram packet Generic drug:  polyethylene glycol Take 17 g by mouth as needed. PEPTO-BISMOL PO Take  by mouth as needed. SUPER B COMPLEX PO Take  by mouth daily (after dinner). TUMS 300 mg (750 mg) chewable tablet Generic drug:  calcium carbonate Take 1 Tab by mouth daily. TYLENOL ARTHRITIS PAIN 650 mg Davey Loose Generic drug:  acetaminophen Take 650 mg by mouth every six (6) hours as needed for Pain. VITAMIN D3 1,000 unit tablet Generic drug:  cholecalciferol Take 1,000 Units by mouth daily. ZANTAC 150 mg tablet Generic drug:  raNITIdine Take 150 mg by mouth Before breakfast and dinner. We Performed the Following [place] ANNUAL ALCOHOL SCREEN 15 MIN [id number] [facility]] Follow-up Instructions Return in about 1 year (around 7/17/2019), or if symptoms worsen or fail to improve, for FULL PHYSICAL - 30 minutes. Patient Instructions Preventing Falls: Care Instructions Your Care Instructions Getting around your home safely can be a challenge if you have injuries or health problems that make it easy for you to fall. Loose rugs and furniture in walkways are among the dangers for many older people who have problems walking or who have poor eyesight. People who have conditions such as arthritis, osteoporosis, or dementia also have to be careful not to fall. You can make your home safer with a few simple measures. Follow-up care is a key part of your treatment and safety. Be sure to make and go to all appointments, and call your doctor if you are having problems. It's also a good idea to know your test results and keep a list of the medicines you take. How can you care for yourself at home? Taking care of yourself · You may get dizzy if you do not drink enough water. To prevent dehydration, drink plenty of fluids, enough so that your urine is light yellow or clear like water. Choose water and other caffeine-free clear liquids. If you have kidney, heart, or liver disease and have to limit fluids, talk with your doctor before you increase the amount of fluids you drink. · Exercise regularly to improve your strength, muscle tone, and balance. Walk if you can. Swimming may be a good choice if you cannot walk easily. · Have your vision and hearing checked each year or any time you notice a change. If you have trouble seeing and hearing, you might not be able to avoid objects and could lose your balance. · Know the side effects of the medicines you take. Ask your doctor or pharmacist whether the medicines you take can affect your balance. Sleeping pills or sedatives can affect your balance. · Limit the amount of alcohol you drink. Alcohol can impair your balance and other senses. · Ask your doctor whether calluses or corns on your feet need to be removed. If you wear loose-fitting shoes because of calluses or corns, you can lose your balance and fall. · Talk to your doctor if you have numbness in your feet. Preventing falls at home · Remove raised doorway thresholds, throw rugs, and clutter. Repair loose carpet or raised areas in the floor. · Move furniture and electrical cords to keep them out of walking paths. · Use nonskid floor wax, and wipe up spills right away, especially on ceramic tile floors. · If you use a walker or cane, put rubber tips on it. If you use crutches, clean the bottoms of them regularly with an abrasive pad, such as steel wool. · Keep your house well lit, especially Mammie Sitter, and outside walkways. Use night-lights in areas such as hallways and bathrooms. Add extra light switches or use remote switches (such as switches that go on or off when you clap your hands) to make it easier to turn lights on if you have to get up during the night. · Install sturdy handrails on stairways. · Move items in your cabinets so that the things you use a lot are on the lower shelves (about waist level). · Keep a cordless phone and a flashlight with new batteries by your bed. If possible, put a phone in each of the main rooms of your house, or carry a cell phone in case you fall and cannot reach a phone. Or, you can wear a device around your neck or wrist. You push a button that sends a signal for help. · Wear low-heeled shoes that fit well and give your feet good support. Use footwear with nonskid soles.  Check the heels and soles of your shoes for wear. Repair or replace worn heels or soles. · Do not wear socks without shoes on wood floors. · Walk on the grass when the sidewalks are slippery. If you live in an area that gets snow and ice in the winter, sprinkle salt on slippery steps and sidewalks. Preventing falls in the bath · Install grab bars and nonskid mats inside and outside your shower or tub and near the toilet and sinks. · Use shower chairs and bath benches. · Use a hand-held shower head that will allow you to sit while showering. · Get into a tub or shower by putting the weaker leg in first. Get out of a tub or shower with your strong side first. 
· Repair loose toilet seats and consider installing a raised toilet seat to make getting on and off the toilet easier. · Keep your bathroom door unlocked while you are in the shower. Where can you learn more? Go to http://yoly-jackie.info/. Enter [id number] in the search box to learn more about \"Preventing Falls: Care Instructions. \" Current as of: May 12, 2017 Content Version: 11.7 © 0291-9563 [employer]Networks, [employer]. Care instructions adapted under license by Vint (which disclaims liability or warranty for this information). If you have questions about a medical condition or this instruction, always ask your healthcare professional. [place] [address] any warranty or liability for your use of this information. Medicare Wellness Visit, Female The best way to live healthy is to have a lifestyle where you eat a well-balanced diet, exercise regularly, limit alcohol use, and quit all forms of tobacco/nicotine, if applicable. Regular preventive services are another way to keep healthy. Preventive services (vaccines, screening tests, monitoring & exams) can help personalize your care plan, which helps you manage your own care. Screening tests can find health problems at the earliest stages, when they are easiest to treat. [facility] follows the current, evidence-based guidelines published by the [facility] States Joshua Rosales ([facility]) when recommending preventive services for our patients. Because we follow these guidelines, sometimes recommendations change over time as research supports it. (For example, mammograms used to be recommended annually. Even though Medicare will still pay for an annual mammogram, the newer guidelines recommend a mammogram every two years for women of average risk.) Of course, you and your provider may decide to screen more often for some diseases, based on your risk and co-morbidities (chronic disease you are already diagnosed with). Preventive services for you include: - Medicare offers their members a free annual wellness visit, which is time for you and your primary care provider to discuss and plan for your preventive service needs. Take advantage of this benefit every year! 
 
-All people over age 72 should receive the recommended pneumonia vaccines. Current USPSTF guidelines recommend a series of two vaccines for the best pneumonia protection.  
 
-All adults should have a yearly flu vaccine and a tetanus vaccine every 10 years. All adults age 61 years should receive a shingles vaccine once in their lifetime.   
 
-A bone mass density test is recommended when a woman turns 65 to screen for osteoporosis. This test is only recommended once as a screening. Some providers will use this same test as a disease monitoring tool if you already have osteoporosis.  
 
-All adults age 38-68 years who are overweight should have a diabetes screening test once every three years.  
 
-Other screening tests & preventive services for persons with diabetes include: an eye exam to screen for diabetic retinopathy, a kidney function test, a foot exam, and stricter control over your cholesterol.  
 
-Cardiovascular screening for adults with routine risk involves an electrocardiogram (ECG) at intervals determined by the provider.  
 
-Colorectal cancer screenings should be done for adults age 54-65 years with normal risk. There are a number of acceptable methods of screening for this type of cancer. Each test has its own benefits and drawbacks. Discuss with your provider what is most appropriate for you during your annual wellness visit. The different tests include: colonoscopy (considered the best screening method), a fecal occult blood test, a fecal DNA test, and sigmoidoscopy. -Breast cancer screenings are recommended every other year for women of normal risk age 54-69 years.  
 
-Cervical cancer screenings for women over age 72 are only recommended with certain risk factors.  
 
-All adults born between [facility] should be screened once for Hepatitis C. Here is a list of your current Health Maintenance items (your personalized list of preventive services) with a due date: 
Health Maintenance Due Topic Date Due  Pneumococcal Vaccine (1 of 2 - PCV13) 09/16/2000  
 DTaP/Tdap/Td  (1 - Tdap) 06/26/2007  Glaucoma Screening   02/08/2018 Nelson Castro Annual Well Visit  07/15/2018 Marlene Oseguera 1722 What is a living will? A living will is a legal form you use to write down the kind of care you want at the end of your life. It is used by the health professionals who will treat you if you aren't able to decide for yourself. If you put your wishes in writing, your loved ones and others will know what kind of care you want. They won't need to guess. This can ease your mind and be helpful to others. A living will is not the same as an estate or property will. An estate will explains what you want to happen with your money and property after you die. Is a living will a legal document? A living will is a legal document. Each state has its own laws about living lin.  If you move to another state, make sure that your living will is legal in the state where you now live. Or you might use a universal form that has been approved by many states. This kind of form can sometimes be completed and stored online. Your electronic copy will then be available wherever you have a connection to the Internet. In most cases, doctors will respect your wishes even if you have a form from a different state. · You don't need an  to complete a living will. But legal advice can be helpful if your state's laws are unclear, your health history is complicated, or your family can't agree on what should be in your living will. · You can change your living will at any time. Some people find that their wishes about end-of-life care change as their health changes. · In addition to making a living will, think about completing a medical power of  form. This form lets you name the person you want to make end-of-life treatment decisions for you (your \"health care agent\") if you're not able to. Many hospitals and nursing homes will give you the forms you need to complete a living will and a medical power of . · Your living will is used only if you can't make or communicate decisions for yourself anymore. If you become able to make decisions again, you can accept or refuse any treatment, no matter what you wrote in your living will. · Your state may offer an online registry. This is a place where you can store your living will online so the doctors and nurses who need to treat you can find it right away. What should you think about when creating a living will? Talk about your end-of-life wishes with your family members and your doctor. Let them know what you want. That way the people making decisions for you won't be surprised by your choices. Think about these questions as you make your living will: · Do you know enough about life support methods that might be used?  If not, talk to your doctor so you know what might be done if you can't breathe on your own, your heart stops, or you're unable to swallow. · What things would you still want to be able to do after you receive life-support methods? Would you want to be able to walk? To speak? To eat on your own? To live without the help of machines? · If you have a choice, where do you want to be cared for? In your home? At a hospital or nursing home? · Do you want certain Yazdanism practices performed if you become very ill? · If you have a choice at the end of your life, where would you prefer to die? At home? In a hospital or nursing home? Somewhere else? · Would you prefer to be buried or cremated? · Do you want your organs to be donated after you die? What should you do with your living will? · Make sure that your family members and your health care agent have copies of your living will. · Give your doctor a copy of your living will to keep in your medical record. If you have more than one doctor, make sure that each one has a copy. · You may want to put a copy of your living will where it can be easily found. Where can you learn more? Go to http://yoly-jackie.info/. Enter K699 in the search box to learn more about \"Learning About Living Perromario. \" Current as of: August 8, 2016 Content Version: 11.3 © 6508-4051 StraighterLine, Incorporated. Care instructions adapted under license by [employer]jo (which disclaims liability or warranty for this information). If you have questions about a medical condition or this instruction, always ask your healthcare professional. [place] [address] any warranty or liability for your use of this information. Introducing [facility]! Dear [place]: 
Thank you for requesting a ReadWave account. Our records indicate that you have previously registered for a ReadWave account but its currently inactive. Please call our ReadWave support line at [phone]. Additional Information If you have questions, please visit the Frequently Asked Questions section of the [email or web address] website at [email or web address] Remember, Uniregistry is NOT to be used for urgent needs. For medical emergencies, dial 911. Now available from your iPhone and Android! Please provide this summary of care documentation to your next provider. Your primary care clinician is listed as Prudencio Cummings. If you have any questions after today's visit, please call [phone].

## 2018-07-18 DIAGNOSIS — I10 ESSENTIAL HYPERTENSION, BENIGN: ICD-10-CM

## 2018-07-18 RX ORDER — AMLODIPINE BESYLATE 5 MG/1
TABLET ORAL
Qty: 90 TAB | Refills: 1 | Status: SHIPPED | OUTPATIENT
Start: 2018-07-18 | End: 2019-01-23 | Stop reason: SDUPTHER

## 2018-12-06 ENCOUNTER — HOSPITAL ENCOUNTER (OUTPATIENT)
Dept: PREADMISSION TESTING | Age: 83
Discharge: HOME | End: 2018-12-06
Payer: MEDICARE

## 2018-12-06 VITALS
TEMPERATURE: 97.5 F | HEART RATE: 75 BPM | WEIGHT: 113 LBS | HEIGHT: 63 IN | DIASTOLIC BLOOD PRESSURE: 79 MMHG | BODY MASS INDEX: 20.02 KG/M2 | SYSTOLIC BLOOD PRESSURE: 147 MMHG

## 2018-12-06 LAB
CREAT SERPL-MCNC: 1.23 MG/DL (ref 0.55–1.02)
HGB BLD-MCNC: 12 G/DL (ref 11.5–16)
POTASSIUM SERPL-SCNC: 4.4 MMOL/L (ref 3.5–5.1)

## 2018-12-06 PROCEDURE — 84132 ASSAY OF SERUM POTASSIUM: CPT

## 2018-12-06 PROCEDURE — 36415 COLL VENOUS BLD VENIPUNCTURE: CPT

## 2018-12-06 PROCEDURE — 93005 ELECTROCARDIOGRAM TRACING: CPT

## 2018-12-06 PROCEDURE — 85018 HEMOGLOBIN: CPT

## 2018-12-06 PROCEDURE — 82565 ASSAY OF CREATININE: CPT

## 2018-12-06 NOTE — PERIOP NOTES
Preoperative instructions reviewed with patient. Patient given six packs of CHG wipes. Instructions reviewed on use of CHG wipes. Patient given SSI infection FAQS sheet. Patient was given the opportunity to ask questions on the information provided.

## 2018-12-06 NOTE — PERIOP NOTES
Preoperative instructions reviewed with patient. Patient given six packs of CHG wipes. Instructions reviewed on use of CHG wipes. Patient given SSI infection FAQS sheet. Patient was given the opportunity to ask questions on the information provided. VM LEFT WITH DR. Marcie Haas NURSECHEKO, REGARDING ABNORMAL LAB RESULTS FROM 12/6/18. CREATININE: 1.23 GFRAA: 30 
KBCJA: 98

## 2018-12-07 LAB
ATRIAL RATE: 64 BPM
CALCULATED P AXIS, ECG09: 44 DEGREES
CALCULATED R AXIS, ECG10: 48 DEGREES
CALCULATED T AXIS, ECG11: 62 DEGREES
DIAGNOSIS, 93000: NORMAL
P-R INTERVAL, ECG05: 192 MS
Q-T INTERVAL, ECG07: 380 MS
QRS DURATION, ECG06: 74 MS
QTC CALCULATION (BEZET), ECG08: 392 MS
VENTRICULAR RATE, ECG03: 64 BPM

## 2018-12-12 ENCOUNTER — ANESTHESIA EVENT (OUTPATIENT)
Dept: MEDSURG UNIT | Age: 83
End: 2018-12-12
Payer: MEDICARE

## 2018-12-12 RX ORDER — SODIUM CHLORIDE 0.9 % (FLUSH) 0.9 %
5-10 SYRINGE (ML) INJECTION EVERY 8 HOURS
Status: CANCELLED | OUTPATIENT
Start: 2018-12-12

## 2018-12-12 RX ORDER — SODIUM CHLORIDE 0.9 % (FLUSH) 0.9 %
5-10 SYRINGE (ML) INJECTION AS NEEDED
Status: CANCELLED | OUTPATIENT
Start: 2018-12-12

## 2018-12-12 NOTE — H&P
Subjective:   Patient ID: Ann Claros is a 80 y.o. female. Pain Score:   4  Chief Complaint: Follow-up of the Right Shoulder and Follow-up of the Left Shoulder        19-year-old female who comes back today for her bilateral left greater than right shoulders as well as for her right hip. We sooner for her shoulders before and the left 1 had an MRI a number of years ago which showed no definitive structural damage. She now has increasing left shoulder pain and now also some right shoulder pain which came up after. The left shoulder is much worse. She has also been having right hip pain. She can't sleep on the right side. He comes in for evaluation of all this. Her pain is excruciating left shoulder with a level 7 or 8. Right hip and right shoulder are more of a level 3-5.   They are getting worse rather than better.     Medical History        Past Medical History:   Diagnosis Date    Anxiety      Cancer      Chronic kidney disease      Depression      Fibromyalgia, primary      GERD (gastroesophageal reflux disease)      Hyperlipidemia      Hypertension      Osteoarthritis      Pulmonary embolism      TIA (transient ischemic attack)           Surgical History         Past Surgical History:   Procedure Laterality Date    JOINT REPLACEMENT        KNEE SURGERY        NO RELEVANT SURGERIES                   Family History   Problem Relation Age of Onset    Cancer Mother      No Known Problems Father        Social History   Social History            Social History    Marital status:        Spouse name: N/A    Number of children: N/A    Years of education: N/A             Social History Main Topics    Smoking status: Never Smoker    Smokeless tobacco: Never Used    Alcohol use Yes         Comment: Drink a small glass of wine occassionally    Drug use: No    Sexual activity: Not Asked           Other Topics Concern    None          Social History Narrative    None    Review of Systems   11/8/2018     Constitutional: Unexplained: Negative  Genitourinary: Frequent Urination: Positive  HEENT: Vision Loss: Positive  Neurological: Memory Loss: Negative  Integumentary: Rash: Negative  Cardiovascular: Palpatations: Negative  Hematologic: Bruises/Bleeds Easily: Positive  Gastrointestinal: Constipation: Positive  Immunological: Seasonal Allergies: Negative  Musculoskeletal: Joint Pain: Positive  Objective:      Vitals       Vitals:     11/08/18 1449   Weight: 105 lb   Height: 5' 2\"         Constitutional:  No acute distress. Well nourished. Well developed. Psychiatric: Alert and oriented x3. Skin:  No marked skin ulcers.   Neurological:  No apparent deficits       Patient Active Problem List    Diagnosis Date Noted    Recurrent major depressive disorder ([facility] 07/17/2018    Irritable bowel syndrome with diarrhea 06/29/2016    Gastroparesis 06/22/2016    Spinal stenosis, lumbar 06/22/2016    Fibromyalgia 06/22/2016    Neuropathy 06/22/2016    Gastroesophageal reflux disease without esophagitis 06/22/2016    History of pulmonary embolism 06/21/2016    Pre-diabetes 12/09/2011    CKD (chronic kidney disease) stage 3, GFR 30-59 ml/min ([facility]) 07/21/2011    Other and unspecified noninfectious gastroenteritis and colitis(558.9) 12/14/2010    DDD (degenerative disc disease), lumbar 10/12/2010    Allergic rhinitis 10/11/2010    Anxiety 08/04/2010    Essential hypertension, benign 04/12/2010    DDD (degenerative disc disease), cervical 04/12/2010    Hypertriglyceridemia 04/12/2010     Past Medical History:   Diagnosis Date    Allergic rhinitis, cause unspecified     Anxiety     Back pain     C. difficile diarrhea 8/2/2017    Admitted to [facility]    Cancer [facility]     basal cell face, back, neck    Cancer (HCC)     squamous cell leg    Chronic kidney disease     Chronic pain     DJD (degenerative joint disease) of cervical spine     Fibromyalgia     Hypercholesterolemia     Hypertension     Hyponatremia     IBS (irritable bowel syndrome)     Irritable bowel syndrome with diarrhea 6/29/2016    GI- Dr Breanne Feliciano Kidney disease, chronic, stage III (GFR 30-59 ml/min) ([facility] [address] 6/2011    Dr. Blane Cuiman    Nausea & vomiting     Psychiatric disorder     DEPRESSION AND ANXIETY    Stroke [facility]) 2017    TIA    Thromboembolus ([facility] [address] 2013    PE X2      Past Surgical History:   Procedure Laterality Date    COLONOSCOPY  1/20/11    diverticulosis    EGD  1/20/11    schatzki's ring--clear on barium swallow 7/11    HX ADENOIDECTOMY      HX CATARACT REMOVAL Left 2013    HX CATARACT REMOVAL Right 01/27/2014    HX CHOLECYSTECTOMY  5/10    HX HYSTERECTOMY      hysterectomy    HX KNEE ARTHROSCOPY Left 1990    HX KNEE REPLACEMENT Right 9/29/09    HX ORTHOPAEDIC Right 2017    trigger finger    HX OTHER SURGICAL      skin cancer removed: L leg    HX TONSILLECTOMY      HX UROLOGICAL  1995    bladder suspension    REPAIR OF RECTOCELE        Prior to Admission medications    Medication Sig Start Date End Date Taking? Authorizing Provider   amLODIPine (NORVASC) 5 mg tablet TAKE ONE TABLET BY MOUTH ONE TIME DAILY  7/18/18  Yes Felipe Bedolla MD   metoprolol tartrate (LOPRESSOR) 25 mg tablet TAKE ONE-QUARTER TABLET BY MOUTH EVERY DAY 5/22/18  Yes Felipe Bedolla MD   aspirin delayed-release 81 mg tablet Take 1 Tab by mouth daily. 8/4/17  Yes Minor, Sawyer Sotomayor NP   LORazepam (ATIVAN) 0.5 mg tablet Take 0.5 mg by mouth every morning. Yes Provider, Historical   hyoscyamine SL (LEVSIN/SL) 0.125 mg SL tablet 0.125 mg by SubLINGual route Before breakfast, lunch, and dinner. Yes Provider, Historical   acetaminophen (TYLENOL ARTHRITIS PAIN) 650 mg CR tablet Take 650 mg by mouth two (2) times daily as needed for Pain. Yes Provider, Historical   raNITIdine (ZANTAC) 150 mg tablet Take 150 mg by mouth Before breakfast and dinner.    Yes Provider, Historical   cholecalciferol (VITAMIN D3) 1,000 unit tablet Take 1,000 Units by mouth daily. Yes Provider, Historical   bismuth subsalicylate (PEPTO-BISMOL PO) Take  by mouth as needed. Provider, Historical   polyethylene glycol (MIRALAX) 17 gram packet Take 17 g by mouth as needed. Provider, Historical   Wheat Dextrin (BENEFIBER CLEAR) 3 gram/3.5 gram pwpk Take  by mouth daily. Provider, Historical   FERROUS FUMARATE/VIT BCOMP,C (SUPER B COMPLEX PO) Take  by mouth daily (after dinner). Provider, Historical     Allergies   Allergen Reactions    Latex Itching    Dilaudid [Hydromorphone (Bulk)] Other (comments)     ? loss of consciousness    Altace [Ramipril] Unknown (comments)    Ambien [Zolpidem] Unknown (comments)    [employer] Test [Blood Sugar Diagnostic, Disc-Type] Unknown (comments)    Aspirin Unknown (comments)    Astelin [Azelastine] Unknown (comments)    Atenolol Unknown (comments)    Banex La Unknown (comments)    Benicar [Olmesartan] Unknown (comments)    Carbocaine [Mepivacaine] Unknown (comments)    Cleocin [Clindamycin Hcl] Hives    Codeine Nausea and Vomiting    Codeine-Asa-Salicyl-Acetam-Caf Other (comments)     Stomach pain    Cortisporin [Neomy-Polymyxinb-Bacitracin-Hc] Unknown (comments)    Cymbalta [Duloxetine] Other (comments)    Cytotec [Misoprostol] Unknown (comments)    Dilaudid [Hydromorphone] Unknown (comments)     Patient states that she was unable to respond, but was breathing    Effexor [Venlafaxine] Unknown (comments)    Epinephrine Unknown (comments)     Heart racing    Estra-D Unknown (comments)    Estrace [Estradiol] Unknown (comments)    Fentanyl Nausea and Vomiting and Unknown (comments)     shakes    Flexeril [Cyclobenzaprine] Unable to Obtain     \"drugs me\"    Flonase [Fluticasone] Unknown (comments)    Flunisolide Unknown (comments)    Guiatussin W/Codeine Unknown (comments)    Ketek [Telithromycin] Nausea Only    Klonopin [Clonazepam] Unknown (comments)    Lexapro [Escitalopram] Nausea and Vomiting    Lodine [Etodolac] Nausea and Vomiting    Morphine Unknown (comments)    Nsaids (Non-Steroidal Anti-Inflammatory Drug) Nausea and Vomiting    Other Medication Unknown (comments)     Gareth Alford    Paxil [Paroxetine Hcl] Nausea and Vomiting    Pcn [Penicillins] Hives    Percocet [Oxycodone-Acetaminophen] Unknown (comments)    Percodan [Oxycodone Hcl-Oxycodone-Asa] Unknown (comments)    Pneumococcal 23-Mary Ps Vaccine Swelling    Proctocream-Hc [Hydrocortisone] Unknown (comments)    Prozac [Fluoxetine] Nausea Only     Suicidal visions      Pseudoephedrine Other (comments)    Quinidine Unknown (comments)    Reglan [Metoclopramide] Unknown (comments)    Remeron [Mirtazapine] Other (comments)    Resaid Other (comments)     inflammed eyes    Robaxin [Methocarbamol] Unknown (comments)    Sonata [Zaleplon] Unknown (comments)    Sulfa (Sulfonamide Antibiotics) Hives    Surmontil [Trimipramine] Nausea and Vomiting    Talwin [Pentazocine Lactate] Unknown (comments)    Toradol [Ketorolac Tromethamine] Nausea and Vomiting    Ultram [Tramadol] Unknown (comments)    Vancomycin Nausea Only    Vasoconstrictor Drug Unknown (comments)    Vigamox [Moxifloxacin] Rash and Swelling    Vioxx [Rofecoxib] Unknown (comments)    Voltaren [Diclofenac Sodium] Itching    Wellbutrin [Bupropion Hcl] Nausea and Vomiting    Zelnorm [Tegaserod Hydrogen Maleate] Unknown (comments)    Zoloft [Sertraline] Nausea and Vomiting      Social History     Tobacco Use    Smoking status: Never Smoker    Smokeless tobacco: Never Used   Substance Use Topics    Alcohol use: No     Alcohol/week: 0.0 oz     Comment: rarely      Family History   Problem Relation Age of Onset    Cancer Mother         lung    Cancer Maternal Aunt         lung    Cancer Maternal Uncle         lung    Cancer Sister         breast    Other Brother         has to have blood tx every other week    Cancer Son 40        prostate, recurrence 7 yrs later   [address] [place] [address] Arthritis-osteo Son         hips    Arthritis-osteo Daughter     Arthritis-osteo Daughter     Anesth Problems Neg Hx             Patient Vitals for the past 8 hrs:   BP Temp Resp SpO2 Height Weight   12/13/18 0727 157/60 98.1 °F (36.7 °C) 18 98 % 5' 3\" (1.6 m) 51.3 kg (113 lb)     General appearance: alert, cooperative, no distress, appears stated age  Head: Normocephalic, without obvious abnormality, atraumatic  Lungs: clear to auscultation bilaterally  Heart: regular rate and rhythm, S1, S2 normal, no murmur  Abdomen: soft, non-tender. Bowel sounds normal. No masses,  no organomegaly  Extremities: Left shoulder:  No skin changes, rashes, erythema, deformity, or bruising. Positive impingement sign anteriorly and laterally. Pain with resisted rotator cuff strength testing but no ana weakness. He has reasonable strength in forward flexion, external rotation, internal rotation and external rotation with the arm in abduction. She has significant pain with rotator cuff strength testing but no weakness. Normal range of motion. Normal stability. No crepitance. Mild AC joint tenderness. Positive Whipple test with just pain. Load and shift testing is equivocal due to discomfort but no instability. Right shoulder:  No skin changes, rashes, erythema, deformity, or bruising. Positive impingement sign anteriorly and laterally. Pain with resisted rotator cuff strength testing but no ana weakness. He has reasonable strength in forward flexion, external rotation, internal rotation and external rotation with the arm in abduction. She has good range of motion without any evidence of weakness. Normal range of motion. Normal stability. No crepitance. Mild AC joint tenderness. Positive Whipple test with just pain. Load and shift testing is equivocal due to discomfort but no instability. Right hip:  No skin changes, rashes, erythema, deformity, or bruising.   Full range of motion with no pain in the groin or buttock. Tender over the greater trochanter with palpation and with adduction of the hip. Good range of motion of the knee in good pulses and good sensation distally with good cap refill and no edema distally. Pulses: 2+ and symmetric  Neurologic: Grossly normal             Procedures:    Large Joint Arthrocentesis  Consent given by: patient  Timeout: Immediately prior to procedure a time out was called to verify the correct patient, procedure, equipment, support staff and site/side marked as required   Supporting Documentation  Indications: pain   Procedure Details  Location: shoulder - R glenohumeral  Preparation: Patient was prepped and draped in the usual sterile fashion. Additional Procedural Details:  Risks and benefits of cortisone injection discussed and patient in agreement to proceed. Needle size: 25 G  Approach: posterior  Patient tolerance: patient tolerated the procedure well with no immediate complications        Medications administered: 1 mL Betamethasone 6 (3-3) MG/ML; 2 mL bupivacaine 0.5 %     Patient Education: Cortisone Flare Risk Discussed and Diabetes Risk Discussed  Large Joint Arthrocentesis  Consent given by: patient  Timeout: Immediately prior to procedure a time out was called to verify the correct patient, procedure, equipment, support staff and site/side marked as required   Supporting Documentation  Indications: pain   Procedure Details  Location: shoulder - L glenohumeral  Preparation: Patient was prepped and draped in the usual sterile fashion. Additional Procedural Details:  Risks and benefits of cortisone injection discussed and patient in agreement to proceed.     Needle size: 25 G  Approach: posterior  Patient tolerance: patient tolerated the procedure well with no immediate complications        Medications administered: 1 mL Betamethasone 6 (3-3) MG/ML; 2 mL bupivacaine 0.5 %     Patient Education: Cortisone Flare Risk Discussed and Diabetes Risk Discussed  Large Joint Arthrocentesis  Consent given by: patient  Timeout: Immediately prior to procedure a time out was called to verify the correct patient, procedure, equipment, support staff and site/side marked as required   Supporting Documentation  Indications: pain   Procedure Details  Location: hip -   Preparation: Patient was prepped and draped in the usual sterile fashion. Additional Procedural Details:  Risks and benefits of cortisone injection discussed and patient in agreement to proceed.     Needle size: 25 G  Approach: lateral  Patient tolerance: patient tolerated the procedure well with no immediate complications        Medications administered: 1 mL Betamethasone 6 (3-3) MG/ML; 2 mL bupivacaine 0.5 %     Patient Education: Cortisone Flare Risk Discussed and Diabetes Risk Discussed            Radiographs:     MRI of left shoulder reviewed shows evidence of partial-thickness rotator cuff tearing, AC arthrosis, and impingement. Right shoulder x-ray reviewed shows impingement and some sclerosis at the greater tuberosity.     Assessment:      1. Primary osteoarthritis of left knee    2. Impingement syndrome of left shoulder    3. Tendinitis of right rotator cuff    4. Trochanteric bursitis of right hip           Patient Active Problem List   Diagnosis    Fibromyalgia    TIA (transient ischemic attack)    GERD (gastroesophageal reflux disease)      Plan:      She has bilateral impingement and right-sided trochanteric bursitis. The left shoulder has had an MRI in the past which was about a year and a half old and shows no obvious evidence of tearing but impingement of the shoulder in some significant AC change. It has been waxing and waning and currently is very uncomfortable as bad as it has ever been. Going to inject both shoulders today and then also the trochanteric bursa. Hopefully she will feel much better.   If she persisted having left shoulder pain she may consider operative and intervention but she would prefer not to. She would not want to continue a she is going right now. She will return if her pain is not improved.     No orders of the defined types were placed in this encounter. Return if symptoms worsen or fail to improve. Catalino Sparrow MD           Electronically signed by Anne-Marie Urias MD at 11/10/2018  8:03 PM     Patient was seen and examined. There have been no significant clinical changes since the completion of the above History and Physical.  Patient identified by surgeon; surgical site was confirmed by patient and surgeon.

## 2018-12-13 ENCOUNTER — HOSPITAL ENCOUNTER (OUTPATIENT)
Age: 83
Setting detail: OUTPATIENT SURGERY
Discharge: HOME | End: 2018-12-13
Attending: ORTHOPAEDIC SURGERY | Admitting: ORTHOPAEDIC SURGERY
Payer: MEDICARE

## 2018-12-13 ENCOUNTER — ANESTHESIA (OUTPATIENT)
Dept: MEDSURG UNIT | Age: 83
End: 2018-12-13
Payer: MEDICARE

## 2018-12-13 VITALS
DIASTOLIC BLOOD PRESSURE: 77 MMHG | BODY MASS INDEX: 20.02 KG/M2 | OXYGEN SATURATION: 96 % | HEART RATE: 70 BPM | HEIGHT: 63 IN | WEIGHT: 113 LBS | RESPIRATION RATE: 16 BRPM | SYSTOLIC BLOOD PRESSURE: 144 MMHG | TEMPERATURE: 97.5 F

## 2018-12-13 DIAGNOSIS — M75.112 INCOMPLETE TEAR OF LEFT ROTATOR CUFF: Primary | ICD-10-CM

## 2018-12-13 PROCEDURE — 77030016678 HC BUR SHV4 S&N -B: Performed by: ORTHOPAEDIC SURGERY

## 2018-12-13 PROCEDURE — 76210000036 HC AMBSU PH I REC 1.5 TO 2 HR: Performed by: ORTHOPAEDIC SURGERY

## 2018-12-13 PROCEDURE — 74011250636 HC RX REV CODE- 250/636

## 2018-12-13 PROCEDURE — 74011250636 HC RX REV CODE- 250/636: Performed by: ORTHOPAEDIC SURGERY

## 2018-12-13 PROCEDURE — 74011250636 HC RX REV CODE- 250/636: Performed by: ANESTHESIOLOGY

## 2018-12-13 PROCEDURE — 74011000250 HC RX REV CODE- 250

## 2018-12-13 PROCEDURE — 76210000050 HC AMBSU PH II REC 0.5 TO 1 HR: Performed by: ORTHOPAEDIC SURGERY

## 2018-12-13 PROCEDURE — 77030006884 HC BLD SHV INCIS S&N -B: Performed by: ORTHOPAEDIC SURGERY

## 2018-12-13 PROCEDURE — 77030013079 HC BLNKT BAIR HGGR 3M -A: Performed by: ANESTHESIOLOGY

## 2018-12-13 PROCEDURE — 77030018835 HC SOL IRR LR ICUM -A: Performed by: ORTHOPAEDIC SURGERY

## 2018-12-13 PROCEDURE — 77030008684 HC TU ET CUF COVD -B: Performed by: ANESTHESIOLOGY

## 2018-12-13 PROCEDURE — 77030003601 HC NDL NRV BLK BBMI -A

## 2018-12-13 PROCEDURE — 77030032490 HC SLV COMPR SCD KNE COVD -B: Performed by: ORTHOPAEDIC SURGERY

## 2018-12-13 PROCEDURE — 77030020782 HC GWN BAIR PAWS FLX 3M -B

## 2018-12-13 PROCEDURE — 76060000063 HC AMB SURG ANES 1.5 TO 2 HR: Performed by: ORTHOPAEDIC SURGERY

## 2018-12-13 PROCEDURE — 76030000003 HC AMB SURG OR TIME 1.5 TO 2: Performed by: ORTHOPAEDIC SURGERY

## 2018-12-13 PROCEDURE — 77030006988: Performed by: ORTHOPAEDIC SURGERY

## 2018-12-13 PROCEDURE — A4565 SLINGS: HCPCS

## 2018-12-13 PROCEDURE — 74011000250 HC RX REV CODE- 250: Performed by: ORTHOPAEDIC SURGERY

## 2018-12-13 PROCEDURE — 77030002916 HC SUT ETHLN J&J -A: Performed by: ORTHOPAEDIC SURGERY

## 2018-12-13 RX ORDER — MIDAZOLAM HYDROCHLORIDE 1 MG/ML
1 INJECTION, SOLUTION INTRAMUSCULAR; INTRAVENOUS AS NEEDED
Status: DISCONTINUED | OUTPATIENT
Start: 2018-12-13 | End: 2018-12-13 | Stop reason: HOSPADM

## 2018-12-13 RX ORDER — DEXTROSE, SODIUM CHLORIDE, SODIUM LACTATE, POTASSIUM CHLORIDE, AND CALCIUM CHLORIDE 5; .6; .31; .03; .02 G/100ML; G/100ML; G/100ML; G/100ML; G/100ML
125 INJECTION, SOLUTION INTRAVENOUS CONTINUOUS
Status: DISCONTINUED | OUTPATIENT
Start: 2018-12-13 | End: 2018-12-13 | Stop reason: HOSPADM

## 2018-12-13 RX ORDER — DEXAMETHASONE SODIUM PHOSPHATE 4 MG/ML
INJECTION, SOLUTION INTRA-ARTICULAR; INTRALESIONAL; INTRAMUSCULAR; INTRAVENOUS; SOFT TISSUE AS NEEDED
Status: DISCONTINUED | OUTPATIENT
Start: 2018-12-13 | End: 2018-12-13 | Stop reason: HOSPADM

## 2018-12-13 RX ORDER — PROPOFOL 10 MG/ML
INJECTION, EMULSION INTRAVENOUS AS NEEDED
Status: DISCONTINUED | OUTPATIENT
Start: 2018-12-13 | End: 2018-12-13 | Stop reason: HOSPADM

## 2018-12-13 RX ORDER — FENTANYL CITRATE 50 UG/ML
25 INJECTION, SOLUTION INTRAMUSCULAR; INTRAVENOUS
Status: DISCONTINUED | OUTPATIENT
Start: 2018-12-13 | End: 2018-12-13 | Stop reason: HOSPADM

## 2018-12-13 RX ORDER — FENTANYL CITRATE 50 UG/ML
50 INJECTION, SOLUTION INTRAMUSCULAR; INTRAVENOUS AS NEEDED
Status: DISCONTINUED | OUTPATIENT
Start: 2018-12-13 | End: 2018-12-13 | Stop reason: HOSPADM

## 2018-12-13 RX ORDER — SUCCINYLCHOLINE CHLORIDE 20 MG/ML
INJECTION INTRAMUSCULAR; INTRAVENOUS AS NEEDED
Status: DISCONTINUED | OUTPATIENT
Start: 2018-12-13 | End: 2018-12-13 | Stop reason: HOSPADM

## 2018-12-13 RX ORDER — ROCURONIUM BROMIDE 10 MG/ML
INJECTION, SOLUTION INTRAVENOUS AS NEEDED
Status: DISCONTINUED | OUTPATIENT
Start: 2018-12-13 | End: 2018-12-13 | Stop reason: HOSPADM

## 2018-12-13 RX ORDER — MIDAZOLAM HYDROCHLORIDE 1 MG/ML
1 INJECTION, SOLUTION INTRAMUSCULAR; INTRAVENOUS
Status: DISCONTINUED | OUTPATIENT
Start: 2018-12-13 | End: 2018-12-13 | Stop reason: HOSPADM

## 2018-12-13 RX ORDER — ROPIVACAINE HYDROCHLORIDE 5 MG/ML
INJECTION, SOLUTION EPIDURAL; INFILTRATION; PERINEURAL
Status: COMPLETED | OUTPATIENT
Start: 2018-12-13 | End: 2018-12-13

## 2018-12-13 RX ORDER — CEFAZOLIN SODIUM 1 G/3ML
INJECTION, POWDER, FOR SOLUTION INTRAMUSCULAR; INTRAVENOUS AS NEEDED
Status: DISCONTINUED | OUTPATIENT
Start: 2018-12-13 | End: 2018-12-13 | Stop reason: HOSPADM

## 2018-12-13 RX ORDER — SODIUM CHLORIDE, SODIUM LACTATE, POTASSIUM CHLORIDE, CALCIUM CHLORIDE 600; 310; 30; 20 MG/100ML; MG/100ML; MG/100ML; MG/100ML
125 INJECTION, SOLUTION INTRAVENOUS CONTINUOUS
Status: DISCONTINUED | OUTPATIENT
Start: 2018-12-13 | End: 2018-12-13 | Stop reason: HOSPADM

## 2018-12-13 RX ORDER — ONDANSETRON 2 MG/ML
4 INJECTION INTRAMUSCULAR; INTRAVENOUS AS NEEDED
Status: DISCONTINUED | OUTPATIENT
Start: 2018-12-13 | End: 2018-12-13 | Stop reason: HOSPADM

## 2018-12-13 RX ORDER — SODIUM CHLORIDE, SODIUM LACTATE, POTASSIUM CHLORIDE, AND CALCIUM CHLORIDE .6; .31; .03; .02 G/100ML; G/100ML; G/100ML; G/100ML
3000 IRRIGANT IRRIGATION ONCE
Status: COMPLETED | OUTPATIENT
Start: 2018-12-13 | End: 2018-12-13

## 2018-12-13 RX ORDER — SODIUM CHLORIDE, SODIUM LACTATE, POTASSIUM CHLORIDE, CALCIUM CHLORIDE 600; 310; 30; 20 MG/100ML; MG/100ML; MG/100ML; MG/100ML
INJECTION, SOLUTION INTRAVENOUS
Status: DISCONTINUED | OUTPATIENT
Start: 2018-12-13 | End: 2018-12-13 | Stop reason: HOSPADM

## 2018-12-13 RX ORDER — ROPIVACAINE HYDROCHLORIDE 5 MG/ML
150 INJECTION, SOLUTION EPIDURAL; INFILTRATION; PERINEURAL
Status: COMPLETED | OUTPATIENT
Start: 2018-12-13 | End: 2018-12-13

## 2018-12-13 RX ORDER — LIDOCAINE HYDROCHLORIDE 20 MG/ML
INJECTION, SOLUTION EPIDURAL; INFILTRATION; INTRACAUDAL; PERINEURAL AS NEEDED
Status: DISCONTINUED | OUTPATIENT
Start: 2018-12-13 | End: 2018-12-13 | Stop reason: HOSPADM

## 2018-12-13 RX ORDER — HYDROCODONE BITARTRATE AND ACETAMINOPHEN 5; 325 MG/1; MG/1
1-2 TABLET ORAL
Qty: 42 TAB | Refills: 0 | Status: SHIPPED | OUTPATIENT
Start: 2018-12-13 | End: 2019-03-15

## 2018-12-13 RX ORDER — LIDOCAINE HYDROCHLORIDE 10 MG/ML
0.5 INJECTION, SOLUTION EPIDURAL; INFILTRATION; INTRACAUDAL; PERINEURAL AS NEEDED
Status: DISCONTINUED | OUTPATIENT
Start: 2018-12-13 | End: 2018-12-13 | Stop reason: HOSPADM

## 2018-12-13 RX ORDER — CEFAZOLIN SODIUM/WATER 2 G/20 ML
2 SYRINGE (ML) INTRAVENOUS ONCE
Status: DISCONTINUED | OUTPATIENT
Start: 2018-12-13 | End: 2018-12-13 | Stop reason: HOSPADM

## 2018-12-13 RX ORDER — ONDANSETRON 2 MG/ML
INJECTION INTRAMUSCULAR; INTRAVENOUS AS NEEDED
Status: DISCONTINUED | OUTPATIENT
Start: 2018-12-13 | End: 2018-12-13 | Stop reason: HOSPADM

## 2018-12-13 RX ADMIN — PROPOFOL 50 MG: 10 INJECTION, EMULSION INTRAVENOUS at 09:37

## 2018-12-13 RX ADMIN — ONDANSETRON 4 MG: 2 INJECTION INTRAMUSCULAR; INTRAVENOUS at 09:48

## 2018-12-13 RX ADMIN — SUCCINYLCHOLINE CHLORIDE 100 MG: 20 INJECTION INTRAMUSCULAR; INTRAVENOUS at 08:43

## 2018-12-13 RX ADMIN — MIDAZOLAM HYDROCHLORIDE 4 MG: 1 INJECTION, SOLUTION INTRAMUSCULAR; INTRAVENOUS at 08:22

## 2018-12-13 RX ADMIN — SODIUM CHLORIDE, SODIUM LACTATE, POTASSIUM CHLORIDE, AND CALCIUM CHLORIDE 125 ML/HR: 600; 310; 30; 20 INJECTION, SOLUTION INTRAVENOUS at 07:33

## 2018-12-13 RX ADMIN — ROCURONIUM BROMIDE 5 MG: 10 INJECTION, SOLUTION INTRAVENOUS at 08:40

## 2018-12-13 RX ADMIN — SODIUM CHLORIDE, SODIUM LACTATE, POTASSIUM CHLORIDE, CALCIUM CHLORIDE: 600; 310; 30; 20 INJECTION, SOLUTION INTRAVENOUS at 08:35

## 2018-12-13 RX ADMIN — LIDOCAINE HYDROCHLORIDE 50 MG: 20 INJECTION, SOLUTION EPIDURAL; INFILTRATION; INTRACAUDAL; PERINEURAL at 08:42

## 2018-12-13 RX ADMIN — DEXAMETHASONE SODIUM PHOSPHATE 6 MG: 4 INJECTION, SOLUTION INTRA-ARTICULAR; INTRALESIONAL; INTRAMUSCULAR; INTRAVENOUS; SOFT TISSUE at 09:18

## 2018-12-13 RX ADMIN — ROPIVACAINE HYDROCHLORIDE 150 MG: 5 INJECTION, SOLUTION EPIDURAL; INFILTRATION; PERINEURAL at 08:25

## 2018-12-13 RX ADMIN — ROPIVACAINE HYDROCHLORIDE 30 ML: 5 INJECTION, SOLUTION EPIDURAL; INFILTRATION; PERINEURAL at 08:34

## 2018-12-13 RX ADMIN — CEFAZOLIN SODIUM 2 G: 1 INJECTION, POWDER, FOR SOLUTION INTRAMUSCULAR; INTRAVENOUS at 08:59

## 2018-12-13 RX ADMIN — PROPOFOL 110 MG: 10 INJECTION, EMULSION INTRAVENOUS at 08:42

## 2018-12-13 NOTE — ANESTHESIA PROCEDURE NOTES
Peripheral Block    Start time: 12/13/2018 8:15 AM  End time: 12/13/2018 8:26 AM  Performed by: Juice Alexis MD  Authorized by: Juice Alexis MD       Pre-procedure: Indications: at surgeon's request and post-op pain management    Preanesthetic Checklist: patient identified, risks and benefits discussed, site marked, timeout performed, anesthesia consent given and patient being monitored    Timeout Time: 08:15          Block Type:   Block Type:   Interscalene  Laterality:  Left  Monitoring:  Standard ASA monitoring, continuous pulse ox, frequent vital sign checks, heart rate, responsive to questions and oxygen  Injection Technique:  Single shot  Procedures: ultrasound guided and nerve stimulator    Patient Position: supine  Prep: betadine and povidone-iodine 7.5% surgical scrub    Location:  Interscalene  Needle Type:  Stimuplex  Needle Gauge:  22 G  Needle Localization:  Nerve stimulator and ultrasound guidance  Motor Response: minimal motor response >0.4 mA      Assessment:  Number of attempts:  1  Injection Assessment:  Incremental injection every 5 mL, local visualized surrounding nerve on ultrasound, negative aspiration for blood, no paresthesia, negative aspiration for CSF and ultrasound image on chart  Patient tolerance:  Patient tolerated the procedure well with no immediate complications

## 2018-12-13 NOTE — ANESTHESIA PREPROCEDURE EVALUATION
Anesthetic History   No history of anesthetic complications  PONV          Review of Systems / Medical History  Patient summary reviewed, nursing notes reviewed and pertinent labs reviewed    Pulmonary  Within defined limits                 Neuro/Psych   Within defined limits    CVA  TIA     Cardiovascular  Within defined limits  Hypertension                   GI/Hepatic/Renal  Within defined limits              Endo/Other  Within defined limits      Arthritis     Other Findings              Physical Exam    Airway  Mallampati: II  TM Distance: > 6 cm  Neck ROM: normal range of motion   Mouth opening: Normal     Cardiovascular  Regular rate and rhythm,  S1 and S2 normal,  no murmur, click, rub, or gallop             Dental  No notable dental hx       Pulmonary  Breath sounds clear to auscultation               Abdominal  GI exam deferred       Other Findings            Anesthetic Plan    ASA: 2  Anesthesia type: general          Induction: Intravenous  Anesthetic plan and risks discussed with: Patient

## 2018-12-13 NOTE — ANESTHESIA POSTPROCEDURE EVALUATION
Procedure(s):  LEFT SHOULDER ARTHROSCOPY, SUBACROMIAL DECOMPRESSION, DISTAL CLAVICLE EXCISION, BICEPS TENOTOMY (GENERAL / REGIONAL) (LATEX ALLERGY). Anesthesia Post Evaluation        Patient location during evaluation: PACU  Patient participation: complete - patient participated  Level of consciousness: awake and alert  Pain management: adequate  Airway patency: patent  Anesthetic complications: no  Cardiovascular status: acceptable  Respiratory status: acceptable  Hydration status: acceptable  Comments: I have seen and evaluated the patient and is ready for discharge.  Kayla Burgess MD    Post anesthesia nausea and vomiting:  none      Visit Vitals  /57 (BP 1 Location: Right arm, BP Patient Position: At rest;Supine)   Pulse 68   Temp 36.3 °C (97.4 °F)   Resp 19   Ht 5' 3\" (1.6 m)   Wt 51.3 kg (113 lb)   SpO2 96%   BMI 20.02 kg/m²

## 2018-12-13 NOTE — OP NOTES
Shoulder Arthroscopy Operative Note      Patient: Larita Siemens MRN: [id number]  SSN: [id number]    YOB: 1935  Age: 80 y.o. Sex: female        Date of Surgery: 12/13/2018    Preoperative Diagnosis:    1. LEFT SHOULDER IMPINGEMENT      2. AC DEGENERATIVE JOINT DISEASE      3. PARTIAL ROTATOR CUFF TEAR    Postoperative Diagnosis: 1. LEFT SHOULDER IMPINGEMENT      2. AC DEGENERATIVE JOINT DISEASE      3. DEGENERATIVE BICEPS TENDON TEAR      4. DEGENERATIVE LABRAL TEARING    Procedures:  1. left Shoulder Arthroscopy and Sub-Acromial Decompression (75382)  2. Arthroscopic Distal Clavicle Excision (96288)  3. Extensive Debridement of Biceps Tendon Stump, Labrum and Subacromial Space (10971)    Surgeon(s) and Role:     Anthony Parra MD (Jody) - Primary    FIRST ASSISTANT- Merna Dubin, PA-C    Second Assistant:   Keanu Doyle MD    Anesthesia: General    Pathology: Impingement and AC Arthritis    Estimated Blood Loss:  Minimal ml      Specimens: * No specimens in log *     Complications: None     Hospital Problems  Date Reviewed: 12/13/2018    None          Indications: The patient is a 80 y.o. female who has right shoulder impingement and AC arthritis . The patient has exhausted nonoperative modalities and is electively admitted for outpatient right shoulder arthroscopy. PROCEDURE IN DETAIL: After the procedure was explained to the patient, including the risks, benefits and possible complications, the patient signed the informed consent. The patient was then taken to the operating suite. Following administration of general anesthesia and interscalene block for postoperative pain control and infusion of intravenous antibiotic, the patient was positioned on the operating table in the supine fashion. The  left  shoulder was then examined under anesthesia and noted to be stable through full range of motion.  At this point, the patient was then carefully positioned in the lateral decubitus position, right side down. The axillary roll was placed. Care was taken to pad both the upper and lower extremities. The left shoulder was then prepped and draped in the sterile fashion. The left arm was then placed in the [employer] traction device in 45 degrees abduction and 30 degrees forward flexion with 10 pounds of traction. The scope was introduced into the shoulder and diagnostic arthroscopy commenced. Articular surfaces of the humeral head and glenoid were visualized and noted to be moderately degenerative. This was carefully debrided with the sucker shaver back to stable tissue. The anterior, posterior, superior and inferior labrum were visualized. There was significant degenerative fraying that was also debrided back to stable tissue. The biceps anchor and the biceps itself was completely torn approximately 1 cm distal to the insertion on the superior labrum. The remaining stump was also carefully debrided back to stable quality tissue. . The undersurface of the rotator cuff was then visualized. There was no tear. The scope was introduced into the subacromial space. An anterior portal was created for inflow and  lateral portal was established for debridement using a spinal needle for localization. Hypertrophic hemorrhagic bursal tissue was then resected. The bursal side of the cuff was visualized and was no tear. The underside of the acromion was identified and denuded of all soft tissue. An acromioplasty was then performed from the posterior portal using a helicut earl with the cutting block technique. The Acromion was shaved down to a type one acromion to remove all impingement. At this point the distal clavicle was identified and an arthroscopic distal clavicle resection was then performed. The distal 10mm of distal clavicle was then resected. Care was taken to preserve the posterior/superior capsule. The scope was introduced back into the subacromial space.   At this point, with the procedure complete, all arthroscopic equipment was removed from the shoulder. The portals were reapproximated using 2-0 nylon sutures. A sterile dressing was applied. The Cryo/Cuff and sling were applied. The patient was then transferred to the Recovery Room in stable condition. .     Signed: Anthony Santos MD (12/13/2018 at 10:11 AM)

## 2018-12-13 NOTE — PERIOP NOTES
Patient: Venkata Elaine MRN: [id number]  SSN: [id number]   YOB: 1935  Age: 80 y.o. Sex: female     Patient is status post Procedure(s):  LEFT SHOULDER ARTHROSCOPY, SUBACROMIAL DECOMPRESSION, DISTAL CLAVICLE EXCISION, BICEPS TENOTOMY (GENERAL / REGIONAL) (LATEX ALLERGY). Surgeon(s) and Role:     Anthony Pruett MD (Jody) - Primary     * Melvi Melendez MD - Fellow    Local/Dose/Irrigation: see [facility]                  Peripheral IV 12/13/18 Right Arm (Active)   Site Assessment Clean, dry, & intact 12/13/2018 10:15 AM   Phlebitis Assessment 0 12/13/2018 10:15 AM   Infiltration Assessment 0 12/13/2018 10:15 AM   Dressing Status Clean, dry, & intact 12/13/2018 10:15 AM   Dressing Type Transparent;Tape 12/13/2018 10:15 AM   Hub Color/Line Status Blue; Infusing 12/13/2018 10:15 AM                           Dressing/Packing:  Wound Shoulder Left-DRESSING TYPE: (as noted earlier) (12/13/18 1011)  Splint/Cast: Wound Shoulder Left-SPLINT TYPE/MATERIAL: Sling]    Other:

## 2018-12-13 NOTE — DISCHARGE INSTRUCTIONS
Ortho Anna Melara,III MD Hansel Leventhal PA-C  POST OPERATIVE INSTRUCTIONS  Arthroscopic Subacromial Decompression - Shoulder       1. First meal at home should be clear liquids. Progress to regular diet as tolerated. 2. Apply an ice bag or use cold therapy device for 20-30 minutes 4 times a day for the first 10  days to reduce swelling and pain and then use as desired. 3. You may remove the bulky dressing 48  hours after surgery and at that time it is ok to shower and at that time it is ok to shower. Cover incisions with bandaids after showering. A sling is provided for your comfort and must be worn until the nerve block wears off. After that, use is optional and usually worn for 3-5 days. 4. A recliner position is often more comfortable for sleeping the first several days/ weeks after surgery. 5. Start the attached exercises Monday 12/17/18  These are designed to keep your shoulder from getting stiff. Physical therapy will be discussed at your first post-operative visit. 6. Medication Instructions are listed below:    Hydrocodone    All narcotics can cause constipation; Advise drinking plenty of fluids and taking over the counter stool softener such as Miralax. 7. You may drive when your arm is out of the sling and you are no longer taking pain medication. 8. A post operative appointment has been scheduled for you. 12/26 at 3:10pm with Reshma Diaz PA-C   Please call the office if you need to change the date or time of your appointment. 9. If you develop a fever greater than 101, unexpected redness, or swelling in your arm please call the office. Some bleeding or drainage should be expected the first few days after surgery. 10. If you have any questions, please call my office, and the  will put you in touch with one of my assistants.   ([phone])                                                   Shoulder  Exercises    Pendulum Swing Note: If you have pain in your back, do not do this exercise. 1. Hold on to a table or the back of a chair with your good arm. Then bend forward a little and let your sore arm hang straight down. This exercise does not use the arm muscles. Rather, use your legs and your hips to create movement that makes your arm swing freely. 2. Use the movement from your hips and legs to guide the slightly swinging arm back and forth like a pendulum (or elephant trunk). Then guide it in circles that start small (about the size of a dinner plate). Make the circles a bit larger each day, as your pain allows. 3. Do this exercise for 5 minutes, 5 to 7 times each day. 4. As you have less pain, try bending over a little farther to do this exercise. This will increase the amount of movement at your shoulder. Shoulder flexion (lying down)    1. Lie on your back, holding a cane  2. With both hands. Your palms should face down as you hold the cane   3. Keeping your elbows straight, slowly raise your arms over your head. Raise them until you feel a stretch in your shoulders, upper back, and chest.  4. Hold for 15 to 30 seconds. 5. Repeat 2 to 4 times. Shoulder rotation (lying down)    1. Lie on your back. Hold a wand with both hands with your elbows bent and palms up. 2. Keep your elbows close to your body, and move the wand across your body toward the sore arm. 3. Hold for 8 to 12 seconds. 4. Repeat 2 to 4 times. Shoulder internal rotation with towel    1. Hold a towel above and behind your head with the arm that is not sore. 2. With your sore arm, reach behind your back and grasp the towel. 3. With the arm above your head, pull the towel upward. Do this until you feel a stretch on the front and outside of your sore shoulder. 4. Hold 15 to 30 seconds. 5. Repeat 2 to 4 times. Shoulder blade squeeze    1. Stand with your arms at your sides, and squeeze your shoulder blades together.  Do not raise your shoulders up as you squeeze. 2. Hold 6 seconds. 3. Repeat 8 to 12 times. DISCHARGE SUMMARY from Nurse    PATIENT INSTRUCTIONS:    After general anesthesia or intravenous sedation, for 24 hours or while taking prescription Narcotics:  · Limit your activities  · Do not drive and operate hazardous machinery  · Do not make important personal or business decisions  · Do  not drink alcoholic beverages  · If you have not urinated within 8 hours after discharge, please contact your surgeon on call. Report the following to your surgeon:  · Excessive pain, swelling, redness or odor of or around the surgical area  · Temperature over 100.5  · Nausea and vomiting lasting longer than 4 hours or if unable to take medications  · Any signs of decreased circulation or nerve impairment to extremity: change in color, persistent  numbness, tingling, coldness or increase pain  · Any questions    What to do at Home:  Recommended activity: See surgical instructions. If you experience any of the following symptoms as noted above, please follow up with Dr. Chelsey Jimenez. *  Please give a list of your current medications to your Primary Care Provider. *  Please update this list whenever your medications are discontinued, doses are      changed, or new medications (including over-the-counter products) are added. *  Please carry medication information at all times in case of emergency situations. These are general instructions for a healthy lifestyle:    No smoking/ No tobacco products/ Avoid exposure to second hand smoke  Surgeon General's Warning:  Quitting smoking now greatly reduces serious risk to your health.     Obesity, smoking, and sedentary lifestyle greatly increases your risk for illness    A healthy diet, regular physical exercise & weight monitoring are important for maintaining a healthy lifestyle    You may be retaining fluid if you have a history of heart failure or if you experience any of the following symptoms:  Weight gain of 3 pounds or more overnight or 5 pounds in a week, increased swelling in our hands or feet or shortness of breath while lying flat in bed. Please call your doctor as soon as you notice any of these symptoms; do not wait until your next office visit. Recognize signs and symptoms of STROKE:    F-face looks uneven    A-arms unable to move or move unevenly    S-speech slurred or non-existent    T-time-call 911 as soon as signs and symptoms begin-DO NOT go       Back to bed or wait to see if you get better-TIME IS BRAIN. Warning Signs of HEART ATTACK     Call 911 if you have these symptoms:   Chest discomfort. Most heart attacks involve discomfort in the center of the chest that lasts more than a few minutes, or that goes away and comes back. It can feel like uncomfortable pressure, squeezing, fullness, or pain.  Discomfort in other areas of the upper body. Symptoms can include pain or discomfort in one or both arms, the back, neck, jaw, or stomach.  Shortness of breath with or without chest discomfort.  Other signs may include breaking out in a cold sweat, nausea, or lightheadedness. Don't wait more than five minutes to call 911 - MINUTES MATTER! Fast action can save your life. Calling 911 is almost always the fastest way to get lifesaving treatment. Emergency Medical Services staff can begin treatment when they arrive -- up to an hour sooner than if someone gets to the hospital by car. The discharge information has been reviewed with the patient and caregiver. The patient and caregiver verbalized understanding. Discharge medications reviewed with the patient and caregiver and appropriate educational materials and side effects teaching were provided.   ___________________________________________________________________________________________________________________________________

## 2019-01-23 DIAGNOSIS — I10 ESSENTIAL HYPERTENSION, BENIGN: ICD-10-CM

## 2019-01-24 RX ORDER — AMLODIPINE BESYLATE 5 MG/1
TABLET ORAL
Qty: 90 TAB | Refills: 1 | Status: SHIPPED | OUTPATIENT
Start: 2019-01-24 | End: 2019-04-09 | Stop reason: SDUPTHER

## 2019-02-15 DIAGNOSIS — I10 ESSENTIAL HYPERTENSION, BENIGN: ICD-10-CM

## 2019-02-15 RX ORDER — AMLODIPINE BESYLATE 5 MG/1
TABLET ORAL
Qty: 90 TAB | Refills: 0 | OUTPATIENT
Start: 2019-02-15

## 2019-02-15 RX ORDER — AMLODIPINE BESYLATE 5 MG/1
TABLET ORAL
Qty: 90 TAB | Refills: 1 | OUTPATIENT
Start: 2019-02-15

## 2019-02-16 ENCOUNTER — HOSPITAL ENCOUNTER (OUTPATIENT)
Dept: MRI IMAGING | Age: 84
Discharge: HOME | End: 2019-02-16
Attending: ORTHOPAEDIC SURGERY
Payer: MEDICARE

## 2019-02-16 ENCOUNTER — HOSPITAL ENCOUNTER (OUTPATIENT)
Dept: GENERAL RADIOLOGY | Age: 84
Discharge: HOME | End: 2019-02-16
Attending: ORTHOPAEDIC SURGERY
Payer: MEDICARE

## 2019-02-16 DIAGNOSIS — M75.101 TEAR OF RIGHT ROTATOR CUFF: ICD-10-CM

## 2019-02-16 DIAGNOSIS — M75.41 IMPINGEMENT SYNDROME OF RIGHT SHOULDER: ICD-10-CM

## 2019-02-16 PROCEDURE — 73221 MRI JOINT UPR EXTREM W/O DYE: CPT

## 2019-02-20 ENCOUNTER — OFFICE VISIT (OUTPATIENT)
Dept: INTERNAL MEDICINE CLINIC | Age: 84
End: 2019-02-20

## 2019-02-20 VITALS
SYSTOLIC BLOOD PRESSURE: 120 MMHG | DIASTOLIC BLOOD PRESSURE: 64 MMHG | BODY MASS INDEX: 20.02 KG/M2 | HEART RATE: 82 BPM | OXYGEN SATURATION: 99 % | RESPIRATION RATE: 16 BRPM | WEIGHT: 113 LBS | HEIGHT: 63 IN | TEMPERATURE: 97.6 F

## 2019-02-20 DIAGNOSIS — H00.014 HORDEOLUM EXTERNUM OF LEFT UPPER EYELID: ICD-10-CM

## 2019-02-20 DIAGNOSIS — J06.9 VIRAL UPPER RESPIRATORY TRACT INFECTION: Primary | ICD-10-CM

## 2019-02-20 NOTE — PROGRESS NOTES
Cold symptoms, sore throat, stye left eye.      Med list updated per Reji Dickens of Dr. Sharif Parra

## 2019-02-20 NOTE — PATIENT INSTRUCTIONS
Mucinex or Robitussin for the congestion. Continue with the neti pot or saline nasal rinses. Start throat lozenges (Vicks or Halls or Ricola). Upper Respiratory Infection (Cold): Care Instructions  Your Care Instructions    An upper respiratory infection, or URI, is an infection of the nose, sinuses, or throat. URIs are spread by coughs, sneezes, and direct contact. The common cold is the most frequent kind of URI. The flu and sinus infections are other kinds of URIs. Almost all URIs are caused by viruses. Antibiotics won't cure them. But you can treat most infections with home care. This may include drinking lots of fluids and taking over-the-counter pain medicine. You will probably feel better in 4 to 10 days. The doctor has checked you carefully, but problems can develop later. If you notice any problems or new symptoms, get medical treatment right away. Follow-up care is a key part of your treatment and safety. Be sure to make and go to all appointments, and call your doctor if you are having problems. It's also a good idea to know your test results and keep a list of the medicines you take. How can you care for yourself at home? · To prevent dehydration, drink plenty of fluids, enough so that your urine is light yellow or clear like water. Choose water and other caffeine-free clear liquids until you feel better. If you have kidney, heart, or liver disease and have to limit fluids, talk with your doctor before you increase the amount of fluids you drink. · Take an over-the-counter pain medicine, such as acetaminophen (Tylenol), ibuprofen (Advil, Motrin), or naproxen (Aleve). Read and follow all instructions on the label. · Before you use cough and cold medicines, check the label. These medicines may not be safe for young children or for people with certain health problems. · Be careful when taking over-the-counter cold or flu medicines and Tylenol at the same time.  Many of these medicines have acetaminophen, which is Tylenol. Read the labels to make sure that you are not taking more than the recommended dose. Too much acetaminophen (Tylenol) can be harmful. · Get plenty of rest.  · Do not smoke or allow others to smoke around you. If you need help quitting, talk to your doctor about stop-smoking programs and medicines. These can increase your chances of quitting for good. When should you call for help? Call 911 anytime you think you may need emergency care. For example, call if:    · You have severe trouble breathing.    Call your doctor now or seek immediate medical care if:    · You seem to be getting much sicker.     · You have new or worse trouble breathing.     · You have a new or higher fever.     · You have a new rash.    Watch closely for changes in your health, and be sure to contact your doctor if:    · You have a new symptom, such as a sore throat, an earache, or sinus pain.     · You cough more deeply or more often, especially if you notice more mucus or a change in the color of your mucus.     · You do not get better as expected. Where can you learn more? Go to http://yoly-jackie.info/. Enter X281 in the search box to learn more about \"Upper Respiratory Infection (Cold): Care Instructions. \"  Current as of: September 5, 2018  Content Version: 11.9  © 5448-1523 [employer]. Care instructions adapted under license by [employer] (which disclaims liability or warranty for this information). If you have questions about a medical condition or this instruction, always ask your healthcare professional. [place] [address] any warranty or liability for your use of this information. Styes and Chalazia: Care Instructions  Your Care Instructions    Styes and chalazia (say \"zog-QHF-qbe-uh\") are both conditions that can cause swelling of the eyelid. A stye is an infection in the root of an eyelash.  The infection causes a tender red lump on the edge of the eyelid. The infection can spread until the whole eyelid becomes red and inflamed. Styes usually break open, and a tiny amount of pus drains. They usually clear up on their own in about a week, but they sometimes need treatment with antibiotics. A chalazion is a lump or cyst in the eyelid (chalazion is singular; chalazia is plural). It is caused by swelling and inflammation of deep oil glands inside the eyelid. Chalazia are usually not infected. They can take a few months to heal.  If a chalazion becomes more swollen and painful or does not go away, you may need to have it drained by your doctor. Follow-up care is a key part of your treatment and safety. Be sure to make and go to all appointments, and call your doctor if you are having problems. It's also a good idea to know your test results and keep a list of the medicines you take. How can you care for yourself at home? · Do not rub your eyes. Do not squeeze or try to open a stye or chalazion. · To help a stye or chalazion heal faster:  ? Put a warm, moist compress on your eye for 5 to 10 minutes, 3 to 6 times a day. Heat often brings a stye to a point where it drains on its own. Keep in mind that warm compresses will often increase swelling a little at first.  ? Do not use hot water or heat a wet cloth in a microwave oven. The compress may get too hot and can burn the eyelid. · Always wash your hands before and after you use a compress or touch your eyes. · If the doctor gave you antibiotic drops or ointment, use the medicine exactly as directed. Use the medicine for as long as instructed, even if your eye starts to feel better. · To put in eyedrops or ointment:  ? Tilt your head back, and pull your lower eyelid down with one finger. ? Drop or squirt the medicine inside the lower lid. ? Close your eye for 30 to 60 seconds to let the drops or ointment move around.   ? Do not touch the ointment or dropper tip to your eyelashes or any other surface. · Do not wear eye makeup or contact lenses until the stye or chalazion heals. · Do not share towels, pillows, or washcloths while you have a stye. When should you call for help? Call your doctor now or seek immediate medical care if:    · You have pain in your eye.     · You have a change in vision or loss of vision.     · Redness and swelling get much worse.    Watch closely for changes in your health, and be sure to contact your doctor if:    · Your stye does not get better in 1 week.     · Your chalazion does not start to get better after several weeks. Where can you learn more? Go to http://yoly-jackie.info/. Enter I697 in the search box to learn more about \"Styes and Chalazia: Care Instructions. \"  Current as of: July 17, 2018  Content Version: 11.9  © 0846-6259 [employer]. Care instructions adapted under license by [employer] (which disclaims liability or warranty for this information). If you have questions about a medical condition or this instruction, always ask your healthcare professional. [address] any warranty or liability for your use of this information.

## 2019-02-20 NOTE — PROGRESS NOTES
Laura Paniagua is a 80 y.o. female who was seen in clinic today (2/20/2019) for an acute visit. Assessment & Plan:   Diagnoses and all orders for this visit:    1. Viral upper respiratory tract infection- symptoms are new in the last 1 day, discussed differential diagnosis and at this time favor a viral etiology. Discussed diagnosis & treatment options and reviewed the importance of avoiding unnecessary antibiotic therapy, reviewed which OTC medications to use and avoid, expected time course for resolution & red flags were reviewed with her to RTC or notify me. Pt repeated over and over she is normally not sick. Reviewed expectations and nothing to suggest this is more then a URI     2. Hordeolum externum of left upper eyelid- new dx, reviewed treatment: warm compresses. Red flags and expectations were reviewed & discussed with the her. She verbalized understanding. Follow-up Disposition:  Return if symptoms worsen or fail to improve. Subjective:   [place] was seen today for Cold Symptoms     [address] returns to clinic today to talk about: sore throat for 1 day ago, which are rapidly worsening since that time. She reports post nasal drip, dry cough, bilateral lower sinus pressure and sinus congestion. She denies a history of: fever, chills, chest congestion, wheezing and SOB/STROUD. Treatments have included: Tylenol which have been not very effective. Relevant PMH: allergic rhinitis. Patient reports sick contacts: yes - she was around someone sick yesterday (had similar symptoms and she had been on abx for several days). She also reports right sided stye. Present x 1 week. Not getting any better. Has not tried anything. Seeing Dr Odalis Mijares tomorrow. Prior to Admission medications    Medication Sig Start Date End Date Taking?  Authorizing Provider   amLODIPine (NORVASC) 5 mg tablet TAKE ONE TABLET BY MOUTH ONE TIME DAILY 1/24/19  Yes Ksenia Gallegos MD HYDROcodone-acetaminophen (NORCO) 5-325 mg per tablet Take 1-2 Tabs by mouth every four (4) hours as needed for Pain. Max Daily Amount: 12 Tabs. 12/13/18  Yes Angie Pabon PA-C   Wheat Dextrin (BENEFIBER CLEAR) 3 gram/3.5 gram pwpk Take  by mouth daily. Yes Provider, Historical   metoprolol tartrate (LOPRESSOR) 25 mg tablet TAKE ONE-QUARTER TABLET BY MOUTH EVERY DAY 5/22/18  Yes Miriam Gill MD   aspirin delayed-release 81 mg tablet Take 1 Tab by mouth daily. 8/4/17  Yes Minor, Kylah Sierra NP   LORazepam (ATIVAN) 0.5 mg tablet Take 0.5 mg by mouth every morning. Yes Provider, Historical   hyoscyamine SL (LEVSIN/SL) 0.125 mg SL tablet 0.125 mg by SubLINGual route Before breakfast, lunch, and dinner. Yes Provider, Historical   acetaminophen (TYLENOL ARTHRITIS PAIN) 650 mg CR tablet Take 650 mg by mouth two (2) times daily as needed for Pain. Yes Provider, Historical   raNITIdine (ZANTAC) 150 mg tablet Take 150 mg by mouth Before breakfast and dinner. Yes Provider, Historical   cholecalciferol (VITAMIN D3) 1,000 unit tablet Take 1,000 Units by mouth daily. Yes Provider, Historical   FERROUS FUMARATE/VIT BCOMP,C (SUPER B COMPLEX PO) Take  by mouth daily (after dinner). Yes Provider, Historical   bismuth subsalicylate (PEPTO-BISMOL PO) Take  by mouth as needed. Provider, Historical          Allergies   Allergen Reactions    Latex Itching    Dilaudid [Hydromorphone (Bulk)] Other (comments)     ? loss of consciousness    Altace [Ramipril] Unknown (comments)    Ambien [Zolpidem] Unknown (comments)    [employer] Test [Blood Sugar Diagnostic, Disc-Type] Unknown (comments)    Aspirin Unknown (comments)    Astelin [Azelastine] Unknown (comments)    Atenolol Unknown (comments)    Banex La Unknown (comments)    Benicar [Olmesartan] Unknown (comments)    Carbocaine [Mepivacaine] Unknown (comments)    Cleocin [Clindamycin Hcl] Hives    Codeine Nausea and Vomiting    Codeine-Asa-Salicyl-Acetam-Caf Other (comments)     Stomach pain    Cortisporin [Neomy-Polymyxinb-Bacitracin-Hc] Unknown (comments)    Cymbalta [Duloxetine] Other (comments)    Cytotec [Misoprostol] Unknown (comments)    Dilaudid [Hydromorphone] Unknown (comments)     Patient states that she was unable to respond, but was breathing    Effexor [Venlafaxine] Unknown (comments)    Epinephrine Unknown (comments)     Heart racing    Estra-D Unknown (comments)    Estrace [Estradiol] Unknown (comments)    Fentanyl Nausea and Vomiting and Unknown (comments)     shakes    Flexeril [Cyclobenzaprine] Unable to Obtain     \"drugs me\"    Flonase [Fluticasone] Unknown (comments)    Flunisolide Unknown (comments)    Guiatussin W/Codeine Unknown (comments)    Ketek [Telithromycin] Nausea Only    Klonopin [Clonazepam] Unknown (comments)    Lexapro [Escitalopram] Nausea and Vomiting    Lodine [Etodolac] Nausea and Vomiting    Morphine Unknown (comments)    Nsaids (Non-Steroidal Anti-Inflammatory Drug) Nausea and Vomiting    Other Medication Unknown (comments)     Gareth Alford    Paxil [Paroxetine Hcl] Nausea and Vomiting    Pcn [Penicillins] Hives    Percocet [Oxycodone-Acetaminophen] Unknown (comments)    Percodan [Oxycodone Hcl-Oxycodone-Asa] Unknown (comments)    Pneumococcal 23-Mary Ps Vaccine Swelling    Proctocream-Hc [Hydrocortisone] Unknown (comments)    Prozac [Fluoxetine] Nausea Only     Suicidal visions      Pseudoephedrine Other (comments)    Quinidine Unknown (comments)    Reglan [Metoclopramide] Unknown (comments)    Remeron [Mirtazapine] Other (comments)    Resaid Other (comments)     inflammed eyes    Robaxin [Methocarbamol] Unknown (comments)    Sonata [Zaleplon] Unknown (comments)    Sulfa (Sulfonamide Antibiotics) Hives    Surmontil [Trimipramine] Nausea and Vomiting    Talwin [Pentazocine Lactate] Unknown (comments)    Toradol [Ketorolac Tromethamine] Nausea and Vomiting    Ultram [Tramadol] Unknown (comments)    Vancomycin Nausea Only    Vasoconstrictor Drug Unknown (comments)    Vigamox [Moxifloxacin] Rash and Swelling    Vioxx [Rofecoxib] Unknown (comments)    Voltaren [Diclofenac Sodium] Itching    Wellbutrin [Bupropion Hcl] Nausea and Vomiting    Zelnorm [Tegaserod Hydrogen Maleate] Unknown (comments)    Zoloft [Sertraline] Nausea and Vomiting           ROS - per HPI        Objective:   Physical Exam   Constitutional: No distress. HENT:   Right Ear: Tympanic membrane is not erythematous and not bulging. No middle ear effusion. Left Ear: Tympanic membrane is not erythematous and not bulging. No middle ear effusion. Nose: No mucosal edema or rhinorrhea. Right sinus exhibits no maxillary sinus tenderness and no frontal sinus tenderness. Left sinus exhibits no maxillary sinus tenderness and no frontal sinus tenderness. Mouth/Throat: Uvula is midline and mucous membranes are normal. No oropharyngeal exudate or posterior oropharyngeal erythema. Eyes: Conjunctivae are normal. No scleral icterus. Neck: Neck supple. Cardiovascular: Regular rhythm and normal heart sounds. No murmur heard. Pulmonary/Chest: Effort normal and breath sounds normal. She has no wheezes. She has no rales. Lymphadenopathy:     She has no cervical adenopathy. Visit Vitals  /64   Pulse 82   Temp 97.6 °F (36.4 °C) (Oral)   Resp 16   Ht 5' 3\" (1.6 m)   Wt 113 lb (51.3 kg)   SpO2 99%   BMI 20.02 kg/m²         Disclaimer:  Advised her to call back or return to office if symptoms worsen/change/persist.  Discussed expected course/resolution/complications of diagnosis in detail with patient. Medication risks/benefits/costs/interactions/alternatives discussed with patient. She was given an after visit summary which includes diagnoses, current medications, & vitals. She expressed understanding with the diagnosis and plan.       Aspects of this note may have been generated using voice recognition software. Despite editing, there may be some syntax errors.        Milton Rodriguez MD

## 2019-02-22 ENCOUNTER — TELEPHONE (OUTPATIENT)
Dept: INTERNAL MEDICINE CLINIC | Age: 84
End: 2019-02-22

## 2019-02-22 NOTE — TELEPHONE ENCOUNTER
Sherren Hardy (Self) [phone] (H)     Pt says that dr Jose Trinh told her to let him know if she was to sick to see her dentist on Monday and he would send something to her dentist so she would not be charged for that appt. She does not have a fax number because their office is closed on [address].

## 2019-02-22 NOTE — LETTER
2/22/2019 Ms. Herrera [address] [facility] [id number] To Whom It May Concern, 
 
 
[place] (1935) is a patient of mine. She was seen on 2/20 by me for an upper respiratory infection. As of today she is still symptomatic and I advised her to postpone her dental appointment with you on Monday (2/25). Please allow her to reschedule this appointment. Sincerely, Tri Ayala MD

## 2019-02-23 ENCOUNTER — HOSPITAL ENCOUNTER (EMERGENCY)
Age: 84
Discharge: HOME | End: 2019-02-23
Attending: EMERGENCY MEDICINE
Payer: MEDICARE

## 2019-02-23 VITALS
TEMPERATURE: 99.2 F | HEART RATE: 92 BPM | OXYGEN SATURATION: 98 % | SYSTOLIC BLOOD PRESSURE: 126 MMHG | RESPIRATION RATE: 16 BRPM | DIASTOLIC BLOOD PRESSURE: 79 MMHG

## 2019-02-23 DIAGNOSIS — J06.9 UPPER RESPIRATORY TRACT INFECTION, UNSPECIFIED TYPE: ICD-10-CM

## 2019-02-23 DIAGNOSIS — J01.90 ACUTE SINUSITIS, RECURRENCE NOT SPECIFIED, UNSPECIFIED LOCATION: Primary | ICD-10-CM

## 2019-02-23 PROCEDURE — 99283 EMERGENCY DEPT VISIT LOW MDM: CPT

## 2019-02-23 RX ORDER — AZITHROMYCIN 250 MG/1
TABLET, FILM COATED ORAL
Qty: 6 TAB | Refills: 0 | Status: SHIPPED | OUTPATIENT
Start: 2019-02-23 | End: 2019-02-28

## 2019-02-23 RX ORDER — CETIRIZINE HCL 10 MG
10 TABLET ORAL DAILY
Qty: 14 TAB | Refills: 0 | Status: SHIPPED | OUTPATIENT
Start: 2019-02-23 | End: 2019-03-09

## 2019-02-23 NOTE — ED PROVIDER NOTES
80 y.o. female with past medical history significant for Stage III CKD, h/o TIA, basal cell CA, squamous cell CA s/p removal, fibromyalgia, IBS, h/o C. difficile, HTN, hyponatremia, hypercholesterolemia, h/o thromboembolus, DJD, depression, anxiety, s/p cholecystectomy, s/p bladder sling, s/p hysterectomy, who presents ambulatory to the ED accompanied by spouse, with chief complaint of headache and sore throat. Pt complains of a \"splitting\" headache, sinus pressure, sore throat and intermittently productive cough that all started 2 days ago. She states that she saw her PCP on Wednesday (2/20/19) for her symptoms, but was not prescribed antibiotics. Notes that her symptoms have worsened since then. Pt denies taking any medication for her symptoms today PTA. Notes that she received her flu vaccine this year. Pt specifically denies fever, vomiting or diarrhea. There are no other acute medical concerns at this time. Social hx: Negative for Tobacco use; Positive for EtOH use (rare); Negative for Illicit Drug use PCP: Amara Scott MD 
 
Note written by Jimmy Victor, as dictated by Leif Taylor MD 12:37 PM 
 
 
The history is provided by the patient and medical records. No  was used. Past Medical History:  
Diagnosis Date  Allergic rhinitis, cause unspecified  Anxiety  Back pain  C. difficile diarrhea 8/2/2017 Admitted to [facility]  Cancer ([facility] [address]   
 basal cell face, back, neck  Cancer (HCC)   
 squamous cell leg  Chronic kidney disease  Chronic pain  DJD (degenerative joint disease) of cervical spine  Fibromyalgia  Hypercholesterolemia  Hypertension  Hyponatremia  IBS (irritable bowel syndrome)  Irritable bowel syndrome with diarrhea 6/29/2016 GI- Dr Nayeli He  Kidney disease, chronic, stage III (GFR 30-59 ml/min) ([facility]) 6/2011 Dr. Ofelia Latif) Rashard Ramirez  Nausea & vomiting  Psychiatric disorder DEPRESSION AND ANXIETY  Stroke [facility]) 2017 TIA  Thromboembolus [facility]) 2013 PE X2 Past Surgical History:  
Procedure Laterality Date  COLONOSCOPY  1/20/11  
 diverticulosis  EGD  1/20/11  
 schatzki's ring--clear on barium swallow 7/11  
 HX ADENOIDECTOMY  HX CATARACT REMOVAL Left 2013  HX CATARACT REMOVAL Right 01/27/2014  HX CHOLECYSTECTOMY  5/10  
 HX HYSTERECTOMY    
 hysterectomy  HX KNEE ARTHROSCOPY Left 1990  
 HX KNEE REPLACEMENT Right 9/29/09  
 HX ORTHOPAEDIC Right 2017  
 trigger finger release  HX OTHER SURGICAL    
 skin cancer removed: L leg  HX OTHER SURGICAL  02/2019  HX SHOULDER ARTHROSCOPY Left 12/13/2018  
 arthroscopy & sub-acromial decompression  HX TONSILLECTOMY  HX UROLOGICAL  1995  
 bladder suspension  REPAIR OF RECTOCELE Family History:  
Problem Relation Age of Onset  Cancer Mother   
     lung  Cancer Maternal Aunt   
     lung  Cancer Maternal Uncle   
     lung  Cancer Sister   
     breast  
 Other Brother   
     has to have blood tx every other week  Cancer Son 36  
     prostate, recurrence 7 yrs later  Arthritis-osteo Son   
     hips  Arthritis-osteo Daughter  Arthritis-osteo Daughter  Anesth Problems Neg Hx Social History Socioeconomic History  Marital status:  Spouse name: Not on file  Number of children: Not on file  Years of education: Not on file  Highest education level: Not on file Social Needs  Financial resource strain: Not on file  Food insecurity - worry: Not on file  Food insecurity - inability: Not on file  Transportation needs - medical: Not on file  Transportation needs - non-medical: Not on file Occupational History  Not on file Tobacco Use  Smoking status: Never Smoker  Smokeless tobacco: Never Used Substance and Sexual Activity  Alcohol use: No  
  Alcohol/week: 0.0 oz  
  Comment: rarely  Drug use: No  
 Sexual activity: No  
Other Topics Concern  Not on file Social History Narrative  Not on file ALLERGIES: Latex; Dilaudid [hydromorphone (bulk)]; Altace [ramipril]; Ambien [zolpidem]; Ascensia autodisc test [blood sugar diagnostic, disc-type]; Aspirin; Astelin [azelastine]; Atenolol; Banex la; Darlis Nailer; Carbocaine [mepivacaine]; Cleocin [clindamycin hcl]; Codeine; Codeine-asa-salicyl-acetam-caf; Cortisporin [neomy-polymyxinb-bacitracin-hc]; Cymbalta [duloxetine]; Cytotec [misoprostol]; Dilaudid [hydromorphone]; Effexor [venlafaxine]; Epinephrine; Estra-d; Estrace [estradiol]; Fentanyl; Flexeril [cyclobenzaprine]; Flonase [fluticasone]; Flunisolide; Guiatussin w/codeine; Ketek [telithromycin]; Araseli Collie; Lexapro [escitalopram]; Lodine [etodolac]; Morphine; Nsaids (non-steroidal anti-inflammatory drug); Other medication; Paxil [paroxetine hcl]; Pcn [penicillins]; Percocet [oxycodone-acetaminophen]; Percodan [oxycodone hcl-oxycodone-asa]; Pneumococcal 23-davida ps vaccine; Proctocream-hc [hydrocortisone]; Prozac [fluoxetine]; Pseudoephedrine; Quinidine; Reglan [metoclopramide]; Remeron [mirtazapine]; Resaid; Robaxin [methocarbamol]; Sonata [zaleplon]; Sulfa (sulfonamide antibiotics); Surmontil [trimipramine]; Talwin [pentazocine lactate]; Toradol [ketorolac tromethamine]; Ultram [tramadol]; Vancomycin; Vasoconstrictor drug; Vigamox [moxifloxacin]; Vioxx [rofecoxib]; Voltaren [diclofenac sodium]; Wellbutrin [bupropion hcl]; Zelnorm [tegaserod hydrogen maleate]; and Zoloft [sertraline] Review of Systems Constitutional: Negative for fever. HENT: Positive for sinus pressure and sore throat. Negative for facial swelling. Eyes: Negative for visual disturbance. Respiratory: Positive for cough. Negative for chest tightness. Cardiovascular: Negative for chest pain. Gastrointestinal: Negative for abdominal pain, diarrhea and vomiting. Genitourinary: Negative for difficulty urinating and dysuria. Musculoskeletal: Negative for arthralgias. Skin: Negative for rash. Neurological: Positive for headaches. Negative for dizziness. Hematological: Negative for adenopathy. Psychiatric/Behavioral: Negative for suicidal ideas. Vitals:  
 02/23/19 1215 Pulse: (!) 102 SpO2: 98% Physical Exam  
Constitutional: She is oriented to person, place, and time. She appears well-developed and well-nourished. No distress. HENT:  
Head: Normocephalic and atraumatic. Mouth/Throat: Oropharynx is clear and moist.  
Eyes: Pupils are equal, round, and reactive to light. No scleral icterus. Neck: Normal range of motion. Neck supple. No thyromegaly present. Cardiovascular: Normal rate, regular rhythm, normal heart sounds and intact distal pulses. No murmur heard. Pulmonary/Chest: Effort normal and breath sounds normal. No respiratory distress. Abdominal: Soft. Bowel sounds are normal. She exhibits no distension. There is no tenderness. Musculoskeletal: Normal range of motion. She exhibits no edema. Neurological: She is alert and oriented to person, place, and time. Skin: Skin is warm and dry. No rash noted. She is not diaphoretic. Nursing note and vitals reviewed. Note written by Jimmy Royal, as dictated by Chelsy Huber MD 12:37 PM  
 
MDM Number of Diagnoses or Management Options Acute sinusitis, recurrence not specified, unspecified location:  
Upper respiratory tract infection, unspecified type:  
 
  
 
Procedures 12:48 PM 
Patient's results have been reviewed with them. Patient and/or family have verbally conveyed their understanding and agreement of the patient's signs, symptoms, diagnosis, treatment and prognosis and additionally agree to follow up as recommended or return to the Emergency Room should their condition change prior to follow-up.   Discharge instructions have also been provided to the patient with some educational information regarding their diagnosis as well a list of reasons why they would want to return to the ER prior to their follow-up appointment should their condition change.

## 2019-02-26 NOTE — TELEPHONE ENCOUNTER
Spoke to patient, reports she is currently on antibiotics that was given to her at the hospital over the weekend. States she is still feeing bad but will contact our office once she has completed it if not continuing to improve. She doesn't need a note for dentist, states dental office took care of her and she doesn't have to pay for canceled visits.

## 2019-03-01 ENCOUNTER — OFFICE VISIT (OUTPATIENT)
Dept: INTERNAL MEDICINE CLINIC | Age: 84
End: 2019-03-01

## 2019-03-01 ENCOUNTER — TELEPHONE (OUTPATIENT)
Dept: INTERNAL MEDICINE CLINIC | Age: 84
End: 2019-03-01

## 2019-03-01 VITALS
WEIGHT: 113 LBS | OXYGEN SATURATION: 96 % | SYSTOLIC BLOOD PRESSURE: 148 MMHG | HEIGHT: 63 IN | BODY MASS INDEX: 20.02 KG/M2 | HEART RATE: 92 BPM | TEMPERATURE: 98.1 F | RESPIRATION RATE: 20 BRPM | DIASTOLIC BLOOD PRESSURE: 48 MMHG

## 2019-03-01 DIAGNOSIS — J06.9 VIRAL UPPER RESPIRATORY TRACT INFECTION: Primary | ICD-10-CM

## 2019-03-01 RX ORDER — METHYLPREDNISOLONE 4 MG/1
TABLET ORAL
Qty: 1 DOSE PACK | Refills: 0 | Status: SHIPPED | OUTPATIENT
Start: 2019-03-01 | End: 2019-03-07

## 2019-03-01 NOTE — PROGRESS NOTES
Follow up. Has finished Zpak. Feeling a little better. Still has cough, congestion. Large bruise on right upper arm.

## 2019-03-01 NOTE — TELEPHONE ENCOUNTER
Patient informed per Dr. Kandy Chávez she should stay home and rest, avoiding close contact with other people is recommended she verbalized understanding.

## 2019-03-01 NOTE — TELEPHONE ENCOUNTER
Pt seen by  this morning -  Pt wants to know if she is contagious? She will be around a lot of people this evening.  Please Advise - [phone]

## 2019-03-01 NOTE — PATIENT INSTRUCTIONS

## 2019-03-01 NOTE — PROGRESS NOTES
Pedro Arenas is a 80 y.o. female who was seen in clinic today (3/1/2019) for an acute visit. Assessment & Plan:   Diagnoses and all orders for this visit:    1. Viral upper respiratory tract infection- symptoms: are improving, discussed differential diagnosis and at this time favor a viral etiology. Did not get much relief with zpak. Discussed diagnosis & treatment options and reviewed the importance of avoiding unnecessary antibiotic therapy, will start on meds below. We reviewed her list of allergies and med options, we will start the medrol dose pack. We discussed the expected time course for resolution & red flags were reviewed with her to RTC or notify me. -     methylPREDNISolone (MEDROL DOSEPACK) 4 mg tablet; use as directed         Follow-up Disposition:  Return if symptoms worsen or fail to improve. Subjective:   [place] was seen today for Hospital Follow Up    Follow Up  Pedro Arenas is seen for follow up from recent ED visit to [facility] on 2/23. We reviewed the records. She presented with worsening URI symptoms. She was seen by me on 2/20. On 2/23 she erports cough was worse and sinus pain was worse. She took her zpak as directed & without any side effects. She reports symptoms are improved slightly. She reports still having headache, sinus congestion, and a dry cough. Prior to Admission medications    Medication Sig Start Date End Date Taking? Authorizing Provider   cetirizine (ZYRTEC) 10 mg tablet Take 1 Tab by mouth daily for 14 days. 2/23/19 3/9/19 Yes Adam Norman MD   sod chlor-bicarb-squeez bottle (SINUS RINSE PEDIATRIC STARTER) pkdv 1 Each by sinus irrigation route daily. 2/23/19  Yes Adam Norman MD   amLODIPine (NORVASC) 5 mg tablet TAKE ONE TABLET BY MOUTH ONE TIME DAILY 1/24/19  Yes Emmanuel Lai MD   HYDROcodone-acetaminophen [facility]) 5-325 mg per tablet Take 1-2 Tabs by mouth every four (4) hours as needed for Pain.  Max Daily Amount: 12 Tabs. 12/13/18  Yes Violette Valdivia PA-C   bismuth subsalicylate (PEPTO-BISMOL PO) Take  by mouth as needed. Yes Provider, Historical   Wheat Dextrin (BENEFIBER CLEAR) 3 gram/3.5 gram pwpk Take  by mouth daily. Yes Provider, Historical   metoprolol tartrate (LOPRESSOR) 25 mg tablet TAKE ONE-QUARTER TABLET BY MOUTH EVERY DAY 5/22/18  Yes Bonifacio Castillo MD   aspirin delayed-release 81 mg tablet Take 1 Tab by mouth daily. 8/4/17  Yes Minor, Jenelle Merritt NP   LORazepam (ATIVAN) 0.5 mg tablet Take 0.5 mg by mouth every morning. Yes Provider, Historical   hyoscyamine SL (LEVSIN/SL) 0.125 mg SL tablet 0.125 mg by SubLINGual route Before breakfast, lunch, and dinner. Yes Provider, Historical   acetaminophen (TYLENOL ARTHRITIS PAIN) 650 mg CR tablet Take 650 mg by mouth two (2) times daily as needed for Pain. Yes Provider, Historical   raNITIdine (ZANTAC) 150 mg tablet Take 150 mg by mouth Before breakfast and dinner. Yes Provider, Historical   cholecalciferol (VITAMIN D3) 1,000 unit tablet Take 1,000 Units by mouth daily. Yes Provider, Historical   FERROUS FUMARATE/VIT BCOMP,C (SUPER B COMPLEX PO) Take  by mouth daily (after dinner). Yes Provider, Historical   azithromycin (ZITHROMAX Z-RICKY) 250 mg tablet Per pack instructions 2/23/19 2/28/19  Chelsy Huber MD          Allergies   Allergen Reactions    Latex Itching    Dilaudid [Hydromorphone (Bulk)] Other (comments)     ? loss of consciousness    Altace [Ramipril] Unknown (comments)    Ambien [Zolpidem] Unknown (comments)    Ascensia Autodisc Test [Blood Sugar Diagnostic, Disc-Type] Unknown (comments)    Aspirin Unknown (comments)    Astelin [Azelastine] Unknown (comments)    Atenolol Unknown (comments)    Banex La Unknown (comments)    Benicar [Olmesartan] Unknown (comments)    Carbocaine [Mepivacaine] Unknown (comments)    Cleocin [Clindamycin Hcl] Hives    Codeine Nausea and Vomiting    Codeine-Asa-Salicyl-Acetam-Caf Other (comments)     Stomach pain    Cortisporin [Neomy-Polymyxinb-Bacitracin-Hc] Unknown (comments)    Cymbalta [Duloxetine] Other (comments)    Cytotec [Misoprostol] Unknown (comments)    Dilaudid [Hydromorphone] Unknown (comments)     Patient states that she was unable to respond, but was breathing    Effexor [Venlafaxine] Unknown (comments)    Epinephrine Unknown (comments)     Heart racing    Estra-D Unknown (comments)    Estrace [Estradiol] Unknown (comments)    Fentanyl Nausea and Vomiting and Unknown (comments)     shakes    Flexeril [Cyclobenzaprine] Unable to Obtain     \"drugs me\"    Flonase [Fluticasone] Unknown (comments)    Flunisolide Unknown (comments)    Guiatussin W/Codeine Unknown (comments)    Ketek [Telithromycin] Nausea Only    Klonopin [Clonazepam] Unknown (comments)    Lexapro [Escitalopram] Nausea and Vomiting    Lodine [Etodolac] Nausea and Vomiting    Morphine Unknown (comments)    Nsaids (Non-Steroidal Anti-Inflammatory Drug) Nausea and Vomiting    Other Medication Unknown (comments)     Gareth Alford    Paxil [Paroxetine Hcl] Nausea and Vomiting    Pcn [Penicillins] Hives    Percocet [Oxycodone-Acetaminophen] Unknown (comments)    Percodan [Oxycodone Hcl-Oxycodone-Asa] Unknown (comments)    Pneumococcal 23-Mary Ps Vaccine Swelling    Proctocream-Hc [Hydrocortisone] Unknown (comments)    Prozac [Fluoxetine] Nausea Only     Suicidal visions      Pseudoephedrine Other (comments)    Quinidine Unknown (comments)    Reglan [Metoclopramide] Unknown (comments)    Remeron [Mirtazapine] Other (comments)    Resaid Other (comments)     inflammed eyes    Robaxin [Methocarbamol] Unknown (comments)    Sonata [Zaleplon] Unknown (comments)    Sulfa (Sulfonamide Antibiotics) Hives    Surmontil [Trimipramine] Nausea and Vomiting    Talwin [Pentazocine Lactate] Unknown (comments)    Toradol [Ketorolac Tromethamine] Nausea and Vomiting    Ultram [Tramadol] Unknown (comments)    Vancomycin Nausea Only    Vasoconstrictor Drug Unknown (comments)    Vigamox [Moxifloxacin] Rash and Swelling    Vioxx [Rofecoxib] Unknown (comments)    Voltaren [Diclofenac Sodium] Itching    Wellbutrin [Bupropion Hcl] Nausea and Vomiting    Zelnorm [Tegaserod Hydrogen Maleate] Unknown (comments)    Zoloft [Sertraline] Nausea and Vomiting           Review of Systems   Constitutional: Negative for chills and fever. HENT: Positive for congestion and sore throat. Negative for sinus pain. Respiratory: Positive for cough. Negative for hemoptysis, shortness of breath and wheezing. Objective:   Physical Exam   Constitutional: No distress. Cardiovascular: Regular rhythm and normal heart sounds. No murmur heard. Pulmonary/Chest: Effort normal and breath sounds normal. She has no wheezes. She has no rales. Visit Vitals  /48   Pulse 92   Temp 98.1 °F (36.7 °C) (Oral)   Resp 20   Ht 5' 3\" (1.6 m)   Wt 113 lb (51.3 kg)   SpO2 96%   BMI 20.02 kg/m²         Disclaimer:  Advised her to call back or return to office if symptoms worsen/change/persist.  Discussed expected course/resolution/complications of diagnosis in detail with patient. Medication risks/benefits/costs/interactions/alternatives discussed with patient. She was given an after visit summary which includes diagnoses, current medications, & vitals. She expressed understanding with the diagnosis and plan. Aspects of this note may have been generated using voice recognition software. Despite editing, there may be some syntax errors.        Bang Alvarez MD

## 2019-03-07 ENCOUNTER — TELEPHONE (OUTPATIENT)
Dept: INTERNAL MEDICINE CLINIC | Age: 84
End: 2019-03-07

## 2019-03-07 RX ORDER — DOXYCYCLINE 100 MG/1
100 TABLET ORAL 2 TIMES DAILY
Qty: 14 TAB | Refills: 0 | Status: SHIPPED | OUTPATIENT
Start: 2019-03-07 | End: 2019-03-10

## 2019-03-07 NOTE — TELEPHONE ENCOUNTER
Eran Ye (Self) [phone] (H)     Pt reports being \"much worse. \"  Pt has worsened cough, sore throat, & H/A, but no fever.   Please advise

## 2019-03-07 NOTE — TELEPHONE ENCOUNTER
Patient reports having a dry cough and sore throat from coughing. Denies any other symptoms at this time. She would like Doxycyline sent to [facility], she is aware and will continue to rest, push fluids and will try drinking hot tea to soothe her throat.

## 2019-03-07 NOTE — TELEPHONE ENCOUNTER
Patient has 50+ allergies. We can try doxycycline 100mg 1 tab PO BID x 7 days, #14, RF 0. She has not improved on zpak or medrol dose pack.   Is lasting longer then I would expect for allergies/viral.

## 2019-03-08 ENCOUNTER — TELEPHONE (OUTPATIENT)
Dept: INTERNAL MEDICINE CLINIC | Age: 84
End: 2019-03-08

## 2019-03-08 NOTE — TELEPHONE ENCOUNTER
Saw  3/1 and has finished all the script given.   Pt states she is getting worst.  Advise - [phone]

## 2019-03-08 NOTE — TELEPHONE ENCOUNTER
Spoke to patient at length yesterday, advised per Dr. Altaf Anderson that an antibiotic, Doxycycline was sent to [facility] per her request. She said she forgot, she will go pick it up and when she completes the medication she will RTC if she isn't feeling better.

## 2019-03-10 ENCOUNTER — HOSPITAL ENCOUNTER (EMERGENCY)
Age: 84
Discharge: HOME | End: 2019-03-10
Attending: EMERGENCY MEDICINE
Payer: MEDICARE

## 2019-03-10 VITALS
DIASTOLIC BLOOD PRESSURE: 61 MMHG | RESPIRATION RATE: 16 BRPM | OXYGEN SATURATION: 98 % | SYSTOLIC BLOOD PRESSURE: 132 MMHG | BODY MASS INDEX: 19.32 KG/M2 | HEIGHT: 62 IN | TEMPERATURE: 97.9 F | HEART RATE: 79 BPM | WEIGHT: 105 LBS

## 2019-03-10 DIAGNOSIS — R10.13 ABDOMINAL PAIN, EPIGASTRIC: ICD-10-CM

## 2019-03-10 DIAGNOSIS — E87.1 HYPONATREMIA: ICD-10-CM

## 2019-03-10 DIAGNOSIS — R11.0 NAUSEA WITHOUT VOMITING: Primary | ICD-10-CM

## 2019-03-10 LAB
ALBUMIN SERPL-MCNC: 3.5 G/DL (ref 3.5–5)
ALBUMIN/GLOB SERPL: 1.2 {RATIO} (ref 1.1–2.2)
ALP SERPL-CCNC: 63 U/L (ref 45–117)
ALT SERPL-CCNC: 25 U/L (ref 12–78)
ANION GAP SERPL CALC-SCNC: 11 MMOL/L (ref 5–15)
APPEARANCE UR: CLEAR
AST SERPL-CCNC: 24 U/L (ref 15–37)
BACTERIA URNS QL MICRO: NEGATIVE /HPF
BASOPHILS # BLD: 0 K/UL (ref 0–0.1)
BASOPHILS NFR BLD: 1 % (ref 0–1)
BILIRUB SERPL-MCNC: 0.5 MG/DL (ref 0.2–1)
BILIRUB UR QL: NEGATIVE
BUN SERPL-MCNC: 20 MG/DL (ref 6–20)
BUN/CREAT SERPL: 15 (ref 12–20)
CALCIUM SERPL-MCNC: 8.9 MG/DL (ref 8.5–10.1)
CHLORIDE SERPL-SCNC: 90 MMOL/L (ref 97–108)
CO2 SERPL-SCNC: 25 MMOL/L (ref 21–32)
COLOR UR: ABNORMAL
COMMENT, HOLDF: NORMAL
CREAT SERPL-MCNC: 1.3 MG/DL (ref 0.55–1.02)
DIFFERENTIAL METHOD BLD: NORMAL
EOSINOPHIL # BLD: 0 K/UL (ref 0–0.4)
EOSINOPHIL NFR BLD: 0 % (ref 0–7)
EPITH CASTS URNS QL MICRO: ABNORMAL /LPF
ERYTHROCYTE [DISTWIDTH] IN BLOOD BY AUTOMATED COUNT: 13 % (ref 11.5–14.5)
GLOBULIN SER CALC-MCNC: 3 G/DL (ref 2–4)
GLUCOSE SERPL-MCNC: 102 MG/DL (ref 65–100)
GLUCOSE UR STRIP.AUTO-MCNC: NEGATIVE MG/DL
HCT VFR BLD AUTO: 37.3 % (ref 35–47)
HGB BLD-MCNC: 12.4 G/DL (ref 11.5–16)
HGB UR QL STRIP: NEGATIVE
HYALINE CASTS URNS QL MICRO: ABNORMAL /LPF (ref 0–5)
IMM GRANULOCYTES # BLD AUTO: 0 K/UL (ref 0–0.04)
IMM GRANULOCYTES NFR BLD AUTO: 0 % (ref 0–0.5)
KETONES UR QL STRIP.AUTO: ABNORMAL MG/DL
LEUKOCYTE ESTERASE UR QL STRIP.AUTO: NEGATIVE
LIPASE SERPL-CCNC: 161 U/L (ref 73–393)
LYMPHOCYTES # BLD: 1.7 K/UL (ref 0.8–3.5)
LYMPHOCYTES NFR BLD: 30 % (ref 12–49)
MCH RBC QN AUTO: 30.5 PG (ref 26–34)
MCHC RBC AUTO-ENTMCNC: 33.2 G/DL (ref 30–36.5)
MCV RBC AUTO: 91.6 FL (ref 80–99)
MONOCYTES # BLD: 0.6 K/UL (ref 0–1)
MONOCYTES NFR BLD: 11 % (ref 5–13)
NEUTS SEG # BLD: 3.4 K/UL (ref 1.8–8)
NEUTS SEG NFR BLD: 58 % (ref 32–75)
NITRITE UR QL STRIP.AUTO: NEGATIVE
NRBC # BLD: 0 K/UL (ref 0–0.01)
NRBC BLD-RTO: 0 PER 100 WBC
PH UR STRIP: 6.5 [PH] (ref 5–8)
PLATELET # BLD AUTO: 308 K/UL (ref 150–400)
PMV BLD AUTO: 8.9 FL (ref 8.9–12.9)
POTASSIUM SERPL-SCNC: 3.9 MMOL/L (ref 3.5–5.1)
PROT SERPL-MCNC: 6.5 G/DL (ref 6.4–8.2)
PROT UR STRIP-MCNC: NEGATIVE MG/DL
RBC # BLD AUTO: 4.07 M/UL (ref 3.8–5.2)
RBC #/AREA URNS HPF: ABNORMAL /HPF (ref 0–5)
SAMPLES BEING HELD,HOLD: NORMAL
SODIUM SERPL-SCNC: 126 MMOL/L (ref 136–145)
SP GR UR REFRACTOMETRY: 1.01 (ref 1–1.03)
UR CULT HOLD, URHOLD: NORMAL
UROBILINOGEN UR QL STRIP.AUTO: 0.2 EU/DL (ref 0.2–1)
WBC # BLD AUTO: 5.7 K/UL (ref 3.6–11)
WBC URNS QL MICRO: ABNORMAL /HPF (ref 0–4)

## 2019-03-10 PROCEDURE — 99283 EMERGENCY DEPT VISIT LOW MDM: CPT

## 2019-03-10 PROCEDURE — 83690 ASSAY OF LIPASE: CPT

## 2019-03-10 PROCEDURE — 36415 COLL VENOUS BLD VENIPUNCTURE: CPT

## 2019-03-10 PROCEDURE — 80053 COMPREHEN METABOLIC PANEL: CPT

## 2019-03-10 PROCEDURE — 81001 URINALYSIS AUTO W/SCOPE: CPT

## 2019-03-10 PROCEDURE — 85025 COMPLETE CBC W/AUTO DIFF WBC: CPT

## 2019-03-10 RX ORDER — ONDANSETRON 4 MG/1
4 TABLET, ORALLY DISINTEGRATING ORAL
Qty: 12 TAB | Refills: 0 | Status: SHIPPED | OUTPATIENT
Start: 2019-03-10 | End: 2019-04-09

## 2019-03-10 NOTE — ED PROVIDER NOTES
80 y.o. female with extensive past medical history, please see list, significant for fibromyalgia, IBS, hyponatremia, S3 CKD, HTN, thromboembolus, stroke, squamous cell cancer of LE, and DJD who presents from home via personal vehicle with chief complaint of abdominal pain. Pt presents to the ED and reports UQ abdominal pain, nausea, headache, and a cough. Pt reports cough began on 3/8/19 but states the rest of the symptoms began shortly before 2/23/19 and persisted since. Pt was seen in [facility] ED by Dr. Lisseth Corado on 2/23/19 for headache and sore throat but notes no change in her symptoms since treatment. Pt reports currently taking azithromycin, doxycycline, and ranitidine. Pt denies any fever, diarrhea, emesis, dysuria, hematuria, or decreased fluid intake. There are no other acute medical concerns at this time. Surgical hx - extensive, please see list, significant for rectocele repair, colonoscopy, bladder suspension, cholecystectomy, hysterectomy, skin cancer removal (LLE), right knee replacement, adenoidectomy, shoulder arthroscopy    Social hx - Tobacco use: non-smoker, Alcohol Use: rarely     PCP: Makeda Rogers MD    Note written by Jimmy Brown, as dictated by Gary uMniz MD 5:55PM.        The history is provided by the patient, a relative and the spouse. No  was used.         Past Medical History:   Diagnosis Date    Allergic rhinitis, cause unspecified     Anxiety     Back pain     C. difficile diarrhea 8/2/2017    Admitted to [facility]    Cancer ([facility] [address]     basal cell face, back, neck    Cancer (HCC)     squamous cell leg    Chronic kidney disease     Chronic pain     DJD (degenerative joint disease) of cervical spine     Fibromyalgia     Hypercholesterolemia     Hypertension     Hyponatremia     IBS (irritable bowel syndrome)     Irritable bowel syndrome with diarrhea 6/29/2016    GI- Dr Carlyn Houston Kidney disease, chronic, stage III (GFR 30-59 ml/min) ([facility] [address] 6/2011    Dr. Jillian Todd) [place]    Nausea & vomiting     Psychiatric disorder     DEPRESSION AND ANXIETY    Stroke [facility]) 2017    TIA    Thromboembolus [facility]) 2013    PE X2       Past Surgical History:   Procedure Laterality Date    COLONOSCOPY  1/20/11    diverticulosis    EGD  1/20/11    schatzki's ring--clear on barium swallow 7/11    HX ADENOIDECTOMY      HX CATARACT REMOVAL Left 2013    HX CATARACT REMOVAL Right 01/27/2014    HX CHOLECYSTECTOMY  5/10    HX HYSTERECTOMY      hysterectomy    HX KNEE ARTHROSCOPY Left 1990    HX KNEE REPLACEMENT Right 9/29/09    HX ORTHOPAEDIC Right 2017    trigger finger release    HX OTHER SURGICAL      skin cancer removed: L leg    HX OTHER SURGICAL  02/2019    HX SHOULDER ARTHROSCOPY Left 12/13/2018    arthroscopy & sub-acromial decompression    HX TONSILLECTOMY      HX UROLOGICAL  1995    bladder suspension    REPAIR OF RECTOCELE           Family History:   Problem Relation Age of Onset    Cancer Mother         lung    Cancer Maternal Aunt         lung    Cancer Maternal Uncle         lung    Cancer Sister         breast    Other Brother         has to have blood tx every other week   [address] [place] [address] Cancer Son 36        prostate, recurrence 7 yrs later    Arthritis-osteo Son         hips    Arthritis-osteo Daughter     Arthritis-osteo Daughter     Anesth Problems Neg Hx        Social History     Socioeconomic History    Marital status:      Spouse name: Not on file    Number of children: Not on file    Years of education: Not on file    Highest education level: Not on file   Social Needs    Financial resource strain: Not on file    Food insecurity - worry: Not on file    Food insecurity - inability: Not on file   [employer] needs - medical: Not on file   [employer] needs - non-medical: Not on file   Occupational History    Not on file   Tobacco Use    Smoking status: Never Smoker    Smokeless tobacco: Never Used Substance and Sexual Activity    Alcohol use: No     Alcohol/week: 0.0 oz     Comment: rarely    Drug use: No    Sexual activity: No   Other Topics Concern    Not on file   Social History Narrative    Not on file         ALLERGIES: Latex; Dilaudid [hydromorphone (bulk)]; Altace [ramipril]; Ambien [zolpidem]; Ascensia autodisc test [blood sugar diagnostic, disc-type]; Aspirin; Astelin [azelastine]; Atenolol; Banex la; Carcamo Sarna; Carbocaine [mepivacaine]; Cleocin [clindamycin hcl]; Codeine; Codeine-asa-salicyl-acetam-caf; Cortisporin [neomy-polymyxinb-bacitracin-hc]; Cymbalta [duloxetine]; Cytotec [misoprostol]; Dilaudid [hydromorphone]; Effexor [venlafaxine]; Epinephrine; Estra-d; Estrace [estradiol]; Fentanyl; Flexeril [cyclobenzaprine]; Flonase [fluticasone]; Flunisolide; Guiatussin w/codeine; Ketek [telithromycin]; Nayla Gauze; Lexapro [escitalopram]; Lodine [etodolac]; Morphine; Nsaids (non-steroidal anti-inflammatory drug); Other medication; Paxil [paroxetine hcl]; Pcn [penicillins]; Percocet [oxycodone-acetaminophen]; Percodan [oxycodone hcl-oxycodone-asa]; Pneumococcal 23-davida ps vaccine; Proctocream-hc [hydrocortisone]; Prozac [fluoxetine]; Pseudoephedrine; Quinidine; Reglan [metoclopramide]; Remeron [mirtazapine]; Resaid; Robaxin [methocarbamol]; Sonata [zaleplon]; Sulfa (sulfonamide antibiotics); Surmontil [trimipramine]; Talwin [pentazocine lactate]; Toradol [ketorolac tromethamine]; Ultram [tramadol]; Vancomycin; Vasoconstrictor drug; Vigamox [moxifloxacin]; Vioxx [rofecoxib]; Voltaren [diclofenac sodium]; Wellbutrin [bupropion hcl]; Zelnorm [tegaserod hydrogen maleate]; and Zoloft [sertraline]    Review of Systems   Constitutional: Negative for appetite change, chills and fever. HENT: Negative for rhinorrhea and trouble swallowing. Eyes: Negative for photophobia. Respiratory: Positive for cough. Negative for shortness of breath.     Cardiovascular: Negative for chest pain and palpitations. Gastrointestinal: Positive for abdominal pain and nausea. Negative for diarrhea and vomiting. Genitourinary: Negative for dysuria, frequency and hematuria. Musculoskeletal: Negative for arthralgias. Neurological: Positive for headaches. Negative for dizziness, syncope and weakness. Psychiatric/Behavioral: Negative for behavioral problems. All other systems reviewed and are negative. Vitals:    03/10/19 1448 03/10/19 1648   BP:  134/73   Pulse: 98 79   Resp:  18   Temp:  97.9 °F (36.6 °C)   SpO2: 98% 99%   Weight:  47.6 kg (105 lb)   Height:  5' 2\" (1.575 m)            Physical Exam   Constitutional: She appears well-developed and well-nourished. HENT:   Head: Normocephalic and atraumatic. Mouth/Throat: Oropharynx is clear and moist.   Eyes: Pupils are equal, round, and reactive to light. EOM are normal.   Neck: Normal range of motion. Neck supple. Cardiovascular: Normal rate, regular rhythm, normal heart sounds and intact distal pulses. Exam reveals no gallop and no friction rub. No murmur heard. Pulmonary/Chest: Effort normal. No respiratory distress. She has no wheezes. She has no rales. Abdominal: Soft. There is no tenderness. There is no rebound. Musculoskeletal: Normal range of motion. She exhibits no tenderness. Neurological: She is alert. No cranial nerve deficit. Motor; symmetric   Skin: No erythema. Psychiatric: She has a normal mood and affect. Her behavior is normal.   Nursing note and vitals reviewed. MDM       Procedures    6:51 PM  Patient's results have been reviewed with them. Patient and/or family have verbally conveyed their understanding and agreement of the patient's signs, symptoms, diagnosis, treatment and prognosis and additionally agree to follow up as recommended or return to the Emergency Room should their condition change prior to follow-up.   Discharge instructions have also been provided to the patient with some educational information regarding their diagnosis as well a list of reasons why they would want to return to the ER prior to their follow-up appointment should their condition change.

## 2019-03-10 NOTE — ED NOTES
Pt given discharge instructions, patient education, prescriptions, and follow up information by Dr. Jareth Sanchez. Pt states understanding. All questions answered. Pt discharged to home in private vehicle, ambulatory. Pt A/Ox4, RA, pain controlled.

## 2019-03-10 NOTE — ED TRIAGE NOTES
Patient c/o abd pain and nausea, denies fever/chills, denies vomiting/diarrhea. Denies CP/SOB, denies dysuria.

## 2019-03-10 NOTE — DISCHARGE INSTRUCTIONS
Patient Education   Restrict water/fluids to 1 quart each day; discontinue doxycycline for now; sodium blood work recheck in 2-4 days; use nausea medicine as needed       Hyponatremia: Care Instructions  Your Care Instructions    Hyponatremia (say \"nd-gu-ddg-TREE-mirna-uh\") means that you don't have enough sodium in your blood. It can cause nausea, vomiting, and headaches. Or you may not feel hungry. In serious cases, it can cause seizures, a coma, or even death. Hyponatremia is not a disease. It is a problem caused by something else, such as medicines or exercising for a long time in hot weather. You can get hyponatremia if you lose a lot of fluids and then you drink a lot of water or other liquids that don't have much sodium. You can also get it if you have kidney, liver, heart, or other health problems. Treatment is focused on getting your sodium levels back to normal.  Follow-up care is a key part of your treatment and safety. Be sure to make and go to all appointments, and call your doctor if you are having problems. It's also a good idea to know your test results and keep a list of the medicines you take. How can you care for yourself at home? · If your doctor recommends it, drink fluids that have sodium. Sports drinks are a good choice. Or you can eat salty foods. · If your doctor recommends it, limit the amount of water you drink. And limit fluids that are mostly water. These include tea, coffee, and juice. · Take your medicines exactly as prescribed. Call your doctor if you have any problems with your medicine. · Get your sodium levels tested when your doctor tells you to. When should you call for help? Call 911 anytime you think you may need emergency care.  For example, call if:    · You have a seizure.     · You passed out (lost consciousness).    Call your doctor now or seek immediate medical care if:    · You are confused or it is hard to focus.     · You have little or no appetite.     · You feel sick to your stomach or you vomit.     · You have a headache.     · You have mood changes.     · You feel more tired than usual.    Watch closely for changes in your health, and be sure to contact your doctor if:    · You do not get better as expected. Where can you learn more? Go to http://yoly-jackie.info/. Enter U417 in the search box to learn more about \"Hyponatremia: Care Instructions. \"  Current as of: June 25, 2018  Content Version: 11.9  © 8063-5627 [employer]. Care instructions adapted under license by [employer] (which disclaims liability or warranty for this information). If you have questions about a medical condition or this instruction, always ask your healthcare professional. [address] any warranty or liability for your use of this information. Patient Education        Abdominal Pain: Care Instructions  Your Care Instructions    Abdominal pain has many possible causes. Some aren't serious and get better on their own in a few days. Others need more testing and treatment. If your pain continues or gets worse, you need to be rechecked and may need more tests to find out what is wrong. You may need surgery to correct the problem. Don't ignore new symptoms, such as fever, nausea and vomiting, urination problems, pain that gets worse, and dizziness. These may be signs of a more serious problem. Your doctor may have recommended a follow-up visit in the next 8 to 12 hours. If you are not getting better, you may need more tests or treatment. The doctor has checked you carefully, but problems can develop later. If you notice any problems or new symptoms, get medical treatment right away. Follow-up care is a key part of your treatment and safety. Be sure to make and go to all appointments, and call your doctor if you are having problems.  It's also a good idea to know your test results and keep a list of the medicines you take.  How can you care for yourself at home? · Rest until you feel better. · To prevent dehydration, drink plenty of fluids, enough so that your urine is light yellow or clear like water. Choose water and other caffeine-free clear liquids until you feel better. If you have kidney, heart, or liver disease and have to limit fluids, talk with your doctor before you increase the amount of fluids you drink. · If your stomach is upset, eat mild foods, such as rice, dry toast or crackers, bananas, and applesauce. Try eating several small meals instead of two or three large ones. · Wait until 48 hours after all symptoms have gone away before you have spicy foods, alcohol, and drinks that contain caffeine. · Do not eat foods that are high in fat. · Avoid anti-inflammatory medicines such as aspirin, ibuprofen (Advil, Motrin), and naproxen (Aleve). These can cause stomach upset. Talk to your doctor if you take daily aspirin for another health problem. When should you call for help? Call 911 anytime you think you may need emergency care. For example, call if:    · You passed out (lost consciousness).     · You pass maroon or very bloody stools.     · You vomit blood or what looks like coffee grounds.     · You have new, severe belly pain.    Call your doctor now or seek immediate medical care if:    · Your pain gets worse, especially if it becomes focused in one area of your belly.     · You have a new or higher fever.     · Your stools are black and look like tar, or they have streaks of blood.     · You have unexpected vaginal bleeding.     · You have symptoms of a urinary tract infection. These may include:  ? Pain when you urinate. ? Urinating more often than usual.  ? Blood in your urine.     · You are dizzy or lightheaded, or you feel like you may faint.    Watch closely for changes in your health, and be sure to contact your doctor if:    · You are not getting better after 1 day (24 hours).    Where can you learn more? Go to http://yoly-jackie.info/. Enter K603 in the search box to learn more about \"Abdominal Pain: Care Instructions. \"  Current as of: September 23, 2018  Content Version: 11.9  © 3631-7114 [employer]. Care instructions adapted under license by [employer] (which disclaims liability or warranty for this information). If you have questions about a medical condition or this instruction, always ask your healthcare professional. [place] [address] any warranty or liability for your use of this information.

## 2019-03-15 ENCOUNTER — HOSPITAL ENCOUNTER (OUTPATIENT)
Dept: GENERAL RADIOLOGY | Age: 84
Discharge: HOME | End: 2019-03-15
Payer: MEDICARE

## 2019-03-15 ENCOUNTER — HOSPITAL ENCOUNTER (OUTPATIENT)
Dept: LAB | Age: 84
Discharge: HOME | End: 2019-03-15
Payer: MEDICARE

## 2019-03-15 ENCOUNTER — TELEPHONE (OUTPATIENT)
Dept: INTERNAL MEDICINE CLINIC | Age: 84
End: 2019-03-15

## 2019-03-15 ENCOUNTER — OFFICE VISIT (OUTPATIENT)
Dept: INTERNAL MEDICINE CLINIC | Age: 84
End: 2019-03-15

## 2019-03-15 VITALS
SYSTOLIC BLOOD PRESSURE: 108 MMHG | BODY MASS INDEX: 19.14 KG/M2 | TEMPERATURE: 97.7 F | RESPIRATION RATE: 18 BRPM | HEART RATE: 92 BPM | DIASTOLIC BLOOD PRESSURE: 56 MMHG | OXYGEN SATURATION: 98 % | HEIGHT: 62 IN | WEIGHT: 104 LBS

## 2019-03-15 DIAGNOSIS — R05.9 COUGH: Primary | ICD-10-CM

## 2019-03-15 DIAGNOSIS — R41.0 CONFUSION: ICD-10-CM

## 2019-03-15 DIAGNOSIS — R05.9 COUGH: ICD-10-CM

## 2019-03-15 DIAGNOSIS — E87.1 HYPONATREMIA: ICD-10-CM

## 2019-03-15 PROCEDURE — 36415 COLL VENOUS BLD VENIPUNCTURE: CPT

## 2019-03-15 PROCEDURE — 71046 X-RAY EXAM CHEST 2 VIEWS: CPT

## 2019-03-15 PROCEDURE — 80048 BASIC METABOLIC PNL TOTAL CA: CPT

## 2019-03-15 NOTE — TELEPHONE ENCOUNTER
Pt calling to see what can be done for her throat hurts,  What can she take - was in today. Wants to feel better.   Ptr [phone]

## 2019-03-15 NOTE — PROGRESS NOTES
Elizabeth Medrano is a 80 y.o. female who was seen in clinic today (3/15/2019). Assessment & Plan:   Diagnoses and all orders for this visit:    1. Cough- unchanged in the last month, will get imaging set up to evaluate for any pathology, reviewed w/ her multiple times I don't think this is infection and she does not need another round of antibiotics. -     XR CHEST PA LAT; Future    2. Hyponatremia- new dx, no recent labs so unsure if this is an acute change or more of a chronic, in setting of weight loss would favor more acute change due to decrease PO intake. Did review this is borderline critical.  Did review this could be the cause of her confusion. Will get imaging and repeat labs. -     BASIC METABOLIC PANEL (7)  -     XR CHEST PA LAT; Future    3. Confusion- this is a new problem, symptoms are: not changed, no one present today to verify any of her story , differential dx reviewed with the patient, and likely related to low sodium. She never had CXR completed today. Nurse reached out to talk to  and daughter. Daughter reports noticing some confusion, worse recently, but has been progressive. Nurse relayed my concerns and she will make sure pt had labs done and get imaging. Follow-up Disposition:  Return if symptoms worsen or fail to improve. Subjective:   [place] was seen today for Hospital Follow Up    Follow Up  Elizabeth Medrano is seen for follow up from recent ED visit to [facility] on 3/10. We reviewed the records. She presented with abdominal pain. She was found to have a low sodium level. This is the 2nd time she has been to the ER in the last month, seen also on 2/23. She has been seen in me several times also over this time frame: 2/20 and 3/1. Notes and labs from all these visits reviewed. She had been seen previously for feeling \"sick\" and having a sore throat and cough.   Has been treated with a zpak and doxycycline without any relief per her.  No CXR completed. Only abnormality on blood work has been her sodium. She is confused today. She asked repeatedly how to treat her low Na and what caused it. She was given doxycycline (14 tabs) on 3/7 and has 8 tabs left. She was alone today. Brief Labs:     Lab Results   Component Value Date/Time    Sodium 126 03/10/2019 05:02 PM    Potassium 3.9 03/10/2019 05:02 PM    Creatinine 1.30 03/10/2019 05:02 PM    Creatinine, External 1.30 03/20/2018          Prior to Admission medications    Medication Sig Start Date End Date Taking? Authorizing Provider   sod chlor-bicarb-squeez bottle (SINUS RINSE PEDIATRIC STARTER) pkdv 1 Each by sinus irrigation route daily. 2/23/19  Yes Aramis Nicholas MD   amLODIPine (NORVASC) 5 mg tablet TAKE ONE TABLET BY MOUTH ONE TIME DAILY 1/24/19  Yes Roseanna Velazquez MD   bismuth subsalicylate (PEPTO-BISMOL PO) Take  by mouth as needed. Yes Provider, Historical   Wheat Dextrin (BENEFIBER CLEAR) 3 gram/3.5 gram pwpk Take  by mouth daily. Yes Provider, Historical   metoprolol tartrate (LOPRESSOR) 25 mg tablet TAKE ONE-QUARTER TABLET BY MOUTH EVERY DAY 5/22/18  Yes Roseanna Velazquez MD   aspirin delayed-release 81 mg tablet Take 1 Tab by mouth daily. 8/4/17  Yes Minor, Lou Ruff, NP   LORazepam (ATIVAN) 0.5 mg tablet Take 0.5 mg by mouth every morning. Yes Provider, Historical   hyoscyamine SL (LEVSIN/SL) 0.125 mg SL tablet 0.125 mg by SubLINGual route Before breakfast, lunch, and dinner. Yes Provider, Historical   acetaminophen (TYLENOL ARTHRITIS PAIN) 650 mg CR tablet Take 650 mg by mouth two (2) times daily as needed for Pain. Yes Provider, Historical   raNITIdine (ZANTAC) 150 mg tablet Take 150 mg by mouth Before breakfast and dinner. Yes Provider, Historical   cholecalciferol (VITAMIN D3) 1,000 unit tablet Take 1,000 Units by mouth daily.    Yes Provider, Historical   FERROUS FUMARATE/VIT BCOMP,C (SUPER B COMPLEX PO) Take  by mouth daily (after dinner). Yes Provider, Historical   ondansetron (ZOFRAN ODT) 4 mg disintegrating tablet Take 1 Tab by mouth every eight (8) hours as needed for Nausea. 3/10/19   Krystal Gonzalez MD          Allergies   Allergen Reactions    Latex Itching    Dilaudid [Hydromorphone (Bulk)] Other (comments)     ? loss of consciousness    Altace [Ramipril] Unknown (comments)    Ambien [Zolpidem] Unknown (comments)    [employer]ia [employer] Test [Blood Sugar Diagnostic, Disc-Type] Unknown (comments)    Aspirin Unknown (comments)    Astelin [Azelastine] Unknown (comments)    Atenolol Unknown (comments)    Banex La Unknown (comments)    Benicar [Olmesartan] Unknown (comments)    Carbocaine [Mepivacaine] Unknown (comments)    Cleocin [Clindamycin Hcl] Hives    Codeine Nausea and Vomiting    Codeine-Asa-Salicyl-Acetam-Caf Other (comments)     Stomach pain    Cortisporin [Neomy-Polymyxinb-Bacitracin-Hc] Unknown (comments)    Cymbalta [Duloxetine] Other (comments)    Cytotec [Misoprostol] Unknown (comments)    Dilaudid [Hydromorphone] Unknown (comments)     Patient states that she was unable to respond, but was breathing    Effexor [Venlafaxine] Unknown (comments)    Epinephrine Unknown (comments)     Heart racing    Estra-D Unknown (comments)    Estrace [Estradiol] Unknown (comments)    Fentanyl Nausea and Vomiting and Unknown (comments)     shakes    Flexeril [Cyclobenzaprine] Unable to Obtain     \"drugs me\"    Flonase [Fluticasone] Unknown (comments)    Flunisolide Unknown (comments)    Guiatussin W/Codeine Unknown (comments)    Ketek [Telithromycin] Nausea Only    Klonopin [Clonazepam] Unknown (comments)    Lexapro [Escitalopram] Nausea and Vomiting    Lodine [Etodolac] Nausea and Vomiting    Morphine Unknown (comments)    Nsaids (Non-Steroidal Anti-Inflammatory Drug) Nausea and Vomiting    Other Medication Unknown (comments)     Gareth Alford    Paxil [Paroxetine Hcl] Nausea and Vomiting    Pcn [Penicillins] Hives    Percocet [Oxycodone-Acetaminophen] Unknown (comments)    Percodan [Oxycodone Hcl-Oxycodone-Asa] Unknown (comments)    Pneumococcal 23-Mary Ps Vaccine Swelling    Proctocream-Hc [Hydrocortisone] Unknown (comments)    Prozac [Fluoxetine] Nausea Only     Suicidal visions      Pseudoephedrine Other (comments)    Quinidine Unknown (comments)    Reglan [Metoclopramide] Unknown (comments)    Remeron [Mirtazapine] Other (comments)    Resaid Other (comments)     inflammed eyes    Robaxin [Methocarbamol] Unknown (comments)    Sonata [Zaleplon] Unknown (comments)    Sulfa (Sulfonamide Antibiotics) Hives    Surmontil [Trimipramine] Nausea and Vomiting    Talwin [Pentazocine Lactate] Unknown (comments)    Toradol [Ketorolac Tromethamine] Nausea and Vomiting    Ultram [Tramadol] Unknown (comments)    Vancomycin Nausea Only    Vasoconstrictor Drug Unknown (comments)    Vigamox [Moxifloxacin] Rash and Swelling    Vioxx [Rofecoxib] Unknown (comments)    Voltaren [Diclofenac Sodium] Itching    Wellbutrin [Bupropion Hcl] Nausea and Vomiting    Zelnorm [Tegaserod Hydrogen Maleate] Unknown (comments)    Zoloft [Sertraline] Nausea and Vomiting           Review of Systems   Constitutional: Positive for malaise/fatigue and weight loss. Respiratory: Positive for cough. Negative for shortness of breath. Cardiovascular: Negative for chest pain, palpitations and leg swelling. Gastrointestinal: Positive for abdominal pain and nausea. Negative for constipation, diarrhea, heartburn and vomiting. Genitourinary: Negative for frequency. Musculoskeletal: Negative for joint pain and myalgias. Skin: Negative for rash. Neurological: Negative for tingling, sensory change, focal weakness and headaches. Psychiatric/Behavioral: Positive for memory loss. Negative for depression. The patient is not nervous/anxious and does not have insomnia.             Objective:   Physical Exam   Constitutional: No distress. Cardiovascular: Regular rhythm and normal heart sounds. No murmur heard. Pulmonary/Chest: Effort normal and breath sounds normal. She has no wheezes. She has no rales. Decreased BS in the lower L side   Abdominal: Bowel sounds are normal. She exhibits no mass. There is no hepatosplenomegaly. There is tenderness in the epigastric area and suprapubic area. There is no rigidity, no rebound and no guarding. Psychiatric:   Oriented to person & place. Day- Monday, Year- 2019, Month- March, President - \"the idiot\" would not say Trump         Visit Vitals  /56   Pulse 92   Temp 97.7 °F (36.5 °C) (Oral)   Resp 18   Ht 5' 2\" (1.575 m)   Wt 104 lb (47.2 kg)   SpO2 98%   BMI 19.02 kg/m²         Disclaimer:  Advised her to call back or return to office if symptoms worsen/change/persist.  Discussed expected course/resolution/complications of diagnosis in detail with patient. Medication risks/benefits/costs/interactions/alternatives discussed with patient. She was given an after visit summary which includes diagnoses, current medications, & vitals. She expressed understanding with the diagnosis and plan. Aspects of this note may have been generated using voice recognition software. Despite editing, there may be some syntax errors.        Syeda Isaac MD

## 2019-03-15 NOTE — TELEPHONE ENCOUNTER
Please call pt's  or POA. Please inquire if they have any concerns. I talked to her extensively this morning about her \"feeling bad\" and low Na levels. She never got CXR done that was ordered and not sure if she went to get labs done either. I'm not sure if this is an acute issue due to her Na level or something else.

## 2019-03-15 NOTE — TELEPHONE ENCOUNTER
Spoke to spouse, Mr. Fransico Sanchez (on HIPAA) he's had concerns about his wife's memory, repeating herself and has been forgetful. Advised per Dr. Elaine Fay, she needs to have labs and chest xray to help determine plan. She has not gone as of yet today. Unsure if she had labs drawn. She is \"supposed to drink less and eat more\" to help bring her sodium levels up. He verbalized understanding, will make sure she goes to have chest xray.

## 2019-03-15 NOTE — TELEPHONE ENCOUNTER
After hanging up with . Mann Garcia, patient's daughter George Huang (on HIPAA) called to let Dr. Gonzalo Hale know that she's had concerns about her mothers memory issues and repeating stories since summer of 2018. Reports progressively gotten worse over the last 3-4 weeks since she started getting \"sick\". She states her mom forgets to eat and she says she was instructed by the ER at discharge to drink 1 QT of water per day and that's what she has been doing. Advised per Dr. Gonzalo Hale he has concerns about her and  She is not acting herself. Advised Need labs & xray to determine next steps. Clay Hanson agrees with this and is aware of plan for chest xray and labs. She will call her dad and make sure they get her to chest xray this evening.

## 2019-03-16 ENCOUNTER — TELEPHONE (OUTPATIENT)
Dept: INTERNAL MEDICINE CLINIC | Age: 84
End: 2019-03-16

## 2019-03-16 LAB
BUN SERPL-MCNC: 16 MG/DL (ref 8–27)
BUN/CREAT SERPL: 12 (ref 12–28)
CHLORIDE SERPL-SCNC: 97 MMOL/L (ref 96–106)
CO2 SERPL-SCNC: 23 MMOL/L (ref 20–29)
CREAT SERPL-MCNC: 1.36 MG/DL (ref 0.57–1)
GLUCOSE SERPL-MCNC: 87 MG/DL (ref 65–99)
POTASSIUM SERPL-SCNC: 4.4 MMOL/L (ref 3.5–5.2)
SODIUM SERPL-SCNC: 133 MMOL/L (ref 134–144)

## 2019-03-16 NOTE — TELEPHONE ENCOUNTER
On Call Note  Called by the patient's daughter to note that the patient has been acting confused and that she was unsure about her clinical status. She had an office visit today with concerns for hyponatremia (last tested at a sodium of 126 on 3/10). Order for CMP and CXR but not yet resulted. Unsure if it were performed today. Advised if her mother is becoming more confused, she should be seen in the ED for potential symptomatic hyponatremia. The patient understands. Red flags and potential complications reviewed.

## 2019-03-17 NOTE — PROGRESS NOTES
Please call patient and daughter. Notify them both CXR normal.  Labs improved, still low Na but improved. Renal fxn stable. No changes to plan. Would recommend her RTC w/ family to discuss next steps.

## 2019-03-18 ENCOUNTER — TELEPHONE (OUTPATIENT)
Dept: INTERNAL MEDICINE CLINIC | Age: 84
End: 2019-03-18

## 2019-03-18 NOTE — TELEPHONE ENCOUNTER
Spoke to Gertrude, patients daughter, (on HIPAA) scheduled a f/u visit with Dr. Hector Rodriguez for 4/9/19 at 4:30pm per her request so she can come with her mom. Spoke to patient, states she is feeling better. She's eating now, said she'd never \"felt that bad her life\" says she's getting upset with her daughter though because \"she's making her feel like she has dementia\". Patient informed of Appt time with Dr. Hector Rodriguez she is okay with this and aware her daughter will be coming with her.

## 2019-03-18 NOTE — TELEPHONE ENCOUNTER
----- Message from Dalton Cid MD sent at 3/17/2019  7:42 PM EDT -----  Please call patient and daughter. Notify them both CXR normal.  Labs improved, still low Na but improved. Renal fxn stable. No changes to plan. Would recommend her RTC w/ family to discuss next steps.

## 2019-03-18 NOTE — PROGRESS NOTES
Message left for Ms. Kenia Kaur to return call to go over normal CXR results and labs, see how she is feeling, asked for a returned call. Spoke to [facility], patient's Daughter, advised labs & CXR results came back, she states she talked to Dr. Brittany Quesada, on call over the weekend and was informed was normal & labs are better. Wants Dr. Camilo Wells to know, Daniel Lester argued with me Friday night and swore up and down she didn't have blood drawn Friday after the appointment\". She then remembered on Sunday that she had them drawn. States she she is eating now, seems to be doing a little better. She will call back to schedule an appt after she talks with her mom/dad on a good time for them to come in for a follow up.

## 2019-03-18 NOTE — TELEPHONE ENCOUNTER
Spoke to Gertrude, patient's Daughter, advised labs & CXR results came back, she states she talked to Dr. Miguel Conde, on call over the weekend and was informed was normal & labs are better. Wants Dr. Joselo Lovell to know, Leo Ortiz argued with me Friday night and swore up and down she didn't have blood drawn Friday after the appointment\". She then remembered on Sunday that she had them drawn. States she she is eating now, seems to be doing a little better. She will call back to schedule an appt after she talks with her mom/dad and siblings on a good time for them to come in for a follow up.

## 2019-03-25 ENCOUNTER — TELEPHONE (OUTPATIENT)
Dept: INTERNAL MEDICINE CLINIC | Age: 84
End: 2019-03-25

## 2019-03-25 RX ORDER — LORAZEPAM 0.5 MG/1
0.5 TABLET ORAL
OUTPATIENT
Start: 2019-03-25

## 2019-03-25 NOTE — TELEPHONE ENCOUNTER
Patient called to advise she transferred all her meds back to [facility], some were being sent to  [facility]ig[facility], at husbands request. She also sent a refill request to us for Ativan, which is supposed to go to [facility], she will notify jyotsna to send to appropriate MD.

## 2019-04-08 ENCOUNTER — TELEPHONE (OUTPATIENT)
Dept: INTERNAL MEDICINE CLINIC | Age: 84
End: 2019-04-08

## 2019-04-08 NOTE — TELEPHONE ENCOUNTER
[address] [facility] ([place] [address]) [phone] (H)     Pt's daughter requesting brain MRI. Says mother is repeating everything and getting upset if they mention it. Was she supposed to have f/u from [facility]IA 2 years ago. Please call prior to pt's appt tomorrow.   Would like any further testing to be referred to as testing from the [facility] and for no one to mention dementia

## 2019-04-08 NOTE — TELEPHONE ENCOUNTER
Spoke to patient's daughter, Kiran Walshney (on HIPAA) advised per . Mylene Sanderson will not mention dementia, will discuss any need for tests/imaging tomorrow during exam. She verbalized understanding.

## 2019-04-09 ENCOUNTER — OFFICE VISIT (OUTPATIENT)
Dept: INTERNAL MEDICINE CLINIC | Age: 84
End: 2019-04-09

## 2019-04-09 VITALS
DIASTOLIC BLOOD PRESSURE: 66 MMHG | OXYGEN SATURATION: 96 % | SYSTOLIC BLOOD PRESSURE: 126 MMHG | HEART RATE: 90 BPM | TEMPERATURE: 97.4 F | HEIGHT: 62 IN | BODY MASS INDEX: 19.14 KG/M2 | RESPIRATION RATE: 20 BRPM | WEIGHT: 104 LBS

## 2019-04-09 DIAGNOSIS — R41.3 MEMORY LOSS: ICD-10-CM

## 2019-04-09 DIAGNOSIS — Z86.73 HISTORY OF TIA (TRANSIENT ISCHEMIC ATTACK): ICD-10-CM

## 2019-04-09 DIAGNOSIS — I10 ESSENTIAL HYPERTENSION, BENIGN: ICD-10-CM

## 2019-04-09 DIAGNOSIS — E87.1 HYPONATREMIA: Primary | ICD-10-CM

## 2019-04-09 RX ORDER — METOPROLOL TARTRATE 25 MG/1
TABLET, FILM COATED ORAL
Qty: 90 TAB | Refills: 0 | Status: SHIPPED | OUTPATIENT
Start: 2019-04-09 | End: 2019-10-25 | Stop reason: SDUPTHER

## 2019-04-09 RX ORDER — AMLODIPINE BESYLATE 5 MG/1
TABLET ORAL
Qty: 90 TAB | Refills: 0 | Status: SHIPPED | OUTPATIENT
Start: 2019-04-09 | End: 2019-08-24

## 2019-04-09 NOTE — PATIENT INSTRUCTIONS

## 2019-04-14 NOTE — H&P
[address]   [place], [address]   HISTORY AND PHYSICAL       Name:  Saman Gentile   MR#:  [id number]   :  1935   Account #:  [de-identified]        Date of Adm:  2017       PRIMARY CARE PROVIDER: Sav Love MD.    SOURCE OF INFORMATION: The patient. CHIEF COMPLAINT: Abdominal pain and diarrhea 5 days' duration. HISTORY OF PRESENT ILLNESS: This is an 44-year-old    female with past medical history significant for fibromyalgia, anxiety,   irritable bowel syndrome, degenerative joint disease, hypertension,   history of skin cancer, chronic back pain, chronic kidney disease,   gastroparesis, history of PE, depression and C. Difficile who presented to   [facility] with abdominal pain and diarrhea 5   days' duration. The pain is at lower abdominal, crampy,   on and off, 5/10, and worse when she goes to the bathroom,   associated with non-bloody loose stool, diarrhea 2-3 times daily. No   fever, chills, sweating or urinary complaint. She called her gastroenterologist  and then she came to [facility]. No left side chest   pain, palpitations, shortness of breath, cough or wheezing. In ER   around 11:59 a.m. the patient noted with incomprehensible speech   with ataxia and code stroke was called. NIH score at that time was 5. CT scan of the head without contrast was done. The study showed   no acute intracranial process. Tele neurologist is consulted, and  recommended for TIA workup. Her dysarthria and ataxia lasted from   11:59 to 12:09 p.m. No right or left-sided weakness. She had history of   bilateral PE in  and completed 9 months of treatment. In ER, stool test is positive for C. difficile and the patient   referred to hospitalist service for further evaluation and admission. REVIEW OF SYSTEMS: Pertinent positive finding mentioned in HPI.    All systems reviewed, not any other positive findings. PAST MEDICAL HISTORY:    1. Fibromyalgia. 2. Anxiety. 3. Irritable bowel syndrome. 4. Basal cell carcinoma of the face, back and neck. 5. Degenerative joint disease. 6. Hypercholesterolemia. 7. Hypertension. 8. Hyponatremia. 9. History of bilateral PE.    10. Depression. MEDICATIONS:  Prior to admission include:   1. Ativan 1 mg p.o. at bedtime. 2. Lorazepam 0.5 mg every morning. 3. Hyoscyamine 0.125 mg sublingual before breakfast, lunch and   dinner. 4. Tylenol 650 mg every 6 hours as needed. 5. Probiotic 1 tab p.o. daily. 6. Pepto-Bismol 262 mg 2 tabs by mouth every 3 hours as needed. 7. Calcium carbonate 300 mg 1 tab p.o. daily. 8. Cholecalciferol 1000 units p.o. daily. 9. Ferrous sulfate 1 tab p.o. after dinner. 10. Amlodipine 5 mg p.o. daily. 11. Metoprolol 25 mg p.o. daily. 12. Ranitidine 150 mg p.o. before breakfast and dinner. 13. MiraLax 17 g p.o. daily. ALLERGIES:    1. LATEX. 2. DILAUDID. 3. RAMIPRIL. 4. AMBIEN. 5. ASPIRIN. 6. ATENOLOL. 7. BENICAR. 8. CARBOCAINE. 9. CLEOCIN. 10. CODEINE. 11. CODEINE-ASA-SALICYL ACETAM-CAF  12. [facility]. 14. CYTOTEC. 15. DARVOCET A500. 16. DARVON. 17. DILAUDID. 18. EFFEXOR. 19. EPINEPHRINE.    20. ESTRA D    21. FENTANYL. 22. FLEXERIL. 23. FLONASE. 24. FLUNISOLIDE. [address].   [employer]. 27. [address]. [address]. 29. LODINE. 30. MORPHINE.    31. NONSTEROIDAL ANTI-INFLAMMATORY DRUGS. 32. PAXIL   33. PENICILLIN. 29. PERCOCET. [address] 36. PNEUMOCCAL VAC. 40. SUDAFED. 38. QUINIDINE  39. REGLAN. [address] [place] [address]. 41. RESAID  42. ROBAXIN. [address]. 40. SULFA. [address]. 46. TALWIN. 47. TORADOL. 48. ULTRAM.    49. VASOCONSTRICTOR DRUGS. 48. VIGAMOX. 51. VIOXX. 52. VOLTAREN. [place] [address]. [address]. 56. ZOLOFT.      SOCIAL HISTORY: The patient lives with her . No tobacco or   alcohol abuse. CODE STATUS: FULL CODE. Advanced care planning discussed with the patient. She has advanced   directive. She wants to be a FULL CODE, and her , Rosi Javier, is her medical decision maker when the need arises. PHYSICAL EXAMINATION:   VITAL SIGNS: Blood pressure 154/56, pulse 77, temperature 97.8,   respiratory rate 16, saturation of oxygen 100% on room air. GENERAL APPEARANCE: The patient is alert, cooperative, not in   distress. She appears her stated age. HEENT: Pink conjunctivae. Anicteric sclerae. Moist tongue and buccal   mucosa. LUNGS: Clear to auscultation bilaterally. CHEST WALL: No tenderness or deformity. CARDIOVASCULAR: Regular rate and rhythm. S1 and S2 normal. No   murmur or gallop. ABDOMEN: Soft. Bowel sounds normal. No mass or organomegaly. EXTREMITIES: No cyanosis or edema. SKIN: No rash or lesion. CENTRAL NERVOUS SYSTEM: Conscious, well oriented to time,   place, and person. Motor 5/5. Sensation intact. Cranial nerves 2-12   grossly intact. EKG: Normal sinus rhythm, ventricular rate 63 beats per minute. Nonspecific ST-T wave abnormalities. Comparing to EKG 12/2010, no   significant change was found. LABORATORY: White blood cell count 5.8, hemoglobin 12, hematocrit   34.6, platelet count 057. Urinalysis unremarkable. INR 1.0. Chemistry:   Sodium 131, potassium 4, chloride 98, CO2 , anion gap 7, glucose   91, BUN 16, creatinine 1.37. Total protein 6.7, albumin 3.7, ALT 21,   AST 21, alkaline phosphatase 44, lipase 205. Troponin less than 0.04. Stool positive for C. difficile. CT scan of the abdomen and pelvis, no acute abnormality identified. CT scan of the head without contrast, mild small vessel ischemic   change, right basal ganglia lacunar infarct, no acute abnormalities   identified.     ASSESSMENT AND PLAN: This is an 35-year-old  female   with past medical history significant for fibromyalgia, anxiety, irritable   bowel syndrome, hyponatremia, degenerative joint disease,   hypertension, hypercholesterolemia, basal cell skin carcinoma, chronic   back pain, chronic kidney disease, gastroparesis, history of bilateral   pulmonary embolism, depression, presented to [facility]   [facility] with 5 days' history of abdominal pain and diarrhea and   positive for Clostridium difficile in emergency room, and the patient   also noted for dysarthria and ataxia, lasted for 15 minutes and code   stroke was called and evaluated by teleneurologist and referred to   hospitalist service for further evaluation and admission. 1. Diarrhea secondary to Clostridium difficile infection. 2. Transient ischemic attack. 3. Hyponatremia. 4. Dehydration due to diarrhea. 5. Hypertension. 6. History of anxiety and depression. 7. History of gastroparesis. PLAN:   1. Diarrhea due to C. difficile infection. Admit the patient to telemetry   service. The patient is dehydrated. will give her normal saline at   100 mL/hour and vancomycin 250 mg p.o. q.6 h. and metronidazole   500 mg IV q.8 h. The patient with history of C. difficile in the past and   gastroparesis and irritable bowel syndrome. will involve   gastroenterologist.   2. TIA. The patient had dysarthria and ataxia, lasted for 15 minutes. Initial CT without contrast negative. will do stroke workup including   MRI of the brain, lipid panel, hemoglobin A1c, echocardiography, and   consult neurologist. The patient claimed that her dysarthria related to   dehydration or dry throat, but her ER nurse and nurse practitioner, they   say she had a significant dysarthria and ataxia, lasted for 15 minutes. We will consult neurologist.   3. Hyponatremia, likely due to diarrhea due to intravascular volume   depletion. Continue normal saline and repeat BMP in a.m. and check   TSH. 4. Dehydration due to diarrhea.  Continue IV fluids and monitor   electrolytes. 5. Hypertension. Continue home medication, Norvasc and metoprolol. 6. History of anxiety and depression, stable. Continue home   medication, lorazepam.   7. History of gastroparesis. The patient presented with abdominal pain,   but no vomiting. We will consult gastroenterologist.     DVT prophylaxis. Sequential compressive device.         MD ANGELES Bhakta / Renan Ortega   D:  08/02/2017   17:17   T:  08/02/2017   18:57   Job #:  [id number] [employer]

## 2019-04-29 ENCOUNTER — TELEPHONE (OUTPATIENT)
Dept: INTERNAL MEDICINE CLINIC | Age: 84
End: 2019-04-29

## 2019-04-29 NOTE — TELEPHONE ENCOUNTER
Patient notified yes it is recommended, and she can get from local pharmacy without a prescription per Dr. Jaida Albarran.

## 2019-05-14 ENCOUNTER — HOSPITAL ENCOUNTER (OUTPATIENT)
Dept: LAB | Age: 84
Discharge: HOME | End: 2019-05-14
Payer: MEDICARE

## 2019-05-14 PROCEDURE — 80048 BASIC METABOLIC PNL TOTAL CA: CPT

## 2019-05-14 PROCEDURE — 36415 COLL VENOUS BLD VENIPUNCTURE: CPT

## 2019-05-15 LAB
BUN SERPL-MCNC: 21 MG/DL (ref 8–27)
BUN/CREAT SERPL: 16 (ref 12–28)
CHLORIDE SERPL-SCNC: 98 MMOL/L (ref 96–106)
CO2 SERPL-SCNC: 23 MMOL/L (ref 20–29)
CREAT SERPL-MCNC: 1.32 MG/DL (ref 0.57–1)
GLUCOSE SERPL-MCNC: 171 MG/DL (ref 65–99)
POTASSIUM SERPL-SCNC: 4.5 MMOL/L (ref 3.5–5.2)
SODIUM SERPL-SCNC: 136 MMOL/L (ref 134–144)

## 2019-05-15 NOTE — PROGRESS NOTES
Letter sent to patient. All labs are stable or at goal except for elevated BS, non-fasting. Renal fxn stable.

## 2019-05-20 ENCOUNTER — TELEPHONE (OUTPATIENT)
Dept: INTERNAL MEDICINE CLINIC | Age: 84
End: 2019-05-20

## 2019-05-20 NOTE — TELEPHONE ENCOUNTER
Spoke to patient, advised Dr. Deborah Mcgee wants her to have the MRI of the brain to look for any changes, it's apart of the work up that he is doing for some of the issues she's been having lately, she verbalized understanding.

## 2019-05-25 ENCOUNTER — HOSPITAL ENCOUNTER (OUTPATIENT)
Dept: MRI IMAGING | Age: 84
Discharge: HOME | End: 2019-05-25
Attending: INTERNAL MEDICINE
Payer: MEDICARE

## 2019-05-25 DIAGNOSIS — Z86.73 HISTORY OF TIA (TRANSIENT ISCHEMIC ATTACK): ICD-10-CM

## 2019-05-25 PROCEDURE — 70551 MRI BRAIN STEM W/O DYE: CPT

## 2019-05-28 NOTE — PROGRESS NOTES
Results reviewed. Please call patient and also her daughter. Notify her that her MRI is overall stable. There is some progression of worsening small vessel disease but no stroke or mass. Would recommend seeing Dr Renard Seals for neurospych testing due to memory changes.

## 2019-05-30 DIAGNOSIS — R41.3 MEMORY LOSS: Primary | ICD-10-CM

## 2019-05-30 DIAGNOSIS — I73.9 SMALL VESSEL DISEASE (HCC): ICD-10-CM

## 2019-05-30 NOTE — PROGRESS NOTES
Patient & her daughter Campbell Limb notified of MRI results and recommendation, they both verbalized understanding and will contact Dr. Vera Winkler for additional testing of diagnosis and memory changes. Contact information provided, referral placed in [facility] care per Dr. Ayad Ramirez.

## 2019-06-04 ENCOUNTER — TELEPHONE (OUTPATIENT)
Dept: INTERNAL MEDICINE CLINIC | Age: 84
End: 2019-06-04

## 2019-06-05 NOTE — TELEPHONE ENCOUNTER
Verified patient identity with two identifiers. Spoke with patient by phone. She would like to come in with her daughter to further discuss MRI results. Appt made for next week. Patient verbalized understanding.

## 2019-06-10 ENCOUNTER — TELEPHONE (OUTPATIENT)
Dept: INTERNAL MEDICINE CLINIC | Age: 84
End: 2019-06-10

## 2019-06-10 NOTE — TELEPHONE ENCOUNTER
Pt's daughter wants Dr Omi Cain to know ahead of tomorrow's appt. That Mom's short term memory remains impaired, she does not want to see Dr Penelope Yanes, now has an appt. With Izzy Jenna with [facility] and wants a referral tomorrow. Also stated they said fee's are involved so not sure she is a medicare provider and encouraged her to find out, appt. 6/23/19. She will be coming with her to appt.

## 2019-06-11 ENCOUNTER — OFFICE VISIT (OUTPATIENT)
Dept: INTERNAL MEDICINE CLINIC | Age: 84
End: 2019-06-11

## 2019-06-11 VITALS
HEART RATE: 71 BPM | TEMPERATURE: 97.9 F | OXYGEN SATURATION: 98 % | BODY MASS INDEX: 20.43 KG/M2 | SYSTOLIC BLOOD PRESSURE: 138 MMHG | HEIGHT: 62 IN | DIASTOLIC BLOOD PRESSURE: 62 MMHG | RESPIRATION RATE: 16 BRPM | WEIGHT: 111 LBS

## 2019-06-11 DIAGNOSIS — R41.3 MEMORY LOSS: Primary | ICD-10-CM

## 2019-06-11 RX ORDER — LORAZEPAM 1 MG/1
1 TABLET ORAL
Status: ON HOLD | COMMUNITY
End: 2019-08-24 | Stop reason: SDUPTHER

## 2019-06-11 RX ORDER — LORAZEPAM 1 MG/1
1 TABLET ORAL
COMMUNITY
Start: 2016-04-28 | End: 2019-06-11 | Stop reason: SDUPTHER

## 2019-06-11 NOTE — PROGRESS NOTES
Carmella Davalos is a 80 y.o. female who was seen in clinic today (6/11/2019). Assessment & Plan:   Diagnoses and all orders for this visit:    1. Memory loss- chronic issue, I spent 15 minutes with the patient and >50% of the time was spent reviewing results of previous HCT and new MRI, reviewing treatment, and w/u. Carotid w/u normal in '17. Agree w/ neuropsych testing and reviewed why it is necessary. Pt denies any memory issues but repeatedly had me repeat things. Not appropriate for memory medications at this time. Will optimize vascular status. Will continue w/ metoprolol and aspirin. Avoiding statin due to allergies. Follow-up and Dispositions    · Follow up - already scheduled (~6 wks for MWV). Subjective:   [place] was seen today for Results    She RTC with her daughter to review recent testing. She reports not noticing any member changes. Family reports they are still seeing changes. Pt reports she has always had memory issues unless she writes things down. She has f/u on 6/24 for neuropsych testing. Brief Labs:     Lab Results   Component Value Date/Time    Sodium 136 05/14/2019 11:21 AM    Potassium 4.5 05/14/2019 11:21 AM    Creatinine 1.32 05/14/2019 11:21 AM    Creatinine, External 1.30 03/20/2018          Prior to Admission medications    Medication Sig Start Date End Date Taking? Authorizing Provider   LORazepam (ATIVAN) 1 mg tablet Take 1 mg by mouth nightly. Yes Provider, Historical   Lactobacillus acidophilus (PROBIOTIC PO) Take  by mouth daily. Yes Provider, Historical   metoprolol tartrate (LOPRESSOR) 25 mg tablet TAKE ONE-QUARTER TABLET BY MOUTH EVERY DAY 4/9/19  Yes Mali Solorio MD   amLODIPine (NORVASC) 5 mg tablet TAKE ONE TABLET BY MOUTH ONE TIME DAILY 4/9/19  Yes Mali Solorio MD   sod chlor-bicarb-squeez bottle (SINUS RINSE PEDIATRIC STARTER) pkdv 1 Each by sinus irrigation route daily.  2/23/19  Yes Rea Stoner MD Wheat Dextrin (BENEFIBER CLEAR) 3 gram/3.5 gram pwpk Take  by mouth daily. Yes Provider, Historical   aspirin delayed-release 81 mg tablet Take 1 Tab by mouth daily. 8/4/17  Yes Minor, Sharee Bui NP   LORazepam (ATIVAN) 0.5 mg tablet Take 0.5 mg by mouth every morning. Yes Provider, Historical   hyoscyamine SL (LEVSIN/SL) 0.125 mg SL tablet 0.125 mg by SubLINGual route Before breakfast, lunch, and dinner. Yes Provider, Historical   acetaminophen (TYLENOL ARTHRITIS PAIN) 650 mg CR tablet Take 650 mg by mouth two (2) times daily as needed for Pain. Yes Provider, Historical   raNITIdine (ZANTAC) 150 mg tablet Take 150 mg by mouth Before breakfast and dinner. Yes Provider, Historical   cholecalciferol (VITAMIN D3) 1,000 unit tablet Take 1,000 Units by mouth daily. Yes Provider, Historical   FERROUS FUMARATE/VIT BCOMP,C (SUPER B COMPLEX PO) Take  by mouth daily (after dinner). Yes Provider, Historical   bismuth subsalicylate (PEPTO-BISMOL PO) Take  by mouth as needed. Provider, Historical          Allergies   Allergen Reactions    Latex Itching    Dilaudid [Hydromorphone (Bulk)] Other (comments)     ? loss of consciousness    Altace [Ramipril] Unknown (comments)    Ambien [Zolpidem] Unknown (comments)    [employer] Test [Blood Sugar Diagnostic, Disc-Type] Unknown (comments)    Aspirin Unknown (comments)    Astelin [Azelastine] Unknown (comments)    Atenolol Unknown (comments)    Banex La Unknown (comments)    Benicar [Olmesartan] Unknown (comments)    Carbocaine [Mepivacaine] Unknown (comments)    Cleocin [Clindamycin Hcl] Hives    Codeine Nausea and Vomiting    Codeine-Asa-Salicyl-Acetam-Caf Other (comments)     Stomach pain    Cortisporin [Neomy-Polymyxinb-Bacitracin-Hc] Unknown (comments)    Cymbalta [Duloxetine] Other (comments)    Cytotec [Misoprostol] Unknown (comments)    Dilaudid [Hydromorphone] Unknown (comments)     Patient states that she was unable to respond, but was breathing    Effexor [Venlafaxine] Unknown (comments)    Epinephrine Unknown (comments)     Heart racing    Estra-D Unknown (comments)    Estrace [Estradiol] Unknown (comments)    Fentanyl Nausea and Vomiting and Unknown (comments)     shakes    Flexeril [Cyclobenzaprine] Unable to Obtain     \"drugs me\"    Flonase [Fluticasone] Unknown (comments)    Flunisolide Unknown (comments)    Guiatussin W/Codeine Unknown (comments)    Ketek [Telithromycin] Nausea Only    Klonopin [Clonazepam] Unknown (comments)    Lexapro [Escitalopram] Nausea and Vomiting    Lodine [Etodolac] Nausea and Vomiting    Morphine Unknown (comments)    Nsaids (Non-Steroidal Anti-Inflammatory Drug) Nausea and Vomiting    Other Medication Unknown (comments)     Gareth Alford    Paxil [Paroxetine Hcl] Nausea and Vomiting    Pcn [Penicillins] Hives    Percocet [Oxycodone-Acetaminophen] Unknown (comments)    Percodan [Oxycodone Hcl-Oxycodone-Asa] Unknown (comments)    Pneumococcal 23-Mary Ps Vaccine Swelling    Proctocream-Hc [Hydrocortisone] Unknown (comments)    Prozac [Fluoxetine] Nausea Only     Suicidal visions      Pseudoephedrine Other (comments)    Quinidine Unknown (comments)    Reglan [Metoclopramide] Unknown (comments)    Remeron [Mirtazapine] Other (comments)    Resaid Other (comments)     inflammed eyes    Robaxin [Methocarbamol] Unknown (comments)    Sonata [Zaleplon] Unknown (comments)    Sulfa (Sulfonamide Antibiotics) Hives    Surmontil [Trimipramine] Nausea and Vomiting    Talwin [Pentazocine Lactate] Unknown (comments)    Toradol [Ketorolac Tromethamine] Nausea and Vomiting    Ultram [Tramadol] Unknown (comments)    Vancomycin Nausea Only    Vasoconstrictor Drug Unknown (comments)    Vigamox [Moxifloxacin] Rash and Swelling    Vioxx [Rofecoxib] Unknown (comments)    Voltaren [Diclofenac Sodium] Itching    Wellbutrin [Bupropion Hcl] Nausea and Vomiting    Zelnorm [Tegaserod Hydrogen Maleate] Unknown (comments)    Zoloft [Sertraline] Nausea and Vomiting           ROS - deferred    Objective:   Physical Exam - deferred      Visit Vitals  /62   Pulse 71   Temp 97.9 °F (36.6 °C) (Oral)   Resp 16   Ht 5' 2\" (1.575 m)   Wt 111 lb (50.3 kg)   SpO2 98%   BMI 20.30 kg/m²         Disclaimer:  Advised her to call back or return to office if symptoms worsen/change/persist.  Discussed expected course/resolution/complications of diagnosis in detail with patient. Medication risks/benefits/costs/interactions/alternatives discussed with patient. She was given an after visit summary which includes diagnoses, current medications, & vitals. She expressed understanding with the diagnosis and plan. Aspects of this note may have been generated using voice recognition software. Despite editing, there may be some syntax errors.        Silvano Sr MD

## 2019-07-05 PROBLEM — F02.80 ALZHEIMER'S DISEASE (HCC): Status: ACTIVE | Noted: 2019-07-05

## 2019-07-05 PROBLEM — G30.9 ALZHEIMER'S DISEASE (HCC): Status: ACTIVE | Noted: 2019-07-05

## 2019-07-23 ENCOUNTER — TELEPHONE (OUTPATIENT)
Dept: INTERNAL MEDICINE CLINIC | Age: 84
End: 2019-07-23

## 2019-07-23 ENCOUNTER — OFFICE VISIT (OUTPATIENT)
Dept: INTERNAL MEDICINE CLINIC | Age: 84
End: 2019-07-23

## 2019-07-23 VITALS
SYSTOLIC BLOOD PRESSURE: 124 MMHG | DIASTOLIC BLOOD PRESSURE: 64 MMHG | TEMPERATURE: 97.5 F | HEIGHT: 62 IN | HEART RATE: 86 BPM | BODY MASS INDEX: 19.98 KG/M2 | OXYGEN SATURATION: 97 % | WEIGHT: 108.6 LBS | RESPIRATION RATE: 12 BRPM

## 2019-07-23 DIAGNOSIS — Z86.711 HISTORY OF PULMONARY EMBOLISM: ICD-10-CM

## 2019-07-23 DIAGNOSIS — M15.9 PRIMARY OSTEOARTHRITIS INVOLVING MULTIPLE JOINTS: ICD-10-CM

## 2019-07-23 DIAGNOSIS — F41.9 ANXIETY: ICD-10-CM

## 2019-07-23 DIAGNOSIS — Z00.00 MEDICARE ANNUAL WELLNESS VISIT, SUBSEQUENT: Primary | ICD-10-CM

## 2019-07-23 DIAGNOSIS — K58.8 OTHER IRRITABLE BOWEL SYNDROME: ICD-10-CM

## 2019-07-23 DIAGNOSIS — I10 ESSENTIAL HYPERTENSION, BENIGN: ICD-10-CM

## 2019-07-23 DIAGNOSIS — G30.1 LATE ONSET ALZHEIMER'S DISEASE WITHOUT BEHAVIORAL DISTURBANCE (HCC): ICD-10-CM

## 2019-07-23 DIAGNOSIS — F33.40 RECURRENT MAJOR DEPRESSIVE DISORDER, IN REMISSION (HCC): ICD-10-CM

## 2019-07-23 DIAGNOSIS — Z23 ENCOUNTER FOR IMMUNIZATION: ICD-10-CM

## 2019-07-23 DIAGNOSIS — M79.7 FIBROMYALGIA: ICD-10-CM

## 2019-07-23 DIAGNOSIS — Z13.39 SCREENING FOR ALCOHOLISM: ICD-10-CM

## 2019-07-23 DIAGNOSIS — Z71.89 ADVANCED CARE PLANNING/COUNSELING DISCUSSION: ICD-10-CM

## 2019-07-23 DIAGNOSIS — N18.30 CKD (CHRONIC KIDNEY DISEASE) STAGE 3, GFR 30-59 ML/MIN (HCC): ICD-10-CM

## 2019-07-23 DIAGNOSIS — F02.80 LATE ONSET ALZHEIMER'S DISEASE WITHOUT BEHAVIORAL DISTURBANCE (HCC): ICD-10-CM

## 2019-07-23 RX ORDER — DONEPEZIL HYDROCHLORIDE 5 MG/1
5 TABLET, FILM COATED ORAL
Qty: 30 TAB | Refills: 2 | Status: SHIPPED | OUTPATIENT
Start: 2019-07-23 | End: 2019-08-30 | Stop reason: SINTOL

## 2019-07-23 NOTE — PROGRESS NOTES
Artie Montoya is a 80 y.o. female who was seen in clinic today (7/23/2019) for a full physical.           Assessment & Plan:   Diagnoses and all orders for this visit:    1. Medicare annual wellness visit, subsequent    2. Advanced care planning/counseling discussion    3. Encounter for immunization  -     diph,Pertuss,Acell,,Tet Vac-PF (ADACEL) 2 Lf-(2.5-5-3-5 mcg)-5Lf/0.5 mL susp; 0.5 mL by IntraMUSCular route once for 1 dose.  -     varicella-zoster recombinant, PF, (SHINGRIX) 50 mcg/0.5 mL susr injection; 0.5 mL IM once now and then repeat in 2-6 months    4. Screening for alcoholism  -     RI ANNUAL ALCOHOL SCREEN 15 MIN    5. Late onset Alzheimer's disease without behavioral disturbance- new dx, confirmed since last visit, reviewed testing, reviewed her concerns about medications and allergies, reviewed medication options and will start w/ meds below. If tolerating this well will consider Namenda or if if she can't tolerate Aricept will start on Namenda. Discussed in depth with daughter and  about expectations.  had a lot of good questions. He has started looking into [facility] [employer]. -     donepezil (ARICEPT) 5 mg tablet; Take 1 Tab by mouth nightly. 6. Recurrent major depressive disorder, in remission ([facility] [address]- well controlled, not an issue, followed by specialist for anxiety, she reports having depression in the past but not anymore    7. Anxiety- difficult to gauge, sounds stable, don't like BID dosing of benzo but with her allergies treatment is limited, will defer to specialist, will forward her note to notify her of new dementia dx. 8. Essential hypertension, benign- well controlled, continue current treatment     9. CKD (chronic kidney disease) stage 3, GFR 30-59 ml/min ([facility])- stable, continue current treatment     10. History of pulmonary embolism- asymptomatic, on ASA 81mg, due to allergies resistant to try other medications, has been asymptomatic, will continue    11.  Other irritable bowel syndrome- intermittent, overall stable, look for food and stress triggers    12. Fibromyalgia- stable, not a major issue, continue w/ regular exercise    13. Primary osteoarthritis involving multiple joints- chronic issue, she reports R should is the worse, back ist able, improved w/ stretching and exercise, defer to specialist.         Follow-up and Dispositions    · Return in about 6 months (around 1/23/2020), or if symptoms worsen or fail to improve.            ------------------------------------------------------------------------------------------    Subjective: Annual Wellness Visit- Subsequent Visit    End of Life Planning: This was discussed with her today and she has an advanced directive - a copy has been provided. Reviewed DNR/DNI and patient is interested in remaining a DNR, no changes made. Depression Screen:  3 most recent PHQ Screens 7/23/2019   PHQ Not Done -   Little interest or pleasure in doing things Not at all   Feeling down, depressed, irritable, or hopeless Not at all   Total Score PHQ 2 0   Trouble falling or staying asleep, or sleeping too much -   Feeling tired or having little energy -   Poor appetite, weight loss, or overeating -   Feeling bad about yourself - or that you are a failure or have let yourself or your family down -   Trouble concentrating on things such as school, work, reading, or watching TV -   Moving or speaking so slowly that other people could have noticed; or the opposite being so fidgety that others notice -   Thoughts of being better off dead, or hurting yourself in some way -   PHQ 9 Score -   How difficult have these problems made it for you to do your work, take care of your home and get along with others -         Fall Risk:   Fall Risk Assessment, last 12 mths 7/23/2019   Able to walk? Yes   Fall in past 12 months?  No   Fall with injury? -   Number of falls in past 12 months -   Fall Risk Score -       Abuse Screen:  Abuse Screening Questionnaire 7/23/2019   Do you ever feel afraid of your partner? N   Are you in a relationship with someone who physically or mentally threatens you? N   Is it safe for you to go home? Y         Alcohol Risk Factor Screening: On any occasion during the past 3 months, have you had more than 3 drinks containing alcohol? No  Do you average more than 7 drinks per week? No    Hearing Loss: The patient wears hearing aids. Cognition Screen: see A/P, has recent neuropsych testing. Activities of Daily Living:    Requires assistance with: no ADLs  Home contains: handrails and grab bars  Exercise: very active (swimming 3 days/wk)    Adult Nutrition Screen:  No risk factors noted. Health Maintenance  Daily Aspirin: yes  Bone Density: UTD - done 10/20/15 - normal  Glaucoma Screening: UTD    Immunizations:   Influenza: up to date  Tetanus: not UTD- script provided  Shingles: due for Shingrix - script provided  Pneumonia: not up to date - has reported allergy  Cancer screening:   Cervical: reviewed guidelines, n/a  Breast: reviewed guidelines, patient declined  Colon: reviewed guidelines, up to date       Patient Care Team:  Dina Chandra MD as PCP - General (Internal Medicine)  Damari Guzman MD (Nephrology)  Kanika Cedillo MD (Dermatology)  Michelle Rice MD (Ophthalmology)  Racquel Rogers NP (Psychiatry)  David Mirza (Pain Management)  Eleanor Lyman MD as Physician (Gastroenterology)  Anthony Hernandez MD (Jody) as Physician (Orthopedic Surgery)  Michelle Rice MD as Physician (Ophthalmology)       In addition to the above issues we also reviewed the following acute and/or chronic problems:    Neurology Review  Patient is seen for dementia. Since last visit: had official neuropsych testing in June '19. Note reviewed and revealed dementia and possible anxiety. We reviewed this report. She does not agree with it. She is not on any medications.      Cardiovascular Review  The patient has hypertension. Since last visit: no changes. She reports taking medications as instructed, no medication side effects noted. Diet and Lifestyle: generally follows a low sodium diet. Labs: reviewed and discussed with patient. The following sections were reviewed & updated as appropriate: PMH, PSH, FH, and SH. Patient Active Problem List   Diagnosis Code    Essential hypertension, benign I10    DDD (degenerative disc disease), cervical M50.30    Anxiety F41.9    Allergic rhinitis J30.9    DDD (degenerative disc disease), lumbar M51.36    CKD (chronic kidney disease) stage 3, GFR 30-59 ml/min ([facility]) N18.3    Pre-diabetes R73.03    History of pulmonary embolism Z86.711    Gastroparesis K31.84    Spinal stenosis, lumbar M48.061    Fibromyalgia M79.7    Gastroesophageal reflux disease without esophagitis K21.9    Irritable bowel syndrome with diarrhea K58.0    Recurrent major depressive disorder ([facility]) F33.9    Alzheimer's disease G30.9, F02.80          Prior to Admission medications    Medication Sig Start Date End Date Taking? Authorizing Provider   LORazepam (ATIVAN) 1 mg tablet Take 1 mg by mouth nightly. Yes Provider, Historical   metoprolol tartrate (LOPRESSOR) 25 mg tablet TAKE ONE-QUARTER TABLET BY MOUTH EVERY DAY 4/9/19  Yes Юлия Guzman MD   amLODIPine (NORVASC) 5 mg tablet TAKE ONE TABLET BY MOUTH ONE TIME DAILY 4/9/19  Yes Юлия Guzman MD   sod chlor-bicarb-squeez bottle (SINUS RINSE PEDIATRIC STARTER) pkdv 1 Each by sinus irrigation route daily. 2/23/19  Yes Williams Cespedes MD   bismuth subsalicylate (PEPTO-BISMOL PO) Take  by mouth as needed. Yes Provider, Historical   Wheat Dextrin (BENEFIBER CLEAR) 3 gram/3.5 gram pwpk Take  by mouth daily. Yes Provider, Historical   aspirin delayed-release 81 mg tablet Take 1 Tab by mouth daily. 8/4/17  Yes Brian, Tonya Ovalles NP   LORazepam (ATIVAN) 0.5 mg tablet Take 0.5 mg by mouth every morning.    Yes Provider, Historical   hyoscyamine SL (LEVSIN/SL) 0.125 mg SL tablet 0.125 mg by SubLINGual route Before breakfast, lunch, and dinner. Yes Provider, Historical   acetaminophen (TYLENOL ARTHRITIS PAIN) 650 mg CR tablet Take 650 mg by mouth two (2) times daily as needed for Pain. Yes Provider, Historical   raNITIdine (ZANTAC) 150 mg tablet Take 150 mg by mouth Before breakfast and dinner. Yes Provider, Historical   cholecalciferol (VITAMIN D3) 1,000 unit tablet Take 1,000 Units by mouth daily. Yes Provider, Historical   FERROUS FUMARATE/VIT BCOMP,C (SUPER B COMPLEX PO) Take  by mouth daily (after dinner). Yes Provider, Historical   Lactobacillus acidophilus (PROBIOTIC PO) Take  by mouth daily. Provider, Historical          Allergies   Allergen Reactions    Latex Itching    Dilaudid [Hydromorphone (Bulk)] Other (comments)     ? loss of consciousness    Altace [Ramipril] Unknown (comments)    Ambien [Zolpidem] Unknown (comments)    [employer] Test [Blood Sugar Diagnostic, Disc-Type] Unknown (comments)    Aspirin Unknown (comments)    Astelin [Azelastine] Unknown (comments)    Atenolol Unknown (comments)    Banex La Unknown (comments)    Benicar [Olmesartan] Unknown (comments)    Carbocaine [Mepivacaine] Unknown (comments)    Cleocin [Clindamycin Hcl] Hives    Codeine Nausea and Vomiting    Codeine-Asa-Salicyl-Acetam-Caf Other (comments)     Stomach pain    Cortisporin [Neomy-Polymyxinb-Bacitracin-Hc] Unknown (comments)    Cymbalta [Duloxetine] Other (comments)    Cytotec [Misoprostol] Unknown (comments)    Dilaudid [Hydromorphone] Unknown (comments)     Patient states that she was unable to respond, but was breathing    Effexor [Venlafaxine] Unknown (comments)    Epinephrine Unknown (comments)     Heart racing    Estra-D Unknown (comments)    Estrace [Estradiol] Unknown (comments)    Fentanyl Nausea and Vomiting and Unknown (comments)     shakes    Flexeril [Cyclobenzaprine] Unable to Obtain     \"drugs me\"    Flonase [Fluticasone] Unknown (comments)    Flunisolide Unknown (comments)    Guiatussin W/Codeine Unknown (comments)    Ketek [Telithromycin] Nausea Only    Klonopin [Clonazepam] Unknown (comments)    Lexapro [Escitalopram] Nausea and Vomiting    Lodine [Etodolac] Nausea and Vomiting    Morphine Unknown (comments)    Nsaids (Non-Steroidal Anti-Inflammatory Drug) Nausea and Vomiting    Other Medication Unknown (comments)     Gareth Alford    Paxil [Paroxetine Hcl] Nausea and Vomiting    Pcn [Penicillins] Hives    Percocet [Oxycodone-Acetaminophen] Unknown (comments)    Percodan [Oxycodone Hcl-Oxycodone-Asa] Unknown (comments)    Pneumococcal 23-Mary Ps Vaccine Swelling    Proctocream-Hc [Hydrocortisone] Unknown (comments)    Prozac [Fluoxetine] Nausea Only     Suicidal visions      Pseudoephedrine Other (comments)    Quinidine Unknown (comments)    Reglan [Metoclopramide] Unknown (comments)    Remeron [Mirtazapine] Other (comments)    Resaid Other (comments)     inflammed eyes    Robaxin [Methocarbamol] Unknown (comments)    Sonata [Zaleplon] Unknown (comments)    Sulfa (Sulfonamide Antibiotics) Hives    Surmontil [Trimipramine] Nausea and Vomiting    Talwin [Pentazocine Lactate] Unknown (comments)    Toradol [Ketorolac Tromethamine] Nausea and Vomiting    Ultram [Tramadol] Unknown (comments)    Vancomycin Nausea Only    Vasoconstrictor Drug Unknown (comments)    Vigamox [Moxifloxacin] Rash and Swelling    Vioxx [Rofecoxib] Unknown (comments)    Voltaren [Diclofenac Sodium] Itching    Wellbutrin [Bupropion Hcl] Nausea and Vomiting    Zelnorm [Tegaserod Hydrogen Maleate] Unknown (comments)    Zoloft [Sertraline] Nausea and Vomiting              Review of Systems   Constitutional: Negative for malaise/fatigue and weight loss. Respiratory: Negative for cough and shortness of breath.     Cardiovascular: Negative for chest pain, palpitations and leg swelling. Gastrointestinal: Negative for abdominal pain, constipation, diarrhea, heartburn, nausea and vomiting. Genitourinary: Negative for frequency. Musculoskeletal: Positive for back pain, myalgias and neck pain. Negative for joint pain. Skin: Negative for rash. Neurological: Negative for tingling, sensory change, focal weakness and headaches. Psychiatric/Behavioral: Negative for depression. The patient is not nervous/anxious and does not have insomnia. Objective:   Physical Exam   Constitutional: She appears well-developed. No distress. HENT:   Mouth/Throat: Mucous membranes are normal.   Eyes: Conjunctivae and lids are normal. No scleral icterus. Neck: Neck supple. No thyromegaly present. Cardiovascular: Regular rhythm and normal heart sounds. No murmur heard. Pulmonary/Chest: Effort normal and breath sounds normal. She has no wheezes. She has no rales. Abdominal: Soft. Bowel sounds are normal. She exhibits no mass. There is no hepatosplenomegaly. There is no tenderness. Musculoskeletal: She exhibits no edema. Lymphadenopathy:     She has no cervical adenopathy. Skin: No rash noted. Psychiatric: She has a normal mood and affect. Her behavior is normal. Cognition and memory are impaired. She exhibits abnormal recent memory. Visit Vitals  /64   Pulse 86   Temp 97.5 °F (36.4 °C) (Oral)   Resp 12   Ht 5' 2\" (1.575 m)   Wt 108 lb 9.6 oz (49.3 kg)   SpO2 97%   BMI 19.86 kg/m²          Advised her to call back or return to office if symptoms worsen/change/persist.  Discussed expected course/resolution/complications of diagnosis in detail with patient. Medication risks/benefits/costs/interactions/alternatives discussed with patient. She was given an after visit summary which includes diagnoses, current medications, & vitals. She expressed understanding with the diagnosis and plan.       Aspects of this note may have been generated using voice recognition software. Despite editing, there may be some syntax errors.        Precious Caballero MD

## 2019-07-23 NOTE — ACP (ADVANCE CARE PLANNING)
Advance Care Planning (ACP) Provider Note - Comprehensive     Date of ACP Conversation: 07/23/19  Persons included in Conversation:  patient and family  Length of ACP Conversation in minutes: <16 minutes (Non-Billable)    Authorized Decision Maker (if patient is incapable of making informed decisions):   Healthcare Agent/Medical Power of  under Advance Directive      She has an advanced directive - a copy has been provided & still reflects her wishes. Reviewed DNR/DNI and patient is interested in remaining a DNR, no changes made. General ACP for ALL Patients with Decision Making Capacity:  Importance of advance care planning, including choosing a healthcare agent to communicate patient's healthcare decisions if patient lost the ability to make decisions, such as after a sudden illness or accident  Understanding of the healthcare agent role was assessed and information provided  Opportunity offered to explore how cultural, Judaism, spiritual, or personal beliefs would affect decisions for future care  Exploration of values, goals, and preferences if recovery is not expected, even with continued medical treatment in the event of: Imminent death or severe, permanent brain injury    For Serious or Chronic Illness:  Understanding of CPR, goals and expected outcomes, benefits and burdens discussed.   Understanding of medical condition  Information on CPR success rates provided (e.g. for CPR in hospital, survival to d/c at two weeks is 22%, for chronically ill or elderly/frail survival is less than 3%)    Interventions Provided:  Recommended communicating the plan and making copies for the healthcare agent, personal physician, and others as appropriate (e.g., health system)  Recommended review of completed ACP document annually or upon change in health status

## 2019-07-23 NOTE — TELEPHONE ENCOUNTER
Will be coming with her mother for her appt at 10:30 this morning. Wants to make sure Dr. Rafita Vázquez will be going over the notes from the neuro doctor, as well as the mwv. Please call within the next hour if possible.

## 2019-07-23 NOTE — TELEPHONE ENCOUNTER
Spoke to patient's daughter Annamaria Gomez (on HIPAA) advised yes, per Dr. Vianey Gerber, will go over results as well as perform MWV.

## 2019-07-23 NOTE — PATIENT INSTRUCTIONS
Medicare Wellness Visit, Female     The best way to live healthy is to have a lifestyle where you eat a well-balanced diet, exercise regularly, limit alcohol use, and quit all forms of tobacco/nicotine, if applicable. Regular preventive services are another way to keep healthy. Preventive services (vaccines, screening tests, monitoring & exams) can help personalize your care plan, which helps you manage your own care. Screening tests can find health problems at the earliest stages, when they are easiest to treat. [facility] follows the current, evidence-based guidelines published by the [facility] ([facility]) when recommending preventive services for our patients. Because we follow these guidelines, sometimes recommendations change over time as research supports it. (For example, mammograms used to be recommended annually. Even though Medicare will still pay for an annual mammogram, the newer guidelines recommend a mammogram every two years for women of average risk.)  Of course, you and your doctor may decide to screen more often for some diseases, based on your risk and your health status. Preventive services for you include:  - Medicare offers their members a free annual wellness visit, which is time for you and your primary care provider to discuss and plan for your preventive service needs. Take advantage of this benefit every year!  -All adults over the age of 72 should receive the recommended pneumonia vaccines. Current USPSTF guidelines recommend a series of two vaccines for the best pneumonia protection.   -All adults should have a flu vaccine yearly and a tetanus vaccine every 10 years. All adults age 61 and older should receive a shingles vaccine once in their lifetime.    -A bone mass density test is recommended when a woman turns 65 to screen for osteoporosis. This test is only recommended one time, as a screening.  Some providers will use this same test as a disease monitoring tool if you already have osteoporosis. -All adults age 38-68 who are overweight should have a diabetes screening test once every three years.   -Other screening tests and preventive services for persons with diabetes include: an eye exam to screen for diabetic retinopathy, a kidney function test, a foot exam, and stricter control over your cholesterol.   -Cardiovascular screening for adults with routine risk involves an electrocardiogram (ECG) at intervals determined by your doctor.   -Colorectal cancer screenings should be done for adults age 54-65 with no increased risk factors for colorectal cancer. There are a number of acceptable methods of screening for this type of cancer. Each test has its own benefits and drawbacks. Discuss with your doctor what is most appropriate for you during your annual wellness visit. The different tests include: colonoscopy (considered the best screening method), a fecal occult blood test, a fecal DNA test, and sigmoidoscopy. -Breast cancer screenings are recommended every other year for women of normal risk, age 54-69.  -Cervical cancer screenings for women over age 72 are only recommended with certain risk factors.   -All adults born between [facility] should be screened once for Hepatitis C. Here is a list of your current Health Maintenance items (your personalized list of preventive services) with a due date:  Health Maintenance Due   Topic Date Due    DTaP/Tdap/Td  (1 - Tdap) 06/26/2007    Annual Well Visit  07/18/2019              Learning About Living Manuelmario  What is a living will? A living will is a legal form you use to write down the kind of care you want at the end of your life. It is used by the health professionals who will treat you if you aren't able to decide for yourself. If you put your wishes in writing, your loved ones and others will know what kind of care you want. They won't need to guess.  This can ease your mind and be helpful to others. A living will is not the same as an estate or property will. An estate will explains what you want to happen with your money and property after you die. Is a living will a legal document? A living will is a legal document. Each state has its own laws about living lin. If you move to another state, make sure that your living will is legal in the state where you now live. Or you might use a universal form that has been approved by many states. This kind of form can sometimes be completed and stored online. Your electronic copy will then be available wherever you have a connection to the Internet. In most cases, doctors will respect your wishes even if you have a form from a different state. · You don't need an  to complete a living will. But legal advice can be helpful if your state's laws are unclear, your health history is complicated, or your family can't agree on what should be in your living will. · You can change your living will at any time. Some people find that their wishes about end-of-life care change as their health changes. · In addition to making a living will, think about completing a medical power of  form. This form lets you name the person you want to make end-of-life treatment decisions for you (your \"health care agent\") if you're not able to. Many hospitals and nursing homes will give you the forms you need to complete a living will and a medical power of . · Your living will is used only if you can't make or communicate decisions for yourself anymore. If you become able to make decisions again, you can accept or refuse any treatment, no matter what you wrote in your living will. · Your state may offer an online registry. This is a place where you can store your living will online so the doctors and nurses who need to treat you can find it right away. What should you think about when creating a living will?   Talk about your end-of-life wishes with your family members and your doctor. Let them know what you want. That way the people making decisions for you won't be surprised by your choices. Think about these questions as you make your living will:  · Do you know enough about life support methods that might be used? If not, talk to your doctor so you know what might be done if you can't breathe on your own, your heart stops, or you're unable to swallow. · What things would you still want to be able to do after you receive life-support methods? Would you want to be able to walk? To speak? To eat on your own? To live without the help of machines? · If you have a choice, where do you want to be cared for? In your home? At a hospital or nursing home? · Do you want certain [employer] practices performed if you become very ill? · If you have a choice at the end of your life, where would you prefer to die? At home? In a hospital or nursing home? Somewhere else? · Would you prefer to be buried or cremated? · Do you want your organs to be donated after you die? What should you do with your living will? · Make sure that your family members and your health care agent have copies of your living will. · Give your doctor a copy of your living will to keep in your medical record. If you have more than one doctor, make sure that each one has a copy. · You may want to put a copy of your living will where it can be easily found. Where can you learn more? Go to http://yoly-jackie.info/. Enter D688 in the search box to learn more about \"Learning About Living Paige Page. \"  Current as of: August 8, 2016  Content Version: 11.3  © 0424-9994 [employer]. Care instructions adapted under license by [employer] (which disclaims liability or warranty for this information).  If you have questions about a medical condition or this instruction, always ask your healthcare professional. Saima Goetz disclaims any warranty or liability for your use of this information.

## 2019-07-29 ENCOUNTER — TELEPHONE (OUTPATIENT)
Dept: INTERNAL MEDICINE CLINIC | Age: 84
End: 2019-07-29

## 2019-07-29 NOTE — TELEPHONE ENCOUNTER
Spoke to patient, states she took Aricept Friday 7/26/19 and all day Saturday she had \"violent diarrhea\" she took Pepto & ate oatmeal and felt better that Sunday. States she is afraid to take it again. She is going to give it another try this coming Friday (only starts new meds on the weekend) will let Dr. Yumiko Negro know if it happens again, otherwise it  Could have possibly been a 24 hour stomach bug. She will update us next week.

## 2019-08-02 ENCOUNTER — TELEPHONE (OUTPATIENT)
Dept: INTERNAL MEDICINE CLINIC | Age: 84
End: 2019-08-02

## 2019-08-02 NOTE — TELEPHONE ENCOUNTER
Spoke to patient states she is nauseas, denies vomiting, fever or diarrhea. This is a recurring issue for her she reports, she is going to try and drink gingerale and crackers, will notify office if she is not feeling better to schedule an appt.

## 2019-08-02 NOTE — TELEPHONE ENCOUNTER
[address] [facility] [place] [address]) [phone]     Please call patient's daughter -- left msg Tues.

## 2019-08-02 NOTE — TELEPHONE ENCOUNTER
Patients daughter called to discuss her moms reaction to the Aricept, advised we were already aware because patient had called Monday, she is going to try again tonight and let Dr. Alex Acharya know if she has diarrhea again after taking it. She was c/o nausea this morning however not related to Aricept because she hasn't been taking it. Daughter wants to know if Dr. Alex Acharya recommends a specific GPS tracker for her mom as there are several different options. I told her that is likely a question for customer service/[employer] etc. Wherever she is getting the product from.

## 2019-08-07 ENCOUNTER — TELEPHONE (OUTPATIENT)
Dept: INTERNAL MEDICINE CLINIC | Age: 84
End: 2019-08-07

## 2019-08-07 RX ORDER — MEMANTINE HYDROCHLORIDE 7 MG/1
7 CAPSULE, EXTENDED RELEASE ORAL DAILY
Qty: 14 CAP | Refills: 0 | Status: SHIPPED | OUTPATIENT
Start: 2019-08-07 | End: 2019-09-07 | Stop reason: SDUPTHER

## 2019-08-07 NOTE — TELEPHONE ENCOUNTER
Stop Aricept. Would recommend trial of 1 more medication. If there are side effects will not pursue further medications. This is in a different class, should hopefully tolerate it better. Namenda XR, 7mg tabs. 1 tab PO daily x 14 days, then increase to 2 tabs if tolerating it well, #60, RF 1.       This is below recommended dose (28mg) but due to her intolerance to medications will go slow

## 2019-08-07 NOTE — TELEPHONE ENCOUNTER
Spoke with patient and let her know to stop her aricept and Dr. Nay Stout wanted to try 1 more medication and if it doesn't work he will not persue anymore. WIll send Namenda to her pharmacy and she will call us back and let us know how she feels.

## 2019-08-08 ENCOUNTER — HOSPITAL ENCOUNTER (OUTPATIENT)
Dept: PREADMISSION TESTING | Age: 84
Discharge: HOME | End: 2019-08-08
Payer: MEDICARE

## 2019-08-08 VITALS
TEMPERATURE: 97.6 F | SYSTOLIC BLOOD PRESSURE: 133 MMHG | HEART RATE: 70 BPM | BODY MASS INDEX: 20.06 KG/M2 | DIASTOLIC BLOOD PRESSURE: 61 MMHG | HEIGHT: 62 IN | WEIGHT: 109 LBS

## 2019-08-08 LAB
ATRIAL RATE: 63 BPM
CALCULATED P AXIS, ECG09: 54 DEGREES
CALCULATED R AXIS, ECG10: 69 DEGREES
CALCULATED T AXIS, ECG11: 77 DEGREES
DIAGNOSIS, 93000: NORMAL
HGB BLD-MCNC: 11.5 G/DL (ref 11.5–16)
P-R INTERVAL, ECG05: 208 MS
Q-T INTERVAL, ECG07: 378 MS
QRS DURATION, ECG06: 72 MS
QTC CALCULATION (BEZET), ECG08: 386 MS
VENTRICULAR RATE, ECG03: 63 BPM

## 2019-08-08 PROCEDURE — 93005 ELECTROCARDIOGRAM TRACING: CPT

## 2019-08-08 PROCEDURE — 85018 HEMOGLOBIN: CPT

## 2019-08-08 PROCEDURE — 36415 COLL VENOUS BLD VENIPUNCTURE: CPT

## 2019-08-08 RX ORDER — POLYETHYLENE GLYCOL 3350 17 G/17G
17 POWDER, FOR SOLUTION ORAL AS NEEDED
COMMUNITY
End: 2019-08-30

## 2019-08-08 NOTE — PERIOP NOTES
PREOPERATIVE INSTRUCTIONS REVIEWED WITH PATIENT. PATIENT GIVEN TWO-- BOTTLES OF CHG SOAPS  INSTRUCTIONS REVIEWED ON USE OF CHG SOAPS   PATIENT GIVEN SSI INFECTIONS SHEET. PATIENT WAS GIVEN THE OPPORTUNITY TO ASK QUESTIONS ON THE INFORMATION PROVIDED.

## 2019-08-12 ENCOUNTER — TELEPHONE (OUTPATIENT)
Dept: INTERNAL MEDICINE CLINIC | Age: 84
End: 2019-08-12

## 2019-08-12 NOTE — TELEPHONE ENCOUNTER
[facility] [phone] (H)     Pt is requesting a call back regarding her meds and some upcoming surgery she is having.

## 2019-08-13 NOTE — TELEPHONE ENCOUNTER
Patient wanted Dr. Terri Choudhury to know she has to stop a few medications prior to her shoulder surgery, advised we are able to see this in the system. She verbalized understanding.

## 2019-08-14 NOTE — H&P
Progress Notes     Expand All Collapse All      []Hide copied text    [][place]anto for details  PATIENT ID: [place] is a 80 y.o. female.     CHIEF COMPLAINT : Follow-up of the Right Shoulder        HISTORY OF PRESENT ILLNESS:   Patient returns today following up for her right shoulder. She has had a long history of right shoulder pain that was quite bad back at the beginning of the year we got an MRI which did show a rotator cuff tear. She started to feel better at some point so did not want to do anything about it however her pain has returned and she is interested in getting something done. Pain is mostly over the lateral arm.     Pain rating =   4 out of 10             Patient Active Problem List   Diagnosis    Fibromyalgia    TIA (transient ischemic attack)    GERD (gastroesophageal reflux disease)         Medical History        Past Medical History:   Diagnosis Date    Anxiety      Cancer      Chronic kidney disease      Depression      Fibromyalgia, primary      GERD (gastroesophageal reflux disease)      Hyperlipidemia      Hypertension      Osteoarthritis      Pulmonary embolism      TIA (transient ischemic attack)           Surgical History         Past Surgical History:   Procedure Laterality Date    JOINT REPLACEMENT        KNEE SURGERY        NO RELEVANT SURGERIES                MEDICATIONS   has a current medication list which includes the following prescription(s): acetaminophen, amlodipine, aspirin, b complex vitamins, bismuth subsalicylate, calcium carbonate ex, vitamin d-3, hyoscyamine, lorazepam, lorazepam, metoprolol tartrate, and ranitidine.    ALLERGIES   is allergic to aspirin; atenolol; azelastine; bacitra-neomycin-polymyxin-hc; bupropion; clindamycin; clonazepam; codeine; cyclobenzaprine; diclofenac sodium; duloxetine; epinephrine; escitalopram; estradiol; etodolac; fentanyl; flunisolide; fluoxetine; fluticasone; hydrocodone-acetaminophen; hydrocortisone; hydromorphone; ketorolac tromethamine; latex; mepivacaine; methocarbamol; metoclopramide; mirtazapine; misoprostol; morphine; moxifloxacin; nsaids; olmesartan; oxycodone-acetaminophen; paroxetine hcl; penicillins; pentazocine; propoxyphene; pseudoephedrine; quinidine; ramipril; rofecoxib; sertraline; sulfa antibiotics; tegaserod; telithromycin; tramadol; trimipramine; vancomycin; venlafaxine; zaleplon; and zolpidem. SOCIAL HISTORY   The patient  reports that she has never smoked. She has never used smokeless tobacco. She reports that she drinks alcohol. She reports that she does not use drugs.              Review of Systems   7/22/2019     Constitutional: Unexplained: Negative  Genitourinary: Frequent Urination: Negative  HEENT: Vision Loss: Negative  Neurological: Memory Loss: Negative  Integumentary: Rash: Negative  Cardiovascular: Palpatations: Negative  Hematologic: Bruises/Bleeds Easily: Positive  Gastrointestinal: Constipation: Negative  Immunological: Seasonal Allergies: Negative  Musculoskeletal: Joint Pain: Positive         PHYSICAL EXAM:      Vitals       Vitals:     07/22/19 1555   Weight: 110 lb   Height: 5' 2\"            Consitutional:  Well developed well nourished , no acute distress   Psychiatric: Alert and oriented x 3.   Respiratory - no labored breathing    Cardiovascular:  No peripheal edema robe  Skin:  Warm dry and intact , no marked skin ulcers   Ambulates with a normal gait   Neuro - no marked sensory loss     Patient Active Problem List    Diagnosis Date Noted    Alzheimer's disease 07/05/2019    Recurrent major depressive disorder ([facility] 07/17/2018    Other irritable bowel syndrome 06/29/2016    Gastroparesis 06/22/2016    Spinal stenosis, lumbar 06/22/2016    Fibromyalgia 06/22/2016    Gastroesophageal reflux disease without esophagitis 06/22/2016    History of pulmonary embolism 06/21/2016    Pre-diabetes 12/09/2011    CKD (chronic kidney disease) stage 3, GFR 30-59 ml/min ([facility] [facility] 07/21/2011    DDD (degenerative disc disease), lumbar 10/12/2010    Allergic rhinitis 10/11/2010    Anxiety 08/04/2010    Essential hypertension, benign 04/12/2010    DDD (degenerative disc disease), cervical 04/12/2010     Past Medical History:   Diagnosis Date    Allergic rhinitis, cause unspecified     Anxiety     Back pain     C. difficile diarrhea 8/2/2017    Admitted to [address] [place] [address] ([facility] [address]     basal cell face, back, neck    Cancer (HCC)     squamous cell leg    Chronic kidney disease     Chronic pain     DJD (degenerative joint disease) of cervical spine     Fibromyalgia     GERD (gastroesophageal reflux disease)     Hypercholesterolemia     Hypertension     Hyponatremia     IBS (irritable bowel syndrome)     Irritable bowel syndrome with diarrhea 6/29/2016    GI- Dr Iliana Townsend    Kidney disease, chronic, stage III (GFR 30-59 ml/min) ([facility] [address] [facility] 6/2011    Dr. Mika Gibson) [place]    Nausea & vomiting     Neuropathy 6/22/2016    Both feet     Psychiatric disorder     DEPRESSION AND ANXIETY    Stroke ([facility] [address] 2017    TIA    Thromboembolus ([facility] [facility] 2013    PE X2      Past Surgical History:   Procedure Laterality Date    COLONOSCOPY  1/20/11    diverticulosis    EGD  1/20/11    schatzki's ring--clear on barium swallow 7/11    HX ADENOIDECTOMY  1940    HX CATARACT REMOVAL Left 2013    HX CATARACT REMOVAL Right 01/27/2014    HX CHOLECYSTECTOMY  5/10    HX DILATION AND CURETTAGE  1963    HX GI      COLONOSCOPY    HX HEENT      BCCA REMOVED FROM FACE ,NOSE     HX HYSTERECTOMY  1970`S    hysterectomy    HX KNEE ARTHROSCOPY Left 1990    HX KNEE REPLACEMENT Right 9/29/09    HX ORTHOPAEDIC Right 2017    trigger finger release    HX ORTHOPAEDIC Right 07/18/2019     RING FINGRER TRIGGER FINGER REALSE     HX OTHER SURGICAL      skin cancer removed: L leg    HX OTHER SURGICAL      BCCA REMOVED FROM BACK     HX POLYPECTOMY  1989    HX SHOULDER ARTHROSCOPY Left 12/13/2018    arthroscopy & sub-acromial decompression    HX TONSILLECTOMY  1940    HX UROLOGICAL  1995    bladder suspension    REPAIR OF RECTOCELE        Prior to Admission medications    Medication Sig Start Date End Date Taking? Authorizing Provider   polyethylene glycol (MIRALAX) 17 gram/dose powder Take 17 g by mouth as needed. Provider, Historical   memantine ER (NAMENDA XR) 7 mg capsule Take 1 Cap by mouth daily. 8/7/19   Tristan Zepeda MD   donepezil (ARICEPT) 5 mg tablet Take 1 Tab by mouth nightly. 7/23/19   Tristan Zepeda MD   varicella-zoster recombinant, PF, [facility]) 50 mcg/0.5 mL susr injection 0.5 mL IM once now and then repeat in 2-6 months 7/23/19   Tristan Zepeda MD   LORazepam (ATIVAN) 1 mg tablet Take 1 mg by mouth nightly. Provider, Historical   Lactobacillus acidophilus (PROBIOTIC PO) Take  by mouth daily. Provider, Historical   metoprolol tartrate (LOPRESSOR) 25 mg tablet TAKE ONE-QUARTER TABLET BY MOUTH EVERY DAY  Patient taking differently: every evening. TAKE ONE-QUARTER TABLET BY MOUTH EVERY DAY 4/9/19   Tristan Zepeda MD   amLODIPine (NORVASC) 5 mg tablet TAKE ONE TABLET BY MOUTH ONE TIME DAILY 4/9/19   Tristan Zepeda MD   sod chlor-bicarb-squeez bottle (SINUS RINSE PEDIATRIC STARTER) pkdv 1 Each by sinus irrigation route daily. 2/23/19   Bebeto Santos MD   bismuth subsalicylate (PEPTO-BISMOL PO) Take  by mouth as needed. Provider, Historical   Wheat Dextrin (BENEFIBER CLEAR) 3 gram/3.5 gram pwpk Take  by mouth daily. Provider, Historical   aspirin delayed-release 81 mg tablet Take 1 Tab by mouth daily. 8/4/17   Brian, Cecilia Bethea, KRISTIN   LORazepam (ATIVAN) 0.5 mg tablet Take 0.5 mg by mouth every morning. Provider, Historical   hyoscyamine SL (LEVSIN/SL) 0.125 mg SL tablet 0.125 mg by SubLINGual route Before breakfast, lunch, and dinner.     Provider, Historical   acetaminophen (TYLENOL ARTHRITIS PAIN) 650 mg CR tablet Take 650 mg by mouth two (2) times daily as needed for Pain.    Provider, Historical   raNITIdine (ZANTAC) 150 mg tablet Take 150 mg by mouth Before breakfast and dinner. Provider, Historical   cholecalciferol (VITAMIN D3) 1,000 unit tablet Take 1,000 Units by mouth daily. Provider, Historical   FERROUS FUMARATE/VIT BCOMP,C (SUPER B COMPLEX PO) Take  by mouth daily (after dinner). Provider, Historical     Allergies   Allergen Reactions    Latex Itching    Dilaudid [Hydromorphone (Bulk)] Other (comments)     ? loss of consciousness    Altace [Ramipril] Unknown (comments)    Ambien [Zolpidem] Unknown (comments)    [employer] Test [Blood Sugar Diagnostic, Disc-Type] Unknown (comments)    Aspirin Unknown (comments)    Astelin [Azelastine] Unknown (comments)    Atenolol Unknown (comments)    Banex La Unknown (comments)    Benicar [Olmesartan] Unknown (comments)    Carbocaine [Mepivacaine] Unknown (comments)    Cleocin [Clindamycin Hcl] Hives    Codeine Nausea and Vomiting    Codeine-Asa-Salicyl-Acetam-Caf Other (comments)     Stomach pain    Cortisporin [Neomy-Polymyxinb-Bacitracin-Hc] Unknown (comments)    Cymbalta [Duloxetine] Other (comments)    Cytotec [Misoprostol] Unknown (comments)    Dilaudid [Hydromorphone] Unknown (comments)     Patient states that she was unable to respond, but was breathing    Effexor [Venlafaxine] Unknown (comments)    Epinephrine Unknown (comments)     Heart racing    Estra-D Unknown (comments)    Estrace [Estradiol] Unknown (comments)    Fentanyl Nausea and Vomiting and Unknown (comments)     shakes    Flexeril [Cyclobenzaprine] Unable to Obtain     \"drugs me\"    Flonase [Fluticasone] Unknown (comments)    Flunisolide Unknown (comments)    Guiatussin W/Codeine Unknown (comments)    Ketek [Telithromycin] Nausea Only    Klonopin [Clonazepam] Unknown (comments)    Lexapro [Escitalopram] Nausea and Vomiting    Lodine [Etodolac] Nausea and Vomiting    Morphine Unknown (comments)    Nsaids (Non-Steroidal Anti-Inflammatory Drug) Nausea and Vomiting    Other Medication Unknown (comments)     Gareth Alford    Paxil [Paroxetine Hcl] Nausea and Vomiting    Pcn [Penicillins] Hives    Percocet [Oxycodone-Acetaminophen] Unknown (comments)    Percodan [Oxycodone Hcl-Oxycodone-Asa] Unknown (comments)    Pneumococcal 23-Mary Ps Vaccine Swelling    Proctocream-Hc [Hydrocortisone] Unknown (comments)    Prozac [Fluoxetine] Nausea Only     Suicidal visions      Pseudoephedrine Other (comments)    Quinidine Unknown (comments)    Reglan [Metoclopramide] Unknown (comments)    Remeron [Mirtazapine] Other (comments)    Resaid Other (comments)     inflammed eyes    Robaxin [Methocarbamol] Unknown (comments)    Sonata [Zaleplon] Unknown (comments)    Sulfa (Sulfonamide Antibiotics) Hives    Surmontil [Trimipramine] Nausea and Vomiting    Talwin [Pentazocine Lactate] Unknown (comments)    Toradol [Ketorolac Tromethamine] Nausea and Vomiting    Ultram [Tramadol] Unknown (comments)    Vancomycin Nausea Only    Vasoconstrictor Drug Unknown (comments)    Vigamox [Moxifloxacin] Rash and Swelling    Vioxx [Rofecoxib] Unknown (comments)    Voltaren [Diclofenac Sodium] Itching    Wellbutrin [Bupropion Hcl] Nausea and Vomiting    Zelnorm [Tegaserod Hydrogen Maleate] Unknown (comments)    Zoloft [Sertraline] Nausea and Vomiting      Social History     Tobacco Use    Smoking status: Never Smoker    Smokeless tobacco: Never Used   Substance Use Topics    Alcohol use: Not Currently     Alcohol/week: 0.0 standard drinks     Frequency: Never     Binge frequency: Never     Comment: rarely      Family History   Problem Relation Age of Onset    Cancer Mother         lung    Cancer Maternal Aunt         lung    Cancer Maternal Uncle         lung    Cancer Sister         breast    Other Brother         has to have blood tx every other week    Cancer Son 40        prostate, recurrence 7 yrs later  Arthritis-osteo Son         hips    Arthritis-osteo Daughter     Arthritis-osteo Daughter    [facility] Arthritis-osteo Sister     No Known Problems Sister     No Known Problems Sister     Anesth Problems Neg Hx             No data found. General appearance: alert, cooperative, no distress, appears stated age  Head: Normocephalic, without obvious abnormality, atraumatic  Lungs: clear to auscultation bilaterally  Heart: regular rate and rhythm, S1, S2 normal, no murmur  Abdomen: soft, non-tender. Bowel sounds normal. No masses,  no organomegaly  Extremities: Exam of the right shoulder she has good passive and active range of motion. Positive Dukes and Neer sign. Pain with resisted abduction pain with resisted external rotation no pain with internal rotation negative lift-off nerve negative load shift sign. She has good cervical spine range of motion. Pulses: 2+ and symmetric  Neurologic: Grossly normal             RADIOGRAPHS:           IMPRESSION:   1. Partial-thickness articular sided tearing in the supraspinatus and  infraspinatus insertions with significant medial delamination into the tendons. 2. Partial tearing of the superior fibers of the subscapularis along the long  head of the biceps tendon to medially sublux into the tendon. The biceps tendon  shows partial tearing along the bicipital groove. 3. There is tearing in the superior to superior posterior labrum. 4. Mild joint effusion. 5. Mild amount of fluid in the subacromial and subdeltoid bursa.     I independently interpreted the MRI with results above date of the MRIs to 18 19     Independent review of her x-ray does not show significant glenohumeral joint space narrowing.     ASSESSMENT:          ICD-10-CM   1. Incomplete tear of right rotator cuff M75.111   2. Primary localized osteoarthrosis of right shoulder region M19.011   3.  Biceps tendinitis of right upper extremity M75.21         PLAN:   We discussed the nature of her rotator cuff tears and arthritis in her shoulder. She did well on the other side however she did not have a significant a rotator cuff tear. I explained risks complications benefits of surgery and that she will likely be in a sling with a pillow for about 6 weeks postoperatively which is much more restrictive than she was the last time. He does also have arthritis and while we can't change that we can debride any loose fragments. She is tentatively scheduled for August 15th. No orders of the defined types were placed in this encounter.            Antohny Pack M.D. Patient was seen and examined. There have been no significant clinical changes since the completion of the above History and Physical.  Patient identified by surgeon; surgical site was confirmed by patient and surgeon.

## 2019-08-15 ENCOUNTER — HOSPITAL ENCOUNTER (OUTPATIENT)
Age: 84
Setting detail: OUTPATIENT SURGERY
Discharge: HOME | End: 2019-08-15
Attending: ORTHOPAEDIC SURGERY | Admitting: ORTHOPAEDIC SURGERY
Payer: MEDICARE

## 2019-08-15 ENCOUNTER — ANESTHESIA (OUTPATIENT)
Dept: MEDSURG UNIT | Age: 84
End: 2019-08-15
Payer: MEDICARE

## 2019-08-15 ENCOUNTER — ANESTHESIA EVENT (OUTPATIENT)
Dept: MEDSURG UNIT | Age: 84
End: 2019-08-15
Payer: MEDICARE

## 2019-08-15 VITALS
OXYGEN SATURATION: 97 % | TEMPERATURE: 97.7 F | HEIGHT: 62 IN | DIASTOLIC BLOOD PRESSURE: 62 MMHG | HEART RATE: 72 BPM | SYSTOLIC BLOOD PRESSURE: 138 MMHG | RESPIRATION RATE: 16 BRPM | WEIGHT: 108 LBS | BODY MASS INDEX: 19.88 KG/M2

## 2019-08-15 DIAGNOSIS — M75.121 NONTRAUMATIC COMPLETE TEAR OF RIGHT ROTATOR CUFF: Primary | ICD-10-CM

## 2019-08-15 PROCEDURE — 77030021162 HC TU IRR ENDOSC BAXT -A: Performed by: ORTHOPAEDIC SURGERY

## 2019-08-15 PROCEDURE — 77030008684 HC TU ET CUF COVD -B: Performed by: ANESTHESIOLOGY

## 2019-08-15 PROCEDURE — 77030018835 HC SOL IRR LR ICUM -A: Performed by: ORTHOPAEDIC SURGERY

## 2019-08-15 PROCEDURE — 74011000250 HC RX REV CODE- 250: Performed by: NURSE ANESTHETIST, CERTIFIED REGISTERED

## 2019-08-15 PROCEDURE — 74011250636 HC RX REV CODE- 250/636: Performed by: PHYSICIAN ASSISTANT

## 2019-08-15 PROCEDURE — 76060000064 HC AMB SURG ANES 2 TO 2.5 HR: Performed by: ORTHOPAEDIC SURGERY

## 2019-08-15 PROCEDURE — 77030036560 HC SHLDR ARM PAD ABDUCTN S2SG -B: Performed by: ORTHOPAEDIC SURGERY

## 2019-08-15 PROCEDURE — 77030002986 HC SUT PROL J&J -A: Performed by: ORTHOPAEDIC SURGERY

## 2019-08-15 PROCEDURE — 77030002916 HC SUT ETHLN J&J -A: Performed by: ORTHOPAEDIC SURGERY

## 2019-08-15 PROCEDURE — 76210000036 HC AMBSU PH I REC 1.5 TO 2 HR: Performed by: ORTHOPAEDIC SURGERY

## 2019-08-15 PROCEDURE — 74011250636 HC RX REV CODE- 250/636: Performed by: ANESTHESIOLOGY

## 2019-08-15 PROCEDURE — 77030010428: Performed by: ORTHOPAEDIC SURGERY

## 2019-08-15 PROCEDURE — C1713 ANCHOR/SCREW BN/BN,TIS/BN: HCPCS | Performed by: ORTHOPAEDIC SURGERY

## 2019-08-15 PROCEDURE — 77030002966 HC SUT PDS J&J -A: Performed by: ORTHOPAEDIC SURGERY

## 2019-08-15 PROCEDURE — 74011250636 HC RX REV CODE- 250/636: Performed by: NURSE ANESTHETIST, CERTIFIED REGISTERED

## 2019-08-15 PROCEDURE — 77030026438 HC STYL ET INTUB CARD -A: Performed by: ANESTHESIOLOGY

## 2019-08-15 PROCEDURE — 77030016678 HC BUR SHV4 S&N -B: Performed by: ORTHOPAEDIC SURGERY

## 2019-08-15 PROCEDURE — 77030040361 HC SLV COMPR DVT MDII -B: Performed by: ORTHOPAEDIC SURGERY

## 2019-08-15 PROCEDURE — 74011000250 HC RX REV CODE- 250: Performed by: ORTHOPAEDIC SURGERY

## 2019-08-15 PROCEDURE — 77030003601 HC NDL NRV BLK BBMI -A

## 2019-08-15 PROCEDURE — 77030006884 HC BLD SHV INCIS S&N -B: Performed by: ORTHOPAEDIC SURGERY

## 2019-08-15 PROCEDURE — 77030020782 HC GWN BAIR PAWS FLX 3M -B

## 2019-08-15 PROCEDURE — 76210000050 HC AMBSU PH II REC 0.5 TO 1 HR: Performed by: ORTHOPAEDIC SURGERY

## 2019-08-15 PROCEDURE — 76030000004 HC AMB SURG OR TIME 2 TO 2.5: Performed by: ORTHOPAEDIC SURGERY

## 2019-08-15 PROCEDURE — 74011250636 HC RX REV CODE- 250/636

## 2019-08-15 PROCEDURE — 77030011390 HC GRSP SUT IDL J&J -C: Performed by: ORTHOPAEDIC SURGERY

## 2019-08-15 DEVICE — BIO-COMPOSITE CRKSCRW 5.5X14.9MM
Type: IMPLANTABLE DEVICE | Site: SHOULDER | Status: FUNCTIONAL
Brand: ARTHREX®

## 2019-08-15 RX ORDER — SODIUM CHLORIDE 0.9 % (FLUSH) 0.9 %
5-40 SYRINGE (ML) INJECTION AS NEEDED
Status: DISCONTINUED | OUTPATIENT
Start: 2019-08-15 | End: 2019-08-15 | Stop reason: HOSPADM

## 2019-08-15 RX ORDER — HYDROCODONE BITARTRATE AND ACETAMINOPHEN 5; 325 MG/1; MG/1
1 TABLET ORAL
Qty: 42 TAB | Refills: 0 | Status: SHIPPED | OUTPATIENT
Start: 2019-08-15 | End: 2019-08-24

## 2019-08-15 RX ORDER — SODIUM CHLORIDE, SODIUM LACTATE, POTASSIUM CHLORIDE, AND CALCIUM CHLORIDE .6; .31; .03; .02 G/100ML; G/100ML; G/100ML; G/100ML
IRRIGANT IRRIGATION AS NEEDED
Status: DISCONTINUED | OUTPATIENT
Start: 2019-08-15 | End: 2019-08-15 | Stop reason: HOSPADM

## 2019-08-15 RX ORDER — SODIUM CHLORIDE 0.9 % (FLUSH) 0.9 %
5-40 SYRINGE (ML) INJECTION EVERY 8 HOURS
Status: DISCONTINUED | OUTPATIENT
Start: 2019-08-15 | End: 2019-08-15 | Stop reason: HOSPADM

## 2019-08-15 RX ORDER — ONDANSETRON 2 MG/ML
INJECTION INTRAMUSCULAR; INTRAVENOUS AS NEEDED
Status: DISCONTINUED | OUTPATIENT
Start: 2019-08-15 | End: 2019-08-15 | Stop reason: HOSPADM

## 2019-08-15 RX ORDER — ONDANSETRON 2 MG/ML
4 INJECTION INTRAMUSCULAR; INTRAVENOUS AS NEEDED
Status: DISCONTINUED | OUTPATIENT
Start: 2019-08-15 | End: 2019-08-15 | Stop reason: HOSPADM

## 2019-08-15 RX ORDER — LIDOCAINE HYDROCHLORIDE 20 MG/ML
INJECTION, SOLUTION EPIDURAL; INFILTRATION; INTRACAUDAL; PERINEURAL AS NEEDED
Status: DISCONTINUED | OUTPATIENT
Start: 2019-08-15 | End: 2019-08-15 | Stop reason: HOSPADM

## 2019-08-15 RX ORDER — SODIUM CHLORIDE, SODIUM LACTATE, POTASSIUM CHLORIDE, CALCIUM CHLORIDE 600; 310; 30; 20 MG/100ML; MG/100ML; MG/100ML; MG/100ML
75 INJECTION, SOLUTION INTRAVENOUS CONTINUOUS
Status: DISCONTINUED | OUTPATIENT
Start: 2019-08-15 | End: 2019-08-15 | Stop reason: HOSPADM

## 2019-08-15 RX ORDER — DIPHENHYDRAMINE HYDROCHLORIDE 50 MG/ML
12.5 INJECTION, SOLUTION INTRAMUSCULAR; INTRAVENOUS AS NEEDED
Status: DISCONTINUED | OUTPATIENT
Start: 2019-08-15 | End: 2019-08-15 | Stop reason: HOSPADM

## 2019-08-15 RX ORDER — PROPOFOL 10 MG/ML
INJECTION, EMULSION INTRAVENOUS AS NEEDED
Status: DISCONTINUED | OUTPATIENT
Start: 2019-08-15 | End: 2019-08-15 | Stop reason: HOSPADM

## 2019-08-15 RX ORDER — SODIUM CHLORIDE 9 MG/ML
50 INJECTION, SOLUTION INTRAVENOUS CONTINUOUS
Status: DISCONTINUED | OUTPATIENT
Start: 2019-08-15 | End: 2019-08-15 | Stop reason: HOSPADM

## 2019-08-15 RX ORDER — DEXAMETHASONE SODIUM PHOSPHATE 4 MG/ML
INJECTION, SOLUTION INTRA-ARTICULAR; INTRALESIONAL; INTRAMUSCULAR; INTRAVENOUS; SOFT TISSUE AS NEEDED
Status: DISCONTINUED | OUTPATIENT
Start: 2019-08-15 | End: 2019-08-15 | Stop reason: HOSPADM

## 2019-08-15 RX ORDER — MIDAZOLAM HYDROCHLORIDE 1 MG/ML
0.5 INJECTION, SOLUTION INTRAMUSCULAR; INTRAVENOUS
Status: DISCONTINUED | OUTPATIENT
Start: 2019-08-15 | End: 2019-08-15 | Stop reason: HOSPADM

## 2019-08-15 RX ORDER — ROPIVACAINE HYDROCHLORIDE 5 MG/ML
30 INJECTION, SOLUTION EPIDURAL; INFILTRATION; PERINEURAL
Status: COMPLETED | OUTPATIENT
Start: 2019-08-15 | End: 2019-08-15

## 2019-08-15 RX ORDER — ROPIVACAINE HYDROCHLORIDE 5 MG/ML
INJECTION, SOLUTION EPIDURAL; INFILTRATION; PERINEURAL
Status: COMPLETED | OUTPATIENT
Start: 2019-08-15 | End: 2019-08-15

## 2019-08-15 RX ORDER — MIDAZOLAM HYDROCHLORIDE 1 MG/ML
1 INJECTION, SOLUTION INTRAMUSCULAR; INTRAVENOUS AS NEEDED
Status: DISCONTINUED | OUTPATIENT
Start: 2019-08-15 | End: 2019-08-15 | Stop reason: HOSPADM

## 2019-08-15 RX ORDER — ROCURONIUM BROMIDE 10 MG/ML
INJECTION, SOLUTION INTRAVENOUS AS NEEDED
Status: DISCONTINUED | OUTPATIENT
Start: 2019-08-15 | End: 2019-08-15 | Stop reason: HOSPADM

## 2019-08-15 RX ORDER — SUCCINYLCHOLINE CHLORIDE 20 MG/ML
INJECTION INTRAMUSCULAR; INTRAVENOUS AS NEEDED
Status: DISCONTINUED | OUTPATIENT
Start: 2019-08-15 | End: 2019-08-15 | Stop reason: HOSPADM

## 2019-08-15 RX ADMIN — VANCOMYCIN HYDROCHLORIDE 1 G: 1 INJECTION, POWDER, LYOPHILIZED, FOR SOLUTION INTRAVENOUS at 09:50

## 2019-08-15 RX ADMIN — PROPOFOL 25 MG: 10 INJECTION, EMULSION INTRAVENOUS at 11:33

## 2019-08-15 RX ADMIN — PROPOFOL 125 MG: 10 INJECTION, EMULSION INTRAVENOUS at 09:36

## 2019-08-15 RX ADMIN — ONDANSETRON 4 MG: 2 INJECTION INTRAMUSCULAR; INTRAVENOUS at 12:00

## 2019-08-15 RX ADMIN — SUCCINYLCHOLINE CHLORIDE 120 MG: 20 INJECTION, SOLUTION INTRAMUSCULAR; INTRAVENOUS at 09:36

## 2019-08-15 RX ADMIN — Medication 12.5 MG: at 09:20

## 2019-08-15 RX ADMIN — ONDANSETRON HYDROCHLORIDE 4 MG: 2 INJECTION, SOLUTION INTRAVENOUS at 10:04

## 2019-08-15 RX ADMIN — PHENYLEPHRINE HYDROCHLORIDE 60 MCG: 10 INJECTION INTRAVENOUS at 11:12

## 2019-08-15 RX ADMIN — DEXAMETHASONE SODIUM PHOSPHATE 4 MG: 4 INJECTION, SOLUTION INTRAMUSCULAR; INTRAVENOUS at 10:04

## 2019-08-15 RX ADMIN — PROPOFOL 25 MG: 10 INJECTION, EMULSION INTRAVENOUS at 11:31

## 2019-08-15 RX ADMIN — ROPIVACAINE HYDROCHLORIDE 20 ML: 5 INJECTION, SOLUTION EPIDURAL; INFILTRATION; PERINEURAL at 09:32

## 2019-08-15 RX ADMIN — ROCURONIUM BROMIDE 10 MG: 10 SOLUTION INTRAVENOUS at 09:36

## 2019-08-15 RX ADMIN — LIDOCAINE HYDROCHLORIDE 60 MG: 20 INJECTION, SOLUTION EPIDURAL; INFILTRATION; INTRACAUDAL; PERINEURAL at 09:36

## 2019-08-15 RX ADMIN — ROPIVACAINE HYDROCHLORIDE 150 MG: 5 INJECTION, SOLUTION EPIDURAL; INFILTRATION; PERINEURAL at 09:25

## 2019-08-15 RX ADMIN — MIDAZOLAM HYDROCHLORIDE 2 MG: 1 INJECTION, SOLUTION INTRAMUSCULAR; INTRAVENOUS at 09:20

## 2019-08-15 RX ADMIN — SODIUM CHLORIDE, POTASSIUM CHLORIDE, SODIUM LACTATE AND CALCIUM CHLORIDE: 600; 310; 30; 20 INJECTION, SOLUTION INTRAVENOUS at 09:20

## 2019-08-15 RX ADMIN — PROPOFOL 25 MG: 10 INJECTION, EMULSION INTRAVENOUS at 11:36

## 2019-08-15 NOTE — DISCHARGE INSTRUCTIONS
[facility]   POST OPERATIVE INSTRUCTIONS  Rotator Cuff Repair - Shoulder  Anthony Ledezma PA-C         1. First meal at home should be clear liquids. Progress to regular diet as tolerated. 2. Apply an ice bag or use cold therapy device for 20-30 minutes a day for the first week to reduce swelling and pain and then use as desired. 3. You may remove the bulky dressing 48 hours after surgery and at that time it is ok to shower. Dry incisions well and cover daily with Band-Aids. 4. You will use a sling with a pillow until your first post-op visit. You may remove the sling to shower- keeping your arm down by your side. Do not actively lift your arm. 5. A recliner position is often more comfortable for sleeping the first several days/ weeks after surgery but you may sleep however you are most comfortable with the sling on.    6. Start the attached exercises  Sunday 8/18   Please do these exercises 3-4 times a day to keep your shoulder from getting too stiff. Physical therapy will be discussed at your first post-operative visit. 7. Medication Instructions are listed below:                 Hydrocodone    8. A post operative appointment has been scheduled for you _8/26 at 1:00pm with Estee Guerrero PA-C  _____________ Please call the office if you need to change the date or time of your appointment. 9. If you develop a fever greater than 101, unexpected redness, or swelling in your arm please call the office . Some bleeding or drainage should be expected the first few days after surgery. Thank you for following the above instructions. Rotator Cuff: Exercises    How to do the exercises          Pendulum swing      1. Hold on to a table or the back of a chair with your good arm. Then bend forward a little and let your sore arm hang straight down. This exercise does not use the arm muscles.  Rather, use your legs and your hips to create movement that makes your arm swing freely. 2. Use the movement from your hips and legs to guide the slightly swinging arm back and forth like a pendulum (or elephant trunk). Then guide it in circles that start small (about the size of a dinner plate). Make the circles a bit larger each day, as your pain allows. 3. Do this exercise for 5 minutes, 5 to 7 times each day. 4. As you have less pain, try bending over a little farther to do this exercise. This will increase the amount of movement at your shoulder. DISCHARGE SUMMARY from Nurse    PATIENT INSTRUCTIONS:    After general anesthesia or intravenous sedation, for 24 hours or while taking prescription Narcotics:  · Limit your activities  · Do not drive and operate hazardous machinery  · Do not make important personal or business decisions  · Do  not drink alcoholic beverages  · If you have not urinated within 8 hours after discharge, please contact your surgeon on call. Report the following to your surgeon:  · Excessive pain, swelling, redness or odor of or around the surgical area  · Temperature over 101  · Nausea and vomiting lasting longer than 4 hours or if unable to take medications  · Any signs of decreased circulation or nerve impairment to extremity: change in color, persistent  numbness, tingling, coldness or increase pain  · Any questions  If you experience any of the following symptoms as noted above, please follow up with Dr. Rhonda Mosley. What to do at Home:  Recommended Activity: See surgical instructions above Dr. Rhonda Mosley  Recommended Diet: Resume regular diet as tolerated. Nothing heavy, greasy, fatty, or fried. Please make sure you have food on your stomach prior to taking narcotic pain medication. *  Please give a list of your current medications to your Primary Care Provider.   *  Please update this list whenever your medications are discontinued, doses arechanged, or new medications (including over-the-counter products) are added.  *  Please carry medication information at all times in case of emergency situations. Community Education:    These are general instructions for a healthy lifestyle:    No smoking/ No tobacco products/ Avoid exposure to second hand smoke. Surgeon General's Warning:  Quitting smoking now greatly reduces serious risk to your health. Obesity, smoking, and sedentary lifestyle greatly increases your risk for illness. A healthy diet, regular physical exercise & weight monitoring are important for maintaining a healthy lifestyle    You may be retaining fluid if you have a history of heart failure or if you experience any of the following symptoms:  Weight gain of 3 pounds or more overnight or 5 pounds in a week, increased swelling in our hands or feet or shortness of breath while lying flat in bed. Please call your doctor as soon as you notice any of these symptoms; do not wait until your next office visit. The discharge information has been reviewed with the patient and caregiver. The patient and caregiver verbalized understanding. Discharge medications reviewed with the patient and caregiver and appropriate educational materials and side effects teaching were provided.   ___________________________________________________________________________________________________________________________________

## 2019-08-15 NOTE — ANESTHESIA PROCEDURE NOTES
Peripheral Block    Performed by: Baron Starla DO  Authorized by: Baron Starla DO       Pre-procedure:    Indications: at surgeon's request and post-op pain management    Preanesthetic Checklist: patient identified, risks and benefits discussed, site marked, timeout performed, anesthesia consent given and patient being monitored      Block Type:   Block Type:  Supraclavicular  Laterality:  Right  Monitoring:  Standard ASA monitoring, continuous pulse ox, frequent vital sign checks, heart rate, responsive to questions and oxygen  Injection Technique:  Single shot  Procedures: ultrasound guided    Patient Position: supine  Prep: chlorhexidine    Location:  Supraclavicular  Needle Type:  Stimuplex  Needle Gauge:  22 G  Needle Localization:  Ultrasound guidance    Assessment:  Number of attempts:  1  Injection Assessment:  Incremental injection every 5 mL, local visualized surrounding nerve on ultrasound, negative aspiration for blood, no intravascular symptoms, negative aspiration for CSF, no paresthesia and ultrasound image on chart  Patient tolerance:  Patient tolerated the procedure well with no immediate complications

## 2019-08-15 NOTE — ANESTHESIA POSTPROCEDURE EVALUATION
Procedure(s):  RIGHT SHOULDER ARTHROSCOPY, SUBACROMIAL DECOMPRESSION, DISTAL CLAVICLE EXCISION,ROTATOR CUFF REPAIR (GEN WITH BLOCK) (LATEX ALLERGY). general    Anesthesia Post Evaluation        Patient location during evaluation: PACU  Patient participation: complete - patient participated  Level of consciousness: awake and alert  Pain management: adequate  Airway patency: patent  Anesthetic complications: no  Cardiovascular status: acceptable  Respiratory status: acceptable  Hydration status: acceptable  Comments: Seen and preparing for transfer   Post anesthesia nausea and vomiting:  none      Vitals Value Taken Time   /58 8/15/2019  1:15 PM   Temp 36.5 °C (97.7 °F) 8/15/2019 11:52 AM   Pulse 75 8/15/2019  1:28 PM   Resp 12 8/15/2019  1:28 PM   SpO2 99 % 8/15/2019  1:28 PM   Vitals shown include unvalidated device data.

## 2019-08-15 NOTE — ANESTHESIA PREPROCEDURE EVALUATION
Anesthetic History   No history of anesthetic complications  PONV          Review of Systems / Medical History  Patient summary reviewed, nursing notes reviewed and pertinent labs reviewed    Pulmonary  Within defined limits                 Neuro/Psych         TIA and psychiatric history    Comments: Fibromyalgia (M79.7)    Anxiety (F41.9)    Neuropathy (G62.9) 6/22/2016 Both feet   Stroke (HCC) (I63.9)    Spinal stenosis, lumbar  Fibromyalgia   Cardiovascular  Within defined limits  Hypertension              Exercise tolerance: >4 METS     GI/Hepatic/Renal     GERD: well controlled    Renal disease: CRI      Comments: IBS Endo/Other        Arthritis and cancer     Other Findings              Physical Exam    Airway  Mallampati: II  TM Distance: 4 - 6 cm  Neck ROM: normal range of motion   Mouth opening: Normal     Cardiovascular  Regular rate and rhythm,  S1 and S2 normal,  no murmur, click, rub, or gallop  Rhythm: regular  Rate: normal         Dental  No notable dental hx       Pulmonary  Breath sounds clear to auscultation               Abdominal  GI exam deferred       Other Findings            Anesthetic Plan    ASA: 3  Anesthesia type: general      Post-op pain plan if not by surgeon: peripheral nerve block single    Induction: Intravenous  Anesthetic plan and risks discussed with: Patient

## 2019-08-15 NOTE — OP NOTES
Shoulder Arthroscopy Operative Note      Patient: Loreta Hewitt MRN: [id number]  SSN: [id number]    YOB: 1935  Age: 80 y.o. Sex: female        Date of Surgery: 8/15/2019    Preoperative Diagnosis:    1. RIGHT ROTATOR CUFF TEAR      2. RIGHT SHOULDER IMPINGMENT      3. AC DJD    Postoperative Diagnosis:  1. RIGHT ROTATOR CUFF TEAR      2. RIGHT SHOULDER IMPINGMENT      3. AC DJD      4. BICEPS TENDON RUPTURE    Procedures:  1.right Shoulder Scope and Sub-Acromial Decompression   2. Arthroscopic Distal Clavicle Excision   3. Arthroscopic Rotator Cuff Repair  4. Extensive Debridement of biceps stump and subacromial space    Surgeon(s) and Role:     Dayanara VerduzcoLoretaAdele MENDEZ MD - Primary     Assistant: Cassia Avelar PA-C    Anesthesia: General    Pathology: Impingement, RC Tear, Proximal Biceps and AC Arthritis    Estimated Blood Loss:  Minimal ml      Specimens: * No specimens in log *     Condition: Stable    Complications: None     Hospital Problems  Date Reviewed: 8/15/2019    None          Indications: The patient is a 80 y.o. female who has right shoulder impingement and AC arthritis and a rotator cuff tear. The patient has exhausted nonoperative modalities and is electively admitted for outpatient right shoulder arthroscopy. PROCEDURE IN DETAIL: After the procedure was explained to the patient, including the risks, benefits and possible complications, the patient signed the informed consent. The patient was then taken to the operating suite. Following administration of general anesthesia and interscalene block for postoperative pain control and infusion of intravenous antibiotic, the patient was positioned on the operating table in the supine fashion. The  right  shoulder was then examined under anesthesia and noted to be stable through full range of motion. At this point, the patient was then carefully positioned in the lateral decubitus position, left side down.  The axillary roll was placed. Care was taken to pad both the upper and lower extremities. The right arm was then placed in the DyV.i. Laboratoriess traction device in 45 degrees abduction and 30 degrees forward flexion with 10 pounds of traction. The right shoulder was then prepped and draped in the sterile fashion. The scope was introduced into the shoulder and diagnostic arthroscopy commenced. Articular surfaces of the humeral head and glenoid were visualized and noted to be intact. The anterior, posterior, superior and inferior labrum were visualized. There was moderate degenerative tearing. The biceps anchor and the biceps itself was torn and retracted and the stump was large. This was debrided with a sucker shaver back to stable tissue. The undersurface of the rotator cuff was then visualized. There was a small tear that might have been incomplete so it was marked with a PDS suture. The scope was introduced into the subacromial space. An anterior portal was created for inflow and  lateral portal was established for debridement using a spinal needle for localization. Extensive Hypertrophic hemorrhagic bursal tissue was then resected. The bursal side of the cuff was visualized and was torn at the marked location. The underside of the acromion was identified and denuded of all soft tissue. An acromioplasty was then performed from the posterior portal using a helicut earl with the cutting block technique. The Acromion was shaved down to a type one acromion to remove all impingement. At this point the distal clavicle was identified and an arthroscopic distal clavicle resection was then performed. The distal 10mm of distal clavicle was then resected. Care was taken to preserve the posterior/superior capsule. The scope was introduced back into the subacromial space.   An arthroscopic rotator cuff repair was then performed utilizing 1 x 5.5 arthrex anchor The 5.5 mm anchor was placed laterally and one limb of each suture was placed through the lateral cuff in simple fashion. All of the sutures were tied with rodeur knots to appose the cuff to the tuberosity. This yielded an excellent cuff repair on the bursal side. At this point, with the procedure complete, all arthroscopic equipment was removed from the shoulder. The portals were reapproximated using 2-0 nylon sutures. A sterile dressing was applied. The Sling/immobilzer was applied. The patient was then transferred to the Recovery Room in stable condition. Norrine Fleischer was present for the entirety of the case and was essential in its completion. She assisted with arm positioning and suture management during the repair as well as controlling the scope as the surgeon completed other tasks such as suture passage and knot tying that required two hands to do. Signed: Anthony Barry MD (8/15/2019 at 11:28 AM)

## 2019-08-20 ENCOUNTER — APPOINTMENT (OUTPATIENT)
Dept: GENERAL RADIOLOGY | Age: 84
DRG: 062 | End: 2019-08-20
Attending: EMERGENCY MEDICINE
Payer: MEDICARE

## 2019-08-20 ENCOUNTER — HOSPITAL ENCOUNTER (INPATIENT)
Age: 84
LOS: 4 days | Discharge: HOME HEALTH | DRG: 062 | End: 2019-08-24
Attending: EMERGENCY MEDICINE | Admitting: FAMILY MEDICINE
Payer: MEDICARE

## 2019-08-20 ENCOUNTER — APPOINTMENT (OUTPATIENT)
Dept: CT IMAGING | Age: 84
DRG: 062 | End: 2019-08-20
Attending: EMERGENCY MEDICINE
Payer: MEDICARE

## 2019-08-20 DIAGNOSIS — R47.01 APHASIA: Primary | ICD-10-CM

## 2019-08-20 DIAGNOSIS — Z92.82 RECEIVED INTRAVENOUS TISSUE PLASMINOGEN ACTIVATOR (TPA) IN EMERGENCY DEPARTMENT: ICD-10-CM

## 2019-08-20 DIAGNOSIS — M79.89 SHOULDER SWELLING: ICD-10-CM

## 2019-08-20 DIAGNOSIS — F41.9 ANXIETY: ICD-10-CM

## 2019-08-20 PROBLEM — G45.9 TIA (TRANSIENT ISCHEMIC ATTACK): Status: ACTIVE | Noted: 2019-08-20

## 2019-08-20 PROBLEM — R47.81 SLURRED SPEECH: Status: ACTIVE | Noted: 2019-08-20

## 2019-08-20 LAB
ALBUMIN SERPL-MCNC: 3.7 G/DL (ref 3.5–5)
ALBUMIN/GLOB SERPL: 1.1 {RATIO} (ref 1.1–2.2)
ALP SERPL-CCNC: 58 U/L (ref 45–117)
ALT SERPL-CCNC: 26 U/L (ref 12–78)
ANION GAP SERPL CALC-SCNC: 11 MMOL/L (ref 5–15)
AST SERPL-CCNC: 26 U/L (ref 15–37)
BASOPHILS # BLD: 0.1 K/UL (ref 0–0.1)
BASOPHILS NFR BLD: 1 % (ref 0–1)
BILIRUB SERPL-MCNC: 0.8 MG/DL (ref 0.2–1)
BUN SERPL-MCNC: 13 MG/DL (ref 6–20)
BUN/CREAT SERPL: 10 (ref 12–20)
CALCIUM SERPL-MCNC: 9.6 MG/DL (ref 8.5–10.1)
CHLORIDE SERPL-SCNC: 102 MMOL/L (ref 97–108)
CO2 SERPL-SCNC: 22 MMOL/L (ref 21–32)
COMMENT, HOLDF: NORMAL
CREAT SERPL-MCNC: 1.27 MG/DL (ref 0.55–1.02)
DIFFERENTIAL METHOD BLD: ABNORMAL
EOSINOPHIL # BLD: 0 K/UL (ref 0–0.4)
EOSINOPHIL NFR BLD: 0 % (ref 0–7)
ERYTHROCYTE [DISTWIDTH] IN BLOOD BY AUTOMATED COUNT: 12.4 % (ref 11.5–14.5)
GLOBULIN SER CALC-MCNC: 3.3 G/DL (ref 2–4)
GLUCOSE SERPL-MCNC: 105 MG/DL (ref 65–100)
HCT VFR BLD AUTO: 34.6 % (ref 35–47)
HGB BLD-MCNC: 11.6 G/DL (ref 11.5–16)
IMM GRANULOCYTES # BLD AUTO: 0 K/UL (ref 0–0.04)
IMM GRANULOCYTES NFR BLD AUTO: 0 % (ref 0–0.5)
LACTATE BLD-SCNC: 1.49 MMOL/L (ref 0.4–2)
LYMPHOCYTES # BLD: 3 K/UL (ref 0.8–3.5)
LYMPHOCYTES NFR BLD: 33 % (ref 12–49)
MAGNESIUM SERPL-MCNC: 1.9 MG/DL (ref 1.6–2.4)
MCH RBC QN AUTO: 30.6 PG (ref 26–34)
MCHC RBC AUTO-ENTMCNC: 33.5 G/DL (ref 30–36.5)
MCV RBC AUTO: 91.3 FL (ref 80–99)
MONOCYTES # BLD: 1 K/UL (ref 0–1)
MONOCYTES NFR BLD: 12 % (ref 5–13)
NEUTS SEG # BLD: 4.9 K/UL (ref 1.8–8)
NEUTS SEG NFR BLD: 54 % (ref 32–75)
NRBC # BLD: 0 K/UL (ref 0–0.01)
NRBC BLD-RTO: 0 PER 100 WBC
PLATELET # BLD AUTO: 347 K/UL (ref 150–400)
PMV BLD AUTO: 9.3 FL (ref 8.9–12.9)
POTASSIUM SERPL-SCNC: 3.5 MMOL/L (ref 3.5–5.1)
PROT SERPL-MCNC: 7 G/DL (ref 6.4–8.2)
RBC # BLD AUTO: 3.79 M/UL (ref 3.8–5.2)
SAMPLES BEING HELD,HOLD: NORMAL
SODIUM SERPL-SCNC: 135 MMOL/L (ref 136–145)
WBC # BLD AUTO: 9 K/UL (ref 3.6–11)

## 2019-08-20 PROCEDURE — 96376 TX/PRO/DX INJ SAME DRUG ADON: CPT

## 2019-08-20 PROCEDURE — 65610000006 HC RM INTENSIVE CARE

## 2019-08-20 PROCEDURE — 74011250636 HC RX REV CODE- 250/636

## 2019-08-20 PROCEDURE — 74011000258 HC RX REV CODE- 258: Performed by: EMERGENCY MEDICINE

## 2019-08-20 PROCEDURE — 99291 CRITICAL CARE FIRST HOUR: CPT

## 2019-08-20 PROCEDURE — 96375 TX/PRO/DX INJ NEW DRUG ADDON: CPT

## 2019-08-20 PROCEDURE — 83735 ASSAY OF MAGNESIUM: CPT

## 2019-08-20 PROCEDURE — 96365 THER/PROPH/DIAG IV INF INIT: CPT

## 2019-08-20 PROCEDURE — 94761 N-INVAS EAR/PLS OXIMETRY MLT: CPT

## 2019-08-20 PROCEDURE — 85025 COMPLETE CBC W/AUTO DIFF WBC: CPT

## 2019-08-20 PROCEDURE — 70450 CT HEAD/BRAIN W/O DYE: CPT

## 2019-08-20 PROCEDURE — 80053 COMPREHEN METABOLIC PANEL: CPT

## 2019-08-20 PROCEDURE — 71045 X-RAY EXAM CHEST 1 VIEW: CPT

## 2019-08-20 PROCEDURE — 83605 ASSAY OF LACTIC ACID: CPT

## 2019-08-20 PROCEDURE — 3E03317 INTRODUCTION OF OTHER THROMBOLYTIC INTO PERIPHERAL VEIN, PERCUTANEOUS APPROACH: ICD-10-PCS | Performed by: EMERGENCY MEDICINE

## 2019-08-20 PROCEDURE — 74011250636 HC RX REV CODE- 250/636: Performed by: EMERGENCY MEDICINE

## 2019-08-20 RX ORDER — LORAZEPAM 2 MG/ML
0.5 INJECTION INTRAMUSCULAR
Status: COMPLETED | OUTPATIENT
Start: 2019-08-20 | End: 2019-08-20

## 2019-08-20 RX ORDER — FENTANYL CITRATE 50 UG/ML
25 INJECTION, SOLUTION INTRAMUSCULAR; INTRAVENOUS
Status: COMPLETED | OUTPATIENT
Start: 2019-08-20 | End: 2019-08-20

## 2019-08-20 RX ORDER — LABETALOL HYDROCHLORIDE 5 MG/ML
10 INJECTION, SOLUTION INTRAVENOUS
Status: DISCONTINUED | OUTPATIENT
Start: 2019-08-20 | End: 2019-08-24 | Stop reason: HOSPADM

## 2019-08-20 RX ORDER — SODIUM CHLORIDE 0.9 % (FLUSH) 0.9 %
5-40 SYRINGE (ML) INJECTION AS NEEDED
Status: DISCONTINUED | OUTPATIENT
Start: 2019-08-20 | End: 2019-08-24 | Stop reason: HOSPADM

## 2019-08-20 RX ORDER — ONDANSETRON 2 MG/ML
4 INJECTION INTRAMUSCULAR; INTRAVENOUS
Status: COMPLETED | OUTPATIENT
Start: 2019-08-20 | End: 2019-08-20

## 2019-08-20 RX ORDER — SODIUM CHLORIDE 0.9 % (FLUSH) 0.9 %
5-40 SYRINGE (ML) INJECTION EVERY 8 HOURS
Status: DISCONTINUED | OUTPATIENT
Start: 2019-08-20 | End: 2019-08-24 | Stop reason: HOSPADM

## 2019-08-20 RX ORDER — FENTANYL CITRATE 50 UG/ML
INJECTION, SOLUTION INTRAMUSCULAR; INTRAVENOUS
Status: COMPLETED
Start: 2019-08-20 | End: 2019-08-20

## 2019-08-20 RX ORDER — SODIUM CHLORIDE 9 MG/ML
50 INJECTION, SOLUTION INTRAVENOUS ONCE
Status: COMPLETED | OUTPATIENT
Start: 2019-08-20 | End: 2019-08-20

## 2019-08-20 RX ORDER — LORAZEPAM 2 MG/ML
INJECTION INTRAMUSCULAR
Status: COMPLETED
Start: 2019-08-20 | End: 2019-08-20

## 2019-08-20 RX ORDER — FENTANYL CITRATE 50 UG/ML
25 INJECTION, SOLUTION INTRAMUSCULAR; INTRAVENOUS ONCE
Status: COMPLETED | OUTPATIENT
Start: 2019-08-20 | End: 2019-08-20

## 2019-08-20 RX ADMIN — LORAZEPAM 0.5 MG: 2 INJECTION INTRAMUSCULAR; INTRAVENOUS at 18:40

## 2019-08-20 RX ADMIN — ONDANSETRON 4 MG: 2 INJECTION INTRAMUSCULAR; INTRAVENOUS at 19:42

## 2019-08-20 RX ADMIN — ALTEPLASE 41.55 MG: KIT at 18:29

## 2019-08-20 RX ADMIN — SODIUM CHLORIDE 50 ML: 900 INJECTION, SOLUTION INTRAVENOUS at 19:31

## 2019-08-20 RX ADMIN — FENTANYL CITRATE 25 MCG: 50 INJECTION, SOLUTION INTRAMUSCULAR; INTRAVENOUS at 21:46

## 2019-08-20 RX ADMIN — LORAZEPAM 0.5 MG: 2 INJECTION INTRAMUSCULAR at 18:40

## 2019-08-20 RX ADMIN — LORAZEPAM 0.5 MG: 2 INJECTION INTRAMUSCULAR; INTRAVENOUS at 19:03

## 2019-08-20 RX ADMIN — FENTANYL CITRATE 25 MCG: 50 INJECTION, SOLUTION INTRAMUSCULAR; INTRAVENOUS at 19:42

## 2019-08-20 NOTE — ED TRIAGE NOTES
Dr. Weaver Patient on screen to assess pt. Pt moaning and trembling; when asked assessment questions, pt either replies \"I don't know\" or replies with nonsensical string of words. Pt's  stating last known well 1430.

## 2019-08-20 NOTE — ED TRIAGE NOTES
Pt brought from home for stroke symptoms. LKW 1400. Home health noted that at 1530, pt speech not making sense and speech was slurred. Pt unable to follow commands. Pt top CT on arrival, Dr. Brandon Burgos to CT.

## 2019-08-20 NOTE — ED PROVIDER NOTES
80 y.o. female with past medical history significant for squamous cell LE skin cancer, basal cell facial/back/neck skin cancer, CKD, PE (x2), TIA, dementia, cervical DJD, HTN, hyponatremia, and fibromyalgia who presents from home via EMS with chief complaint of aphasia. Pt LKW @ 1400 on 8/20/19. As per spouse, pt suddenly developed nonsensical aphasia around 0 noting that what she was able to get out was \"complete jabber\". Pt then developed severe anxiety around 1700.  reports a similar past medical episode which took place 4 years ago where she \"was babbling\" and had aphasia which led to diagnosis of TIA. EMS reports that pt had aphasia/slurred speech but no facial droop during transit. EMS states that the pt can occasionally answer questions with short responses and that they were unable to perform a stroke assessment on the pt PTA. Pt underwent right shoulder surgery on 8/15/19. Pt was discharged from the hospital with regimen of hydrocodone which was directed to be taken every 4 hours. Last dosage of hydrocodone around 1600 today. Pt still has severe right shoulder pain. There are no other acute medical concerns at this time. Surgical hx - rectocele repair, EGD, cholecystectomy, polypectomy, hysterectomy, dilation and curettage, bladder suspension, right knee replacement, sub-acromial decompression, adenoidectomy, BCCA removal from face/nose   Social hx - Tobacco use: non-smoker, Alcohol Use: rarely, Drug Use: denies    PCP: Feliciano Cruz MD    Note written by Jimmy Rosales, as dictated by Alyson Barajas MD 5:28 PM.      The history is provided by the spouse and the EMS personnel. No  was used.         Past Medical History:   Diagnosis Date    Allergic rhinitis, cause unspecified     Anxiety     Back pain     C. difficile diarrhea 8/2/2017    Admitted to [facility]    Cancer ([facility] [address]     basal cell face, back, neck    Cancer (HCC)     squamous cell leg  Chronic kidney disease     Chronic pain     DJD (degenerative joint disease) of cervical spine     Fibromyalgia     GERD (gastroesophageal reflux disease)     Hypercholesterolemia     Hypertension     Hyponatremia     IBS (irritable bowel syndrome)     Irritable bowel syndrome with diarrhea 6/29/2016    GI- Dr Jorge Knutson    Kidney disease, chronic, stage III (GFR 30-59 ml/min) ([facility] [address] 6/2011    Dr. Beckie Quijano) Christian    Nausea & vomiting     Neuropathy 6/22/2016    Both feet     Psychiatric disorder     DEPRESSION AND ANXIETY    Stroke ([facility] [address] 2017    TIA    Thromboembolus ([facility] [address] 2013    PE X2       Past Surgical History:   Procedure Laterality Date    COLONOSCOPY  1/20/11    diverticulosis    EGD  1/20/11    schatzki's ring--clear on barium swallow 7/11    HX ADENOIDECTOMY  1940    HX CATARACT REMOVAL Left 2013    HX CATARACT REMOVAL Right 01/27/2014    HX CHOLECYSTECTOMY  5/10    HX DILATION AND CURETTAGE  1963    HX GI      COLONOSCOPY    HX HEENT      BCCA REMOVED FROM FACE ,NOSE     HX HYSTERECTOMY  1970`S    hysterectomy    HX KNEE ARTHROSCOPY Left 1990    HX KNEE REPLACEMENT Right 9/29/09    HX ORTHOPAEDIC Right 2017    trigger finger release    HX ORTHOPAEDIC Right 07/18/2019     RING FINGRER TRIGGER FINGER REALSE     HX OTHER SURGICAL      skin cancer removed: L leg    HX OTHER SURGICAL      BCCA REMOVED FROM BACK     HX POLYPECTOMY  1989    HX ROTATOR CUFF REPAIR Right 08/15/2019    and sub-acromial decompression    HX SHOULDER ARTHROSCOPY Left 12/13/2018    arthroscopy & sub-acromial decompression    HX TONSILLECTOMY  1940    HX UROLOGICAL  1995    bladder suspension    REPAIR OF RECTOCELE           Family History:   Problem Relation Age of Onset    Cancer Mother         lung    Cancer Maternal Aunt         lung    Cancer Maternal Uncle         lung    Cancer Sister         breast    Other Brother         has to have blood tx every other week    Cancer Son 36 prostate, recurrence 7 yrs later    Arthritis-osteo Son         hips    Arthritis-osteo Daughter     Arthritis-osteo Daughter     Arthritis-osteo Sister     No Known Problems Sister     No Known Problems Sister     Anesth Problems Neg Hx        Social History     Socioeconomic History    Marital status:      Spouse name: Not on file    Number of children: Not on file    Years of education: Not on file    Highest education level: Not on file   Occupational History    Not on file   Social Needs    Financial resource strain: Not on file    Food insecurity:     Worry: Not on file     Inability: Not on file    Transportation needs:     Medical: Not on file     Non-medical: Not on file   Tobacco Use    Smoking status: Never Smoker    Smokeless tobacco: Never Used   Substance and Sexual Activity    Alcohol use: Not Currently     Alcohol/week: 0.0 standard drinks     Frequency: Never     Binge frequency: Never     Comment: rarely    Drug use: No    Sexual activity: Not Currently     Partners: Male   Lifestyle    Physical activity:     Days per week: Not on file     Minutes per session: Not on file    Stress: Not on file   Relationships    Social connections:     Talks on phone: Not on file     Gets together: Not on file     Attends [employer] service: Not on file     Active member of club or organization: Not on file     Attends meetings of clubs or organizations: Not on file     Relationship status: Not on file    Intimate partner violence:     Fear of current or ex partner: Not on file     Emotionally abused: Not on file     Physically abused: Not on file     Forced sexual activity: Not on file   Other Topics Concern    Not on file   Social History Narrative    Not on file         ALLERGIES: Latex; Dilaudid [hydromorphone (bulk)]; Altace [ramipril]; Ambien [zolpidem]; Ascensia autodisc test [blood sugar diagnostic, disc-type]; Aspirin; Astelin [azelastine]; Atenolol; Banex la;  Benicar Sergey Bras; Carbocaine [mepivacaine]; Cleocin [clindamycin hcl]; Codeine; Codeine-asa-salicyl-acetam-caf; Cortisporin [neomy-polymyxinb-bacitracin-hc]; Cymbalta [duloxetine]; Cytotec [misoprostol]; Dilaudid [hydromorphone]; Effexor [venlafaxine]; Epinephrine; Estra-d; Estrace [estradiol]; Fentanyl; Flexeril [cyclobenzaprine]; Flonase [fluticasone]; Flunisolide; Guiatussin w/codeine; Ketek [telithromycin]; Mary Anne Dodei; Lexapro [escitalopram]; Lodine [etodolac]; Morphine; Nsaids (non-steroidal anti-inflammatory drug); Other medication; Paxil [paroxetine hcl]; Pcn [penicillins]; Percocet [oxycodone-acetaminophen]; Percodan [oxycodone hcl-oxycodone-asa]; Pneumococcal 23-davida ps vaccine; Proctocream-hc [hydrocortisone]; Prozac [fluoxetine]; Pseudoephedrine; Quinidine; Reglan [metoclopramide]; Remeron [mirtazapine]; Resaid; Robaxin [methocarbamol]; Sonata [zaleplon]; Sulfa (sulfonamide antibiotics); Surmontil [trimipramine]; Talwin [pentazocine lactate]; Toradol [ketorolac tromethamine]; Ultram [tramadol]; Vancomycin; Vasoconstrictor drug; Vigamox [moxifloxacin]; Vioxx [rofecoxib]; Voltaren [diclofenac sodium]; Wellbutrin [bupropion hcl]; Zelnorm [tegaserod hydrogen maleate]; and Zoloft [sertraline]    Review of Systems   Constitutional: Negative for appetite change, chills and fever. HENT: Negative for rhinorrhea, sore throat and trouble swallowing. Eyes: Negative for photophobia. Respiratory: Negative for cough and shortness of breath. Cardiovascular: Negative for chest pain and palpitations. Gastrointestinal: Negative for abdominal pain, nausea and vomiting. Genitourinary: Negative for dysuria, frequency and hematuria. Musculoskeletal: Positive for arthralgias. Neurological: Positive for speech difficulty. Negative for dizziness, facial asymmetry and weakness. Psychiatric/Behavioral: Negative for behavioral problems. The patient is nervous/anxious.     All other systems reviewed and are negative. Vitals:    08/20/19 1900 08/20/19 1915 08/20/19 1930 08/20/19 1945   BP: 148/64 145/60 165/77 181/66   Pulse: 96 84 90 (!) 105   Resp: 16 16 16 16   Temp: 98.6 °F (37 °C) 98.1 °F (36.7 °C) 98.2 °F (36.8 °C) 98 °F (36.7 °C)   SpO2: 96% 98% 99% 99%   Weight:       Height:                Physical Exam   HENT:   Head: Normocephalic and atraumatic. Eyes: Conjunctivae and EOM are normal.   Neck: Normal range of motion. Neck supple. Cardiovascular: Normal rate, regular rhythm and normal heart sounds. Pulmonary/Chest: Effort normal and breath sounds normal. No respiratory distress. Abdominal: Soft. Bowel sounds are normal. She exhibits no distension. There is no tenderness. Musculoskeletal:   Post operative changes to RUE and right shoulder. Pt has a sling in place over the area   Neurological: She is alert. Moving arms and legs symmetrically. Nonsensical aphasic speech. Unable to follow commands. Skin: Skin is warm and dry. Psychiatric: Her mood appears anxious. Nursing note and vitals reviewed. Note written by Jimmy Velez, as dictated by Lamonte Hines MD 5:28 PM    MDM  Number of Diagnoses or Management Options  Aphasia:   Shoulder swelling:   Critical Care  Total time providing critical care: 30-74 minutes  Total critical care time spent exclusive of procedures:  45 minutes         Procedures  CONSULT NOTE:  5:42 PM Lamonte Hines MD spoke with Dr. Telma Mack, Consult for Neurology. Discussed available diagnostic tests and clinical findings. Note: Patient has a complex past medical history. She has over 60 allergies. She had shoulder surgery 5 days ago. She has been complaining of a lot of pain. She is been treated with hydrocodone. Patient is prone to anxiety. She has never had nonsensical speech except one time 4 years ago. Patient has relatively rapid onset of nonsensical speech and not making sense.   Symptoms appear more like a aphasia than delirium. Tele-neurology was consulted. They recommended TPA. They told the patient about the possible side effects including bleeding from recent shoulder surgery. Patient is now improving and her speech is becoming more fluent. Her shoulder seems to be getting more swollen however. Patient will be admitted to the medical service. A pressure dressing is being applied to her shoulder. We will consult Ortho to make them aware of the shoulder swelling. 8:28 PM  Atif Ledezma MD  Hospitalist Joe for Admission  8:29 PM    ED Room Number: ER20/20  Patient Name and age:  [place] 80 y.o.  female  Working Diagnosis:   1. Aphasia    2.  Shoulder swelling      Readmission: no  Isolation Requirements:  no  Recommended Level of Care:  ICU  Code Status:  Full Code  Department:[facility] Adult ED - 21   Other:

## 2019-08-21 ENCOUNTER — APPOINTMENT (OUTPATIENT)
Dept: MRI IMAGING | Age: 84
DRG: 062 | End: 2019-08-21
Attending: FAMILY MEDICINE
Payer: MEDICARE

## 2019-08-21 ENCOUNTER — APPOINTMENT (OUTPATIENT)
Dept: NON INVASIVE DIAGNOSTICS | Age: 84
DRG: 062 | End: 2019-08-21
Attending: FAMILY MEDICINE
Payer: MEDICARE

## 2019-08-21 ENCOUNTER — APPOINTMENT (OUTPATIENT)
Dept: MRI IMAGING | Age: 84
DRG: 062 | End: 2019-08-21
Attending: PSYCHIATRY & NEUROLOGY
Payer: MEDICARE

## 2019-08-21 LAB
APTT PPP: 27.1 SEC (ref 22.1–32)
CHOLEST SERPL-MCNC: 158 MG/DL
ECHO LA MAJOR AXIS: 2.57 CM
ECHO LV INTERNAL DIMENSION DIASTOLIC: 3.23 CM (ref 3.9–5.3)
ECHO LV INTERNAL DIMENSION SYSTOLIC: 1.8 CM
ECHO LV IVSD: 0.64 CM (ref 0.6–0.9)
ECHO LV MASS 2D: 54.6 G (ref 67–162)
ECHO LV MASS INDEX 2D: 36.4 G/M2 (ref 43–95)
ECHO LV POSTERIOR WALL DIASTOLIC: 0.77 CM (ref 0.6–0.9)
ECHO RV TAPSE: 1.99 CM (ref 1.5–2)
ECHO TV REGURGITANT MAX VELOCITY: 284.05 CM/S
ECHO TV REGURGITANT PEAK GRADIENT: 32.3 MMHG
ERYTHROCYTE [DISTWIDTH] IN BLOOD BY AUTOMATED COUNT: 12.5 % (ref 11.5–14.5)
EST. AVERAGE GLUCOSE BLD GHB EST-MCNC: 123 MG/DL
HBA1C MFR BLD: 5.9 % (ref 4.2–6.3)
HCT VFR BLD AUTO: 31.4 % (ref 35–47)
HDLC SERPL-MCNC: 53 MG/DL
HDLC SERPL: 3 {RATIO} (ref 0–5)
HGB BLD-MCNC: 10.1 G/DL (ref 11.5–16)
INR PPP: 1.3 (ref 0.9–1.1)
LDLC SERPL CALC-MCNC: 89.4 MG/DL (ref 0–100)
LIPID PROFILE,FLP: NORMAL
MCH RBC QN AUTO: 30.6 PG (ref 26–34)
MCHC RBC AUTO-ENTMCNC: 32.2 G/DL (ref 30–36.5)
MCV RBC AUTO: 95.2 FL (ref 80–99)
NRBC # BLD: 0 K/UL (ref 0–0.01)
NRBC BLD-RTO: 0 PER 100 WBC
PLATELET # BLD AUTO: 270 K/UL (ref 150–400)
PMV BLD AUTO: 9.4 FL (ref 8.9–12.9)
PROTHROMBIN TIME: 12.6 SEC (ref 9–11.1)
RBC # BLD AUTO: 3.3 M/UL (ref 3.8–5.2)
THERAPEUTIC RANGE,PTTT: NORMAL SECS (ref 58–77)
TRIGL SERPL-MCNC: 78 MG/DL (ref ?–150)
VLDLC SERPL CALC-MCNC: 15.6 MG/DL
WBC # BLD AUTO: 8.8 K/UL (ref 3.6–11)

## 2019-08-21 PROCEDURE — 85730 THROMBOPLASTIN TIME PARTIAL: CPT

## 2019-08-21 PROCEDURE — 70544 MR ANGIOGRAPHY HEAD W/O DYE: CPT

## 2019-08-21 PROCEDURE — 74011250636 HC RX REV CODE- 250/636: Performed by: FAMILY MEDICINE

## 2019-08-21 PROCEDURE — 92610 EVALUATE SWALLOWING FUNCTION: CPT

## 2019-08-21 PROCEDURE — 70551 MRI BRAIN STEM W/O DYE: CPT

## 2019-08-21 PROCEDURE — 74011250637 HC RX REV CODE- 250/637: Performed by: HOSPITALIST

## 2019-08-21 PROCEDURE — 74011250637 HC RX REV CODE- 250/637: Performed by: INTERNAL MEDICINE

## 2019-08-21 PROCEDURE — 51798 US URINE CAPACITY MEASURE: CPT | Performed by: NURSE PRACTITIONER

## 2019-08-21 PROCEDURE — 85610 PROTHROMBIN TIME: CPT

## 2019-08-21 PROCEDURE — 65610000006 HC RM INTENSIVE CARE

## 2019-08-21 PROCEDURE — 80061 LIPID PANEL: CPT

## 2019-08-21 PROCEDURE — 85027 COMPLETE CBC AUTOMATED: CPT

## 2019-08-21 PROCEDURE — 36415 COLL VENOUS BLD VENIPUNCTURE: CPT

## 2019-08-21 PROCEDURE — 83036 HEMOGLOBIN GLYCOSYLATED A1C: CPT

## 2019-08-21 PROCEDURE — 93306 TTE W/DOPPLER COMPLETE: CPT

## 2019-08-21 RX ORDER — SODIUM CHLORIDE 0.9 % (FLUSH) 0.9 %
30 SYRINGE (ML) INJECTION
Status: ACTIVE | OUTPATIENT
Start: 2019-08-21 | End: 2019-08-21

## 2019-08-21 RX ORDER — SODIUM CHLORIDE 9 MG/ML
75 INJECTION, SOLUTION INTRAVENOUS CONTINUOUS
Status: DISPENSED | OUTPATIENT
Start: 2019-08-21 | End: 2019-08-22

## 2019-08-21 RX ORDER — POLYETHYLENE GLYCOL 3350 17 G/17G
17 POWDER, FOR SOLUTION ORAL DAILY
Status: DISCONTINUED | OUTPATIENT
Start: 2019-08-22 | End: 2019-08-21

## 2019-08-21 RX ORDER — FAMOTIDINE 20 MG/1
20 TABLET, FILM COATED ORAL DAILY
Status: DISCONTINUED | OUTPATIENT
Start: 2019-08-21 | End: 2019-08-24 | Stop reason: HOSPADM

## 2019-08-21 RX ORDER — SODIUM CHLORIDE 0.9 % (FLUSH) 0.9 %
5-40 SYRINGE (ML) INJECTION EVERY 8 HOURS
Status: DISCONTINUED | OUTPATIENT
Start: 2019-08-21 | End: 2019-08-24 | Stop reason: HOSPADM

## 2019-08-21 RX ORDER — SODIUM CHLORIDE 0.9 % (FLUSH) 0.9 %
5-40 SYRINGE (ML) INJECTION AS NEEDED
Status: DISCONTINUED | OUTPATIENT
Start: 2019-08-21 | End: 2019-08-24 | Stop reason: HOSPADM

## 2019-08-21 RX ORDER — ACETAMINOPHEN 325 MG/1
650 TABLET ORAL
Status: DISCONTINUED | OUTPATIENT
Start: 2019-08-21 | End: 2019-08-24 | Stop reason: HOSPADM

## 2019-08-21 RX ORDER — POLYETHYLENE GLYCOL 3350 17 G/17G
17 POWDER, FOR SOLUTION ORAL DAILY
Status: DISCONTINUED | OUTPATIENT
Start: 2019-08-21 | End: 2019-08-24 | Stop reason: HOSPADM

## 2019-08-21 RX ORDER — LORAZEPAM 0.5 MG/1
0.5 TABLET ORAL
Status: DISCONTINUED | OUTPATIENT
Start: 2019-08-21 | End: 2019-08-24 | Stop reason: HOSPADM

## 2019-08-21 RX ADMIN — SODIUM CHLORIDE 75 ML/HR: 900 INJECTION, SOLUTION INTRAVENOUS at 18:55

## 2019-08-21 RX ADMIN — FAMOTIDINE 20 MG: 20 TABLET ORAL at 11:09

## 2019-08-21 RX ADMIN — Medication 10 ML: at 06:00

## 2019-08-21 RX ADMIN — Medication 1 PACKET: at 16:00

## 2019-08-21 RX ADMIN — LORAZEPAM 0.5 MG: 1 TABLET ORAL at 12:24

## 2019-08-21 RX ADMIN — POLYETHYLENE GLYCOL 3350 17 G: 17 POWDER, FOR SOLUTION ORAL at 13:52

## 2019-08-21 RX ADMIN — ACETAMINOPHEN 650 MG: 325 TABLET ORAL at 11:09

## 2019-08-21 RX ADMIN — SODIUM CHLORIDE 75 ML/HR: 900 INJECTION, SOLUTION INTRAVENOUS at 01:10

## 2019-08-21 RX ADMIN — SODIUM CHLORIDE 500 ML: 900 INJECTION, SOLUTION INTRAVENOUS at 01:08

## 2019-08-21 RX ADMIN — ACETAMINOPHEN 650 MG: 325 TABLET ORAL at 20:29

## 2019-08-21 NOTE — ED NOTES
Patient noted to have new raised hematoma on right shoulder (post operative shoulder) that was not there prior to TPA administration.  MD aware, consult placed and ice pack placed on patients shoulder

## 2019-08-21 NOTE — ED NOTES
The documentation for this period is being entered following the guidelines as defined in the [facility] policy by Rosario Hoang RN.

## 2019-08-21 NOTE — PROGRESS NOTES
Problem: Dysphagia (Adult)  Goal: *Acute Goals and Plan of Care (Insert Text)  Description  Speech pathology goals  Initiated 8/21/2019  1. Patient will tolerate regular/thin liquid diet with no overt s/s aspiration within 7 days   Outcome: Progressing Towards Goal     SPEECH LANGUAGE PATHOLOGY BEDSIDE SWALLOW EVALUATION  Patient: Lalit Thibodeaux (80 y.o. female)  Date: 8/21/2019  Primary Diagnosis: TIA (transient ischemic attack) [G45.9]  Aphasia [R47.01]  Anxiety [F41.9]  Slurred speech [R47.81]        Precautions:        ASSESSMENT :  Based on the objective data described below, the patient presents with no oral or pharyngeal dysphagia, and no overt s/s aspiration observed. Patient's motor speech and language function are WFL in conversation. Patient grossly oriented x4 (stated the date was the 19th). Patient will benefit from skilled intervention to address the above impairments. Patients rehabilitation potential is considered to be Excellent  Factors which may influence rehabilitation potential include:   ? None noted  ? Mental ability/status  ? Medical condition  ? Home/family situation and support systems  ? Safety awareness  ? Pain tolerance/management  ? Other:      PLAN :  Recommendations and Planned Interventions:  --Regular/thin liquid diet  --Straws ok  --Meds whole  --SLP to follow x1-2 for diet tolerance  Frequency/Duration: Patient will be followed by speech-language pathology 2 times a week to address goals. Discharge Recommendations: None     SUBJECTIVE:   Patient stated I think I had a stroke.     OBJECTIVE:     Past Medical History:   Diagnosis Date    Allergic rhinitis, cause unspecified     Anxiety     Back pain     C. difficile diarrhea 8/2/2017    Admitted to [facility]    Cancer ([facility] [address]     basal cell face, back, neck    Cancer (HCC)     squamous cell leg    Chronic kidney disease     Chronic pain     DJD (degenerative joint disease) of cervical spine     Fibromyalgia     GERD (gastroesophageal reflux disease)     Hypercholesterolemia     Hypertension     Hyponatremia     IBS (irritable bowel syndrome)     Irritable bowel syndrome with diarrhea 6/29/2016    GI- Dr Kayla Oconnor    Kidney disease, chronic, stage III (GFR 30-59 ml/min) ([facility] [address] 6/2011    Dr. Raul Gonzales    Nausea & vomiting     Neuropathy 6/22/2016    Both feet     Psychiatric disorder     DEPRESSION AND ANXIETY    Stroke ([facility] [address] 2017    TIA    Thromboembolus ([facility] [address] 2013    PE X2     Past Surgical History:   Procedure Laterality Date    COLONOSCOPY  1/20/11    diverticulosis    EGD  1/20/11    schatzki's ring--clear on barium swallow 7/11    HX ADENOIDECTOMY  1940    HX CATARACT REMOVAL Left 2013    HX CATARACT REMOVAL Right 01/27/2014    HX CHOLECYSTECTOMY  5/10    HX DILATION AND CURETTAGE  1963    HX GI      COLONOSCOPY    HX HEENT      BCCA REMOVED FROM FACE ,NOSE     HX HYSTERECTOMY  1970`S    hysterectomy    HX KNEE ARTHROSCOPY Left 1990    HX KNEE REPLACEMENT Right 9/29/09    HX ORTHOPAEDIC Right 2017    trigger finger release    HX ORTHOPAEDIC Right 07/18/2019     RING FINGRER TRIGGER FINGER REALSE     HX OTHER SURGICAL      skin cancer removed: L leg    HX OTHER SURGICAL      BCCA REMOVED FROM BACK     HX POLYPECTOMY  1989    HX ROTATOR CUFF REPAIR Right 08/15/2019    and sub-acromial decompression    HX SHOULDER ARTHROSCOPY Left 12/13/2018    arthroscopy & sub-acromial decompression    HX TONSILLECTOMY  1940    HX UROLOGICAL  1995    bladder suspension    REPAIR OF RECTOCELE       Prior Level of Function/Home Situation:      Diet prior to admission: regular/thin  Current Diet:  NPO   Cognitive and Communication Status:  Neurologic State: Alert, Appropriate for age  Orientation Level: Oriented X4  Cognition: Follows commands, Appropriate for age attention/concentration     Perseveration: No perseveration noted  Safety/Judgement: Awareness of environment  Oral Assessment:  Oral Assessment  Labial: No impairment  Dentition: Natural;Full  Oral Hygiene: moist oral mucosa  Lingual: No impairment  Velum: Unable to visualize  Mandible: No impairment  P.O. Trials:  Patient Position: only agreeable to semi-upright positioning in bed due to pain  Vocal quality prior to P.O.: No impairment  Consistency Presented: Ice chips; Thin liquid;Puree; Solid  How Presented: Self-fed/presented;Spoon;Straw;Successive swallows     Bolus Acceptance: No impairment  Bolus Formation/Control: No impairment     Propulsion: No impairment  Oral Residue: None  Initiation of Swallow: No impairment  Laryngeal Elevation: Functional  Aspiration Signs/Symptoms: None  Pharyngeal Phase Characteristics: No impairment, issues, or problems   Effective Modifications: Alternate liquids/solids  Cues for Modifications: None       Oral Phase Severity: No impairment  Pharyngeal Phase Severity : No impairment    NOMS:   The NOMS functional outcome measure was used to quantify this patient's level of swallowing impairment. Based on the NOMS, the patient was determined to be at level 7 for swallow function       NOMS Swallowing Levels:  Level 1 (CN): NPO  Level 2 (CM): NPO but takes consistency in therapy  Level 3 (CL): Takes less than 50% of nutrition p.o. and continues with nonoral feedings; and/or safe with mod cues; and/or max diet restriction  Level 4 (CK): Safe swallow but needs mod cues; and/or mod diet restriction; and/or still requires some nonoral feeding/supplements  Level 5 (CJ): Safe swallow with min diet restriction; and/or needs min cues  Level 6 (CI): Independent with p.o.; rare cues; usually self cues; may need to avoid some foods or needs extra time  Level 7 ([address] [place] [address]): Independent for all p.o.  ABNER. (2003). National Outcomes Measurement System (NOMS): Adult Speech-Language Pathology User's Guide. Pain:  Pain Scale 1: Numeric (0 - 10)  Pain Intensity 1: 0     After treatment:   ? Patient left in no apparent distress sitting up in chair  ? Patient left in no apparent distress in bed  ? Call bell left within reach  ? Nursing notified  ? Caregiver present  ? Bed alarm activated    COMMUNICATION/EDUCATION:   The patients plan of care including recommendations, planned interventions, and recommended diet changes were discussed with: Registered Nurse. ? Patient/family have participated as able in goal setting and plan of care. ?            Patient/family agree to work toward stated goals and plan of care. ?            Patient understands intent and goals of therapy, but is neutral about his/her participation. ? Patient is unable to participate in goal setting and plan of care.     Thank you for this referral.  Edward Lakhani, SLP  Time Calculation: 20 mins

## 2019-08-21 NOTE — PROGRESS NOTES
Spiritual Care Assessment/Progress Note  ST. [address]      NAME: Carlos Ashraf      MRN: [id number]  AGE: 80 y.o. SEX: female  Zoroastrian Affiliation: Hinduism   Language: English     8/20/2019     Total Time (in minutes): 25     Spiritual Assessment begun in [address] through conversation with:         [x]Patient        [x] Family    [] Friend(s)        Reason for Consult: Request by staff     Spiritual beliefs: (Please include comment if needed)     [x] Identifies with a tammie tradition:         [x] Supported by a tammie community:            [] Claims no spiritual orientation:           [] Seeking spiritual identity:                [] Adheres to an individual form of spirituality:           [] Not able to assess:                           Identified resources for coping:      [x] Prayer                               [] Music                  [] Guided Imagery     [x] Family/friends                 [] Pet visits     [] Devotional reading                         [] Unknown     [] Other:                                               Interventions offered during this visit: (See comments for more details)    Patient Interventions: Affirmation of emotions/emotional suffering, Affirmation of tammie, Normalization of emotional/spiritual concerns, Prayer (assurance of)     Family/Friend(s):  Affirmation of emotions/emotional suffering, Affirmation of tammie, Iconic (affirming the presence of God/Higher Power), Normalization of emotional/spiritual concerns, Prayer (assurance of)     Plan of Care:     [x] Support spiritual and/or cultural needs    [] Support AMD and/or advance care planning process      [] Support grieving process   [] Coordinate Rites and/or Rituals    [] Coordination with community clergy   [] No spiritual needs identified at this time   [] Detailed Plan of Care below (See Comments)  [] Make referral to Music Therapy  [] Make referral to Pet Therapy     [] Make referral to Addiction services  [] Make referral to [facility]  [] Make referral to Spiritual Care Partner  [] No future visits requested        [x] Follow up visits as needed     Comments:  responded to staff request for family support. A code stroke had been called on pt earlier. Pt's family and  were at bedside. When  arrived pt was resting, family shared that pt was requesting prayer. Pt woke up during visit but was happy for  to follow up tomorrow to have prayer with pt and family. Let pt and family know of  availability.  follow up as needed.      Christ Falcon, [facility]   [phone])

## 2019-08-21 NOTE — PROGRESS NOTES
PCCM    In ICU for management of stroke sx s/p TPA    Head CT negative  MRI brain pending    Had shoulder surgery  with hematoma    Patient Vitals for the past 4 hrs:   BP Temp Pulse Resp SpO2   19 0830 95/51  75 14 96 %   19 0800 99/57 98.4 °F (36.9 °C) 81 14 98 %   19 0700 118/48  71 12 95 %   19 0630 113/46  77 14 97 %   19 0600 127/52  82 21 97 %   19 0530 114/49  78 13 95 %   19 0500 125/56  82 13 95 %   Temp (24hrs), Av.1 °F (36.7 °C), Min:97.9 °F (36.6 °C), Max:98.7 °F (37.1 °C)      Intake/Output Summary (Last 24 hours) at 2019 0841  Last data filed at 2019 0400  Gross per 24 hour   Intake    Output 0 ml   Net 0 ml     No resp distress    Lab:  Recent Labs     19  0455 19  1751   WBC 8.8 9.0   HGB 10.1* 11.6    347   NA  --  135*   K  --  3.5   CL  --  102   CO2  --  22   BUN  --  13   CREA  --  1.27*   GLU  --  105*   CA  --  9.6   MG  --  1.9   INR 1.3*  --    TBILI  --  0.8   SGOT  --  26     Impression    TIA / CVA - s/p TPA    Recent shoulder surgery with hematoma post tpa    --post tpa protocol observation in ICU  --Neuro to see  --ortho to see    Will see formally if needed    Darren Mckeon MD

## 2019-08-21 NOTE — ROUTINE PROCESS
Bedside, Verbal and Written shift change report given to [address] [place] [address] (oncoming nurse) by Osiel Kaur (offgoing nurse). Report included the following information SBAR, Kardex, ED Summary, Procedure Summary, Intake/Output, MAR, Accordion and Recent Results.

## 2019-08-21 NOTE — PROGRESS NOTES
0159: The documentation for this period is being entered following the guidelines as defined in the [facility] downtime policy by Ivana Mcfarland RN.    0200: TRANSFER - IN REPORT:    Verbal report received from [facility] (name) on [place]  being received from ED (unit) for routine progression of care      Report consisted of patients Situation, Background, Assessment and   Recommendations(SBAR). Information from the following report(s) SBAR, Kardex, ED Summary, Intake/Output, MAR, Recent Results, Med Rec Status, Cardiac Rhythm SR and Alarm Parameters  was reviewed with the receiving nurse. Opportunity for questions and clarification was provided. Assessment completed upon patients arrival to unit and care assumed. 0730: Bedside shift change report given to Ponce Martinez RN (oncoming nurse) by Yasmine Joy RN (offgoing nurse). Report included the following information SBAR, Kardex, ED Summary, Intake/Output, MAR, Recent Results, Med Rec Status, Cardiac Rhythm SR and Alarm Parameters .

## 2019-08-21 NOTE — PROGRESS NOTES
Hospitalist Progress Note  Jack Moran MD  Answering service: [phone] from in house phone        Date of Service:  2019  NAME:  Carlos Ashraf  :  1935  MRN:  [id number]      Admission Summary:   80 y.o F with PMH of skin ca,HTN,TIA,PE,CKD,anxiety and chronic pain was brought to the hospital because of slurred speech. Code stroke was called and she received TPA and was admitted to the ICU for further management    Interval history / Subjective:     F/u for suspected stroke    This afternoon ,she complained of some back pain. States that yesterday evening she had some word finding difficulty and was brought to the hospital.  She had rotator cuff repair on Monday and was taking hydrocodone -tylenol every 4 hrs for pain. Has some abdominal discomfort -last bowel movement was 5-6 days ago. Assessment & Plan:     # Speech difficulty - TIA  -s/p tpa on   -MRI brain did not show any acute infarct. Mild white matter disease likely related to chronic small vessel ischemic disease. -MRA did not show any flow limiting stenosis or intracranial aneurysm.  -Echo showed normal EF and no ASD. -neurology following  -carotid dopplers ordered. -PT/OT/speech eval    #History of rotator cuff repair recently:   -ortho following  -pain management tylenol. Can use hydrocodone if severe pain. #Constipation:  -likely due to recent use of narcotics,  -start miralax,patient said she takes benefiber. #HTN:  -BP soft, will hold antihypertensives now. #CKD stage 3:  -baseline cr -1.3  -check labs tomorrow.         Code status: full  DVT prophylaxis: SCD    Care Plan discussed with: patient,nurse and family at bed side  Disposition:TBD     Hospital Problems  Date Reviewed: 8/15/2019          Codes Class Noted POA    Aphasia ICD-10-CM: R47.01  ICD-9-CM: 784.3  2019 Unknown        TIA (transient ischemic attack) ICD-10-CM: G45.9  ICD-9-CM: 435.9  8/20/2019 Unknown        Slurred speech ICD-10-CM: R47.81  ICD-9-CM: 784.59  8/20/2019 Unknown        Anxiety ICD-10-CM: F41.9  ICD-9-CM: 300.00  8/4/2010 Unknown                Review of Systems:   Pertinent items are noted in HPI. Vital Signs:    Last 24hrs VS reviewed since prior progress note. Most recent are:  Visit Vitals  /51   Pulse 77   Temp 98.2 °F (36.8 °C)   Resp 12   Ht 5' 2\" (1.575 m)   Wt 51.3 kg (113 lb 1.5 oz)   SpO2 100%   BMI 20.69 kg/m²         Intake/Output Summary (Last 24 hours) at 8/21/2019 1801  Last data filed at 8/21/2019 1200  Gross per 24 hour   Intake 812.5 ml   Output 0 ml   Net 812.5 ml        Physical Examination:             Constitutional:  No acute distress, cooperative, pleasant    ENT:  Oral mucous moist, oropharynx benign. Resp:  CTA bilaterally. No wheezing/rhonchi/rales   CV:  Regular rhythm, normal rate    GI:  Soft, non distended, non tender. normoactive bowel sounds    Musculoskeletal:  No edema, warm    Neurologic:  Moves all extremities. AAOx2, speech clear, RUE limited mobility due to recent surgery. Skin: there is a large bruise on the left UE             Data Review:    Review and/or order of clinical lab test  Review and/or order of tests in the radiology section of CPT  Review and/or order of tests in the medicine section of CPT      Labs:     Recent Labs     08/21/19 0455 08/20/19 1751   WBC 8.8 9.0   HGB 10.1* 11.6   HCT 31.4* 34.6*    347     Recent Labs     08/20/19 1751   *   K 3.5      CO2 22   BUN 13   CREA 1.27*   *   CA 9.6   MG 1.9     Recent Labs     08/20/19 1751   SGOT 26   ALT 26   AP 58   TBILI 0.8   TP 7.0   ALB 3.7   GLOB 3.3     Recent Labs     08/21/19 0455   INR 1.3*   PTP 12.6*   APTT 27.1      No results for input(s): FE, TIBC, PSAT, FERR in the last 72 hours. No results found for: FOL, RBCF   No results for input(s): PH, PCO2, PO2 in the last 72 hours.   No results for input(s): CPK, CKNDX, TROIQ in the last 72 hours.     No lab exists for component: CPKMB  Lab Results   Component Value Date/Time    Cholesterol, total 158 08/21/2019 04:55 AM    HDL Cholesterol 53 08/21/2019 04:55 AM    LDL, calculated 89.4 08/21/2019 04:55 AM    Triglyceride 78 08/21/2019 04:55 AM    CHOL/HDL Ratio 3.0 08/21/2019 04:55 AM     Lab Results   Component Value Date/Time    Glucose (POC) 107 (H) 08/02/2017 12:16 PM     Lab Results   Component Value Date/Time    Color YELLOW/STRAW 03/10/2019 06:49 PM    Appearance CLEAR 03/10/2019 06:49 PM    Specific gravity 1.012 03/10/2019 06:49 PM    pH (UA) 6.5 03/10/2019 06:49 PM    Protein NEGATIVE  03/10/2019 06:49 PM    Glucose NEGATIVE  03/10/2019 06:49 PM    Ketone TRACE (A) 03/10/2019 06:49 PM    Bilirubin NEGATIVE  03/10/2019 06:49 PM    Urobilinogen 0.2 03/10/2019 06:49 PM    Nitrites NEGATIVE  03/10/2019 06:49 PM    Leukocyte Esterase NEGATIVE  03/10/2019 06:49 PM    Epithelial cells FEW 03/10/2019 06:49 PM    Bacteria NEGATIVE  03/10/2019 06:49 PM    WBC 0-4 03/10/2019 06:49 PM    RBC 0-5 03/10/2019 06:49 PM         Medications Reviewed:     Current Facility-Administered Medications   Medication Dose Route Frequency    sodium chloride (NS) flush 5-40 mL  5-40 mL IntraVENous Q8H    sodium chloride (NS) flush 5-40 mL  5-40 mL IntraVENous PRN    0.9% sodium chloride infusion  75 mL/hr IntraVENous CONTINUOUS    sodium chloride (NS) flush 30 mL  30 mL IntraVENous RAD ONCE    famotidine (PEPCID) tablet 20 mg  20 mg Oral DAILY    LORazepam (ATIVAN) tablet 0.5 mg  0.5 mg Oral BID PRN    acetaminophen (TYLENOL) tablet 650 mg  650 mg Oral Q6H PRN    guar gum (BENEFIBER) packet 1 Packet  4 g Oral TID    polyethylene glycol (MIRALAX) packet 17 g  17 g Oral DAILY    sodium chloride (NS) flush 5-40 mL  5-40 mL IntraVENous Q8H    sodium chloride (NS) flush 5-40 mL  5-40 mL IntraVENous PRN    labetalol (NORMODYNE;TRANDATE) injection 10 mg  10 mg IntraVENous Q10MIN PRN     ______________________________________________________________________  EXPECTED LENGTH OF STAY: - - -  ACTUAL LENGTH OF STAY:          1                 Pastor Kobi MD

## 2019-08-21 NOTE — PROGRESS NOTES
0928. MRI screening sheet completed and signed with Patient. 3410. Consult placed to Dr. Melina Montana with [facility].   1200. Patient's  concerned that patient has not had a bowel movement since before her shoulder surgery (Thursday 8/15). Hospitalist notified. Per pt, usually takes benefiber at home. Benefiber ordered per MD.   6598. Patient has not voided for this RN. Bladder scan reveals 205ml of urine in the bladder. Purewick in place. Patient states she does not need to use the restroom. Will reassess in two hours.

## 2019-08-21 NOTE — PROGRESS NOTES
Reason for Admission:   Presented to the ED with slurred speech, unable to follow commands. Admitted with TIA, received tPA in the ED. RRAT Score:   26 / high               Resources/supports as identified by patient/family:    [facility]ron [phone], Has 2 sins and a daughter                Top Challenges facing patient (as identified by patient/family and CM): Finances/Medication cost?   Confirmed insurance to be Medicare A,B with a AARP supplement                 Transportation? Patient drives              Support system or lack thereof?  and children                     Living arrangements? Lives with  in a 2 level home has a stair lift, one step to enter              Self-care/ADLs/Cognition? A&O x 4, Independent with ADL's          Current Advanced Directive/Advance Care Plan: On file,  is primary agent                          Plan for utilizing home health:TBD                     Transition of Care Plan:        Care manager met with patient, her  and  Daughter in law to introduce self and wxplain role. Patient confirmed her PCP to be Dr Ro Rivera and she sees him 3 times a year and uses the [facility] as her pharmacy. Patient has no equipment needs, had [facility] but cannot recall the name of the agency. Care management will follow for transitions of care.  Ozzie Kelley RN,CRM    Care Management Interventions  PCP Verified by CM: Yes(Dr Ro Rivera)  MyChart Signup: No  Discharge Durable Medical Equipment: No  Physical Therapy Consult: Yes  Occupational Therapy Consult: Yes  Speech Therapy Consult: Yes  Current Support Network: Lives with Spouse(Spouse Cody [phone])  Confirm Follow Up Transport: Family

## 2019-08-21 NOTE — PROGRESS NOTES
for follow up visit as  visited pt and family last night they requested prayer today. Pt was drowsy though appreciative of visit. Pt's RN was also in the room and pt thought maybe later would be a better time for a visit. Let her know of  availability and support.  follow up as needed.      Christ Falcon, MACE   [phone]

## 2019-08-21 NOTE — CONSULTS
[address] [place] Luke, NP  Neurocritical Care Nurse Practitioner  [phone]    Patient: Vu Cortez MRN: [id number]  SSN: [id number]    YOB: 1935  Age: 80 y.o. Sex: female      Chief Complaint: Aphasia    Subjective:      Vu Cortez is a 80 y.o. female with a pmh significant for skin CA, HTN, TIA, PE, CKD, anxiety and chronic pain who presented to the ED yesterday via EMS due to complaints of acute onset of slurred speech and speech pattern described as \"words not making sense. \" A Code S was called and a stat CT of her head was obtained, which was negative for acute process. NIHSS documented as 6. She was within the window for tPA, so it was administered. Of note, she had rotator cuff repair on 8/15/19, did develop a hematoma at surgery site post tPA, for which ortho has been consulted. She was given fentanyl and ativan in the ED for pain management, reports shoulder is very painful. Presently, she denies numbness, tingling, headache, dizziness, nausea, or blurred vision. Her chart indicates an allergy to ASA and NSAIDS, however she reports she takes ASA 81 mg daily.      Past Medical History:   Diagnosis Date    Allergic rhinitis, cause unspecified     Anxiety     Back pain     C. difficile diarrhea 8/2/2017    Ad[facility] to [facility]    [facility]     basal cell face, back, neck    Cancer (HCC)     squamous cell leg    Chronic kidney disease     Chronic pain     DJD (degenerative joint disease) of cervical spine     Fibromyalgia     GERD (gastroesophageal reflux disease)     Hypercholesterolemia     Hypertension     Hyponatremia     IBS (irritable bowel syndrome)     Irritable bowel syndrome with diarrhea 6/29/2016    GI- Dr Alejandro Ferreira    Kidney disease, chronic, stage III (GFR 30-59 ml/min) ([facility] 6/2011    Dr. Margarita Bill) [place]    Nausea & vomiting     Neuropathy 6/22/2016    Both feet     Psychiatric disorder     DEPRESSION AND ANXIETY    Stroke [facility]) 2017    TIA    Thromboembolus ([address] 2013    PE X2     Family History   Problem Relation Age of Onset    Cancer Mother         lung    Cancer Maternal Aunt         lung    Cancer Maternal Uncle         lung    Cancer Sister         breast    Other Brother         has to have blood tx every other week   [facility] Cancer Son 36        prostate, recurrence 7 yrs later   [address] [place] [address] Arthritis-osteo Son         hips    Arthritis-osteo Daughter     Arthritis-osteo Daughter     Arthritis-osteo Sister     No Known Problems Sister     No Known Problems Sister     Anesth Problems Neg Hx      Social History     Tobacco Use    Smoking status: Never Smoker    Smokeless tobacco: Never Used   Substance Use Topics    Alcohol use: Not Currently     Alcohol/week: 0.0 standard drinks     Frequency: Never     Binge frequency: Never     Comment: rarely      Prior to Admission Medications   Prescriptions Last Dose Informant Patient Reported? Taking? FERROUS FUMARATE/VIT BCOMP,C (SUPER B COMPLEX PO) 8/19/2019 at Unknown time  Yes Yes   Sig: Take  by mouth daily (after dinner). HYDROcodone-acetaminophen (NORCO) 5-325 mg per tablet 8/19/2019 at Unknown time  No Yes   Sig: Take 1 Tab by mouth every four (4) hours as needed for Pain for up to 7 days. Max Daily Amount: 6 Tabs. LORazepam (ATIVAN) 0.5 mg tablet 8/20/2019 at AM  Yes Yes   Sig: Take 0.5 mg by mouth every morning. LORazepam (ATIVAN) 1 mg tablet 8/19/2019 at HS  Yes Yes   Sig: Take 1 mg by mouth nightly. Lactobacillus acidophilus (PROBIOTIC PO) 8/20/2019 at AM  Yes Yes   Sig: Take  by mouth daily. Wheat Dextrin (BENEFIBER CLEAR) 3 gram/3.5 gram pwpk 8/20/2019 at AM  Yes Yes   Sig: Take  by mouth daily. acetaminophen (TYLENOL ARTHRITIS PAIN) 650 mg CR tablet   Yes No   Sig: Take 650 mg by mouth two (2) times daily as needed for Pain.    amLODIPine (NORVASC) 5 mg tablet 8/20/2019 at Unknown time  No Yes   Sig: TAKE ONE TABLET BY MOUTH ONE TIME DAILY   aspirin delayed-release 81 mg tablet 2019 at Unknown time  No Yes   Sig: Take 1 Tab by mouth daily. bismuth subsalicylate (PEPTO-BISMOL PO)   Yes Yes   Sig: Take  by mouth as needed. cholecalciferol (VITAMIN D3) 1,000 unit tablet 2019 at Unknown time  Yes Yes   Sig: Take 1,000 Units by mouth daily. donepezil (ARICEPT) 5 mg tablet 2019 at HS  No Yes   Sig: Take 1 Tab by mouth nightly. hyoscyamine SL (LEVSIN/SL) 0.125 mg SL tablet 2019 at Unknown time  Yes Yes   Si.125 mg by SubLINGual route Before breakfast, lunch, and dinner. memantine ER (NAMENDA XR) 7 mg capsule 2019 at AM  No Yes   Sig: Take 1 Cap by mouth daily. metoprolol tartrate (LOPRESSOR) 25 mg tablet 2019 at HS  No Yes   Sig: TAKE ONE-QUARTER TABLET BY MOUTH EVERY DAY   Patient taking differently: every evening. TAKE ONE-QUARTER TABLET BY MOUTH EVERY DAY   polyethylene glycol (MIRALAX) 17 gram/dose powder   Yes Yes   Sig: Take 17 g by mouth as needed. raNITIdine (ZANTAC) 150 mg tablet 2019 at AM  Yes Yes   Sig: Take 150 mg by mouth Before breakfast and dinner. Facility-Administered Medications: None       Allergies   Allergen Reactions    Latex Itching    Dilaudid [Hydromorphone (Bulk)] Other (comments)     ? loss of consciousness    Altace [Ramipril] Unknown (comments)    Ambien [Zolpidem] Unknown (comments)    [employer] Test [Blood Sugar Diagnostic, Disc-Type] Unknown (comments)    Aspirin Unknown (comments)    Astelin [Azelastine] Unknown (comments)    Atenolol Unknown (comments)    Banex La Unknown (comments)    Benicar [Olmesartan] Unknown (comments)    Carbocaine [Mepivacaine] Unknown (comments)    Cleocin [Clindamycin Hcl] Hives    Codeine Nausea and Vomiting    Codeine-Asa-Salicyl-Acetam-Caf Other (comments)     Stomach pain    Cortisporin [Neomy-Polymyxinb-Bacitracin-Hc] Unknown (comments)    Cymbalta [Duloxetine] Other (comments)    Cytotec [Misoprostol] Unknown (comments)  Dilaudid [Hydromorphone] Unknown (comments)     Patient states that she was unable to respond, but was breathing    Effexor [Venlafaxine] Unknown (comments)    Epinephrine Unknown (comments)     Heart racing    Estra-D Unknown (comments)    Estrace [Estradiol] Unknown (comments)    Fentanyl Nausea and Vomiting and Unknown (comments)     shakes    Flexeril [Cyclobenzaprine] Unable to Obtain     \"drugs me\"    Flonase [Fluticasone] Unknown (comments)    Flunisolide Unknown (comments)    Guiatussin W/Codeine Unknown (comments)    Ketek [Telithromycin] Nausea Only    Klonopin [Clonazepam] Unknown (comments)    Lexapro [Escitalopram] Nausea and Vomiting    Lodine [Etodolac] Nausea and Vomiting    Morphine Unknown (comments)    Nsaids (Non-Steroidal Anti-Inflammatory Drug) Nausea and Vomiting    Other Medication Unknown (comments)     Gareth Alford    Paxil [Paroxetine Hcl] Nausea and Vomiting    Pcn [Penicillins] Hives    Percocet [Oxycodone-Acetaminophen] Unknown (comments)    Percodan [Oxycodone Hcl-Oxycodone-Asa] Unknown (comments)    Pneumococcal 23-Mary Ps Vaccine Swelling    Proctocream-Hc [Hydrocortisone] Unknown (comments)    Prozac [Fluoxetine] Nausea Only     Suicidal visions      Pseudoephedrine Other (comments)    Quinidine Unknown (comments)    Reglan [Metoclopramide] Unknown (comments)    Remeron [Mirtazapine] Other (comments)    Resaid Other (comments)     inflammed eyes    Robaxin [Methocarbamol] Unknown (comments)    Sonata [Zaleplon] Unknown (comments)    Sulfa (Sulfonamide Antibiotics) Hives    Surmontil [Trimipramine] Nausea and Vomiting    Talwin [Pentazocine Lactate] Unknown (comments)    Toradol [Ketorolac Tromethamine] Nausea and Vomiting    Ultram [Tramadol] Unknown (comments)    Vancomycin Nausea Only    Vasoconstrictor Drug Unknown (comments)    Vigamox [Moxifloxacin] Rash and Swelling    Vioxx [Rofecoxib] Unknown (comments)    Voltaren [Diclofenac Sodium] Itching    Wellbutrin [Bupropion Hcl] Nausea and Vomiting    Zelnorm [Tegaserod Hydrogen Maleate] Unknown (comments)    Zoloft [Sertraline] Nausea and Vomiting     Review of Systems:  Pertinent items are noted in the History of Present Illness. Objective:     Vitals:    19 2300 19 2330 19 0000 19 0030   BP: 142/68 144/73 140/64 123/60   Pulse: 87 82 81 81   Resp: 18 15 15 15   Temp: 98 °F (36.7 °C) 98.1 °F (36.7 °C) 97.9 °F (36.6 °C) 98 °F (36.7 °C)   SpO2: 99% 100% 100% 100%   Weight:       Height:          Physical Exam:  GENERAL: Calm, cooperative, NAD  SKIN: Warm, dry, color appropriate for ethnicity. Neurologic Exam:  Mental Status:  Drowsy but easily arousable and oriented x 4. Appropriate affect, mood and behavior. Language:    Speech is clear, but slow. Impaired comprehension with delayed command following and requires frequent redirection to perform tasks properly. Cranial Nerves:   Pupils 3 mm, equal, round and reactive to light. Visual fields full to confrontation. Extraocular movements intact. Facial sensation intact. Full facial strength, no asymmetry. Hearing grossly intact bilaterally. No dysarthria. Tongue protrudes to midline, palate elevates symmetrically. Shoulder shrug 5/5 bilaterally. Motor:    No pronator drift. Bulk and tone normal.      Equal power in all extremities proximally and distally. No involuntary movements. Sensation:    Sensation intact throughout to light touch. Coordination & Gait: Gait deferred. FTN slow but intact with no ataxia present.     NIHSS:      1a-LOC:0    1b-Month/Age:0    1c-Open/Close Hand:1    2-Best Gaze:0    3-Visual Fields:0    4-Facial Palsy:0    5a-Left Arm:0    5b-Right Arm:0    6a-Left Le    6b-Right Le    7-Limb Ataxia:0    8-Sensory:0    9-Best Language:1    10-Dysarthria:0    11-Extinction/Inattention:0  TOTAL SCORE:2    Labs:  Lab Results   Component Value Date/Time    WBC 9.0 08/20/2019 05:51 PM    HGB 11.6 08/20/2019 05:51 PM    HCT 34.6 (L) 08/20/2019 05:51 PM    PLATELET 061 13/22/6411 05:51 PM    MCV 91.3 08/20/2019 05:51 PM      Lab Results   Component Value Date/Time    Sodium 135 (L) 08/20/2019 05:51 PM    Potassium 3.5 08/20/2019 05:51 PM    Chloride 102 08/20/2019 05:51 PM    CO2 22 08/20/2019 05:51 PM    Anion gap 11 08/20/2019 05:51 PM    Glucose 105 (H) 08/20/2019 05:51 PM    BUN 13 08/20/2019 05:51 PM    Creatinine 1.27 (H) 08/20/2019 05:51 PM    BUN/Creatinine ratio 10 (L) 08/20/2019 05:51 PM    GFR est AA 49 (L) 08/20/2019 05:51 PM    GFR est non-AA 40 (L) 08/20/2019 05:51 PM    Calcium 9.6 08/20/2019 05:51 PM     Imaging:  CT Results (most recent):  Results from Hospital Encounter encounter on 08/20/19   CT CODE NEURO HEAD WO CONTRAST    Narrative EXAM: CT CODE NEURO HEAD WO CONTRAST    INDICATION: slurred speech    COMPARISON: None. CONTRAST: None. TECHNIQUE: Unenhanced CT of the head was performed using 5 mm images. Brain and  bone windows were generated. CT dose reduction was achieved through use of a  standardized protocol tailored for this examination and automatic exposure  control for dose modulation. FINDINGS:  The ventricles and sulci are normal in size, shape and configuration and  midline. There is no significant white matter disease. There is no intracranial  hemorrhage, extra-axial collection, mass, mass effect or midline shift. The  basilar cisterns are open. No acute infarct is identified. The bone windows  demonstrate no abnormalities. The visualized portions of the paranasal sinuses  and mastoid air cells are clear.  Status post bilateral lens surgery      Impression IMPRESSION: No evidence of acute process     Assessment:     Hospital Problems  Date Reviewed: 8/15/2019          Codes Class Noted POA    Aphasia ICD-10-CM: R47.01  ICD-9-CM: 784.3  8/20/2019 Unknown        TIA (transient ischemic attack) ICD-10-CM: G45.9  ICD-9-CM: 435.9  8/20/2019 Unknown     Plan:     1.) CVA              - s/p tPA in ED              - No ASA/anticoagulants until 24 hours post tPA and imaging negative for hemorrhage              - NIH of 2 on my exam              - Neuro checks per post tPA protocol              - A1C and lipid panel pending, LDL goal <70              - MRI brain ordered              - ECHO ordered              - PT/OT/SLP evals              - Stroke education     2.) Hx HTN              - SBP goal less than 180, allow permissive HTN in the setting of possible CVA              - Labetalol PRN    I have discussed the diagnosis and the intended plan as seen in the above orders with Dr. Merlinda Gross, the patient and the primary RN. Thank you for this consult and participating in the care of this patient.   Signed By: Puja Coffey NP     August 21, 2019

## 2019-08-21 NOTE — PROGRESS NOTES
Physical Therapy Note  8/21/19    Orders acknowledged and chart reviewed. Note patient given tPA @1829 on 8/20. Will hold mobilization x24hrs per protocol and f/u tomorrow for evaluation.      Thank you.   Nelson Gudino, PT, DPT

## 2019-08-21 NOTE — ED NOTES
Pt was given dose of pain medication and her o2 saturation dropped to 88-93%, pt placed on 2lpm via md susan aware

## 2019-08-21 NOTE — PROGRESS NOTES
[address] visit. Mrs. Quan Elaine had requested communion but decided to decline for today because she wasn't feeling well. Prayer offered. Mr. Quan Elaine received communion and Mrs. Quan Elaine received spiritual communion. Will make a referral to Fr. Cardoso for [employer] [facility] [employer] [facility]. Mrs. Quan Elaine thinks she may be feeling better tomorrow to receive communion.     ZOLTAN Rudolph, RN, ACSW, LCSW   Page:  [phone]

## 2019-08-21 NOTE — H&P
[address]  HISTORY AND PHYSICAL    Name:  Adan Gutiérrez  MR#:  [id number]  :  1935  ACCOUNT #:  [de-identified]  ADMIT DATE:  2019      CHIEF COMPLAINT:  The patient does not provide. HISTORY OF PRESENT ILLNESS:  An 59-year-old white female with past medical history of anxiety, allergic rhinitis, chronic back pain, chronic kidney disease, chronic pain syndrome, degenerative joint disease, fibromyalgia, GERD, hypertension, hyperlipidemia, hyponatremia, irritable bowel syndrome (IBS), peripheral neuropathy, stroke and PE, presented to emergency department via EMS with reported aphasia. The patient is a nonhistorian, not answering questions appropriately. The patient is accompanied by her . Her daughter provides limited history. Remainder of history has been obtained from review of ED and electronic medical records. Per the collective report, the patient had onset of aphasia at approximately 15:30 hours on 2019. She reportedly last was seen normal at 14:00 hours on 2019. She notably had some slurred speech. She was only able to provide some short answers to questions. EMS was called to scene and transported the patient to the ED. Code stroke had been called. Of note, she had recently been hospitalized on 08/15/2019, and underwent arthroscopic right rotator cuff repair. She notably had been taken hydrocodone at home for severe right shoulder pain. On arrival to the emergency department, initial recorded vital signs, blood pressure 178/75, heart rate of 90, respiratory rate of 30, O2 saturation 100% on room air. The patient reportedly had episodes of anxiety. In the emergency department assessment, the patient's  comments that she tried to mobilize out of bed (unassisted) with noted agitation.   Daughter has noted that the patient has had similar episodes in the past.  Part of code stroke workup, the patient underwent a CT code neuro of the head without contrast showing no evidence of acute process. Per review of the chart records, ED MD spoke with neurologist on-call who felt that the patient was a tPA candidate. The patient was reportedly given tPA. The patient is now seen for admission to the hospitalist service for continued evaluation and treatment. Of note, there have been no reports of the patient having a recent fall, head or neck trauma. PAST MEDICAL HISTORY:  1. Allergic rhinitis. 2.  Anxiety. 3.  Chronic back pain. 4.  C. difficile diarrhea. 5.  Basal cell carcinoma of face, back, and neck. 6.  Squamous cell carcinoma of the leg. 7.  Chronic kidney disease. 8.  Chronic pain syndrome. 9.  Degenerative joint disease of cervical spine. 10.  Fibromyalgia. 11.  GERD. 12.  Hypertension. 13.  Hyperlipidemia. 14.  Hyponatremia. 15.  Irritable bowel syndrome. 16.  Peripheral neuropathy. 17.  Depression. 18.  Stroke. 19. TIA. 20.  PE.    PAST SURGICAL HISTORY:  1. Rectocele repair. 2.  Colonoscopy. 3.  EGD. 4.  Cholecystectomy. 5.  Colon polypectomy. 6.  Hysterectomy. 7.  Dilatation and curettage. 8.  Bladder suspension surgery. 9.  Left knee arthroscopic surgery. 10.  Right total knee replacement. 11. Arthroscopic right rotator cuff repair. 12.  Left arthroscopic shoulder surgery. 13.  Right trigger finger release. 14.  Bilateral cataract extraction and intraocular lens implant. 15.  Tonsillectomy and adenoidectomy. 16.  Removal of basal cell carcinoma from face and nose. 17.  Removal of skin cancer from her left leg. 18.  Basal cell carcinoma removal from back. MEDICATIONS:  Medication list reviewed and noted on chart records. ALLERGIES:  1. LATEX. 2.  DILAUDID. 3.  ALTACE. 4.  AMBIEN. 5.  ASCENSIA. 6.  ASPIRIN. 7.  ASTELIN. 8.  ATENOLOL. 9.  BANEX. [address] [place] [address] [place]. 11.  CARBOCAINE. 12.  CLEOCIN. 13.  CODEINE. 14. SALICYLATE. 15.  ACETAMINOPHEN.  16.  CAFFEINE.   17.  CORTISPORIN.  18. CYMBALTA. 19.  CYTOTEC. 20.  EFFEXOR. 21.  EPINEPHRINE. 22.  ESTRA-D.  23.  ESTRACE. 24.  FENTANYL. 25. FLEXERIL. 26.  FLONASE. 27.  FLUNISOLIDE. [address] [facility] [address]. [address]. [address] [place]. 32.  LODINE. 33.  MORPHINE.  34. NSAIDS. [address]. [address].  40. PAXIL. 38.  PENICILLIN.  39.  PERCOCET. [address] 41.  PNEUMOCOCCAL VACCINE.  42.  PROZAC. 37.  PSEUDOEPHEDRINE. 44.  QUINIDINE. 39.  REGLAN. [address]. 47.  RESAID. 50.  ROBAXIN. 52.  SONATA. 50.  SULFA. [address] [facility] [address]. 52.  TALWIN. 53.  TORADOL. [facility].  55. VANCOMYCIN. 56. VASOCONSTRICTIVE DRUGS. 57. VIGAMOX. 58. VIOXX. 59. VOLTAREN. 61.  Jacqlyn Jumbo. 64.  ZELNORM. 62.  ZOLOFT. SOCIAL HISTORY:  No reports of smoking or alcohol. FAMILY HISTORY:  Osteoarthritis, daughter. Lung cancer, maternal aunt and maternal uncle. GI bleed, father. Osteoarthritis, sister and son. Breast cancer, sister. Prostate cancer, son. REVIEW OF SYSTEMS:  Pertinent positives as noted in the HPI, otherwise negative. PHYSICAL EXAMINATION:  VITAL SIGNS:  Temperature 98.1 degrees Fahrenheit, blood pressure 142/68, heart rate 82, respiratory rate 16, O2 saturation 99% on 2 L oxygen via nasal cannula. Recorded weight of 113 pounds (51.3 kg). Recorded height of 5 feet 2 inches tall. GENERAL:  Elderly female, in no acute respiratory distress. PSYCHIATRIC:  The patient is somnolent, eyes are closed. Uncooperative. Not following all commands appropriately. Oriented x2 to person, place, confused to year. NEUROLOGIC:  GCS of 13 (E4, V3, M6). Occasional spontaneous opening. Confused speech. Follows simple but not all commands. Generalized weakness. Sensation is grossly intact, withdraws to tactile stimuli, without slurred speech or facial droop. HEENT:  Normocephalic, atraumatic. Pupils are 2 mm, reactive. Sclerae are anicteric. Conjunctivae are clear.   Nares are patent. Oropharynx anteriorly appeared stable, otherwise difficult exam as the patient did not fully open her mouth or cooperating on exam.  NECK:  Supple without lymphadenopathy, JVD, carotid bruits, or thyromegaly. LYMPHATICS:  Negative for cervical or supraclavicular adenopathy. RESPIRATORY:  Lungs, clear to auscultation bilaterally. CVS:  Heart regular rate and rhythm, normal S1 and S2, without murmurs, rubs, or gallops. GI:  Abdomen is soft, nontender, nondistended. Normoactive bowel sounds. No rebound, guarding, or rigidity. No auscultated abdominal bruits. No palpable abdominal mass. BACK:  No CVA tenderness. No step-off deformity. MUSCULOSKELETAL:  No acute palpable bony deformity. Negative for calf tenderness. There is noted swelling, edema, hematoma of the right shoulder. Tenderness on palpation. VASCULAR:  2+ radial and 1+ dorsalis pedis pulse without cyanosis, clubbing, or edema. SKIN:  Warm and dry with noted ecchymosis of the right shoulder, right chest, right arm, ecchymosis noted near peripheral IV on the left forearm, which is tender to palpation. LABORATORY DATA:  Labs are reviewed and as follows:  Sodium 135, potassium 3.5, chloride 102, CO2 of 22, BUN 13, creatinine 1.27, glucose 105, anion gap of 11, calcium 9.6, magnesium 1.9, GFR 40, total bilirubin 0.8, total protein 7.0, albumin is 3.7, ALT 26, AST 26, alkaline phosphatase 58. WBC of 9.0, hemoglobin of 11.6, hematocrit 34.6, platelets of 826, neutrophils 54%. CT code neuro of the head without contrast results reviewed. IMPRESSION AND PLAN:  1. Transient ischemic attack, rule out stroke. Admit the patient to the ICU status post tPA. Consult with neurologist.  Follow up evaluation in a.m. Place on neurovascular checks. Fall precautions. Physical Therapy consultation. Repeat CT of the head post tPA. 2.  Altered mental status. Plan as noted above. 3.  Aphasia. Keep n.p.o. until passes dysphagia screen.   4. Anxiety with agitation. Concern for panic attack. Have to monitor closely. Continue with neurovascular watch. 5.  Right shoulder pain - status post right rotator cuff repair. Right shoulder is swollen and edematous with evidence of hematoma present on the surface of the right shoulder. Consider for Orthopedic evaluation. Consult orthopedist to further evaluate. 6.  Chronic kidney disease. Elevated creatinine. Order IV fluids and repeat the renal panel in the a.m.  7.  Hypertension. Monitor blood pressure closely and provide antihypertensive medications for the same. 8.  Hypoxia - noted after receiving dose of fentanyl. I explained to the patient's family that we have to be concerned for the patient's airway breathing circulation. If fentanyl is the cause of the patient's O2 saturations to be low, we will have to hold on the same for now in managing the patient's airway and breathing. 9.  Venous thromboembolism prophylaxis. Sequential compression devices to lower extremities. CODE STATUS:  Full code.         Lester Vazquez MD      [id number]  D:  08/21/2019 0:00  T:  08/21/2019 2:39  JOB #:  [id number]

## 2019-08-21 NOTE — PROGRESS NOTES
Occupational therapy 0802 -  08.21.2019    Orders acknowledged and chart reviewed in prep for OT evaluation. Noted CT negative for acute process. Patient given tPA @1829 on 8/20, per protocol, OT will hold formal evaluation for 24 hours post tPA administration. Will f/u tomorrow for evaluation. Thank you. Sahra Lyons MS, OTR/L

## 2019-08-22 ENCOUNTER — APPOINTMENT (OUTPATIENT)
Dept: GENERAL RADIOLOGY | Age: 84
DRG: 062 | End: 2019-08-22
Attending: HOSPITALIST
Payer: MEDICARE

## 2019-08-22 LAB
ANION GAP SERPL CALC-SCNC: 11 MMOL/L (ref 5–15)
BASOPHILS # BLD: 0.1 K/UL (ref 0–0.1)
BASOPHILS NFR BLD: 1 % (ref 0–1)
BUN SERPL-MCNC: 13 MG/DL (ref 6–20)
BUN/CREAT SERPL: 12 (ref 12–20)
CALCIUM SERPL-MCNC: 8.5 MG/DL (ref 8.5–10.1)
CHLORIDE SERPL-SCNC: 102 MMOL/L (ref 97–108)
CO2 SERPL-SCNC: 22 MMOL/L (ref 21–32)
CREAT SERPL-MCNC: 1.13 MG/DL (ref 0.55–1.02)
DIFFERENTIAL METHOD BLD: ABNORMAL
EOSINOPHIL # BLD: 0.2 K/UL (ref 0–0.4)
EOSINOPHIL NFR BLD: 2 % (ref 0–7)
ERYTHROCYTE [DISTWIDTH] IN BLOOD BY AUTOMATED COUNT: 12.7 % (ref 11.5–14.5)
GLUCOSE SERPL-MCNC: 119 MG/DL (ref 65–100)
HCT VFR BLD AUTO: 27.7 % (ref 35–47)
HGB BLD-MCNC: 8.9 G/DL (ref 11.5–16)
IMM GRANULOCYTES # BLD AUTO: 0 K/UL (ref 0–0.04)
IMM GRANULOCYTES NFR BLD AUTO: 0 % (ref 0–0.5)
LYMPHOCYTES # BLD: 2.7 K/UL (ref 0.8–3.5)
LYMPHOCYTES NFR BLD: 28 % (ref 12–49)
MCH RBC QN AUTO: 30.2 PG (ref 26–34)
MCHC RBC AUTO-ENTMCNC: 32.1 G/DL (ref 30–36.5)
MCV RBC AUTO: 93.9 FL (ref 80–99)
MONOCYTES # BLD: 1 K/UL (ref 0–1)
MONOCYTES NFR BLD: 11 % (ref 5–13)
NEUTS SEG # BLD: 5.5 K/UL (ref 1.8–8)
NEUTS SEG NFR BLD: 58 % (ref 32–75)
NRBC # BLD: 0 K/UL (ref 0–0.01)
NRBC BLD-RTO: 0 PER 100 WBC
PLATELET # BLD AUTO: 241 K/UL (ref 150–400)
POTASSIUM SERPL-SCNC: 3.8 MMOL/L (ref 3.5–5.1)
RBC # BLD AUTO: 2.95 M/UL (ref 3.8–5.2)
RBC MORPH BLD: ABNORMAL
RBC MORPH BLD: ABNORMAL
SODIUM SERPL-SCNC: 135 MMOL/L (ref 136–145)
WBC # BLD AUTO: 9.5 K/UL (ref 3.6–11)

## 2019-08-22 PROCEDURE — 77010033678 HC OXYGEN DAILY

## 2019-08-22 PROCEDURE — 80048 BASIC METABOLIC PNL TOTAL CA: CPT

## 2019-08-22 PROCEDURE — 74011250637 HC RX REV CODE- 250/637: Performed by: INTERNAL MEDICINE

## 2019-08-22 PROCEDURE — 65660000000 HC RM CCU STEPDOWN

## 2019-08-22 PROCEDURE — 74011250637 HC RX REV CODE- 250/637: Performed by: HOSPITALIST

## 2019-08-22 PROCEDURE — 85025 COMPLETE CBC W/AUTO DIFF WBC: CPT

## 2019-08-22 PROCEDURE — 92526 ORAL FUNCTION THERAPY: CPT | Performed by: SPEECH-LANGUAGE PATHOLOGIST

## 2019-08-22 PROCEDURE — 74011250637 HC RX REV CODE- 250/637: Performed by: PSYCHIATRY & NEUROLOGY

## 2019-08-22 PROCEDURE — 97161 PT EVAL LOW COMPLEX 20 MIN: CPT

## 2019-08-22 PROCEDURE — 97165 OT EVAL LOW COMPLEX 30 MIN: CPT

## 2019-08-22 PROCEDURE — 36415 COLL VENOUS BLD VENIPUNCTURE: CPT

## 2019-08-22 PROCEDURE — 74018 RADEX ABDOMEN 1 VIEW: CPT

## 2019-08-22 PROCEDURE — 97116 GAIT TRAINING THERAPY: CPT

## 2019-08-22 PROCEDURE — 97535 SELF CARE MNGMENT TRAINING: CPT

## 2019-08-22 RX ORDER — FACIAL-BODY WIPES
10 EACH TOPICAL DAILY PRN
Status: DISCONTINUED | OUTPATIENT
Start: 2019-08-22 | End: 2019-08-24 | Stop reason: HOSPADM

## 2019-08-22 RX ORDER — POLYETHYLENE GLYCOL 3350 17 G/17G
17 POWDER, FOR SOLUTION ORAL DAILY
Status: DISCONTINUED | OUTPATIENT
Start: 2019-08-23 | End: 2019-08-24 | Stop reason: HOSPADM

## 2019-08-22 RX ORDER — GUAIFENESIN 100 MG/5ML
81 LIQUID (ML) ORAL DAILY
Status: DISCONTINUED | OUTPATIENT
Start: 2019-08-22 | End: 2019-08-24 | Stop reason: HOSPADM

## 2019-08-22 RX ADMIN — ASPIRIN 81 MG CHEWABLE TABLET 81 MG: 81 TABLET CHEWABLE at 08:28

## 2019-08-22 RX ADMIN — Medication 10 ML: at 16:20

## 2019-08-22 RX ADMIN — Medication 5 ML: at 02:06

## 2019-08-22 RX ADMIN — Medication 10 ML: at 08:27

## 2019-08-22 RX ADMIN — BISACODYL 10 MG: 10 SUPPOSITORY RECTAL at 16:31

## 2019-08-22 RX ADMIN — FAMOTIDINE 20 MG: 20 TABLET ORAL at 08:28

## 2019-08-22 RX ADMIN — Medication 1 PACKET: at 08:28

## 2019-08-22 RX ADMIN — Medication 5 ML: at 02:05

## 2019-08-22 RX ADMIN — LORAZEPAM 0.5 MG: 1 TABLET ORAL at 21:56

## 2019-08-22 RX ADMIN — ACETAMINOPHEN 650 MG: 325 TABLET ORAL at 21:56

## 2019-08-22 RX ADMIN — POLYETHYLENE GLYCOL 3350 17 G: 17 POWDER, FOR SOLUTION ORAL at 08:28

## 2019-08-22 RX ADMIN — Medication 1 PACKET: at 21:45

## 2019-08-22 RX ADMIN — Medication 10 ML: at 21:46

## 2019-08-22 RX ADMIN — Medication 5 ML: at 02:07

## 2019-08-22 RX ADMIN — Medication 1 PACKET: at 16:31

## 2019-08-22 NOTE — PROGRESS NOTES
Problem: Dysphagia (Adult)  Goal: *Acute Goals and Plan of Care (Insert Text)  Description  Speech pathology goals  Initiated 8/21/2019  1. Patient will tolerate regular/thin liquid diet with no overt s/s aspiration within 7 days MET 8/22/2019    Outcome: Resolved/Met     SPEECH LANGUAGE PATHOLOGY DYSPHAGIA TREATMENT/DISCHARGE  Patient: Artie Montoya (80 y.o. female)  Date: 8/22/2019  Diagnosis: TIA (transient ischemic attack) [G45.9]  Aphasia [R47.01]  Anxiety [F41.9]  Slurred speech [R47.81] <principal problem not specified>       Precautions:  Fall, Other (comment)(NWB RUE, sling OOB)    ASSESSMENT:  Patient with no oral or pharyngeal dysphagia. Tolerating a regular diet well without concerns. Continues to report no concerns regarding speech or language function and carries on a conversation without difficulty. PLAN:  --regular diet. No further SLP needs   Patient will be discharged from acute skilled speech therapy at this time. Rationale for discharge:  ?      Goals Achieved  ? Plateau Reached  ? Patient not participating in therapy  ? Other:  Discharge Recommendations:  None     SUBJECTIVE:   Patient stated I never stop talking! . When asked about her communication     OBJECTIVE:   Cognitive and Communication Status:  Neurologic State: Alert  Orientation Level: Oriented X4  Cognition: Appropriate for age attention/concentration    Perception: Appears intact    Perseveration: No perseveration noted    Safety/Judgement: Awareness of environment  Dysphagia Treatment:  Oral Assessment:  Oral Assessment  Labial: No impairment  Dentition: Natural  Oral Hygiene: moist mucosa   Lingual: No impairment  Mandible: No impairment  P.O. Trials:  Patient Position: upright in chair   Vocal quality prior to P.O.: No impairment  Consistency Presented: Thin liquid; Solid  How Presented: Self-fed/presented;Straw;Successive swallows     Bolus Acceptance: No impairment  Bolus Formation/Control: No impairment     Propulsion: No impairment  Oral Residue: None  Initiation of Swallow: No impairment  Laryngeal Elevation: Functional  Aspiration Signs/Symptoms: None  Pharyngeal Phase Characteristics: No impairment, issues, or problems            Oral Phase Severity: No impairment  Pharyngeal Phase Severity : No impairment                                                                                       NOMS:   The NOMS functional outcome measure was used to quantify this patient's level of swallowing impairment. Based on the NOMS, the patient was determined to be at level 7 for swallow function     NOMS Swallowing Levels:  Level 1 (CN): NPO  Level 2 (CM): NPO but takes consistency in therapy  Level 3 (CL): Takes less than 50% of nutrition p.o. and continues with nonoral feedings; and/or safe with mod cues; and/or max diet restriction  Level 4 (CK): Safe swallow but needs mod cues; and/or mod diet restriction; and/or still requires some nonoral feeding/supplements  Level 5 (CJ): Safe swallow with min diet restriction; and/or needs min cues  Level 6 (CI): Independent with p.o.; rare cues; usually self cues; may need to avoid some foods or needs extra time  Level 7 ([address] [place] [address]): Independent for all p.o.  ABNER. (2003). National Outcomes Measurement System (NOMS): Adult Speech-Language Pathology User's Guide. Pain:  Pain Scale 1: Numeric (0 - 10)  Pain Intensity 1: 0     After treatment:   ?                Patient left in no apparent distress sitting up in chair  ? Patient left in no apparent distress in bed  ? Call bell left within reach  ? Nursing notified  ? Caregiver present  ? Bed alarm activated    COMMUNICATION/EDUCATION:   Patient was educated regarding Her functional swallow as this relates to Her diagnosis of CVA workup s/p TPA. She demonstrated Good understanding as evidenced by verbalization of understanding.     The patients plan of care including recommendations, planned interventions, and recommended diet changes were discussed with: Registered Nurse. Bashir Andrews M.CD.  CCC-SLP   Time Calculation: 11 mins

## 2019-08-22 NOTE — PROGRESS NOTES
0730: Bedside and Verbal shift change report given to Rachel Wood  (oncoming nurse) by Balbir Jorge  (offgoing nurse). Report included the following information SBAR, Kardex, Intake/Output, MAR, Recent Results and Cardiac Rhythm NSR/ST. 1130: Bedside and Verbal shift change report given to Swati  (oncoming nurse) by Miki Fernandez RN  (offgoing nurse). Report included the following information SBAR, Kardex, Intake/Output, MAR, Recent Results and Cardiac Rhythm NSR.

## 2019-08-22 NOTE — PROGRESS NOTES
Bedside and Verbal shift change report given to Triny Lemus (oncoming nurse) by Staci Anderson (offgoing nurse). Report included the following information SBAR, Kardex, Intake/Output, MAR and Recent Results.

## 2019-08-22 NOTE — PROGRESS NOTES
1244-patient found on bsc with curtain pulled to room. Did not utilize call bell. Sitting ob bed alarm and shut it off herself so other nurses not alerted to her movement. Disconnected self from telemetry. Have had several conversations with patient about being high fall risk and to call out for assistance for all ambulation needs. She is not compliant and I have high sfaety concerns. Charge nurse alerted. 1310- again patient attempted to get up without utilizing call bell and get to bed alone. Assisted to bed, and reminded to use call bell. Bed alarm on.     1344-patient had just been placed in chair with bed alarm under her. Found again to be getting up alone, walking over to pull curtain and get to bed. Assisted to bed side commode and assisted back to bed. Bed alarm under patient and on. Side rails up x 3. Call bell in reach. Utilized teach back and patient pointed to red button on call bell  And to call nurse for all ambulation. She is weak and unsteady on feet. 0- Spoke to MD concerning patient high fall risk. MD ordered Sitter for patient and room close to nurse station upon transfer. Discussed safety concerns with family as well and included in plan of care. 1640- TRANSFER - OUT REPORT:    Verbal report given to DEBORAH Houser(name) on [facility]  being transferred to [facility](unit) for routine progression of care       Report consisted of patients Situation, Background, Assessment and   Recommendations(SBAR). Information from the following report(s) SBAR, Kardex, [facility] and Recent Results was reviewed with the receiving nurse. Lines:   Peripheral IV 08/22/19 Anterior; Left Forearm (Active)   Site Assessment Clean, dry, & intact 8/22/2019 12:00 PM   Phlebitis Assessment 0 8/22/2019 12:00 PM   Infiltration Assessment 0 8/22/2019 12:00 PM   Dressing Status Clean, dry, & intact 8/22/2019 12:00 PM   Dressing Type Gauze;Tape;Transparent 8/22/2019 12:00 PM   Hub Color/Line Status Pink;Flushed 8/22/2019 12:00 PM   Action Taken Open ports on tubing capped 8/22/2019 12:00 PM   Alcohol Cap Used Yes 8/22/2019 12:00 PM        Opportunity for questions and clarification was provided.       Patient transported with:   Monitor  Registered Nurse

## 2019-08-22 NOTE — PROGRESS NOTES
Rounded on [facility] patients and provided Anointing of the Sick at request of patient    Fr. Benito Giordano

## 2019-08-22 NOTE — PROGRESS NOTES
Problem: Mobility Impaired (Adult and Pediatric)  Goal: *Acute Goals and Plan of Care (Insert Text)  Description  FUNCTIONAL STATUS PRIOR TO ADMISSION: Patient was independent and active without use of DME.    HOME SUPPORT PRIOR TO ADMISSION: The patient lived with  but did not require assist.    Physical Therapy Goals  Initiated 8/22/2019  1. Patient will move from supine to sit and sit to supine , scoot up and down and roll side to side in bed with independence within 7 day(s). 2.  Patient will transfer from bed to chair and chair to bed with modified independence using the least restrictive device within 7 day(s). 3.  Patient will perform sit to stand with modified independence within 7 day(s). 4.  Patient will ambulate with modified independence for 250 feet with the least restrictive device within 7 day(s). 5.  Patient will ascend/descend 1 stairs with no handrail(s) with modified independence within 7 day(s). Outcome: Progressing Towards Goal    PHYSICAL THERAPY EVALUATION  Patient: Lorenzo Fay (80 y.o. female)  Date: 8/22/2019  Primary Diagnosis: TIA (transient ischemic attack) [G45.9]  Aphasia [R47.01]  Anxiety [F41.9]  Slurred speech [R47.81]        Precautions:   Fall, Other (comment)(NWB RUE, sling OOB)      ASSESSMENT  Based on the objective data described below, the patient presents with decreased strength, decreased coordination,  NWBing on R Ue, impaired standing balance, increased fall risk limiting independence with all functional mobility. Patient admitted for TIA, s/p TPA and no acute infarct shown on MRI. Patient with recent R RTC repair1 week ago, currently no AROM of R UE and NWB on R UE (must wear sling when OOB). Patient very impulsive on arrival, with unsteady gait and high fall risk. Prior to admission, patient reports completely independent with all functional mobility, very active using no Ad.  Recommend home health PT at discharge and 24 hr supervision due to high fall risk and impulsivity. Current Level of Function Impacting Discharge (mobility/balance): CGA for transfers and bed mobility, min A for ambulation and stairs, no AD    Functional Outcome Measure: The patient scored 15/56 on the CASEY outcome measure which is indicative of high fall risk      Other factors to consider for discharge: high prior level of function, impulsive with decreased safety awareness     Patient will benefit from skilled therapy intervention to address the above noted impairments. PLAN :  Recommendations and Planned Interventions: bed mobility training, transfer training, gait training, therapeutic exercises, neuromuscular re-education, modalities, edema management/control, patient and family training/education and therapeutic activities      Frequency/Duration: Patient will be followed by physical therapy:  5 times a week to address goals. Recommendation for discharge: (in order for the patient to meet his/her long term goals)  Physical therapy at least 2 days/week in the home AND ensure assist and/or supervision for safety with all functional mobility     This discharge recommendation:  A follow-up discussion with the attending provider and/or case management is planned    Equipment recommendations for successful discharge (if) home: none, patient has equipment. SUBJECTIVE:   Patient stated I need my shoes on, where are my shoes.     OBJECTIVE DATA SUMMARY:   HISTORY:    Past Medical History:   Diagnosis Date    Allergic rhinitis, cause unspecified     Anxiety     Back pain     C. difficile diarrhea 8/2/2017    Admitted to [facility]    Cancer ([facility] [address]     basal cell face, back, neck    Cancer (HCC)     squamous cell leg    Chronic kidney disease     Chronic pain     DJD (degenerative joint disease) of cervical spine     Fibromyalgia     GERD (gastroesophageal reflux disease)     Hypercholesterolemia     Hypertension     Hyponatremia     IBS (irritable bowel syndrome) Irritable bowel syndrome with diarrhea 6/29/2016    GI- Dr Odalys Baptiste    Kidney disease, chronic, stage III (GFR 30-59 ml/min) ([facility] [address] 6/2011    Dr. Billie Gonzales    Nausea & vomiting     Neuropathy 6/22/2016    Both feet     Psychiatric disorder     DEPRESSION AND ANXIETY    Stroke ([facility] [address] 2017    TIA    Thromboembolus ([facility] [address] 2013    PE X2     Past Surgical History:   Procedure Laterality Date    COLONOSCOPY  1/20/11    diverticulosis    EGD  1/20/11    schatzki's ring--clear on barium swallow 7/11    HX ADENOIDECTOMY  1940    HX CATARACT REMOVAL Left 2013    HX CATARACT REMOVAL Right 01/27/2014    HX CHOLECYSTECTOMY  5/10    HX DILATION AND CURETTAGE  1963    HX GI      COLONOSCOPY    HX HEENT      BCCA REMOVED FROM FACE ,NOSE     HX HYSTERECTOMY  1970`S    hysterectomy    HX KNEE ARTHROSCOPY Left 1990    HX KNEE REPLACEMENT Right 9/29/09    HX ORTHOPAEDIC Right 2017    trigger finger release    HX ORTHOPAEDIC Right 07/18/2019     RING FINGRER TRIGGER FINGER REALSE     HX OTHER SURGICAL      skin cancer removed: L leg    HX OTHER SURGICAL      BCCA REMOVED FROM BACK     HX POLYPECTOMY  1989    HX ROTATOR CUFF REPAIR Right 08/15/2019    and sub-acromial decompression    HX SHOULDER ARTHROSCOPY Left 12/13/2018    arthroscopy & sub-acromial decompression    HX TONSILLECTOMY  1940    HX UROLOGICAL  1995    bladder suspension    REPAIR OF RECTOCELE         Personal factors and/or comorbidities impacting plan of care: Patient with high level of prior level of function, however impulsive    Home Situation  Home Environment: Private residence  # Steps to Enter: 1  Rails to Enter: No  One/Two Story Residence: Two story  # of Interior Steps: 20  Lift Chair Available: Yes  Living Alone: No  Support Systems: Spouse/Significant Other/Partner  Current DME Used/Available at Home: Walker, rolling, Shower chair, Grab bars, Cane, straight  Tub or Shower Type: Shower    EXAMINATION/PRESENTATION/DECISION MAKING:   Critical Behavior:  Neurologic State: Alert, Confused, Irritable  Orientation Level: Oriented to person, Oriented to place  Cognition: Impaired decision making, Impulsive, Memory loss, Poor safety awareness  Safety/Judgement: Awareness of environment  Hearing:   Auditory  Auditory Impairment: None    Range Of Motion:  AROM: Generally decreased, functional           PROM: Generally decreased, functional           Strength:    Strength: Generally decreased, functional                    Tone & Sensation:   Tone: Normal              Sensation: Intact               Coordination:  Coordination: Generally decreased, functional  Vision:   Tracking: Able to track stimulus in all quadrants w/o difficulty  Diplopia: No  Corrective Lenses: Reading glasses  Functional Mobility:  Bed Mobility:     Supine to Sit: Contact guard assistance  Sit to Supine: Contact guard assistance  Scooting: Contact guard assistance  Transfers:  Sit to Stand: Contact guard assistance  Stand to Sit: Contact guard assistance        Bed to Chair: Contact guard assistance              Balance:   Sitting: Intact  Standing: Impaired  Standing - Static: Fair  Standing - Dynamic : Fair;Constant support  Ambulation/Gait Training:  Distance (ft): 150 Feet (ft)  Assistive Device: Gait belt  Ambulation - Level of Assistance: Minimal assistance        Gait Abnormalities: Trunk sway increased;Scissoring;Decreased step clearance        Base of Support: Narrowed     Speed/Emerald: Fluctuations                     Patient ambulated with min A for 150 ft, assistance for balance and safety, noted occasional scissoring with turns and very distractible and impulse with all functional mobility   Stairs:  Number of Stairs Trained: 3  Stairs - Level of Assistance: Contact guard assistance   Rail Use: Left       Functional Measure:  Alfonso Balance Test:    Sitting to Standin  Standing Unsupported: 2  Sitting with Back Unsupported: 4  Standing to Sitting: 3  Transfers: 1  Standing Unsupported with Eyes Closed: 0  Standing Unsupported with Feet Together: 1  Reach Forward with Outstretched Arm: 1   Object: 0  Turn to Look Over Shoulders: 1  Turn 360 Degrees: 1  Alternate Foot on Step/Stool: 0  Standing Unsupported One Foot in Front: 0  Stand on One Le  Total: 15/         56=Maximum possible score;   0-20=High fall risk  21-40=Moderate fall risk   41-56=Low fall risk               Physical Therapy Evaluation Charge Determination   History Examination Presentation Decision-Making   LOW Complexity : Zero comorbidities / personal factors that will impact the outcome / POC LOW Complexity : 1-2 Standardized tests and measures addressing body structure, function, activity limitation and / or participation in recreation  LOW Complexity : Stable, uncomplicated  LOW Complexity : FOTO score of       Based on the above components, the patient evaluation is determined to be of the following complexity level: LOW     Pain Rating:  No c/o of pain    Activity Tolerance:   Good and requires rest breaks  Please refer to the flowsheet for vital signs taken during this treatment. After treatment patient left in no apparent distress:   Sitting in chair, Call bell within reach and Bed / chair alarm activated    COMMUNICATION/EDUCATION:   The patients plan of care was discussed with: Registered Nurse. Fall prevention education was provided and the patient/caregiver indicated understanding., Patient/family have participated as able in goal setting and plan of care. and Patient/family agree to work toward stated goals and plan of care.     Thank you for this referral.  Yajaira Blunt, PT   Time Calculation: 18 mins

## 2019-08-22 NOTE — PROGRESS NOTES
Problem: Self Care Deficits Care Plan (Adult)  Goal: *Acute Goals and Plan of Care (Insert Text)  Description    FUNCTIONAL STATUS PRIOR TO ADMISSION: Patient was independent and active without use of DME.    HOME SUPPORT: The patient lived with  but did not require assist. Patient with recent R shoulder sx, sling and NWB RUE, has a shower chair and grab bars, does not use them at baseline. Occupational Therapy Goals  Initiated 8/22/2019  1. Patient will vivien/doff sling on RUE with independence within 7 day(s). 2.  Patient will perform bathing with modified independence within 7 day(s). 3.  Patient will perform lower body dressing with independence within 7 day(s). 4.  Patient will perform toilet transfers with independence within 7 day(s). 5.  Patient will perform all aspects of toileting with independence within 7 day(s). 6.  Patient will participate in upper extremity therapeutic exercise/activities (digits, wrist and elbow only) with independence for 5 minutes within 7 day(s). 7.  Patient will utilize energy conservation techniques during functional activities with verbal cues within 7 day(s). Outcome: Progressing Towards Goal    OCCUPATIONAL THERAPY EVALUATION  Patient: Loreta Hewitt (80 y.o. female)  Date: 8/22/2019  Primary Diagnosis: TIA (transient ischemic attack) [G45.9]  Aphasia [R47.01]  Anxiety [F41.9]  Slurred speech [R47.81]        Precautions:  Fall, Other (comment)(NWB RUE, sling OOB)    ASSESSMENT  Based on the objective data described below, the patient presents with mildly limited ADL performance 2* impaired balance, decreased functional activity tolerance, generalized weakness, NWB RUE, RUE pain and anxiety s/p admission for TIA. Patient given tPA with noted increased RUE hematoma post administration. Noted imaging negative for acute process. Patient noted to be 1 week post op from rotator cuff repair.  Patient limited by anxiety and impulsiveness, required calming voice, but was easily redirected. Patient strength, sensation, FM/GM coordination and BUE ROM WFL (within range of sx precautions). Fugl Helton assessment not indicated at this time. Patient completed toileting with up to min A for clothing management and functional transfer with CGA. Patient left in chair at end of session with call bell in reach, RN aware, family bedside, chair alarm active. Will continue to follow for safety and progression of ADL task performance. Discussed case with Ortho PA, patient NWB RUE with no active ROM of shoulder (okay for digits, elbow and wrist). Patient to wear prescribed sling while OOB. Current Level of Function Impacting Discharge (ADLs/self-care): up to min A for ADLs, CGA for functional mobility    Functional Outcome Measure: The patient scored 50/100 on the Barthel Index outcome measure which is indicative of 50% deficits in ADLs, however, this appears to be close to her baseline 2* recent R shoulder sx/ recovery process. Other factors to consider for discharge: IND PTA     Patient will benefit from skilled therapy intervention to address the above noted impairments. PLAN :  Recommendations and Planned Interventions: self care training, functional mobility training, therapeutic exercise, balance training, therapeutic activities, endurance activities, patient education, home safety training and family training/education    Frequency/Duration: Patient will be followed by occupational therapy 3 times a week to address goals.     Recommendation for discharge: (in order for the patient to meet his/her long term goals)  No skilled occupational therapy/ follow up rehabilitation needs identified at this time, however, patient would benefit from 24/7 supervision 2* impulsivity and mildly impaired safety awareness    This discharge recommendation:  Has been made in collaboration with the attending provider and/or case management    Equipment recommendations for successful discharge (if) home: none - has needed DME       SUBJECTIVE:   Patient stated I just can't relax, I'm not good at that.     OBJECTIVE DATA SUMMARY:   HISTORY:   Past Medical History:   Diagnosis Date    Allergic rhinitis, cause unspecified     Anxiety     Back pain     C. difficile diarrhea 8/2/2017    [facility]    Cancer ([facility] [address]     basal cell face, back, neck    Cancer (HCC)     squamous cell leg    Chronic kidney disease     Chronic pain     DJD (degenerative joint disease) of cervical spine     Fibromyalgia     GERD (gastroesophageal reflux disease)     Hypercholesterolemia     Hypertension     Hyponatremia     IBS (irritable bowel syndrome)     Irritable bowel syndrome with diarrhea 6/29/2016    GI- Dr Gutierres Pod Kidney disease, chronic, stage III (GFR 30-59 ml/min) ([facility] [address] 6/2011    Dr. Deon Rincon) [place]    Nausea & vomiting     Neuropathy 6/22/2016    Both feet     Psychiatric disorder     DEPRESSION AND ANXIETY    Stroke ([facility] [address] 2017    TIA    Thromboembolus ([facility] [address] 2013    PE X2     Past Surgical History:   Procedure Laterality Date    COLONOSCOPY  1/20/11    diverticulosis    EGD  1/20/11    schatzki's ring--clear on barium swallow 7/11    HX ADENOIDECTOMY  1940    HX CATARACT REMOVAL Left 2013    HX CATARACT REMOVAL Right 01/27/2014    HX CHOLECYSTECTOMY  5/10    HX DILATION AND CURETTAGE  1963    HX GI      COLONOSCOPY    HX HEENT      BCCA REMOVED FROM FACE ,NOSE     HX HYSTERECTOMY  1970`S    hysterectomy    HX KNEE ARTHROSCOPY Left 1990    HX KNEE REPLACEMENT Right 9/29/09    HX ORTHOPAEDIC Right 2017    trigger finger release    HX ORTHOPAEDIC Right 07/18/2019     RING FINGRER TRIGGER FINGER REALSE     HX OTHER SURGICAL      skin cancer removed: L leg    HX OTHER SURGICAL      BCCA REMOVED FROM BACK     HX POLYPECTOMY  1989    HX ROTATOR CUFF REPAIR Right 08/15/2019    and sub-acromial decompression    HX SHOULDER ARTHROSCOPY Left 12/13/2018 arthroscopy & sub-acromial decompression    HX TONSILLECTOMY  1940    HX UROLOGICAL  1995    bladder suspension    REPAIR OF RECTOCELE         Expanded or extensive additional review of patient history:     Home Situation  Home Environment: Private residence  # Steps to Enter: 1  One/Two Story Residence: Two story  Lift Chair Available: Yes  Living Alone: No  Support Systems: Spouse/Significant Other/Partner  Current DME Used/Available at Home: Grab bars, Shower chair  Tub or Shower Type: Shower    Hand dominance: Right    EXAMINATION OF PERFORMANCE DEFICITS:  Cognitive/Behavioral Status:  Neurologic State: Alert  Orientation Level: Oriented X4  Cognition: Appropriate for age attention/concentration; Follows commands; Impulsive;Poor safety awareness; Impaired decision making  Perception: Appears intact  Perseveration: Perseverates during conversation  Safety/Judgement: Awareness of environment; Fall prevention    Skin: noted significant bruising on BUE    Edema: none noted in BUEs    Hearing: Auditory  Auditory Impairment: None    Vision/Perceptual:    Tracking: Able to track stimulus in all quadrants w/o difficulty    Diplopia: No     Corrective Lenses: Reading glasses    Range of Motion:  In BUEs  AROM: Generally decreased, functional(LUE WFL, RUE limited 2* R shoulder precautions)  PROM: Generally decreased, functional    Strength: In BUEs  Strength: Generally decreased, functional    Coordination:  Coordination: Generally decreased, functional  Fine Motor Skills-Upper: Left Intact; Right Intact    Gross Motor Skills-Upper: Left Intact; Right Intact    Tone & Sensation:  In BUEs  Tone: Normal  Sensation: Intact    Balance:  Sitting: Intact  Standing: Impaired; Without support  Standing - Static: Good  Standing - Dynamic : Fair    Functional Mobility and Transfers for ADLs:  Bed Mobility:  Supine to Sit: Minimum assistance(trunk righting 2* NWB RUE)  Scooting: Contact guard assistance    Transfers:  Sit to Stand: Contact guard assistance  Stand to Sit: Contact guard assistance  Bed to Chair: Contact guard assistance  Toilet Transfer : Contact guard assistance  Assistive Device : Gait Belt    ADL Assessment:  Feeding: Setup(Infer assist for containers )    Oral Facial Hygiene/Grooming: Setup(Infer assist for containers )    Bathing: Setup(Infer setup if sitting 2* balance and NWB RUE)    Upper Body Dressing: Minimum assistance(to vivien sling 2* pain)    Lower Body Dressing: Minimum assistance(Infer min A to hike pants over hips, SBA for socks)    Toileting: Minimum assistance    ADL Intervention and task modifications:  Lower Body Dressing Assistance  Socks: Contact guard assistance  Leg Crossed Method Used: Yes  Position Performed: Seated edge of bed  Cues: Alie Antes  Bladder Hygiene: Set-up  Bowel Hygiene: Set-up  Clothing Management: Minimum assistance(Infer min A for clothing 2* balance and RUE NWB)  Cues: Physical assistance for pants down;Verbal cues provided    Cognitive Retraining  Safety/Judgement: Awareness of environment; Fall prevention    Functional Measure:  Barthel Index:    Bathin  Bladder: 10  Bowels: 10  Groomin  Dressin  Feedin  Mobility: 5  Stairs: 0  Toilet Use: 5  Transfer (Bed to Chair and Back): 10  Total: 50/100        Percentage of impairment   0%   1-19%   20-39%   40-59%   60-79%   80-99%   100%   Barthel Score 0-100 100 99-80 79-60 59-40 20-39 1-19   0     The Barthel ADL Index: Guidelines  1. The index should be used as a record of what a patient does, not as a record of what a patient could do. 2. The main aim is to establish degree of independence from any help, physical or verbal, however minor and for whatever reason. 3. The need for supervision renders the patient not independent. 4. A patient's performance should be established using the best available evidence.  Asking the patient, friends/relatives and nurses are the usual sources, but direct observation and common sense are also important. However direct testing is not needed. 5. Usually the patient's performance over the preceding 24-48 hours is important, but occasionally longer periods will be relevant. 6. Middle categories imply that the patient supplies over 50 per cent of the effort. 7. Use of aids to be independent is allowed. Sal Little., Barthel, D.W. ([id number]). Functional evaluation: the Barthel Index. [address] (14)2. DAKOTAH Clark, Lyssa Noe., Nasir Alonso., [place], [address] (1999). Measuring the change indisability after inpatient rehabilitation; comparison of the responsiveness of the Barthel Index and Functional [place] Measure. Journal of Neurology, Neurosurgery, and Psychiatry, 66(4), 533-691. LI Alvarez, BRYCE Ryan, & Loreta Cantu MBRIAN. (2004.) Assessment of post-stroke quality of life in cost-effectiveness studies: The usefulness of the Barthel Index and the EuroQoL-5D. Quality of Life Research, 15, 008-20       Occupational Therapy Evaluation Charge Determination   History Examination Decision-Making   LOW Complexity : Brief history review  LOW Complexity : 1-3 performance deficits relating to physical, cognitive , or psychosocial skils that result in activity limitations and / or participation restrictions  MEDIUM Complexity : Patient may present with comorbidities that affect occupational performnce. Miniml to moderate modification of tasks or assistance (eg, physical or verbal ) with assesment(s) is necessary to enable patient to complete evaluation       Based on the above components, the patient evaluation is determined to be of the following complexity level: LOW   Pain Rating:  Patient did not quantify    Activity Tolerance:   Good and requires rest breaks  Please refer to the flowsheet for vital signs taken during this treatment.     After treatment patient left in no apparent distress:    Sitting in chair, Call bell within reach, Bed / chair alarm activated, Caregiver / family present and RN aware     COMMUNICATION/EDUCATION:   The patients plan of care was discussed with: Physical Therapist, Registered Nurse and Physician. Home safety education was provided and the patient/caregiver indicated understanding. and Patient/family have participated as able in goal setting and plan of care. This patients plan of care is appropriate for delegation to [facility].     Thank you for this referral.  Jessee Marin OT  Time Calculation: 32 mins

## 2019-08-22 NOTE — PROGRESS NOTES
Hospitalist Progress Note  Lg Anand MD  Answering service: [phone] from in house phone        Date of Service:  2019  NAME:  Jaja Adam  :  1935  MRN:  [id number]      Admission Summary:   80 y.o F with PMH of skin ca,HTN,TIA,PE,CKD,anxiety and chronic pain was brought to the hospital because of slurred speech. Code stroke was called and she received TPA and was admitted to the ICU for further management    Interval history / Subjective:     F/u for  stroke    Per RN,patient impulsive and trying to get out of bed multiple times despite redirection/reorientation to the situation. She is still constipated, pain tolerable with tylenol. Bruising on the LUE remained the same but on the RUE has increased. Assessment & Plan:     # Speech difficulty - Suspected acute CVA  -s/p tpa on   -MRI brain did not show any acute infarct. Mild white matter disease likely related to chronic small vessel ischemic disease.  -Per neurology - tpa would have aborted stroke and no changes seen on MRI  -MRA did not show any flow limiting stenosis or intracranial aneurysm.  -Echo showed normal EF and no ASD. -neurology following  -carotid dopplers pending  -PT/OT/speech eval    #History of rotator cuff repair recently:   -ortho following  -pain management tylenol. Can use hydrocodone if severe pain. #Brusing of the b/l UE:  -likely related to the tpa she received at admission  -LUE bruising -stable  -RUE bruising increased today, Hb has dropped -8.9  Monitor for now. #Anemia:  -likely due to bruise/hemorrhage on the UE with recent surgery  -monitor HB for now. #Constipation:  -likely due to recent use of narcotics,  -KUB did not show obstructive pattern  -Ordered dulcolex suppository and increased miralax to BID  -will give lactulose if this does not work    #HTN:  -BP soft, will hold antihypertensives now.     #CKD stage 3:  -baseline cr -1.3  -cr stable        Code status: full  DVT prophylaxis: SCD    Care Plan discussed with: patient,nurse and family at bed side  Disposition:TBD     Hospital Problems  Date Reviewed: 8/15/2019          Codes Class Noted POA    Aphasia ICD-10-CM: R47.01  ICD-9-CM: 784.3  8/20/2019 Unknown        TIA (transient ischemic attack) ICD-10-CM: G45.9  ICD-9-CM: 435.9  8/20/2019 Unknown        Slurred speech ICD-10-CM: R47.81  ICD-9-CM: 784.59  8/20/2019 Unknown        Anxiety ICD-10-CM: F41.9  ICD-9-CM: 300.00  8/4/2010 Unknown                Review of Systems:   Pertinent items are noted in HPI. Vital Signs:    Last 24hrs VS reviewed since prior progress note. Most recent are:  Visit Vitals  /64 (BP 1 Location: Left arm, BP Patient Position: At rest)   Pulse 99   Temp 98.5 °F (36.9 °C)   Resp 18   Ht 5' 2\" (1.575 m)   Wt 51.3 kg (113 lb 1.5 oz)   SpO2 100%   BMI 20.69 kg/m²         Intake/Output Summary (Last 24 hours) at 8/22/2019 1934  Last data filed at 8/22/2019 1700  Gross per 24 hour   Intake 2045 ml   Output 400 ml   Net 1645 ml        Physical Examination:             Constitutional:  No acute distress, cooperative, pleasant    ENT:  Oral mucous moist, oropharynx benign. Resp:  CTA bilaterally. No wheezing/rhonchi/rales   CV:  Regular rhythm, normal rate    GI:  Soft, non distended, non tender. normoactive bowel sounds    Musculoskeletal:  No edema, warm    Neurologic:  Moves all extremities. AAOx3, speech clear, RUE limited mobility due to recent surgery.   Skin: there is a large bruise on the left UE which is stable,RUE bruise has increased today involving arm and slightly posteriorly            Data Review:    Review and/or order of clinical lab test  Review and/or order of tests in the radiology section of CPT  Review and/or order of tests in the medicine section of CPT      Labs:     Recent Labs     08/22/19  1715 08/21/19  0455   WBC 9.5 8.8   HGB 8.9* 10.1*   HCT 27.7* 31.4*    270     Recent Labs     08/22/19  1715 08/20/19  1751   * 135*   K 3.8 3.5    102   CO2 22 22   BUN 13 13   CREA 1.13* 1.27*   * 105*   CA 8.5 9.6   MG  --  1.9     Recent Labs     08/20/19  1751   SGOT 26   ALT 26   AP 58   TBILI 0.8   TP 7.0   ALB 3.7   GLOB 3.3     Recent Labs     08/21/19  0455   INR 1.3*   PTP 12.6*   APTT 27.1      No results for input(s): FE, TIBC, PSAT, FERR in the last 72 hours. No results found for: FOL, RBCF   No results for input(s): PH, PCO2, PO2 in the last 72 hours. No results for input(s): CPK, CKNDX, TROIQ in the last 72 hours.     No lab exists for component: CPKMB  Lab Results   Component Value Date/Time    Cholesterol, total 158 08/21/2019 04:55 AM    HDL Cholesterol 53 08/21/2019 04:55 AM    LDL, calculated 89.4 08/21/2019 04:55 AM    Triglyceride 78 08/21/2019 04:55 AM    CHOL/HDL Ratio 3.0 08/21/2019 04:55 AM     Lab Results   Component Value Date/Time    Glucose (POC) 107 (H) 08/02/2017 12:16 PM     Lab Results   Component Value Date/Time    Color YELLOW/STRAW 03/10/2019 06:49 PM    Appearance CLEAR 03/10/2019 06:49 PM    Specific gravity 1.012 03/10/2019 06:49 PM    pH (UA) 6.5 03/10/2019 06:49 PM    Protein NEGATIVE  03/10/2019 06:49 PM    Glucose NEGATIVE  03/10/2019 06:49 PM    Ketone TRACE (A) 03/10/2019 06:49 PM    Bilirubin NEGATIVE  03/10/2019 06:49 PM    Urobilinogen 0.2 03/10/2019 06:49 PM    Nitrites NEGATIVE  03/10/2019 06:49 PM    Leukocyte Esterase NEGATIVE  03/10/2019 06:49 PM    Epithelial cells FEW 03/10/2019 06:49 PM    Bacteria NEGATIVE  03/10/2019 06:49 PM    WBC 0-4 03/10/2019 06:49 PM    RBC 0-5 03/10/2019 06:49 PM         Medications Reviewed:     Current Facility-Administered Medications   Medication Dose Route Frequency    aspirin chewable tablet 81 mg  81 mg Oral DAILY    bisacodyl (DULCOLAX) suppository 10 mg  10 mg Rectal DAILY PRN    sodium chloride (NS) flush 5-40 mL  5-40 mL IntraVENous Q8H    sodium chloride (NS) flush 5-40 mL  5-40 mL IntraVENous PRN    famotidine (PEPCID) tablet 20 mg  20 mg Oral DAILY    LORazepam (ATIVAN) tablet 0.5 mg  0.5 mg Oral BID PRN    acetaminophen (TYLENOL) tablet 650 mg  650 mg Oral Q6H PRN    guar gum (BENEFIBER) packet 1 Packet  4 g Oral TID    polyethylene glycol (MIRALAX) packet 17 g  17 g Oral DAILY    sodium chloride (NS) flush 5-40 mL  5-40 mL IntraVENous Q8H    sodium chloride (NS) flush 5-40 mL  5-40 mL IntraVENous PRN    labetalol (NORMODYNE;TRANDATE) injection 10 mg  10 mg IntraVENous Q10MIN PRN     ______________________________________________________________________  EXPECTED LENGTH OF STAY: 2d 9h  ACTUAL LENGTH OF STAY:          2                 Lg Anand MD

## 2019-08-22 NOTE — CONSULTS
Neurology Progress Note    Patient ID:  Damian Wright  [id number]  80 y.o.  1935    Chief Complaint: Stroke    Subjective:     20-year-old woman who presented with sudden aphasia. She received TPA. She has returned to her baseline completely. MRI brain did not reveal any acute stroke. She still has persistent right shoulder pain status post surgery. Aspirin resuming. Objective:       ROS:  Per HPI  All other 12 pt ROS negative    Meds:  Current Facility-Administered Medications   Medication Dose Route Frequency    aspirin chewable tablet 81 mg  81 mg Oral DAILY    sodium chloride (NS) flush 5-40 mL  5-40 mL IntraVENous Q8H    sodium chloride (NS) flush 5-40 mL  5-40 mL IntraVENous PRN    famotidine (PEPCID) tablet 20 mg  20 mg Oral DAILY    LORazepam (ATIVAN) tablet 0.5 mg  0.5 mg Oral BID PRN    acetaminophen (TYLENOL) tablet 650 mg  650 mg Oral Q6H PRN    guar gum (BENEFIBER) packet 1 Packet  4 g Oral TID    polyethylene glycol (MIRALAX) packet 17 g  17 g Oral DAILY    sodium chloride (NS) flush 5-40 mL  5-40 mL IntraVENous Q8H    sodium chloride (NS) flush 5-40 mL  5-40 mL IntraVENous PRN    labetalol (NORMODYNE;TRANDATE) injection 10 mg  10 mg IntraVENous Q10MIN PRN       MRI Results (maximum last 3): Results from [place] encounter on 08/20/19   MRA BRAIN WO CONT    Narrative INDICATION: stroke    COMPARISON:  None    TECHNIQUE:  3-D time-of-flight MRA of the brain was performed. Multiplanar  reconstructions were obtained. FINDINGS:    The vertebral arteries are codominant. The basilar artery and its branches are  normal. The internal carotid, anterior cerebral, and middle cerebral arteries  are patent. There is no flow-limiting intracranial stenosis. There is no  aneurysm. There are no sizable posterior communicating arteries. Impression IMPRESSION:  No flow limiting stenosis or intracranial aneurysm.      MRI BRAIN WO CONT    Narrative EXAM: MRI BRAIN WO CONT    INDICATION: TIA/ APHASIA    COMPARISON: 5/25/2019. CONTRAST: None. TECHNIQUE:    Multiplanar multisequence acquisition without contrast of the brain. FINDINGS:  The ventricles are normal in size and position. There is mild white matter  disease likely to chronic small vessel ischemic disease. There is no acute  infarct, hemorrhage, extra-axial fluid collection, or mass effect. There is no  cerebellar tonsillar herniation. Expected arterial flow-voids are present. The paranasal sinuses, mastoid air cells, and middle ears are clear. The patient  is status post bilateral lens surgery. No significant osseous or scalp lesions  are identified. Impression IMPRESSION:   Mild white matter disease likely related to chronic small vessel ischemic  disease. No evidence of acute process. Results from [place] encounter on 05/25/19   MRI BRAIN WO CONT    Narrative EXAM: MRI BRAIN WO CONT    INDICATION: h/o TIA, memory changes, assess for change    COMPARISON: 8/3/2017. CONTRAST: None. TECHNIQUE:    Multiplanar multisequence acquisition without contrast of the brain. FINDINGS:  The ventricles are stable in size and position. There is confluent  periventricular white matter FLAIR hyperintensity. Some of the round to oval  foci of FLAIR hyperintensity scattered throughout the centrum semiovale and  corona radiata have become slightly bigger than seen on the 2017 study. However,  there is no corresponding diffusion abnormality. There is no acute infarct,  hemorrhage, extra-axial fluid collection, or mass effect. There is no cerebellar  tonsillar herniation. Expected arterial flow-voids are present. The paranasal sinuses, mastoid air cells, and middle ears are clear. The orbital  contents are within normal limits. No significant osseous or scalp lesions are  identified. There may be a disc bulge at C3-4. (Series 2, image 12).       Impression Interval mild progression of the nonspecific presumed small vessel ischemic  disease. No recent intracranial stroke  Possible C3-4 disc bulge. Lab Review   Recent Results (from the past 24 hour(s))   ECHO ADULT COMPLETE    Collection Time: 08/21/19 11:56 AM   Result Value Ref Range    Tapse 1.99 1.5 - 2.0 cm    LVIDd 3.23 (A) 3.9 - 5.3 cm    LVPWd 0.77 0.6 - 0.9 cm    LVIDs 1.80 cm    IVSd 0.64 0.6 - 0.9 cm    LV Mass AL 54.6 (A) 67 - 162 g    LV Mass AL Index 36.4 43 - 95 g/m2    Left Atrium Major Axis 2.57 cm    Triscuspid Valve Regurgitation Peak Gradient 32.3 mmHg    TR Max Velocity 284.05 cm/s       Additional comments:I reviewed the patients new imaging test results. Patient Vitals for the past 8 hrs:   BP Temp Pulse Resp SpO2   08/22/19 0800 (!) 126/95 97.8 °F (36.6 °C)  30    08/22/19 0600 159/57  76 15 98 %   08/22/19 0500 (!) 125/107  82 17 100 %       08/22 0701 - 08/22 1900  In: -   Out: 200 [Urine:200]  08/20 1901 - 08/22 0700  In: 1937.5 [I.V.:1937.5]  Out: 200 [Urine:200]    Exam:  Visit Vitals  BP (!) 126/95   Pulse 76   Temp 97.8 °F (36.6 °C)   Resp 30   Ht 5' 2\" (1.575 m)   Wt 51.3 kg (113 lb 1.5 oz)   SpO2 98%   BMI 20.69 kg/m²     Gen: Well developed  CV: RRR  Lungs: non labored breathing  Abd: soft, non distended  Neuro: A&O, perseverating on constipation and right shoulder pain.   No dysarthria or aphasia  CN II-XII: PERRL, EOMI, face grossly symmetric, tongue/palate midline  Motor: Right arm cannot be assessed secondary to postop, all else 4+/5  Sensory: Grossly intact to LT  DTRs: symmetric  COOR: no limb/truncal ataxia  Gait: Deferred secondary patient factors    PROBLEM LIST:     Patient Active Problem List   Diagnosis Code    Essential hypertension, benign I10    DDD (degenerative disc disease), cervical M50.30    Anxiety F41.9    Allergic rhinitis J30.9    DDD (degenerative disc disease), lumbar M51.36    CKD (chronic kidney disease) stage 3, GFR 30-59 ml/min ([facility]) N18.3    Pre-diabetes R73.03    History of pulmonary embolism Z86.711    Gastroparesis K31.84    Spinal stenosis, lumbar M48.061    Fibromyalgia M79.7    Gastroesophageal reflux disease without esophagitis K21.9    Other irritable bowel syndrome K58.8    Recurrent major depressive disorder (HCC) F33.9    Alzheimer's disease G30.9, F02.80    Aphasia R47.01    TIA (transient ischemic attack) G45.9    Slurred speech R47.81       Assessment/Plan:      80-year-old woman who presented with acute aphasia suspicious for stroke in progress aborted by TPA. MRI yesterday well after presentation did not show acute process which is not unusual in cases of TPA being effective. She needs to stay on baby aspirin which has been resumed. LDL below goal.  No need for statin. She is not a failure as she discontinued the medication for her shoulder surgery. Okay to transfer to stroke unit once ICU needs have been satisfied. Stroke rehab/needs if indicated. During this evaluation, we also discussed stroke education to include signs and symptoms of stroke and TIA. This clinical note was dictated with an electronic dictation software that can make unintentional errors. If there are any questions, please contact me directly for clarification.       Signed:  Mariana Davison DO  8/22/2019  10:08 AM

## 2019-08-22 NOTE — PROGRESS NOTES
Bedside, Verbal and Written shift change report given to Miller Linda RN (oncoming nurse) by Janine Simms RN (offgoing nurse). Report included the following information SBAR, Kardex, ED Summary, Intake/Output, MAR, Accordion, Recent Results, Cardiac Rhythm NSR and Alarm Parameters . Shift Summary: Patient A&O x4. Follows commands appropriately. Adamant in regards to going home. Right shoulder swelling. PRN Tylenol. Bedside, Verbal and Written shift change report given to DEBORAH Ramos (oncoming nurse) by Miller Linda RN (offgoing nurse). Report included the following information SBAR, Kardex, ED Summary, Intake/Output, MAR, Accordion, Recent Results, Cardiac Rhythm NSR and Alarm Parameters .

## 2019-08-23 ENCOUNTER — APPOINTMENT (OUTPATIENT)
Dept: VASCULAR SURGERY | Age: 84
DRG: 062 | End: 2019-08-23
Attending: HOSPITALIST
Payer: MEDICARE

## 2019-08-23 LAB
ANION GAP SERPL CALC-SCNC: 8 MMOL/L (ref 5–15)
BUN SERPL-MCNC: 12 MG/DL (ref 6–20)
BUN/CREAT SERPL: 11 (ref 12–20)
CALCIUM SERPL-MCNC: 8.1 MG/DL (ref 8.5–10.1)
CHLORIDE SERPL-SCNC: 106 MMOL/L (ref 97–108)
CO2 SERPL-SCNC: 24 MMOL/L (ref 21–32)
CREAT SERPL-MCNC: 1.12 MG/DL (ref 0.55–1.02)
ERYTHROCYTE [DISTWIDTH] IN BLOOD BY AUTOMATED COUNT: 12.7 % (ref 11.5–14.5)
GLUCOSE SERPL-MCNC: 121 MG/DL (ref 65–100)
HCT VFR BLD AUTO: 23.2 % (ref 35–47)
HGB BLD-MCNC: 7.6 G/DL (ref 11.5–16)
MCH RBC QN AUTO: 30.6 PG (ref 26–34)
MCHC RBC AUTO-ENTMCNC: 32.8 G/DL (ref 30–36.5)
MCV RBC AUTO: 93.5 FL (ref 80–99)
NRBC # BLD: 0 K/UL (ref 0–0.01)
NRBC BLD-RTO: 0 PER 100 WBC
PLATELET # BLD AUTO: 239 K/UL (ref 150–400)
PMV BLD AUTO: 9.2 FL (ref 8.9–12.9)
POTASSIUM SERPL-SCNC: 3.8 MMOL/L (ref 3.5–5.1)
RBC # BLD AUTO: 2.48 M/UL (ref 3.8–5.2)
SODIUM SERPL-SCNC: 138 MMOL/L (ref 136–145)
WBC # BLD AUTO: 9.2 K/UL (ref 3.6–11)

## 2019-08-23 PROCEDURE — 74011250637 HC RX REV CODE- 250/637: Performed by: HOSPITALIST

## 2019-08-23 PROCEDURE — 97530 THERAPEUTIC ACTIVITIES: CPT

## 2019-08-23 PROCEDURE — 80048 BASIC METABOLIC PNL TOTAL CA: CPT

## 2019-08-23 PROCEDURE — 97116 GAIT TRAINING THERAPY: CPT

## 2019-08-23 PROCEDURE — 74011250637 HC RX REV CODE- 250/637: Performed by: INTERNAL MEDICINE

## 2019-08-23 PROCEDURE — 93880 EXTRACRANIAL BILAT STUDY: CPT

## 2019-08-23 PROCEDURE — 65660000000 HC RM CCU STEPDOWN

## 2019-08-23 PROCEDURE — 36415 COLL VENOUS BLD VENIPUNCTURE: CPT

## 2019-08-23 PROCEDURE — 85027 COMPLETE CBC AUTOMATED: CPT

## 2019-08-23 PROCEDURE — 74011250637 HC RX REV CODE- 250/637: Performed by: PSYCHIATRY & NEUROLOGY

## 2019-08-23 RX ADMIN — ACETAMINOPHEN 650 MG: 325 TABLET ORAL at 21:04

## 2019-08-23 RX ADMIN — ASPIRIN 81 MG CHEWABLE TABLET 81 MG: 81 TABLET CHEWABLE at 09:19

## 2019-08-23 RX ADMIN — Medication 10 ML: at 21:05

## 2019-08-23 RX ADMIN — Medication 10 ML: at 21:04

## 2019-08-23 RX ADMIN — Medication 1 PACKET: at 16:11

## 2019-08-23 RX ADMIN — Medication 10 ML: at 16:14

## 2019-08-23 RX ADMIN — Medication 10 ML: at 05:20

## 2019-08-23 RX ADMIN — FAMOTIDINE 20 MG: 20 TABLET ORAL at 09:19

## 2019-08-23 RX ADMIN — ACETAMINOPHEN 650 MG: 325 TABLET ORAL at 09:23

## 2019-08-23 RX ADMIN — Medication 1 PACKET: at 09:20

## 2019-08-23 RX ADMIN — Medication 1 PACKET: at 21:04

## 2019-08-23 RX ADMIN — POLYETHYLENE GLYCOL 3350 17 G: 17 POWDER, FOR SOLUTION ORAL at 09:19

## 2019-08-23 NOTE — PROGRESS NOTES
Problem: Mobility Impaired (Adult and Pediatric)  Goal: *Acute Goals and Plan of Care (Insert Text)  Description  FUNCTIONAL STATUS PRIOR TO ADMISSION: Patient was independent and active without use of DME.    HOME SUPPORT PRIOR TO ADMISSION: The patient lived with  but did not require assist.    Physical Therapy Goals  Initiated 8/22/2019  1. Patient will move from supine to sit and sit to supine , scoot up and down and roll side to side in bed with independence within 7 day(s). 2.  Patient will transfer from bed to chair and chair to bed with modified independence using the least restrictive device within 7 day(s). 3.  Patient will perform sit to stand with modified independence within 7 day(s). 4.  Patient will ambulate with modified independence for 250 feet with the least restrictive device within 7 day(s). 5.  Patient will ascend/descend 1 stairs with no handrail(s) with modified independence within 7 day(s). Outcome: Progressing Towards Goal     PHYSICAL THERAPY TREATMENT  Patient: Jaja Adam (80 y.o. female)  Date: 8/23/2019  Diagnosis: TIA (transient ischemic attack) [G45.9]  Aphasia [R47.01]  Anxiety [F41.9]  Slurred speech [R47.81] <principal problem not specified>       Precautions: Fall, Other (comment)(NWB RUE, sling OOB)  Chart, physical therapy assessment, plan of care and goals were reviewed. ASSESSMENT  Based on the objective data described below, pt was able to increase gait tolerance. Pt has a slight unsteady gait requiring CGA to mobilize. Pt is quick to mobilize. Educated pt and  on fall preventions and to have someone with her at all times with upright mobility. Pt's  expressed concerns over pt impulsivity.      Current Level of Function Impacting Discharge (mobility/balance): gait with CGA    Other factors to consider for discharge: safety awareness and impulsivity          PLAN :  Patient continues to benefit from skilled intervention to address the above impairments. Continue treatment per established plan of care. to address goals. Recommendation for discharge: (in order for the patient to meet his/her long term goals)  Physical therapy at least 2 days/week in the home AND ensure assist and/or supervision for safety with upright mobility     This discharge recommendation:  Has not yet been discussed the attending provider and/or case management    Equipment recommendations for successful discharge (if) home: none       SUBJECTIVE:   Patient stated It was good to walk.     OBJECTIVE DATA SUMMARY:   Critical Behavior:  Neurologic State: Alert, Confused, Restless  Orientation Level: Disoriented to situation  Cognition: Follows commands, Impulsive  Safety/Judgement: Awareness of environment  Functional Mobility Training:  Bed Mobility:        Sit to Supine: Stand-by assistance           Transfers:  Sit to Stand: Stand-by assistance  Stand to Sit: Stand-by assistance        Bed to Chair: Stand-by assistance                    Balance:  Sitting: Intact  Standing: Impaired  Standing - Static: Good  Standing - Dynamic : Fair  Ambulation/Gait Training:  Distance (ft): 500 Feet (ft)  Assistive Device: Gait belt  Ambulation - Level of Assistance: Contact guard assistance        Gait Abnormalities: Decreased step clearance; Path deviations        Base of Support: Center of gravity altered                             Stairs: Therapeutic Exercises:     Pain Rating:  Right shoulder    Activity Tolerance:   Limited   Please refer to the flowsheet for vital signs taken during this treatment.     After treatment patient left in no apparent distress:   Supine in bed, Call bell within reach, Bed / chair alarm activated and Caregiver / family present    COMMUNICATION/COLLABORATION:   The patients plan of care was discussed with: Registered Nurse    Christiana Cogan, PTA   Time Calculation: 27 mins

## 2019-08-23 NOTE — PROGRESS NOTES
Occupational Therapy  08/23/19    New order acknowledged, chart reviewed. Patient is currently on OT caseload for 3x/week with no further OT needs upon d/c except 24/7 SPV for safety. Will follow up with OT treatment as able & appropriate.      Thank you  Markus Patel, OTMARIELLA, OTR/L

## 2019-08-23 NOTE — PROGRESS NOTES
Problem: TIA/CVA Stroke: Day 2 Until Discharge  Goal: Nutrition/Diet  Outcome: Progressing Towards Goal  Goal: Medications  Outcome: Progressing Towards Goal  Goal: Psychosocial  Outcome: Progressing Towards Goal     Problem: Ischemic Stroke: Discharge Outcomes  Goal: *Tolerating diet  Outcome: Progressing Towards Goal

## 2019-08-23 NOTE — PROGRESS NOTES
ORTHO PROGRESS NOTE    2019  Admit Date:   2019    Post Op day: * No surgery found *    Subjective:    [place] D Caralyn Chain states that she has some right shoulder pain but manageable. Swelling continues. She is concerned about her bruising. Hospitalist called and asked for Ortho to re-evaluate shoulder in light of precipitous Hgb 11.6 on  to 7.6 today. Denies new issues.    Tolerating diet  Denies N/V/SOB or CP    PT/OT:   Gait:  Gait  Base of Support: Narrowed  Speed/Emerald: Fluctuations  Gait Abnormalities: Trunk sway increased, Scissoring, Decreased step clearance  Ambulation - Level of Assistance: Minimal assistance  Distance (ft): 150 Feet (ft)  Assistive Device: Gait belt  Rail Use: Left   Stairs - Level of Assistance: Contact guard assistance  Number of Stairs Trained: 3                 Vital Signs:    Patient Vitals for the past 8 hrs:   BP Temp Pulse Resp SpO2 Weight   19 0959 122/60 97.4 °F (36.3 °C) 82 22 100 %    19 0516 127/62 97.3 °F (36.3 °C) 77 15 100 %    19 0218 112/46 98.6 °F (37 °C) 81 14 100 % 55.1 kg (121 lb 7.6 oz)     Temp (24hrs), Av °F (36.7 °C), Min:97.3 °F (36.3 °C), Max:98.6 °F (37 °C)      Pain Control:   Pain Assessment  Pain Scale 1: Numeric (0 - 10)  Pain Intensity 1: 3  Pain Onset 1: sudden with movement  Pain Location 1: Other (comment)(B/L UE's)  Pain Orientation 1: Right  Pain Description 1: Hypersensitivity, Stabbing, Shooting  Pain Intervention(s) 1: Medication (see MAR), Ice, Other (comment)(refuses splint to RUE for sleep)    Meds:    Current Facility-Administered Medications   Medication Dose Route Frequency    aspirin chewable tablet 81 mg  81 mg Oral DAILY    bisacodyl (DULCOLAX) suppository 10 mg  10 mg Rectal DAILY PRN    polyethylene glycol (MIRALAX) packet 17 g  17 g Oral DAILY    sodium chloride (NS) flush 5-40 mL  5-40 mL IntraVENous Q8H    sodium chloride (NS) flush 5-40 mL  5-40 mL IntraVENous PRN    famotidine (PEPCID) tablet 20 mg  20 mg Oral DAILY    LORazepam (ATIVAN) tablet 0.5 mg  0.5 mg Oral BID PRN    acetaminophen (TYLENOL) tablet 650 mg  650 mg Oral Q6H PRN    guar gum (BENEFIBER) packet 1 Packet  4 g Oral TID    polyethylene glycol (MIRALAX) packet 17 g  17 g Oral DAILY    sodium chloride (NS) flush 5-40 mL  5-40 mL IntraVENous Q8H    sodium chloride (NS) flush 5-40 mL  5-40 mL IntraVENous PRN    labetalol (NORMODYNE;TRANDATE) injection 10 mg  10 mg IntraVENous Q10MIN PRN       LAB:    Recent Labs     08/23/19  0233  08/21/19  0455   HCT 23.2*   < > 31.4*   HGB 7.6*   < > 10.1*   INR  --   --  1.3*    < > = values in this interval not displayed.        Transfuse PRBC's:      Assessment & Physician's Comment:  Right shoulder with widespread ecchymosis noted worse posterior than anterior  Surgical incisions with sutures intact  Palpable hematoma present superior and anterior to acromion  Global swelling noted  Redness extends down the upper arm and to right forearm with swelling as well  Pain free AROM of elbow, wrist and hand  Left forearm without swelling but extensive ecchymosis noted  Left anterior shoulder with what appears to be a superficial retained old suture but no erythema or swelling  Left shoulder NTTP    Dressing is clean, dry, and intact  Neurovascular checks within normal limits  Orientation:  Oriented   Acute blood loss anemia s/p surgery with subsequent tPA use    Active Problems:    Anxiety (8/4/2010)      Aphasia (8/20/2019)      TIA (transient ischemic attack) (8/20/2019)      Slurred speech (8/20/2019)        Plan:    Cont NWB through RUE; in sling when OOB but does not need bolster pillow  PT/OT - encourage AROM through right elbow, wrist and hand; no upper arm movement for now  Pain control  Extensive icing of right shoulder should help reduce hematoma/hemarthrosis  Anticoagulation and medical management per primary teams  Follow-up outpatient with Dr Milly Bishop as previously scheduled        Srinivas Llamas PA-C   Orthopedic Trauma Service  [address] [facility]

## 2019-08-24 ENCOUNTER — HOME HEALTH ADMISSION (OUTPATIENT)
Dept: HOME HEALTH SERVICES | Facility: HOME HEALTH | Age: 84
End: 2019-08-24
Payer: MEDICARE

## 2019-08-24 VITALS
TEMPERATURE: 97.3 F | SYSTOLIC BLOOD PRESSURE: 116 MMHG | RESPIRATION RATE: 16 BRPM | HEART RATE: 80 BPM | HEIGHT: 62 IN | OXYGEN SATURATION: 96 % | BODY MASS INDEX: 22.63 KG/M2 | DIASTOLIC BLOOD PRESSURE: 48 MMHG | WEIGHT: 123 LBS

## 2019-08-24 LAB
ERYTHROCYTE [DISTWIDTH] IN BLOOD BY AUTOMATED COUNT: 13 % (ref 11.5–14.5)
HCT VFR BLD AUTO: 24.9 % (ref 35–47)
HGB BLD-MCNC: 8.1 G/DL (ref 11.5–16)
MCH RBC QN AUTO: 30.9 PG (ref 26–34)
MCHC RBC AUTO-ENTMCNC: 32.5 G/DL (ref 30–36.5)
MCV RBC AUTO: 95 FL (ref 80–99)
NRBC # BLD: 0 K/UL (ref 0–0.01)
NRBC BLD-RTO: 0 PER 100 WBC
PLATELET # BLD AUTO: 282 K/UL (ref 150–400)
PMV BLD AUTO: 9.9 FL (ref 8.9–12.9)
RBC # BLD AUTO: 2.62 M/UL (ref 3.8–5.2)
WBC # BLD AUTO: 7.4 K/UL (ref 3.6–11)

## 2019-08-24 PROCEDURE — 85027 COMPLETE CBC AUTOMATED: CPT

## 2019-08-24 PROCEDURE — 74011250637 HC RX REV CODE- 250/637: Performed by: INTERNAL MEDICINE

## 2019-08-24 PROCEDURE — 36415 COLL VENOUS BLD VENIPUNCTURE: CPT

## 2019-08-24 RX ORDER — LORAZEPAM 1 MG/1
0.5 TABLET ORAL
Qty: 1 TAB | Refills: 0 | Status: SHIPPED
Start: 2019-08-24 | End: 2019-08-30 | Stop reason: SDUPTHER

## 2019-08-24 RX ORDER — DONEPEZIL HYDROCHLORIDE 5 MG/1
5 TABLET, FILM COATED ORAL
Status: DISCONTINUED | OUTPATIENT
Start: 2019-08-24 | End: 2019-08-24 | Stop reason: HOSPADM

## 2019-08-24 RX ORDER — MEMANTINE HYDROCHLORIDE 5 MG/1
5 TABLET ORAL DAILY
Status: DISCONTINUED | OUTPATIENT
Start: 2019-08-24 | End: 2019-08-24 | Stop reason: HOSPADM

## 2019-08-24 RX ADMIN — ASPIRIN 81 MG CHEWABLE TABLET 81 MG: 81 TABLET CHEWABLE at 09:19

## 2019-08-24 RX ADMIN — Medication 10 ML: at 05:27

## 2019-08-24 RX ADMIN — LORAZEPAM 0.5 MG: 1 TABLET ORAL at 05:37

## 2019-08-24 RX ADMIN — Medication 1 PACKET: at 09:22

## 2019-08-24 RX ADMIN — ACETAMINOPHEN 650 MG: 325 TABLET ORAL at 09:42

## 2019-08-24 RX ADMIN — FAMOTIDINE 20 MG: 20 TABLET ORAL at 09:21

## 2019-08-24 NOTE — PROGRESS NOTES
Stroke Education documented in Patient Education: YES  Core Measures Documented in [facility]:  Risk Factors: YES  Warning signs of stroke: YES  When to Activate 911: YES  Medication Education for Risk Factors: YES  Smoking cessation if applicable: YES  Written Education Given:  YES    Discharge NIH Completed: YES  Score: 0    BRAINS: YES    Follow Up Appointment Made: NO  Date/Time if applicable: F/U to be made by patient. Bedside RN performed patient education and medication education. Discharge concerns initiated and discussed with patient, including clarification on \"who\" assists the patient at their home and instructions for when the home going patient should call their provider after discharge. Opportunity for questions and clarification was provided. Patient receptive to education: YES  Patient stated: \"I'm happy to go home. \"  Barriers to Education: Dementia  Diagnosis Education given:  YES    Length of stay: 4  Expected Day of Discharge: 8/24/19  Ask if they have \"Help at Home\" & add to white board?   YES    Education Day #: 4    Medication Education Given:  YES  M in the box Medication name: Aspirin    Pt aware of HCAHPS survey: YES

## 2019-08-24 NOTE — PROGRESS NOTES
Problem: TIA/CVA Stroke: Day 2 Until Discharge  Goal: Activity/Safety  Outcome: Progressing Towards Goal  Goal: Diagnostic Test/Procedures  Outcome: Progressing Towards Goal  Goal: Nutrition/Diet  Outcome: Progressing Towards Goal  Goal: Discharge Planning  Outcome: Progressing Towards Goal  Goal: Medications  Outcome: Progressing Towards Goal  Goal: Respiratory  Outcome: Progressing Towards Goal  Goal: Treatments/Interventions/Procedures  Outcome: Progressing Towards Goal  Goal: Psychosocial  Outcome: Progressing Towards Goal  Goal: *Verbalizes anxiety and depression are reduced or absent  Outcome: Progressing Towards Goal  Goal: *Absence of aspiration  Outcome: Progressing Towards Goal  Goal: *Absence of deep venous thrombosis signs and symptoms(Stroke Metric)  Outcome: Progressing Towards Goal  Goal: *Optimal pain control at patient's stated goal  Outcome: Progressing Towards Goal  Goal: *Tolerating diet  Outcome: Progressing Towards Goal  Goal: *Ability to perform ADLs and demonstrates progressive mobility and function  Outcome: Progressing Towards Goal  Goal: *Stroke education continued(Stroke Metric)  Outcome: Progressing Towards Goal     Problem: Ischemic Stroke: Discharge Outcomes  Goal: *Verbalizes anxiety and depression are reduced or absent  Outcome: Progressing Towards Goal  Goal: *Verbalize understanding of risk factor modification(Stroke Metric)  Outcome: Progressing Towards Goal  Goal: *Hemodynamically stable  Outcome: Progressing Towards Goal  Goal: *Absence of aspiration pneumonia  Outcome: Progressing Towards Goal  Goal: *Aware of needed dietary changes  Outcome: Progressing Towards Goal  Goal: *Verbalize understanding of prescribed medications including anti-coagulants, anti-lipid, and/or anti-platelets(Stroke Metric)  Outcome: Progressing Towards Goal  Goal: *Tolerating diet  Outcome: Progressing Towards Goal  Goal: *Aware of follow-up diagnostics related to anticoagulants  Outcome: Progressing Towards Goal  Goal: *Ability to perform ADLs and demonstrates progressive mobility and function  Outcome: Progressing Towards Goal  Goal: *Absence of DVT(Stroke Metric)  Outcome: Progressing Towards Goal  Goal: *Absence of aspiration  Outcome: Progressing Towards Goal  Goal: *Optimal pain control at patient's stated goal  Outcome: Progressing Towards Goal  Goal: *Home safety concerns addressed  Outcome: Progressing Towards Goal  Goal: *Describes available resources and support systems  Outcome: Progressing Towards Goal  Goal: *Verbalizes understanding of activation of EMS(911) for stroke symptoms(Stroke Metric)  Outcome: Progressing Towards Goal  Goal: *Understands and describes signs and symptoms to report to providers(Stroke Metric)  Outcome: Progressing Towards Goal  Goal: *Neurolgocially stable (absence of additional neurological deficits)  Outcome: Progressing Towards Goal  Goal: *Verbalizes importance of follow-up with primary care physician(Stroke Metric)  Outcome: Progressing Towards Goal  Goal: *Smoking cessation discussed,if applicable(Stroke Metric)  Outcome: Progressing Towards Goal  Goal: *Depression screening completed(Stroke Metric)  Outcome: Progressing Towards Goal     Problem: Pressure Injury - Risk of  Goal: *Prevention of pressure injury  Description  Document Magan Scale and appropriate interventions in the flowsheet.   Outcome: Progressing Towards Goal  Note:   Pressure Injury Interventions:  Sensory Interventions: Assess changes in LOC, Float heels, Keep linens dry and wrinkle-free, Maintain/enhance activity level, Monitor skin under medical devices, Pressure redistribution bed/mattress (bed type)    Moisture Interventions: Offer toileting Q_hr    Activity Interventions: PT/OT evaluation, Pressure redistribution bed/mattress(bed type), Increase time out of bed, Chair cushion    Mobility Interventions: Pressure redistribution bed/mattress (bed type), HOB 30 degrees or less, Float heels, PT/OT evaluation    Nutrition Interventions: Document food/fluid/supplement intake    Friction and Shear Interventions: Lift team/patient mobility team, Minimize layers, HOB 30 degrees or less, Foam dressings/transparent film/skin sealants                Problem: Falls - Risk of  Goal: *Absence of Falls  Description  Document Jeramy Laureano Fall Risk and appropriate interventions in the flowsheet.   Outcome: Progressing Towards Goal  Note:   Fall Risk Interventions:  Mobility Interventions: PT Consult for mobility concerns, OT consult for ADLs, Patient to call before getting OOB, Communicate number of staff needed for ambulation/transfer, Bed/chair exit alarm    Mentation Interventions: Bed/chair exit alarm, Adequate sleep, hydration, pain control, Door open when patient unattended, Increase mobility, More frequent rounding, Reorient patient    Medication Interventions: Evaluate medications/consider consulting pharmacy, Patient to call before getting OOB, Teach patient to arise slowly    Elimination Interventions: Call light in reach, Bed/chair exit alarm, Patient to call for help with toileting needs, Stay With Me (per policy)

## 2019-08-24 NOTE — PROGRESS NOTES
Hospitalist Progress Note  Stephie Blas MD  Answering service: [phone] from in house phone        Date of Service:  2019  NAME:  Ajit Avitia  :  1935  MRN:  [id number]      Admission Summary:   80 y.o F with PMH of skin ca,HTN,TIA,PE,CKD,anxiety and chronic pain was brought to the hospital because of slurred speech. Code stroke was called and she received TPA and was admitted to the ICU for further management    Interval history / Subjective:     F/u for  stroke    States that constipation has resolved. She states that Rt UE hurts if she moves. Hb dropped but hemorrhage/bruising of RUE and LUE stable       Assessment & Plan:     # Speech difficulty - Suspected acute CVA  -s/p tpa on   -MRI brain did not show any acute infarct. Mild white matter disease likely related to chronic small vessel ischemic disease.  -Per neurology - tpa would have aborted stroke and no changes seen on MRI  -MRA did not show any flow limiting stenosis or intracranial aneurysm.  -Echo showed normal EF and no ASD. -neurology following  -carotid dopplers no stenosis  -PT/OT/speech eval      #Hematoma of RUE and LUE bruises  -RUE hematoma likely related to the tpa she received at admission in the setting of repair  -LUE bruising -stable  -RUE bruising stable today, Hb has dropped -7.6  Monitor for now. -Ortho following, recommended elevation and ice application    #Acute blood loss anemia:  -likely due to bruise/hemorrhage on the UE with recent surgery  -monitor HB for now. #History of rotator cuff repair recently:   -ortho following  -pain management tylenol. Can use hydrocodone if severe pain. #Constipation:resolved  -likely due to recent use of narcotics,  -KUB did not show obstructive pattern  -continue laxatives      #HTN:  -BP soft, will hold antihypertensives now.     #CKD stage 3:  -baseline cr -1.3  -cr stable        Code status: full  DVT prophylaxis: SCD    Care Plan discussed with: patient,nurse and family at bed side  Disposition:TBD     Hospital Problems  Date Reviewed: 8/15/2019          Codes Class Noted POA    Aphasia ICD-10-CM: R47.01  ICD-9-CM: 784.3  8/20/2019 Unknown        TIA (transient ischemic attack) ICD-10-CM: G45.9  ICD-9-CM: 435.9  8/20/2019 Unknown        Slurred speech ICD-10-CM: R47.81  ICD-9-CM: 784.59  8/20/2019 Unknown        Anxiety ICD-10-CM: F41.9  ICD-9-CM: 300.00  8/4/2010 Unknown                Review of Systems:   Pertinent items are noted in HPI. Vital Signs:    Last 24hrs VS reviewed since prior progress note. Most recent are:  Visit Vitals  /48   Pulse 79   Temp 97.9 °F (36.6 °C)   Resp 21   Ht 5' 2\" (1.575 m)   Wt 55.1 kg (121 lb 7.6 oz)   SpO2 100%   BMI 22.22 kg/m²         Intake/Output Summary (Last 24 hours) at 8/23/2019 2044  Last data filed at 8/23/2019 2060  Gross per 24 hour   Intake 60 ml   Output 450 ml   Net -390 ml        Physical Examination:             Constitutional:  No acute distress, cooperative, pleasant    ENT:  Oral mucous moist, oropharynx benign. Resp:  CTA bilaterally. No wheezing/rhonchi/rales   CV:  Regular rhythm, normal rate    GI:  Soft, non distended, non tender. normoactive bowel sounds    Musculoskeletal:  No edema, warm    Neurologic:  Moves all extremities. AAOx3, speech clear, RUE limited mobility due to recent surgery.   Skin: there is a large bruise on the left UE which is stable,RUE swollen,echymosis + ,hematoma of the rt  Shoulder  upto forearm but stable involving arm and slightly posteriorly            Data Review:    Review and/or order of clinical lab test  Review and/or order of tests in the medicine section of CPT      Labs:     Recent Labs     08/23/19  0233 08/22/19  1715   WBC 9.2 9.5   HGB 7.6* 8.9*   HCT 23.2* 27.7*    241     Recent Labs     08/23/19  0233 08/22/19  1715    135*   K 3.8 3.8    102   CO2 24 22 BUN 12 13   CREA 1.12* 1.13*   * 119*   CA 8.1* 8.5     No results for input(s): SGOT, GPT, ALT, AP, TBIL, TBILI, TP, ALB, GLOB, GGT, AML, LPSE in the last 72 hours. No lab exists for component: AMYP, HLPSE  Recent Labs     08/21/19  0455   INR 1.3*   PTP 12.6*   APTT 27.1      No results for input(s): FE, TIBC, PSAT, FERR in the last 72 hours. No results found for: FOL, RBCF   No results for input(s): PH, PCO2, PO2 in the last 72 hours. No results for input(s): CPK, CKNDX, TROIQ in the last 72 hours.     No lab exists for component: CPKMB  Lab Results   Component Value Date/Time    Cholesterol, total 158 08/21/2019 04:55 AM    HDL Cholesterol 53 08/21/2019 04:55 AM    LDL, calculated 89.4 08/21/2019 04:55 AM    Triglyceride 78 08/21/2019 04:55 AM    CHOL/HDL Ratio 3.0 08/21/2019 04:55 AM     Lab Results   Component Value Date/Time    Glucose (POC) 107 (H) 08/02/2017 12:16 PM     Lab Results   Component Value Date/Time    Color YELLOW/STRAW 03/10/2019 06:49 PM    Appearance CLEAR 03/10/2019 06:49 PM    Specific gravity 1.012 03/10/2019 06:49 PM    pH (UA) 6.5 03/10/2019 06:49 PM    Protein NEGATIVE  03/10/2019 06:49 PM    Glucose NEGATIVE  03/10/2019 06:49 PM    Ketone TRACE (A) 03/10/2019 06:49 PM    Bilirubin NEGATIVE  03/10/2019 06:49 PM    Urobilinogen 0.2 03/10/2019 06:49 PM    Nitrites NEGATIVE  03/10/2019 06:49 PM    Leukocyte Esterase NEGATIVE  03/10/2019 06:49 PM    Epithelial cells FEW 03/10/2019 06:49 PM    Bacteria NEGATIVE  03/10/2019 06:49 PM    WBC 0-4 03/10/2019 06:49 PM    RBC 0-5 03/10/2019 06:49 PM         Medications Reviewed:     Current Facility-Administered Medications   Medication Dose Route Frequency    aspirin chewable tablet 81 mg  81 mg Oral DAILY    bisacodyl (DULCOLAX) suppository 10 mg  10 mg Rectal DAILY PRN    polyethylene glycol (MIRALAX) packet 17 g  17 g Oral DAILY    sodium chloride (NS) flush 5-40 mL  5-40 mL IntraVENous Q8H    sodium chloride (NS) flush 5-40 mL  5-40 mL IntraVENous PRN    famotidine (PEPCID) tablet 20 mg  20 mg Oral DAILY    LORazepam (ATIVAN) tablet 0.5 mg  0.5 mg Oral BID PRN    acetaminophen (TYLENOL) tablet 650 mg  650 mg Oral Q6H PRN    guar gum (BENEFIBER) packet 1 Packet  4 g Oral TID    polyethylene glycol (MIRALAX) packet 17 g  17 g Oral DAILY    sodium chloride (NS) flush 5-40 mL  5-40 mL IntraVENous Q8H    sodium chloride (NS) flush 5-40 mL  5-40 mL IntraVENous PRN    labetalol (NORMODYNE;TRANDATE) injection 10 mg  10 mg IntraVENous Q10MIN PRN     ______________________________________________________________________  EXPECTED LENGTH OF STAY: 2d 9h  ACTUAL LENGTH OF STAY:          3                 Noemy Clark MD

## 2019-08-24 NOTE — PROGRESS NOTES
Transition of Care Plan    Home with  [place]  [phone]   Medical follow up  Home health PT/OT --[phone]    to transport home

## 2019-08-24 NOTE — PROGRESS NOTES
ORTHO PROGRESS NOTE    2019  Admit Date:   2019    Post Op day: * No surgery found *    Subjective:    [place] states that right shoulder pain remains an issue but that Tylenol is adequate so far.  with questions regarding hydrocodone's role in causing stroke. Hgb stabilizing today. No new issues noted. Patient hoping to go home.   Tolerating diet  Denies N/V/SOB or CP     PT/OT:   Gait:  Gait  Base of Support: Center of gravity altered  Speed/Emerald: Fluctuations  Gait Abnormalities: Decreased step clearance, Path deviations  Ambulation - Level of Assistance: Contact guard assistance  Distance (ft): 500 Feet (ft)  Assistive Device: Gait belt  Rail Use: Left   Stairs - Level of Assistance: Contact guard assistance  Number of Stairs Trained: 3                 Vital Signs:    Patient Vitals for the past 8 hrs:   BP Temp Pulse Resp SpO2   19 0955 116/48 97.3 °F (36.3 °C) 80 16 96 %   19 0613 133/55 97.7 °F (36.5 °C) 75 12 98 %     Temp (24hrs), Av.8 °F (36.6 °C), Min:97.3 °F (36.3 °C), Max:98.1 °F (36.7 °C)      Pain Control:   Pain Assessment  Pain Scale 1: Numeric (0 - 10)  Pain Intensity 1: 0  Pain Onset 1: sudden with movement  Pain Location 1: Shoulder  Pain Orientation 1: Right  Pain Description 1: Aching  Pain Intervention(s) 1: Ice    Meds:    Current Facility-Administered Medications   Medication Dose Route Frequency    donepezil (ARICEPT) tablet 5 mg  5 mg Oral QHS    memantine (NAMENDA) tablet 5 mg  5 mg Oral DAILY    aspirin chewable tablet 81 mg  81 mg Oral DAILY    bisacodyl (DULCOLAX) suppository 10 mg  10 mg Rectal DAILY PRN    polyethylene glycol (MIRALAX) packet 17 g  17 g Oral DAILY    sodium chloride (NS) flush 5-40 mL  5-40 mL IntraVENous Q8H    sodium chloride (NS) flush 5-40 mL  5-40 mL IntraVENous PRN    famotidine (PEPCID) tablet 20 mg  20 mg Oral DAILY    LORazepam (ATIVAN) tablet 0.5 mg  0.5 mg Oral BID PRN    acetaminophen (TYLENOL) tablet 650 mg  650 mg Oral Q6H PRN    guar gum (BENEFIBER) packet 1 Packet  4 g Oral TID    polyethylene glycol (MIRALAX) packet 17 g  17 g Oral DAILY    sodium chloride (NS) flush 5-40 mL  5-40 mL IntraVENous Q8H    sodium chloride (NS) flush 5-40 mL  5-40 mL IntraVENous PRN    labetalol (NORMODYNE;TRANDATE) injection 10 mg  10 mg IntraVENous Q10MIN PRN       LAB:    Recent Labs     08/24/19  0329   HCT 24.9*   HGB 8.1*       Transfuse PRBC's:      Assessment & Physician's Comment:  Portal incisions healing well with sutures still in place  Significant posterior shoulder ecchymosis which extends down into the forearm  Moves arm at elbow without pain  Some pain if she tries to externally rotate arm  Strong right hand  strength  Dressing is clean, dry, and intact  Neurovascular checks within normal limits  Orientation:  Oriented  Acute postoperative blood loss - stabilizing and asymptomatic    Active Problems:    Anxiety (8/4/2010)      Aphasia (8/20/2019)      TIA (transient ischemic attack) (8/20/2019)      Slurred speech (8/20/2019)        Plan:    PT/OT as needed - may mobilize right arm through the elbow, wrist and hand; no active motion through upper arm/shoulder  Sling when out of bed but does not need bolster pillow  Tylenol for pain but may ise Norco if needed - discussed with patient/ doubts about norco having caused current stroke situation  Follow-up with Dr Milly Bishop in office Monday as previously scheduled  OK to discharge from [address] [place] [address] Dr PA-C   Orthopedic Trauma Service  [address] [place] [address]

## 2019-08-24 NOTE — PROGRESS NOTES
Bedside shift change report given to [address] [place] [address] Avenue (oncoming nurse) by Felton Alaniz (offgoing nurse). Report included the following information SBAR, Kardex, Intake/Output, MAR, Accordion and Cardiac Rhythm NSR.

## 2019-08-25 ENCOUNTER — HOME CARE VISIT (OUTPATIENT)
Dept: SCHEDULING | Facility: HOME HEALTH | Age: 84
End: 2019-08-25
Payer: MEDICARE

## 2019-08-25 LAB
LEFT CCA DIST DIAS: 16.3 CM/S
LEFT CCA DIST SYS: 86.4 CM/S
LEFT CCA PROX DIAS: 13.6 CM/S
LEFT CCA PROX SYS: 111.5 CM/S
LEFT ECA DIAS: 0 CM/S
LEFT ECA SYS: 81.7 CM/S
LEFT ICA DIST DIAS: 32.1 CM/S
LEFT ICA DIST SYS: 98.3 CM/S
LEFT ICA MID DIAS: 36.1 CM/S
LEFT ICA MID SYS: 116.8 CM/S
LEFT ICA PROX DIAS: 25.5 CM/S
LEFT ICA PROX SYS: 115.5 CM/S
LEFT ICA/CCA SYS: 1.35
LEFT VERTEBRAL DIAS: 8.04 CM/S
LEFT VERTEBRAL SYS: 50 CM/S
RIGHT CCA DIST DIAS: 15.7 CM/S
RIGHT CCA DIST SYS: 89.3 CM/S
RIGHT CCA PROX DIAS: 7.7 CM/S
RIGHT CCA PROX SYS: 113.9 CM/S
RIGHT ECA DIAS: 1.72 CM/S
RIGHT ECA SYS: 100.9 CM/S
RIGHT ICA DIST DIAS: 14.2 CM/S
RIGHT ICA DIST SYS: 51.7 CM/S
RIGHT ICA MID DIAS: 13.4 CM/S
RIGHT ICA PROX DIAS: 15.7 CM/S
RIGHT ICA PROX SYS: 83.9 CM/S
RIGHT ICA/CCA SYS: 0.9
RIGHT VERTEBRAL DIAS: 8.98 CM/S
RIGHT VERTEBRAL SYS: 53.5 CM/S

## 2019-08-25 PROCEDURE — 3331090001 HH PPS REVENUE CREDIT

## 2019-08-25 PROCEDURE — G0151 HHCP-SERV OF PT,EA 15 MIN: HCPCS

## 2019-08-25 PROCEDURE — 3331090002 HH PPS REVENUE DEBIT

## 2019-08-25 PROCEDURE — 400013 HH SOC

## 2019-08-25 NOTE — DISCHARGE SUMMARY
Discharge Summary       PATIENT ID: Berna Carroll  MRN: [id number]   YOB: 1935    DATE OF ADMISSION: 8/20/2019  5:34 PM    DATE OF DISCHARGE: 8/24/2019   PRIMARY CARE PROVIDER: Bong Mckeon MD     ATTENDING PHYSICIAN: Kulwinder Gale MD    DISCHARGING PROVIDER: Marlon Self MD    To contact this individual call [phone] and ask the  to page. If unavailable ask to be transferred the Adult Hospitalist Department. CONSULTATIONS: IP CONSULT TO NEUROLOGY  IP CONSULT TO NEUROLOGY  IP CONSULT TO NEUROLOGY  IP CONSULT TO ORTHOPEDIC SURGERY    PROCEDURES/SURGERIES: * No surgery found *    ADMITTING DIAGNOSES & HOSPITAL COURSE:     DISCHARGE DIAGNOSES / PLAN:      80 y.o F with PMH of skin ca,HTN,TIA,PE,CKD,anxiety and chronic pain was brought to the hospital because of slurred speech. Code stroke was called and she received TPA and was admitted to the ICU for further management    # Speech difficulty - Suspected acute CVA  -s/p tpa on 8/20  -MRI brain did not show any acute infarct. Mild white matter disease likely related to chronic small vessel ischemic disease.  -Per neurology - tpa would have aborted stroke and no changes seen on MRI  -MRA did not show any flow limiting stenosis or intracranial aneurysm.  -Echo showed normal EF and no ASD. -neurology following  -carotid dopplers no stenosis  -PT/OT/speech eval        #Hematoma of RUE and LUE bruises  -RUE hematoma likely related to the tpa she received at admission in the setting of repair  -LUE bruising -stable  -RUE bruising stable today,   Hb stable at 8.1  -Ortho following, recommended elevation and ice application. She has an appointment to see ortho surgeon on monday     #Acute blood loss anemia:  -likely due to bruise/hemorrhage on the UE with recent surgery  -monitor HB for now.     #History of rotator cuff repair recently:   -ortho following  -pain management tylenol.  Can use hydrocodone if severe pain.        #Constipation:resolved  -likely due to recent use of narcotics,  -KUB did not show obstructive pattern  -continue laxatives        #HTN:  -BP soft, will hold antihypertensives now.     #CKD stage 3:  -baseline cr -1.3  -cr stable       PENDING TEST RESULTS:   At the time of discharge the following test results are still pending: none    Xr Abd (kub)    Result Date: 8/22/2019  INDICATION: Constipation. Exam: Single supine frontal view of the abdomen. There is a nonspecific bowel gas pattern. No dilated loop of large or small bowel is seen. Moderate amount of stool overlies the rectum and distal sigmoid colon. Stool is also present within the descending colon. No pathologic calcification is visualized. The osseous structures are intact. There is no evidence of free intraperitoneal gas on supine view. IMPRESSION: Nonspecific bowel gas pattern. Mra Brain Wo Cont    Result Date: 8/21/2019  INDICATION: stroke COMPARISON:  None TECHNIQUE:  3-D time-of-flight MRA of the brain was performed. Multiplanar reconstructions were obtained. FINDINGS: The vertebral arteries are codominant. The basilar artery and its branches are normal. The internal carotid, anterior cerebral, and middle cerebral arteries are patent. There is no flow-limiting intracranial stenosis. There is no aneurysm. There are no sizable posterior communicating arteries. IMPRESSION: No flow limiting stenosis or intracranial aneurysm. Mri Brain Wo Cont    Result Date: 8/21/2019  EXAM: MRI BRAIN WO CONT INDICATION: TIA/ APHASIA COMPARISON: 5/25/2019. CONTRAST: None. TECHNIQUE:  Multiplanar multisequence acquisition without contrast of the brain. FINDINGS: The ventricles are normal in size and position. There is mild white matter disease likely to chronic small vessel ischemic disease. There is no acute infarct, hemorrhage, extra-axial fluid collection, or mass effect. There is no cerebellar tonsillar herniation.  Expected arterial flow-voids are present. The paranasal sinuses, mastoid air cells, and middle ears are clear. The patient is status post bilateral lens surgery. No significant osseous or scalp lesions are identified. IMPRESSION: Mild white matter disease likely related to chronic small vessel ischemic disease. No evidence of acute process. Xr Chest Port    Result Date: 8/20/2019  Chest portable AP History: Aphasia Comparison: 3/15/2019 Findings: The lungs are well expanded. No focal consolidation, pleural effusion, or pneumothorax. The cardiomediastinal silhouette is unremarkable. Status post resection of the distal clavicles. Impression: No acute cardiopulmonary process. Ct Code Neuro Head Wo Contrast    Result Date: 8/20/2019  EXAM: CT CODE NEURO HEAD WO CONTRAST INDICATION: slurred speech COMPARISON: None. CONTRAST: None. TECHNIQUE: Unenhanced CT of the head was performed using 5 mm images. Brain and bone windows were generated. CT dose reduction was achieved through use of a standardized protocol tailored for this examination and automatic exposure control for dose modulation. FINDINGS: The ventricles and sulci are normal in size, shape and configuration and midline. There is no significant white matter disease. There is no intracranial hemorrhage, extra-axial collection, mass, mass effect or midline shift. The basilar cisterns are open. No acute infarct is identified. The bone windows demonstrate no abnormalities. The visualized portions of the paranasal sinuses and mastoid air cells are clear.  Status post bilateral lens surgery     IMPRESSION: No evidence of acute process    FOLLOW UP APPOINTMENTS:    Follow-up Information     Follow up With Specialties Details Why Contact Info    Patrick Garrett MD Internal Medicine In 1 week  [place] [address]  [address] [facility] Blas Garcia MD Orthopedic Surgery In 1 week  [address] [facility] [address] [facility]  [facility] [address] health   PT/OT  call agency if you have not been contacted by Monday 8/26/19 to inquire as to the day and time of initial visit [address]  [address]  [phone]       ADDITIONAL CARE RECOMMENDATIONS:   -check CBC in 1 week  -Cont non weight bearing through RUE; in sling when out of bed  PT/OT - encourage Active Range of Motion through right elbow, wrist and hand; no upper arm movement for now  Extensive icing of right shoulder should help reduce hematoma/hemarthrosis  Follow-up outpatient with Dr Verna Bishop as previously scheduled  -check BP everyday and if greater than 140 mm hg- restart taking amlodipine and follow up with PCP. -Take tylenol for pain.  -Do not take Hydrocodone and ativan  together as can cause drowsiness,dizziness.       DIET: Regular Diet      ACTIVITY: Activity as tolerated      EQUIPMENT needed: none          DISCHARGE MEDICATIONS:  Discharge Medication List as of 8/24/2019  2:14 PM      CONTINUE these medications which have CHANGED    Details   !! LORazepam (ATIVAN) 1 mg tablet Take 0.5 Tabs by mouth nightly. Max Daily Amount: 0.5 mg., No Print, Disp-1 Tab, R-0       !! - Potential duplicate medications found. Please discuss with provider. CONTINUE these medications which have NOT CHANGED    Details   polyethylene glycol (MIRALAX) 17 gram/dose powder Take 17 g by mouth as needed., Historical Med      Lactobacillus acidophilus (PROBIOTIC PO) Take  by mouth daily. , Historical Med      metoprolol tartrate (LOPRESSOR) 25 mg tablet TAKE ONE-QUARTER TABLET BY MOUTH EVERY DAY, Normal, Disp-90 Tab, R-0      bismuth subsalicylate (PEPTO-BISMOL PO) Take  by mouth as needed., Historical Med      Wheat Dextrin (BENEFIBER CLEAR) 3 gram/3.5 gram pwpk Take  by mouth daily. , Historical Med      aspirin delayed-release 81 mg tablet Take 1 Tab by mouth daily. , No Print, Disp-30 Tab, R-0      !! LORazepam (ATIVAN) 0.5 mg tablet Take 0.5 mg by mouth every morning., Historical Med      hyoscyamine SL (LEVSIN/SL) 0.125 mg SL tablet 0.125 mg by SubLINGual route Before breakfast, lunch, and dinner., Historical Med      acetaminophen (TYLENOL ARTHRITIS PAIN) 650 mg CR tablet Take 650 mg by mouth two (2) times daily as needed for Pain., Historical Med      cholecalciferol (VITAMIN D3) 1,000 unit tablet Take 1,000 Units by mouth daily. , Historical Med      FERROUS FUMARATE/VIT BCOMP,C (SUPER B COMPLEX PO) Take  by mouth daily (after dinner). , Historical Med      memantine ER (NAMENDA XR) 7 mg capsule Take 1 Cap by mouth daily. , Normal, Disp-14 Cap, R-0      donepezil (ARICEPT) 5 mg tablet Take 1 Tab by mouth nightly., Normal, Disp-30 Tab, R-2      raNITIdine (ZANTAC) 150 mg tablet Take 150 mg by mouth Before breakfast and dinner., Historical Med       !! - Potential duplicate medications found. Please discuss with provider. STOP taking these medications       HYDROcodone-acetaminophen (NORCO) 5-325 mg per tablet Comments:   Reason for Stopping:         amLODIPine (NORVASC) 5 mg tablet Comments:   Reason for Stopping:                 NOTIFY YOUR PHYSICIAN FOR ANY OF THE FOLLOWING:   Fever over 101 degrees for 24 hours. Chest pain, shortness of breath, fever, chills, nausea, vomiting, diarrhea, change in mentation, falling, weakness, bleeding. Severe pain or pain not relieved by medications. Or, any other signs or symptoms that you may have questions about.     DISPOSITION:   X Home With:   OT  PT  HH  RN       Long term SNF/Inpatient Rehab    Independent/assisted living    Hospice    Other:       PATIENT CONDITION AT DISCHARGE:     Functional status    Poor     Deconditioned    X Independent      Cognition     Lucid    X Forgetful     Dementia      Catheters/lines (plus indication)    Garcia     PICC     PEG    X None      Code status    X Full code     DNR      PHYSICAL EXAMINATION AT DISCHARGE:                                        Constitutional:  No acute distress, cooperative, pleasant    ENT:  Oral mucous moist, oropharynx benign. Resp:  CTA bilaterally. No wheezing/rhonchi/rales   CV:  Regular rhythm, normal rate    GI:  Soft, non distended, non tender. normoactive bowel sounds    Musculoskeletal:  No edema, warm    Neurologic:  Moves all extremities. AAOx3, speech clear, RUE limited mobility due to recent surgery.   Skin: there is a large bruise on the left UE which is stable,RUE swollen,echymosis + ,hematoma of the rt  Shoulder  upto forearm but stable involving arm and slightly posteriorly -slowly improving          CHRONIC MEDICAL DIAGNOSES:  Problem List as of 8/24/2019 Date Reviewed: 8/15/2019          Codes Class Noted - Resolved    Aphasia ICD-10-CM: R47.01  ICD-9-CM: 784.3  8/20/2019 - Present        TIA (transient ischemic attack) ICD-10-CM: G45.9  ICD-9-CM: 435.9  8/20/2019 - Present        Slurred speech ICD-10-CM: R47.81  ICD-9-CM: 784.59  8/20/2019 - Present        Alzheimer's disease ICD-10-CM: G30.9, F02.80  ICD-9-CM: 331.0  7/5/2019 - Present    Overview Signed 7/5/2019  7:38 AM by Davey Cornelius MD     2019- neuropsych testing             Recurrent major depressive disorder ([facility] ICD-10-CM: F33.9  ICD-9-CM: 296.30  7/17/2018 - Present        Other irritable bowel syndrome ICD-10-CM: K58.8  ICD-9-CM: 564.1  6/29/2016 - Present    Overview Signed 6/29/2016  7:46 AM by Davey Cornelius MD     GI- Dr Yuriy Teixeira             Gastroparesis ICD-10-CM: K31.84  ICD-9-CM: 536.3  6/22/2016 - Present        Spinal stenosis, lumbar ICD-10-CM: M48.061  ICD-9-CM: 724.02  6/22/2016 - Present        Fibromyalgia ICD-10-CM: M79.7  ICD-9-CM: 729.1  6/22/2016 - Present        Gastroesophageal reflux disease without esophagitis ICD-10-CM: K21.9  ICD-9-CM: 530.81  6/22/2016 - Present    Overview Signed 9/7/2016  6:17 AM by Davey Cornelius MD     + hiatal hernia, EGD in '11             History of pulmonary embolism ICD-10-CM: Z86.711  ICD-9-CM: V12.55  6/21/2016 - Present    Overview Signed 6/21/2016  6:15 AM by Romulo Head MD     Admitted to [facility] in '13             Pre-diabetes ICD-10-CM: R73.03  ICD-9-CM: 790.29  12/9/2011 - Present        CKD (chronic kidney disease) stage 3, GFR 30-59 ml/min ([facility]) ICD-10-CM: N18.3  ICD-9-CM: 585.3  7/21/2011 - Present        DDD (degenerative disc disease), lumbar ICD-10-CM: M51.36  ICD-9-CM: 722.52  10/12/2010 - Present    Overview Signed 10/12/2010  8:06 AM by Kashif Kaur             Allergic rhinitis ICD-10-CM: J30.9  ICD-9-CM: 477.9  10/11/2010 - Present        Anxiety ICD-10-CM: F41.9  ICD-9-CM: 300.00  8/4/2010 - Present        Essential hypertension, benign ICD-10-CM: I10  ICD-9-CM: 401.1  4/12/2010 - Present        DDD (degenerative disc disease), cervical ICD-10-CM: M50.30  ICD-9-CM: 722.4  4/12/2010 - Present        RESOLVED: TIA (transient ischemic attack) ICD-10-CM: G45.9  ICD-9-CM: 435.9  8/2/2017 - 8/4/2017        RESOLVED: C. difficile diarrhea ICD-10-CM: A04.72  ICD-9-CM: 008.45  8/2/2017 - 7/17/2018    Overview Signed 8/6/2017  8:42 PM by Romulo Head MD     Admitted to [facility]             RESOLVED: Advance directive on file ICD-10-CM: Z78.9  ICD-9-CM: V49.89  7/18/2016 - 1/20/2017        RESOLVED: Neuropathy ICD-10-CM: G62.9  ICD-9-CM: 355.9  6/22/2016 - 7/23/2019    Overview Signed 6/22/2016  7:41 AM by Romulo Head MD     Both feet             RESOLVED: Pulmonary emboli ([facility] ICD-10-CM: I26.99  ICD-9-CM: 415.19  10/24/2013 - 10/28/2013        RESOLVED: PAD (peripheral artery disease) ([facility] ICD-10-CM: I73.9  ICD-9-CM: 443.9  9/14/2012 - 9/15/2012        RESOLVED: Other and unspecified noninfectious gastroenteritis and colitis(558.9) ICD-10-CM: K52.9  ICD-9-CM: 558.9  12/14/2010 - 7/23/2019        RESOLVED: Hypertriglyceridemia ICD-10-CM: E78.1  ICD-9-CM: 272.1  4/12/2010 - 7/23/2019              35 minutes were spent with the patient on counseling and coordination of care    Signed:   Yury Cordero MD  8/25/2019  3:56 PM

## 2019-08-26 ENCOUNTER — PATIENT OUTREACH (OUTPATIENT)
Dept: INTERNAL MEDICINE CLINIC | Age: 84
End: 2019-08-26

## 2019-08-26 ENCOUNTER — TELEPHONE (OUTPATIENT)
Dept: INTERNAL MEDICINE CLINIC | Age: 84
End: 2019-08-26

## 2019-08-26 VITALS — SYSTOLIC BLOOD PRESSURE: 124 MMHG | HEART RATE: 74 BPM | DIASTOLIC BLOOD PRESSURE: 56 MMHG

## 2019-08-26 PROCEDURE — 3331090001 HH PPS REVENUE CREDIT

## 2019-08-26 PROCEDURE — 3331090002 HH PPS REVENUE DEBIT

## 2019-08-26 NOTE — TELEPHONE ENCOUNTER
Lorie Duarte [facility]) [phone]     Pt's  requesting call regarding wife's stroke- he would like to discuss his wife's care before we call his daughter & needs to make hospital f/u

## 2019-08-26 NOTE — PROGRESS NOTES
Hospital Discharge Follow-Up      Date/Time:  2019 2:58 PM    Patient was admitted to [facility] on 19 and discharged on 19 for TIA/stroke. The physician discharge summary was not available at the time of outreach. Patient was contacted within 1 business days of discharge. Top Challenges reviewed with the provider   Discharge Instructions recommend: check CBC in 1 week  Consider new BMP also   2019 17:51 2019 04:55 2019 17:15 2019 02:33 2019 03:29   RBC 3.79 (L) 3.30 (L) 2.95 (L) 2.48 (L) 2.62 (L)   HGB 11.6 10.1 (L) 8.9 (L) 7.6 (L) 8.1 (L)   HCT 34.6 (L) 31.4 (L) 27.7 (L) 23.2 (L) 24.9 (L)   Creatinine 1.27 (H)  1.13 (H) 1.12 (H)    BUN/Creatinine ratio 10 (L)  12 11 (L)    GFR est non-AA 40 (L)  46 (L) 46 (L)    Calcium 9.6  8.5 8.1 (L)      Medication Reconciliation reveals patient has 2 orders for Lorazepam. One says 0.5 mg - take 0.5 mg every morning. The other: 1 mg - take 0.5 mg every evening. Patient has a bottle for each dosage and is taking 1 mg every evening. Patient's daughter is organizing medications and is confused, but says patient has been taking the whole 1 mg dose in the evenings at least since March when she started taking care of the medications for the patient. Method of communication with provider :chart routing    Inpatient RRAT score: 32  Was this a readmission? no   Patient stated reason for the readmission: n/a    Nurse Navigator (NN) contacted the  and daughter by telephone to perform post hospital discharge assessment. Verified name and  with  and daughter, Tonya Grijalva, as identifiers. Provided introduction to self, and explanation of the Nurse Navigator role. Reviewed discharge instructions and red flags with daughter, Tonya Grijalva, who verbalized understanding. daughter, Tonya Grijalva, given an opportunity to ask questions and does not have any further questions or concerns at this time.  The daughter, [facility], agrees to contact the PCP office for questions related to their healthcare. NN provided contact information for future reference. Disease Specific:   N/A    Summary of patient's top problems:  1. TIA/stroke. Brought to ED by EMS after experiencing aphasia. Code stroke called and stroke workup begun. CT head showed no evidence of acute process. Neuro on call recommended tPA which was administered. Patient admitted to ICU. Neuro checks & fall precautions ordered. NPO until patient passed dysphagia screen. Ortho consulted for recent (8/15/19) right rotator cuff surgery. Additional tele neuro recommendations: brain MRI which showed mild white matter disease likely related to chronic small vessel ischemic disease. No evidence of acute process and no flow limiting stenosis or intracranial aneurysm, 24 hrs of tele, 2D echo showed LVEF 56-60%, labs (hgb a1c, flp), PT, OT, swallow screen and formal swallow eval, IVFs and antplateley therapy optimized which were all completed. Home Health orders at discharge: PT, [facility]: [facility]  Date of initial visit: 8/25/19    Durable Medical Equipment ordered/company: none  Durable Medical Equipment received: none    Barriers to care? lack of knowledge about disease    Advance Care Planning:   Does patient have an Advance Directive:  reviewed and current      Medication(s):   New Medications at Discharge: none  Changed Medications at Discharge: ativan 0.5 mg and Lorazepam 1 mg  Discontinued Medications at Discharge: amlodipine and hydrocodone-acetaminophen    Medication reconciliation was performed with patient's daughter, [facility], who verbalizes understanding of administration of home medications. There were no barriers to obtaining medications identified at this time. Referral to Pharm D needed: no     Current Outpatient Medications   Medication Sig    LORazepam (ATIVAN) 1 mg tablet Take 0.5 Tabs by mouth nightly.  Max Daily Amount: 0.5 mg.    polyethylene glycol (MIRALAX) 17 gram/dose powder Take 17 g by mouth as needed for Other (constipation).  metoprolol tartrate (LOPRESSOR) 25 mg tablet TAKE ONE-QUARTER TABLET BY MOUTH EVERY DAY (Patient taking differently: every evening. TAKE ONE-QUARTER TABLET BY MOUTH EVERY DAY)    Wheat Dextrin (BENEFIBER CLEAR) 3 gram/3.5 gram pwpk Take  by mouth daily.  aspirin delayed-release 81 mg tablet Take 1 Tab by mouth daily.  LORazepam (ATIVAN) 0.5 mg tablet Take 0.5 mg by mouth every morning.  hyoscyamine SL (LEVSIN/SL) 0.125 mg SL tablet 0.125 mg by SubLINGual route Before breakfast, lunch, and dinner.  acetaminophen (TYLENOL ARTHRITIS PAIN) 650 mg CR tablet Take 650 mg by mouth two (2) times daily as needed for Pain.  raNITIdine (ZANTAC) 150 mg tablet Take 150 mg by mouth Before breakfast and dinner.  cholecalciferol (VITAMIN D3) 1,000 unit tablet Take 1,000 Units by mouth daily.  FERROUS FUMARATE/VIT BCOMP,C (SUPER B COMPLEX PO) Take  by mouth daily (after dinner).  memantine ER (NAMENDA XR) 7 mg capsule Take 1 Cap by mouth daily.  donepezil (ARICEPT) 5 mg tablet Take 1 Tab by mouth nightly.  Lactobacillus acidophilus (PROBIOTIC PO) Take  by mouth daily.  bismuth subsalicylate (PEPTO-BISMOL PO) Take 2 Adjustable Dose Pre-filled Pen Syringe by mouth as needed for Diarrhea or Nausea. No current facility-administered medications for this visit. There are no discontinued medications.     [facility] follow up appointment(s):   Future Appointments   Date Time Provider Department Center   8/28/2019 To Be Determined Isidra Moralez PT [facility]   8/30/2019  3:00 PM Sarthak Mccain MD [address] [facility]   9/3/2019 To Be Determined Isidra Moralez PT [place] [address] [facility]   9/5/2019 To Be Determined [facility]   9/10/2019 To Be Determined [facility]   9/12/2019 To Be Determined Nellie Beebe, PT [address]   9/17/2019 To Be Determined Tyrone Lizarraga, PT [facility]   9/19/2019 To Be Determined Nellie Beebe, PT [facility]      Non-[facility] follow up appointment(s): Venkat Doe - patient was seen by surgeon today  [facility]:  information provided as a resource       Goals      Prevent complications post hospitalization. 8/26/19  CVA/TIA  Patient will:  - check BP at home every day. If systolic greater than 446 restart amlodipine & FU with PCP  - will verbalized s/s stroke using BeFast acronym by third outreach  - Continue NWB through RUE - use sling when OOB  - Take Tylenol for pain. No hydrocodone with ativan - may cause drowsiness or dizziness  - Work with PT/OT - encourage ROM through right elbow, wrist & hand.  No upper arm movement for now  - extensive icing of right shoulder to reduce hematoma/hemarthrosis  - attend FU appointments with PCP and ortho  - NN will FU next week  Aramis Ott RN

## 2019-08-26 NOTE — TELEPHONE ENCOUNTER
Ervin Bautista ([facility]) [phone] (H)     Pt's daughter requesting a call back regarding an appt her mother needs to make to see dr Austin Child this week, but she has some questions first.

## 2019-08-26 NOTE — ACP (ADVANCE CARE PLANNING)
Non-Provider Advance Care Planning (ACP) Note    Date of ACP Conversation: 8/26/2019  Persons included in Conversation: daughter, Alberto Prasad  Length of ACP Conversation in minutes: <16 minutes (Non-Billable)    Conversation requested by:   Nurse Navigator    Authorized Decision Maker (if patient is incapable of making informed decisions):    This person is:  Healthcare Agent/Medical Power of  under Advance Directive        General ACP for ALL Patients with Decision Making Capacity:    Advance Directive Conversation with Patients who have not yet planned:  n/a    Review of Existing Advance Directive: (Select questions covered)  Does this advance directive still reflect your preferences?  daughter will review, discuss with patient and decide if document should be updated    Interventions Provided:  Reviewed patient's existing Advance Directive and prior conversations/process related to its completion  Recommended review of completed ACP document annually or upon change in health status

## 2019-08-27 ENCOUNTER — PATIENT OUTREACH (OUTPATIENT)
Dept: INTERNAL MEDICINE CLINIC | Age: 84
End: 2019-08-27

## 2019-08-27 PROCEDURE — 3331090002 HH PPS REVENUE DEBIT

## 2019-08-27 PROCEDURE — 3331090001 HH PPS REVENUE CREDIT

## 2019-08-27 NOTE — DISCHARGE INSTRUCTIONS
Discharge Instructions       PATIENT ID: Carlos Houser  MRN: [id number]   YOB: 1935    DATE OF ADMISSION: 8/20/2019  5:34 PM    DATE OF DISCHARGE: 8/24/2019    PRIMARY CARE PROVIDER: Mabel Templeton MD     ATTENDING PHYSICIAN: Felisa Tejada MD  DISCHARGING PROVIDER: Surya Lay MD    To contact this individual call [phone] and ask the  to page. If unavailable ask to be transferred the Adult Hospitalist Department. DISCHARGE DIAGNOSES -Acute stroke,Right upper extremity hemorrhage. CONSULTATIONS: IP CONSULT TO NEUROLOGY  IP CONSULT TO NEUROLOGY  IP CONSULT TO NEUROLOGY  IP CONSULT TO ORTHOPEDIC SURGERY    PROCEDURES/SURGERIES: * No surgery found *    PENDING TEST RESULTS:   At the time of discharge the following test results are still pending: none    Xr Abd (kub)    Result Date: 8/22/2019  IMPRESSION: Nonspecific bowel gas pattern. Mra Brain Wo Cont    Result Date: 8/21/2019  IMPRESSION: No flow limiting stenosis or intracranial aneurysm. Mri Brain Wo Cont    Result Date: 8/21/2019  IMPRESSION: Mild white matter disease likely related to chronic small vessel ischemic disease. No evidence of acute process. Xr Chest Port    Result Date: 8/20/2019  Impression: No acute cardiopulmonary process.     Ct Code Neuro Head Wo Contrast    Result Date: 8/20/2019  IMPRESSION: No evidence of acute process        FOLLOW UP APPOINTMENTS:   Follow-up Information     Follow up With Specialties Details Why Contact Info    Mabel Templeton MD Internal Medicine In 1 week  [place] [address]  [address]      Anthony Lockett(Loreta) Lise Barton MD Orthopedic Surgery In 1 week  [address]  [address]  [phone]             ADDITIONAL CARE RECOMMENDATIONS:   -check CBC in 1 week  -Cont non weight bearing through RUE; in sling when out of bed  PT/OT - encourage Active Range of Motion through right elbow, wrist and hand; no upper arm movement for now  Extensive icing of right shoulder should help reduce hematoma/hemarthrosis  Follow-up outpatient with Dr Lott Links as previously scheduled  -check BP everyday and if greater than 140 mm hg- restart taking amlodipine and follow up with PCP. -Take tylenol for pain.  -Do not take Hydrocodone and ativan  together as can cause drowsiness,dizziness.       DIET: Regular Diet      ACTIVITY: Activity as tolerated      EQUIPMENT needed: none      DISCHARGE MEDICATIONS:   See Medication Reconciliation Form    · It is important that you take the medication exactly as they are prescribed. · Keep your medication in the bottles provided by the pharmacist and keep a list of the medication names, dosages, and times to be taken in your wallet. · Do not take other medications without consulting your doctor. NOTIFY YOUR PHYSICIAN FOR ANY OF THE FOLLOWING:   Fever over 101 degrees for 24 hours. Chest pain, shortness of breath, fever, chills, nausea, vomiting, diarrhea, change in mentation, falling, weakness, bleeding. Severe pain or pain not relieved by medications. Or, any other signs or symptoms that you may have questions about.       DISPOSITION:  X  Home With:   OT  PT  HH  RN       SNF/Inpatient Rehab/LTAC    Independent/assisted living    Hospice    Other:         Signed:   Kelsi Sen MD  8/24/2019  7:57 AM

## 2019-08-27 NOTE — PROGRESS NOTES
Goals      Prevent complications post hospitalization. 8/27/19  Spoke with patient's daughter, Jus Braxton, regarding possibility or moving appointment to tomorrow but it would be limited to 15 min time vs. 30 min on Friday, 8/30/19. Patient's  had requested move if possible because grandson's wedding is Friday afternoon in [place]. Daughter states they will not be going to the wedding even if appointment is changed and would prefer they keep the 30 min appointment on Friday unless a 30 min appointment opens sooner.   - NN will notify patient and  if a 30 min appointment opens before then. Swetha Batista RN    8/26/19  CVA/TIA  Patient will:  - check BP at home every day. If systolic greater than 856 restart amlodipine & FU with PCP  - will verbalized s/s stroke using BeFast acronym by third outreach  - Continue NWB through RUE - use sling when OOB  - Take Tylenol for pain. No hydrocodone with ativan - may cause drowsiness or dizziness  - Work with PT/OT - encourage ROM through right elbow, wrist & hand.  No upper arm movement for now  - extensive icing of right shoulder to reduce hematoma/hemarthrosis  - attend FU appointments with PCP and ortho  - NN will FU next week  Swetha Batista RN

## 2019-08-28 PROCEDURE — 3331090001 HH PPS REVENUE CREDIT

## 2019-08-28 PROCEDURE — 3331090002 HH PPS REVENUE DEBIT

## 2019-08-29 ENCOUNTER — HOME CARE VISIT (OUTPATIENT)
Dept: SCHEDULING | Facility: HOME HEALTH | Age: 84
End: 2019-08-29
Payer: MEDICARE

## 2019-08-29 ENCOUNTER — HOME CARE VISIT (OUTPATIENT)
Dept: HOME HEALTH SERVICES | Facility: HOME HEALTH | Age: 84
End: 2019-08-29
Payer: MEDICARE

## 2019-08-29 VITALS
HEART RATE: 75 BPM | TEMPERATURE: 98.6 F | RESPIRATION RATE: 18 BRPM | DIASTOLIC BLOOD PRESSURE: 64 MMHG | SYSTOLIC BLOOD PRESSURE: 118 MMHG | OXYGEN SATURATION: 94 %

## 2019-08-29 PROCEDURE — 3331090001 HH PPS REVENUE CREDIT

## 2019-08-29 PROCEDURE — G0152 HHCP-SERV OF OT,EA 15 MIN: HCPCS

## 2019-08-29 PROCEDURE — 3331090002 HH PPS REVENUE DEBIT

## 2019-08-30 ENCOUNTER — OFFICE VISIT (OUTPATIENT)
Dept: INTERNAL MEDICINE CLINIC | Age: 84
End: 2019-08-30

## 2019-08-30 VITALS
TEMPERATURE: 98 F | WEIGHT: 108.4 LBS | SYSTOLIC BLOOD PRESSURE: 110 MMHG | DIASTOLIC BLOOD PRESSURE: 56 MMHG | HEART RATE: 83 BPM | HEIGHT: 62 IN | RESPIRATION RATE: 20 BRPM | BODY MASS INDEX: 19.95 KG/M2 | OXYGEN SATURATION: 98 %

## 2019-08-30 DIAGNOSIS — Z86.73 HISTORY OF RECENT STROKE: Primary | ICD-10-CM

## 2019-08-30 DIAGNOSIS — M79.7 FIBROMYALGIA: ICD-10-CM

## 2019-08-30 DIAGNOSIS — D62 ANEMIA ASSOCIATED WITH ACUTE BLOOD LOSS: ICD-10-CM

## 2019-08-30 DIAGNOSIS — F02.80 LATE ONSET ALZHEIMER'S DISEASE WITHOUT BEHAVIORAL DISTURBANCE (HCC): ICD-10-CM

## 2019-08-30 DIAGNOSIS — G30.1 LATE ONSET ALZHEIMER'S DISEASE WITHOUT BEHAVIORAL DISTURBANCE (HCC): ICD-10-CM

## 2019-08-30 PROBLEM — R47.01 APHASIA: Status: RESOLVED | Noted: 2019-08-20 | Resolved: 2019-08-30

## 2019-08-30 PROBLEM — R47.81 SLURRED SPEECH: Status: RESOLVED | Noted: 2019-08-20 | Resolved: 2019-08-30

## 2019-08-30 PROCEDURE — 3331090001 HH PPS REVENUE CREDIT

## 2019-08-30 PROCEDURE — 3331090002 HH PPS REVENUE DEBIT

## 2019-08-30 RX ORDER — DEXTROMETHORPHAN HYDROBROMIDE, GUAIFENESIN 5; 100 MG/5ML; MG/5ML
650 LIQUID ORAL EVERY 8 HOURS
COMMUNITY
Start: 2019-08-30

## 2019-08-30 RX ORDER — LORAZEPAM 1 MG/1
TABLET ORAL
COMMUNITY
End: 2020-07-27 | Stop reason: SDUPTHER

## 2019-08-30 NOTE — PROGRESS NOTES
Roman Galeana is a 80 y.o. female who was seen in clinic today (8/30/2019) for a Transitional Care Appointment          Assessment & Plan:     Diagnoses and all orders for this visit:    1. History of recent stroke- this is a new problem, symptoms have resolved & no deficits, reviewed CVA vs TIA, family had a lot of questions. Reviewed w/u is negative. No changes at this time. Should continue ASA. Okay to stay off statin. BP looks good so no med changes. Will schedule f/u with neurology. Reviewed risk of recurrence over next 30 days. Signs/symptoms of acute CVA reivewed. -     REFERRAL TO NEUROLOGY    2. Late onset Alzheimer's disease without behavioral disturbance- unchanged, pt repeating herself, will restart Namenda but wait 2 wks    3. Fibromyalgia- stable, reviewed med options, reviewed Tylenol vs NSAID's, will restart Tylenol. 4. Anemia associated with acute blood loss- stable, due to tPA, will repeat labs in 2 wks. , will hold off on iron based on allergies. -     CBC W/O DIFF         Follow-up and Dispositions    · Return in about 2 months (around 10/30/2019) for Regular follow up. Subjective:   [place] was seen today for Hospital Follow Up      Transition Care Management:    Admission: 8/20  Discharge: 8/24  [facility]  Diagnosis: stroke  Nurse Navigator note reviewed: Yes    We reviewed the records. She underwent right shoulder arthroscopic surgery on 8/15. The on 8/20 developed slurred speech. Neurology deemed her a tPA candidate and was administered this. Subsequent imaging did not reveal and damage, which is expected per neurology in cases that tPA works. Her w/u was negative (TTE, MRA, and carotid doppler). Her stay was complicated by large hematomas and anemia attributed to tPA, constipation attributed to pain medications, and agitation due to dementia. She was started on ativan for anxiety. Her amlodipine was stopped due to low BP.     She is asymptomatic. No residual deficits. Her probiotic was stopped. She thinks her \"sore stomach\" is worse. Brief Labs:     Lab Results   Component Value Date/Time    Sodium 138 08/23/2019 02:33 AM    Potassium 3.8 08/23/2019 02:33 AM    Creatinine 1.12 08/23/2019 02:33 AM    Creatinine, External 1.30 03/20/2018    Cholesterol, total 158 08/21/2019 04:55 AM    HDL Cholesterol 53 08/21/2019 04:55 AM    LDL, calculated 89.4 08/21/2019 04:55 AM    Triglyceride 78 08/21/2019 04:55 AM    Hemoglobin A1c 5.9 08/21/2019 04:55 AM        Prior to Admission medications    Medication Sig Start Date End Date Taking? Authorizing Provider   LORazepam (ATIVAN) 1 mg tablet Take  by mouth. Takes 0.5 mg in the morning and 1 mg at night. Yes Provider, Historical   acetaminophen (TYLENOL ARTHRITIS PAIN) 650 mg TbER Take 1 Tab by mouth every eight (8) hours. 8/30/19  Yes Halina Lance MD   metoprolol tartrate (LOPRESSOR) 25 mg tablet TAKE ONE-QUARTER TABLET BY MOUTH EVERY DAY  Patient taking differently: every evening. TAKE ONE-QUARTER TABLET BY MOUTH EVERY DAY 4/9/19  Yes Halina Lance MD   bismuth subsalicylate (PEPTO-BISMOL PO) Take 2 Adjustable Dose Pre-filled Pen Syringe by mouth as needed for Diarrhea or Nausea. Yes Provider, Historical   Wheat Dextrin (BENEFIBER CLEAR) 3 gram/3.5 gram pwpk Take  by mouth daily. Yes Provider, Historical   aspirin delayed-release 81 mg tablet Take 1 Tab by mouth daily. 8/4/17  Yes Minor, Irvin Chavez, NP   hyoscyamine SL (LEVSIN/SL) 0.125 mg SL tablet 0.125 mg by SubLINGual route Before breakfast, lunch, and dinner. Yes Provider, Historical   raNITIdine (ZANTAC) 150 mg tablet Take 150 mg by mouth Before breakfast and dinner. Yes Provider, Historical   cholecalciferol (VITAMIN D3) 1,000 unit tablet Take 1,000 Units by mouth daily. Yes Provider, Historical   FERROUS FUMARATE/VIT BCOMP,C (SUPER B COMPLEX PO) Take  by mouth daily (after dinner).    Yes Provider, Historical memantine ER (NAMENDA XR) 7 mg capsule Take 1 Cap by mouth daily. 8/7/19   Sarthak Mccain MD   Lactobacillus acidophilus (PROBIOTIC PO) Take  by mouth daily. Provider, Historical           Allergies   Allergen Reactions    Latex Itching    Dilaudid [Hydromorphone (Bulk)] Other (comments)     ? loss of consciousness    Altace [Ramipril] Unknown (comments)    Ambien [Zolpidem] Unknown (comments)    [employer] Test [Blood Sugar Diagnostic, Disc-Type] Unknown (comments)    Aspirin Unknown (comments)    Astelin [Azelastine] Unknown (comments)    Atenolol Unknown (comments)    Banex La Unknown (comments)    Benicar [Olmesartan] Unknown (comments)    Carbocaine [Mepivacaine] Unknown (comments)    Cleocin [Clindamycin Hcl] Hives    Codeine Nausea and Vomiting    Codeine-Asa-Salicyl-Acetam-Caf Other (comments)     Stomach pain    Cortisporin [Neomy-Polymyxinb-Bacitracin-Hc] Unknown (comments)    Cymbalta [Duloxetine] Other (comments)    Cytotec [Misoprostol] Unknown (comments)    Dilaudid [Hydromorphone] Unknown (comments)     Patient states that she was unable to respond, but was breathing    Effexor [Venlafaxine] Unknown (comments)    Epinephrine Unknown (comments)     Heart racing    Estra-D Unknown (comments)    Estrace [Estradiol] Unknown (comments)    Fentanyl Nausea and Vomiting and Unknown (comments)     shakes    Flexeril [Cyclobenzaprine] Unable to Obtain     \"drugs me\"    Flonase [Fluticasone] Unknown (comments)    Flunisolide Unknown (comments)    Guiatussin W/Codeine Unknown (comments)    Ketek [Telithromycin] Nausea Only    Klonopin [Clonazepam] Unknown (comments)    Lexapro [Escitalopram] Nausea and Vomiting    Lodine [Etodolac] Nausea and Vomiting    Morphine Unknown (comments)    Nsaids (Non-Steroidal Anti-Inflammatory Drug) Nausea and Vomiting    Other Medication Unknown (comments)     Gareth Alford    Paxil [Paroxetine Hcl] Nausea and Vomiting  Pcn [Penicillins] Hives    Percocet [Oxycodone-Acetaminophen] Unknown (comments)    Percodan [Oxycodone Hcl-Oxycodone-Asa] Unknown (comments)    Pneumococcal 23-Mary Ps Vaccine Swelling    Proctocream-Hc [Hydrocortisone] Unknown (comments)    Prozac [Fluoxetine] Nausea Only     Suicidal visions      Pseudoephedrine Other (comments)    Quinidine Unknown (comments)    Reglan [Metoclopramide] Unknown (comments)    Remeron [Mirtazapine] Other (comments)    Resaid Other (comments)     inflammed eyes    Robaxin [Methocarbamol] Unknown (comments)    Sonata [Zaleplon] Unknown (comments)    Sulfa (Sulfonamide Antibiotics) Hives    Surmontil [Trimipramine] Nausea and Vomiting    Talwin [Pentazocine Lactate] Unknown (comments)    Toradol [Ketorolac Tromethamine] Nausea and Vomiting    Ultram [Tramadol] Unknown (comments)    Vancomycin Nausea Only    Vasoconstrictor Drug Unknown (comments)    Vigamox [Moxifloxacin] Rash and Swelling    Vioxx [Rofecoxib] Unknown (comments)    Voltaren [Diclofenac Sodium] Itching    Wellbutrin [Bupropion Hcl] Nausea and Vomiting    Zelnorm [Tegaserod Hydrogen Maleate] Unknown (comments)    Zoloft [Sertraline] Nausea and Vomiting           Review of Systems   Respiratory: Negative for cough and shortness of breath. Cardiovascular: Negative for chest pain and palpitations. Gastrointestinal: Negative for abdominal pain, constipation, diarrhea, nausea and vomiting. Neurological: Negative for dizziness, sensory change, speech change and headaches. Psychiatric/Behavioral: Positive for memory loss. Objective:   Physical Exam   Eyes: Conjunctivae are normal. No scleral icterus. Cardiovascular: Regular rhythm and normal heart sounds. No murmur heard. Pulmonary/Chest: Effort normal and breath sounds normal. She has no wheezes. She has no rales. Abdominal: Bowel sounds are normal. She exhibits no mass. There is no hepatosplenomegaly. There is no tenderness. Musculoskeletal:   Limited ROM in R shoulder, slightly ttp   Skin:   Extensive bruising over R shoulder   Psychiatric:   Pleasantly confused         Visit Vitals  /56   Pulse 83   Temp 98 °F (36.7 °C) (Oral)   Resp 20   Ht 5' 2\" (1.575 m)   Wt 108 lb 6.4 oz (49.2 kg)   SpO2 98%   BMI 19.83 kg/m²         Disclaimer:  Advised her to call back or return to office if symptoms worsen/change/persist.  Discussed expected course/resolution/complications of diagnosis in detail with patient. Medication risks/benefits/costs/interactions/alternatives discussed with patient. She was given an after visit summary which includes diagnoses, current medications, & vitals. She expressed understanding with the diagnosis and plan. Aspects of this note may have been generated using voice recognition software. Despite editing, there may be some syntax errors.        Amari Cortez MD

## 2019-08-31 PROCEDURE — 3331090002 HH PPS REVENUE DEBIT

## 2019-08-31 PROCEDURE — 3331090001 HH PPS REVENUE CREDIT

## 2019-09-01 PROCEDURE — 3331090001 HH PPS REVENUE CREDIT

## 2019-09-01 PROCEDURE — 3331090002 HH PPS REVENUE DEBIT

## 2019-09-02 PROCEDURE — 3331090001 HH PPS REVENUE CREDIT

## 2019-09-02 PROCEDURE — 3331090002 HH PPS REVENUE DEBIT

## 2019-09-03 ENCOUNTER — HOME CARE VISIT (OUTPATIENT)
Dept: SCHEDULING | Facility: HOME HEALTH | Age: 84
End: 2019-09-03
Payer: MEDICARE

## 2019-09-03 PROCEDURE — G0151 HHCP-SERV OF PT,EA 15 MIN: HCPCS

## 2019-09-03 PROCEDURE — 3331090001 HH PPS REVENUE CREDIT

## 2019-09-03 PROCEDURE — 3331090002 HH PPS REVENUE DEBIT

## 2019-09-04 ENCOUNTER — HOME CARE VISIT (OUTPATIENT)
Dept: SCHEDULING | Facility: HOME HEALTH | Age: 84
End: 2019-09-04
Payer: MEDICARE

## 2019-09-04 VITALS
OXYGEN SATURATION: 97 % | TEMPERATURE: 97.9 F | HEART RATE: 73 BPM | DIASTOLIC BLOOD PRESSURE: 70 MMHG | SYSTOLIC BLOOD PRESSURE: 130 MMHG | RESPIRATION RATE: 16 BRPM

## 2019-09-04 VITALS
RESPIRATION RATE: 16 BRPM | DIASTOLIC BLOOD PRESSURE: 66 MMHG | HEART RATE: 75 BPM | SYSTOLIC BLOOD PRESSURE: 130 MMHG | OXYGEN SATURATION: 97 % | TEMPERATURE: 97.9 F

## 2019-09-04 PROCEDURE — 3331090002 HH PPS REVENUE DEBIT

## 2019-09-04 PROCEDURE — G0152 HHCP-SERV OF OT,EA 15 MIN: HCPCS

## 2019-09-04 PROCEDURE — 3331090001 HH PPS REVENUE CREDIT

## 2019-09-05 ENCOUNTER — HOME CARE VISIT (OUTPATIENT)
Dept: SCHEDULING | Facility: HOME HEALTH | Age: 84
End: 2019-09-05
Payer: MEDICARE

## 2019-09-05 ENCOUNTER — PATIENT OUTREACH (OUTPATIENT)
Dept: INTERNAL MEDICINE CLINIC | Age: 84
End: 2019-09-05

## 2019-09-05 VITALS
DIASTOLIC BLOOD PRESSURE: 68 MMHG | SYSTOLIC BLOOD PRESSURE: 142 MMHG | OXYGEN SATURATION: 98 % | RESPIRATION RATE: 16 BRPM | HEART RATE: 68 BPM | TEMPERATURE: 97.8 F

## 2019-09-05 PROCEDURE — 3331090002 HH PPS REVENUE DEBIT

## 2019-09-05 PROCEDURE — G0151 HHCP-SERV OF PT,EA 15 MIN: HCPCS

## 2019-09-05 PROCEDURE — 3331090001 HH PPS REVENUE CREDIT

## 2019-09-05 NOTE — PROGRESS NOTES
Goals      Prevent complications post hospitalization. 9/5/19  Patient reports:  - PT/OT have been checking BP - no concerns at this time  - Patient verbalized 2 s/s stroke: speech and face droop. NN reviewed BeFast with patient. Patient will be able to verbalize additional s/s stroke by next outreach  - will attend FU with ortho on 9/9/19 for FU of right shoulder arthroscopic surgery  - hematoma R. shoulder, does not appear improved. Patient forgets to use ice. Reviewed not leaving ice on for longer than 15 min each time. Pt verbalized understanding   - using sling on R arm when out of bed  - schedule FU with PCP in 2 months  - Repeat labs in 2 weeks per PCP office visit note  - call Dr. Elysia Moreno regarding continued stomach nausea. Patient denies eating anything out of ordinary, indigestion, or taking anything for relief except occasional Pepto Bismol.  - NN will FU next week  Rosaland Gosselin, RN        8/27/19  Spoke with patient's daughter, Bong Ferrell, regarding possibility or moving appointment to tomorrow but it would be limited to 15 min time vs. 30 min on Friday, 8/30/19. Patient's  had requested move if possible because grandson's wedding is Friday afternoon in [place]. Daughter states they will not be going to the wedding even if appointment is changed and would prefer they keep the 30 min appointment on Friday unless a 30 min appointment opens sooner.   - NN will notify patient and  if a 30 min appointment opens before then. Rosaland Gosselin, RN    8/26/19  CVA/TIA  Patient will:  - check BP at home every day. If systolic greater than 578 restart amlodipine & FU with PCP  - will verbalized s/s stroke using BeFast acronym by third outreach  - Continue NWB through RUE - use sling when OOB  - Take Tylenol for pain. No hydrocodone with ativan - may cause drowsiness or dizziness  - Work with PT/OT - encourage ROM through right elbow, wrist & hand.  No upper arm movement for now  - extensive icing of right shoulder to reduce hematoma/hemarthrosis  - attend FU appointments with PCP and ortho  - NN will FU next week  Hugo Aguirre RN

## 2019-09-06 ENCOUNTER — HOME CARE VISIT (OUTPATIENT)
Dept: SCHEDULING | Facility: HOME HEALTH | Age: 84
End: 2019-09-06
Payer: MEDICARE

## 2019-09-06 PROCEDURE — 3331090002 HH PPS REVENUE DEBIT

## 2019-09-06 PROCEDURE — 3331090001 HH PPS REVENUE CREDIT

## 2019-09-06 PROCEDURE — G0152 HHCP-SERV OF OT,EA 15 MIN: HCPCS

## 2019-09-07 PROCEDURE — 3331090001 HH PPS REVENUE CREDIT

## 2019-09-07 PROCEDURE — 3331090002 HH PPS REVENUE DEBIT

## 2019-09-08 PROCEDURE — 3331090002 HH PPS REVENUE DEBIT

## 2019-09-08 PROCEDURE — 3331090001 HH PPS REVENUE CREDIT

## 2019-09-08 RX ORDER — MEMANTINE HYDROCHLORIDE 7 MG/1
CAPSULE, EXTENDED RELEASE ORAL
Qty: 90 CAP | Refills: 0 | Status: SHIPPED | OUTPATIENT
Start: 2019-09-08 | End: 2019-12-09 | Stop reason: SDUPTHER

## 2019-09-09 ENCOUNTER — HOME CARE VISIT (OUTPATIENT)
Dept: SCHEDULING | Facility: HOME HEALTH | Age: 84
End: 2019-09-09
Payer: MEDICARE

## 2019-09-09 ENCOUNTER — TELEPHONE (OUTPATIENT)
Dept: NEUROLOGY | Age: 84
End: 2019-09-09

## 2019-09-09 VITALS
SYSTOLIC BLOOD PRESSURE: 132 MMHG | DIASTOLIC BLOOD PRESSURE: 70 MMHG | RESPIRATION RATE: 16 BRPM | HEART RATE: 76 BPM | OXYGEN SATURATION: 93 % | TEMPERATURE: 99.4 F

## 2019-09-09 VITALS
HEART RATE: 67 BPM | DIASTOLIC BLOOD PRESSURE: 65 MMHG | SYSTOLIC BLOOD PRESSURE: 122 MMHG | RESPIRATION RATE: 18 BRPM | TEMPERATURE: 99.2 F | OXYGEN SATURATION: 93 %

## 2019-09-09 PROCEDURE — 3331090002 HH PPS REVENUE DEBIT

## 2019-09-09 PROCEDURE — 3331090001 HH PPS REVENUE CREDIT

## 2019-09-09 PROCEDURE — G0152 HHCP-SERV OF OT,EA 15 MIN: HCPCS

## 2019-09-09 NOTE — TELEPHONE ENCOUNTER
----- Message from Maria Victoria Alfaro sent at 9/9/2019 10:46 AM EDT -----  Regarding: Gilberto Wilson DO/Telephone   General Message/Vendor Calls    Caller's first and last name: [facility]     Reason for call: Request for a sooner appt. Callback required yes/no and why: Yes       Best contact number(s): [address] Number: [phone]  Lois Ball Best Contact Number:[phone]    Details to clarify the request: Pt's daughter [facility] called on behalf of the pt, she would like to inform that the pt suffered a stroke on August 20th and was admitted to the hospital. Ms. Denver Hammock would like the pt to get a sooner appt regarding this issue. Contacted the  and spoke with [facility], she was advised from her supervisor to send a cc message and they will call her back.        Maria Victoria Alfaro

## 2019-09-10 ENCOUNTER — HOME CARE VISIT (OUTPATIENT)
Dept: SCHEDULING | Facility: HOME HEALTH | Age: 84
End: 2019-09-10
Payer: MEDICARE

## 2019-09-10 PROCEDURE — G0151 HHCP-SERV OF PT,EA 15 MIN: HCPCS

## 2019-09-10 PROCEDURE — 3331090002 HH PPS REVENUE DEBIT

## 2019-09-10 PROCEDURE — 3331090001 HH PPS REVENUE CREDIT

## 2019-09-11 ENCOUNTER — HOME CARE VISIT (OUTPATIENT)
Dept: SCHEDULING | Facility: HOME HEALTH | Age: 84
End: 2019-09-11
Payer: MEDICARE

## 2019-09-11 PROCEDURE — 3331090001 HH PPS REVENUE CREDIT

## 2019-09-11 PROCEDURE — G0152 HHCP-SERV OF OT,EA 15 MIN: HCPCS

## 2019-09-11 PROCEDURE — 3331090002 HH PPS REVENUE DEBIT

## 2019-09-11 NOTE — TELEPHONE ENCOUNTER
Left message for daughter, per -Not urgent, stay on baby aspirin. DEC or later ok. To call back to schedule follow up with  in December.

## 2019-09-12 ENCOUNTER — PATIENT OUTREACH (OUTPATIENT)
Dept: INTERNAL MEDICINE CLINIC | Age: 84
End: 2019-09-12

## 2019-09-12 ENCOUNTER — HOME CARE VISIT (OUTPATIENT)
Dept: SCHEDULING | Facility: HOME HEALTH | Age: 84
End: 2019-09-12
Payer: MEDICARE

## 2019-09-12 VITALS
HEART RATE: 73 BPM | SYSTOLIC BLOOD PRESSURE: 120 MMHG | DIASTOLIC BLOOD PRESSURE: 70 MMHG | RESPIRATION RATE: 16 BRPM | TEMPERATURE: 98.3 F | OXYGEN SATURATION: 98 %

## 2019-09-12 PROCEDURE — G0151 HHCP-SERV OF PT,EA 15 MIN: HCPCS

## 2019-09-12 PROCEDURE — 3331090001 HH PPS REVENUE CREDIT

## 2019-09-12 PROCEDURE — 3331090002 HH PPS REVENUE DEBIT

## 2019-09-12 NOTE — PROGRESS NOTES
Goals      Prevent complications post hospitalization. 9/12/19  Patient reports;  - checking BP and has been normal. Did not check today, but is being seen by [facility] and they check it. - feels like needs to have BM. NN encouraged plenty of fluids which patient reports she is doing. NN also encouraged prune juice. Patient was not receptive to that suggestion.   - attended follow up with ortho. Instructed to continue with immobilization brace for 2 more weeks and follow up with ortho. Patient verbalized understanding.   - denies s/s stroke. Reviewed s/s with patient.  - NN supplied contact information and notified patient NN will be out of office next week but will FU the following week. Juana Sherman    9/5/19  Patient reports:  - PT/OT have been checking BP - no concerns at this time  - Patient verbalized 2 s/s stroke: speech and face droop. NN reviewed BeFast with patient. Patient will be able to verbalize additional s/s stroke by next outreach  - will attend FU with ortho on 9/9/19 for FU of right shoulder arthroscopic surgery  - hematoma R. shoulder, does not appear improved. Patient forgets to use ice. Reviewed not leaving ice on for longer than 15 min each time. Pt verbalized understanding   - using sling on R arm when out of bed  - schedule FU with PCP in 2 months  - Repeat labs in 2 weeks per PCP office visit note  - call Dr. Yuriy Teixeira regarding continued stomach nausea. Patient denies eating anything out of ordinary, indigestion, or taking anything for relief except occasional Pepto Bismol.  - NN will FU next week  Duke Yu RN        8/27/19  Spoke with patient's daughter, Mauricio Lynn, regarding possibility or moving appointment to tomorrow but it would be limited to 15 min time vs. 30 min on Friday, 8/30/19. Patient's  had requested move if possible because grandson's wedding is Friday afternoon in [place].  Daughter states they will not be going to the wedding even if appointment is changed and would prefer they keep the 30 min appointment on Friday unless a 30 min appointment opens sooner.   - NN will notify patient and  if a 30 min appointment opens before then. Dyane Eisenmenger, RN    8/26/19  CVA/TIA  Patient will:  - check BP at home every day. If systolic greater than 229 restart amlodipine & FU with PCP  - will verbalized s/s stroke using BeFast acronym by third outreach  - Continue NWB through RUE - use sling when OOB  - Take Tylenol for pain. No hydrocodone with ativan - may cause drowsiness or dizziness  - Work with PT/OT - encourage ROM through right elbow, wrist & hand.  No upper arm movement for now  - extensive icing of right shoulder to reduce hematoma/hemarthrosis  - attend FU appointments with PCP and ortho  - NN will FU next week  Dyane Eisenmenger, RN

## 2019-09-13 PROCEDURE — 3331090002 HH PPS REVENUE DEBIT

## 2019-09-13 PROCEDURE — 3331090001 HH PPS REVENUE CREDIT

## 2019-09-14 PROCEDURE — 3331090001 HH PPS REVENUE CREDIT

## 2019-09-14 PROCEDURE — 3331090002 HH PPS REVENUE DEBIT

## 2019-09-15 PROCEDURE — 3331090002 HH PPS REVENUE DEBIT

## 2019-09-15 PROCEDURE — 3331090001 HH PPS REVENUE CREDIT

## 2019-09-16 PROCEDURE — 3331090001 HH PPS REVENUE CREDIT

## 2019-09-16 PROCEDURE — 3331090002 HH PPS REVENUE DEBIT

## 2019-09-17 ENCOUNTER — TELEPHONE (OUTPATIENT)
Dept: NEUROLOGY | Age: 84
End: 2019-09-17

## 2019-09-17 ENCOUNTER — HOME CARE VISIT (OUTPATIENT)
Dept: SCHEDULING | Facility: HOME HEALTH | Age: 84
End: 2019-09-17
Payer: MEDICARE

## 2019-09-17 VITALS
HEART RATE: 74 BPM | DIASTOLIC BLOOD PRESSURE: 70 MMHG | TEMPERATURE: 98.2 F | SYSTOLIC BLOOD PRESSURE: 128 MMHG | RESPIRATION RATE: 16 BRPM | OXYGEN SATURATION: 96 %

## 2019-09-17 PROCEDURE — 3331090002 HH PPS REVENUE DEBIT

## 2019-09-17 PROCEDURE — 3331090001 HH PPS REVENUE CREDIT

## 2019-09-17 PROCEDURE — G0151 HHCP-SERV OF PT,EA 15 MIN: HCPCS

## 2019-09-17 NOTE — TELEPHONE ENCOUNTER
Pt's  calling, stated the pt needs a f/u appt as soon as possible. Pt saw Dr. Matos Comment in 2017 but recently saw [facility]. [facility] in the hospital. He would like the pt to f/u with Dr. [facility] and stated pt's PCP referred her to [facility]. Please advise.

## 2019-09-18 ENCOUNTER — HOSPITAL ENCOUNTER (OUTPATIENT)
Dept: ULTRASOUND IMAGING | Age: 84
Discharge: HOME | End: 2019-09-18
Attending: SPECIALIST
Payer: MEDICARE

## 2019-09-18 ENCOUNTER — TELEPHONE (OUTPATIENT)
Dept: NEUROLOGY | Age: 84
End: 2019-09-18

## 2019-09-18 ENCOUNTER — HOSPITAL ENCOUNTER (OUTPATIENT)
Dept: GENERAL RADIOLOGY | Age: 84
Discharge: HOME | End: 2019-09-18
Attending: SPECIALIST
Payer: MEDICARE

## 2019-09-18 DIAGNOSIS — I67.9 CEREBROVASCULAR DISEASE: ICD-10-CM

## 2019-09-18 DIAGNOSIS — K31.84 GASTROPARESIS: ICD-10-CM

## 2019-09-18 DIAGNOSIS — R11.0 NAUSEA: ICD-10-CM

## 2019-09-18 PROCEDURE — 3331090002 HH PPS REVENUE DEBIT

## 2019-09-18 PROCEDURE — 76700 US EXAM ABDOM COMPLETE: CPT

## 2019-09-18 PROCEDURE — 74245 XR UPPER GI/SMALL BOWEL: CPT

## 2019-09-18 PROCEDURE — 3331090001 HH PPS REVENUE CREDIT

## 2019-09-18 NOTE — TELEPHONE ENCOUNTER
Spoke with [address] [place] [address], informed him  stated that-Not urgent, stay on baby aspirin. DEC or later ok. Scheduled follow up with  on 12/2.

## 2019-09-19 ENCOUNTER — HOME CARE VISIT (OUTPATIENT)
Dept: SCHEDULING | Facility: HOME HEALTH | Age: 84
End: 2019-09-19
Payer: MEDICARE

## 2019-09-19 VITALS
DIASTOLIC BLOOD PRESSURE: 68 MMHG | SYSTOLIC BLOOD PRESSURE: 142 MMHG | RESPIRATION RATE: 18 BRPM | HEART RATE: 79 BPM | OXYGEN SATURATION: 97 % | TEMPERATURE: 98.3 F

## 2019-09-19 PROCEDURE — G0151 HHCP-SERV OF PT,EA 15 MIN: HCPCS

## 2019-09-19 PROCEDURE — 3331090002 HH PPS REVENUE DEBIT

## 2019-09-19 PROCEDURE — 3331090001 HH PPS REVENUE CREDIT

## 2019-09-23 ENCOUNTER — TELEPHONE (OUTPATIENT)
Dept: INTERNAL MEDICINE CLINIC | Age: 84
End: 2019-09-23

## 2019-09-23 NOTE — TELEPHONE ENCOUNTER
[phone]      Please call to discuss stomach pain.  [facility]

## 2019-09-23 NOTE — TELEPHONE ENCOUNTER
[phone]      Please call to discuss the medication you asked her if she had, She does not remember the name of it and does not have it

## 2019-09-23 NOTE — TELEPHONE ENCOUNTER
Spoke to patient, reports that she started feeling \"sick to her stomach this morning\" , has not vomited, only nauseas. Has not taken anything or drank or eaten anything except a few sips of water. Reports she's had 3 BM's this morning, loose stools, \"starting to slow down now\", has had a little bit of cramping before each BM. She is going to eat some crackers & try a few sips of ginger ale and see if this settles her stomach. She will call back later if she doesn't improve.  She has called before in the past regarding this and is not experiencing any new symptoms/

## 2019-09-23 NOTE — TELEPHONE ENCOUNTER
Spoke to patient, states she took a zofran & drank ginger ale is feeling a little better. She will schedule an appt to see Dr. Sara Ingram if she doesn't improve.

## 2019-09-24 ENCOUNTER — TELEPHONE (OUTPATIENT)
Dept: INTERNAL MEDICINE CLINIC | Age: 84
End: 2019-09-24

## 2019-09-24 NOTE — TELEPHONE ENCOUNTER
Patient still c/o nausea and abdominal pain in the center of her stomach, unable to eat much \"crackers & a little ginger ale\", wants to be seen, reports the zofran doesn't really help her. She scheduled an appt to be seen tomorrow with Dr. Baylee Bruce 9/25/19.

## 2019-09-25 ENCOUNTER — OFFICE VISIT (OUTPATIENT)
Dept: INTERNAL MEDICINE CLINIC | Age: 84
End: 2019-09-25

## 2019-09-25 ENCOUNTER — PATIENT OUTREACH (OUTPATIENT)
Dept: INTERNAL MEDICINE CLINIC | Age: 84
End: 2019-09-25

## 2019-09-25 VITALS
DIASTOLIC BLOOD PRESSURE: 80 MMHG | HEART RATE: 78 BPM | WEIGHT: 106.8 LBS | OXYGEN SATURATION: 100 % | TEMPERATURE: 97.6 F | SYSTOLIC BLOOD PRESSURE: 155 MMHG | RESPIRATION RATE: 12 BRPM | HEIGHT: 62 IN | BODY MASS INDEX: 19.65 KG/M2

## 2019-09-25 DIAGNOSIS — R11.0 NAUSEA: Primary | ICD-10-CM

## 2019-09-25 RX ORDER — ONDANSETRON 4 MG/1
4 TABLET, ORALLY DISINTEGRATING ORAL
Qty: 30 TAB | Refills: 1 | Status: SHIPPED | OUTPATIENT
Start: 2019-09-25 | End: 2019-12-02

## 2019-09-25 NOTE — PROGRESS NOTES
HISTORY OF PRESENT ILLNESS  Khalif Roche is a 80 y.o. female. Patient is accompanied by her son today. HPI  Patient is an 81 yo woman with gastroparesis, dementia, recent CVA in August without residual deficit. Patient saw Dr. Alejandro Ferreira earlier this month. Labs from 9/20  including   CBC, CMP, Celiac Disease Panel, urinalysis, TSH reviewed. Hgb 11.2, Bun/Cr 19/1.32 (previous 12/1.12), UA negative,, TSH 3.06, Celiac disease panel negative  She also  had a negative UGI SBFT and Ultrasound. She reports that she has not been feeling hungry x 2 days, and has only been eating crackers. She had normal bowel movements x 3, 2 days ago, but no bowel movement yesterday. She denies abdominal pain, vomiting, diarrhea, melena or hematochezia. She had 2  Zofran tabs from a previous prescription. She took these on Monday, and is not sure if she felt better. She also resumed Namenda about a week ago. She is not sure if symptoms are changed since resuming Namenda. She denies dairy allergy, and enjoys ice cream. She states she has some Ensure at home but has not tried drinking them. Her weight is down 2 lbs from 8/30/19. She denies feeling thirsty or lightheaded.  She states that today she is hungry and wants to eat a chicken and cheese sandwich for lunch, and is looking forward to mint chip ice cream.    Past Medical History:   Diagnosis Date    Allergic rhinitis, cause unspecified     Anxiety     Back pain     C. difficile diarrhea 8/2/2017    [facility]    Cancer ([facility] [address]     basal cell face, back, neck    Cancer ([facility] [address]     squamous cell leg    Chronic kidney disease     Chronic pain     DJD (degenerative joint disease) of cervical spine     Fibromyalgia     GERD (gastroesophageal reflux disease)     Hypercholesterolemia     Hypertension     Hyponatremia     IBS (irritable bowel syndrome)     Irritable bowel syndrome with diarrhea 6/29/2016    GI- Dr Cordero Chi Kidney disease, chronic, stage III (GFR 30-59 ml/min) ([facility] [address] 6/2011    Dr. Serenity Hall) Christian    Nausea & vomiting     Neuropathy 6/22/2016    Both feet     Psychiatric disorder     DEPRESSION AND ANXIETY    Stroke [facility]) 2017    TIA    [facility]    PE X2      Current Outpatient Medications on File Prior to Visit   Medication Sig Dispense Refill    memantine ER (NAMENDA XR) 7 mg capsule TAKE 1 CAPSULE BY MOUTH ONE TIME DAILY 90 Cap 0    LORazepam (ATIVAN) 1 mg tablet Take  by mouth. Takes 0.5 mg in the morning and 1 mg at night.  acetaminophen (TYLENOL ARTHRITIS PAIN) 650 mg TbER Take 1 Tab by mouth every eight (8) hours.  Lactobacillus acidophilus (PROBIOTIC PO) Take  by mouth daily.  metoprolol tartrate (LOPRESSOR) 25 mg tablet TAKE ONE-QUARTER TABLET BY MOUTH EVERY DAY (Patient taking differently: every evening. TAKE ONE-QUARTER TABLET BY MOUTH EVERY DAY) 90 Tab 0    aspirin delayed-release 81 mg tablet Take 1 Tab by mouth daily. 30 Tab 0    hyoscyamine SL (LEVSIN/SL) 0.125 mg SL tablet 0.125 mg by SubLINGual route Before breakfast, lunch, and dinner.  raNITIdine (ZANTAC) 150 mg tablet Take 150 mg by mouth Before breakfast and dinner.  cholecalciferol (VITAMIN D3) 1,000 unit tablet Take 1,000 Units by mouth daily.  FERROUS FUMARATE/VIT BCOMP,C (SUPER B COMPLEX PO) Take 1 Cap by mouth daily (after dinner).  bismuth subsalicylate (PEPTO-BISMOL PO) Take 2 Adjustable Dose Pre-filled Pen Syringe by mouth as needed for Diarrhea or Nausea.  Wheat Dextrin (BENEFIBER CLEAR) 3 gram/3.5 gram pwpk Take 3.5 g by mouth daily. No current facility-administered medications on file prior to visit.      Allergies: reviewed      Physical Exam  Patient is in no apparent distress   Visit Vitals  /80   Pulse 78   Temp 97.6 °F (36.4 °C) (Oral)   Resp 12   Ht 5' 2\" (1.575 m)   Wt 106 lb 12.8 oz (48.4 kg)   SpO2 100%   BMI 19.53 kg/m²     Patient appears well  Oropharynx: moist mucous membranes  Heart[de-identified] normal rate, regular rhythm, normal S1, S2, no murmurs, rubs, clicks or gallops. Chest: clear to auscultation, no wheezes, rales or rhonchi,   Abdomen: soft, non tender, non distended, without mass  Extremities: no edema  ASSESSMENT and PLAN  Nausea   Anorexia  Symptoms are chronic.    recent lab work looks ok  Encouraged patient to eat multiple small meals, supplement with Ensure if she skips a meal  Zofran 4 mg q 8 hours prn renewed  follow up as planned with Dr. America Clifton  Call or return to clinic if these symptoms worsen or fail to improve as anticipated. Anemia post TPA: Hgb improved. Dementia: Leatha resumed  follow up with Carlito Hernández MD in about 1 month  Increased BUN/Cr: will advise to increase her fluids  Call or return to clinic if these symptoms worsen or fail to improve as anticipated.

## 2019-09-25 NOTE — PATIENT INSTRUCTIONS
Drink Boost or Ensure if you miss a meal  Try to increase your fluids  follow up with Dr. Yokasta James as planned    Zofran for nausea as needed every 8 hours    Call or return to clinic if these symptoms worsen or fail to improve as anticipated.

## 2019-09-25 NOTE — PROGRESS NOTES
Goals      Prevent complications post hospitalization. 9/25/19  Patient reports:  - BP today 155/80. Has not been checking at home.   - discharged from [facility] PT 9/19/19. Patient met all goals  - No longer wearing immobilization brace for right shoulder surgery. Feels better, but had to cancel ortho follow up scheduled for this past Monday. She will reschedule and is anxious to restart swimming. NN encouraged patient to get ok from ortho prior to resuming.   - Last BM 9/23/19. Patient reports not eating much so not having BMs daily. Encouraged patient to supplement diet with Boost drink. Patient verbalized understanding and is trying to eat more. - Patient is alert, clear with speech and thought process, dressed appropriately with good hygiene.   - NN to review s/s stroke at next outreach. Kody Tang, RN  Kody Tang RN      9/12/19  Patient reports;  - checking BP and has been normal. Did not check today, but is being seen by [facility] and they check it. - feels like needs to have BM. NN encouraged plenty of fluids which patient reports she is doing. NN also encouraged prune juice. Patient was not receptive to that suggestion.   - attended follow up with ortho. Instructed to continue with immobilization brace for 2 more weeks and follow up with ortho. Patient verbalized understanding.   - denies s/s stroke. Reviewed s/s with patient.  - NN supplied contact information and notified patient NN will be out of office next week but will FU the following week. Eliu Murray    9/5/19  Patient reports:  - PT/OT have been checking BP - no concerns at this time  - Patient verbalized 2 s/s stroke: speech and face droop. NN reviewed BeFast with patient. Patient will be able to verbalize additional s/s stroke by next outreach  - will attend FU with ortho on 9/9/19 for FU of right shoulder arthroscopic surgery  - hematoma R. shoulder, does not appear improved. Patient forgets to use ice.  Reviewed not leaving ice on for longer than 15 min each time. Pt verbalized understanding   - using sling on R arm when out of bed  - schedule FU with PCP in 2 months  - Repeat labs in 2 weeks per PCP office visit note  - call Dr. Carol Quesada regarding continued stomach nausea. Patient denies eating anything out of ordinary, indigestion, or taking anything for relief except occasional Pepto Bismol.  - NN will FU next week  Abhilash Dominguez RN        8/27/19  Spoke with patient's daughter, José Manuel Vera, regarding possibility or moving appointment to tomorrow but it would be limited to 15 min time vs. 30 min on Friday, 8/30/19. Patient's  had requested move if possible because grandson's wedding is Friday afternoon in [place]. Daughter states they will not be going to the wedding even if appointment is changed and would prefer they keep the 30 min appointment on Friday unless a 30 min appointment opens sooner.   - NN will notify patient and  if a 30 min appointment opens before then. Abhilash Dominguez RN    8/26/19  CVA/TIA  Patient will:  - check BP at home every day. If systolic greater than 480 restart amlodipine & FU with PCP  - will verbalized s/s stroke using BeFast acronym by third outreach  - Continue NWB through RUE - use sling when OOB  - Take Tylenol for pain. No hydrocodone with ativan - may cause drowsiness or dizziness  - Work with PT/OT - encourage ROM through right elbow, wrist & hand.  No upper arm movement for now  - extensive icing of right shoulder to reduce hematoma/hemarthrosis  - attend FU appointments with PCP and ortho  - NN will FU next week  Abhilash Dominguez RN

## 2019-09-26 ENCOUNTER — PATIENT OUTREACH (OUTPATIENT)
Dept: INTERNAL MEDICINE CLINIC | Age: 84
End: 2019-09-26

## 2019-09-26 NOTE — PROGRESS NOTES
Goals      Prevent complications post hospitalization. 9/26/19  Returned call to patient's daughter, Asia Marquez. Daughter inquires details regarding NN following patient care. Writer provided explanation. Daughter reports:  - patient has not been eating or taking medications consistently  - Family in to check on patient Wednesday and does not think patient took any of her medications from Sunday - Wed. NN encouraged daughter to set up a schedule with family to check on patient daily to make sure she is eating and taking medication. Daughter will try to arrange this. NN also suggested [facility] for additional help in the home and possibility of Assisted Living. Daughter stated parents will have to be eased into someone coming into the home, but will contact [facility] for help, she does not think patient is ready for assisted living at this time, but the family has considered and is looking into the financial aspect of that. NN will also look into other options for help and get back to daughter. Kristin Bo RN    9/25/19  Patient reports:  - BP today 155/80. Has not been checking at home.   - discharged from [facility] 9/19/19. Patient met all goals  - No longer wearing immobilization brace for right shoulder surgery. Feels better, but had to cancel ortho follow up scheduled for this past Monday. She will reschedule and is anxious to restart swimming. NN encouraged patient to get ok from ortho prior to resuming.   - Last BM 9/23/19. Patient reports not eating much so not having BMs daily. Encouraged patient to supplement diet with Boost drink. Patient verbalized understanding and is trying to eat more. - Patient is alert, clear with speech and thought process, dressed appropriately with good hygiene.   - NN to review s/s stroke at next outreach.    DEBORAH Marsh RN      9/12/19  Patient reports;  - checking BP and has been normal. Did not check today, but is being seen by [facility] and they check it. - feels like needs to have BM. NN encouraged plenty of fluids which patient reports she is doing. NN also encouraged prune juice. Patient was not receptive to that suggestion.   - attended follow up with ortho. Instructed to continue with immobilization brace for 2 more weeks and follow up with ortho. Patient verbalized understanding.   - denies s/s stroke. Reviewed s/s with patient.  - NN supplied contact information and notified patient NN will be out of office next week but will FU the following week. Kash Madhuri    9/5/19  Patient reports:  - PT/OT have been checking BP - no concerns at this time  - Patient verbalized 2 s/s stroke: speech and face droop. NN reviewed BeFast with patient. Patient will be able to verbalize additional s/s stroke by next outreach  - will attend FU with ortho on 9/9/19 for FU of right shoulder arthroscopic surgery  - hematoma R. shoulder, does not appear improved. Patient forgets to use ice. Reviewed not leaving ice on for longer than 15 min each time. Pt verbalized understanding   - using sling on R arm when out of bed  - schedule FU with PCP in 2 months  - Repeat labs in 2 weeks per PCP office visit note  - call Dr. Jose Mayes regarding continued stomach nausea. Patient denies eating anything out of ordinary, indigestion, or taking anything for relief except occasional Pepto Bismol.  - NN will FU next week  Dave Fontenot RN        8/27/19  Spoke with patient's daughter, María Jenkins, regarding possibility or moving appointment to tomorrow but it would be limited to 15 min time vs. 30 min on Friday, 8/30/19. Patient's  had requested move if possible because grandson's wedding is Friday afternoon in [place].  Daughter states they will not be going to the wedding even if appointment is changed and would prefer they keep the 30 min appointment on Friday unless a 30 min appointment opens sooner.   - NN will notify patient and  if a 30 min appointment opens before then. Ludy Alvarez RN    8/26/19  CVA/TIA  Patient will:  - check BP at home every day. If systolic greater than 122 restart amlodipine & FU with PCP  - will verbalized s/s stroke using BeFast acronym by third outreach  - Continue NWB through RUE - use sling when OOB  - Take Tylenol for pain. No hydrocodone with ativan - may cause drowsiness or dizziness  - Work with PT/OT - encourage ROM through right elbow, wrist & hand.  No upper arm movement for now  - extensive icing of right shoulder to reduce hematoma/hemarthrosis  - attend FU appointments with PCP and ortho  - NN will FU next week  Ludy Alvarez RN

## 2019-10-02 ENCOUNTER — TELEPHONE (OUTPATIENT)
Dept: INTERNAL MEDICINE CLINIC | Age: 84
End: 2019-10-02

## 2019-10-02 NOTE — TELEPHONE ENCOUNTER
Pt wants to talk with dr Saez  and she said no body else this is about her  Not being able to drive you can call her at [phone]

## 2019-10-03 ENCOUNTER — PATIENT OUTREACH (OUTPATIENT)
Dept: INTERNAL MEDICINE CLINIC | Age: 84
End: 2019-10-03

## 2019-10-03 NOTE — TELEPHONE ENCOUNTER
Called and spoke to patient. She was asking why her family is not letting her drive anymore. Reviewed we have discussed this previously and due to her memory she should not be driving. She does not remember this conversation. Discussed with her that is why she should not be driving. Tried to convince me she has never had an accident or ticket so does not understand why she can't drive. Reviewed again it is her memory is the concern and I support the no driving. She was not happy.

## 2019-10-03 NOTE — PROGRESS NOTES
Goals      Prevent complications post hospitalization. 10/3/19 spoke with patient. Patient reports:  - upset about not being able to drive. Feels she is able to continue driving and does not think she has dementia. NN offered encouragement and asked if patient would like to come in to the office to discuss with PCP. Patient declined  - is eating 3 times daily. Has not started supplementing with Boost but will start  - complains of constipation. States drinks 3-4 8oz glasses of water daily and 8 oz ginger ale in morning. NN suggested patient take OTC stool softener. Patient agrees to try  - states she is seeing PT Dennis Velazquez at [address] [place] [address] and hopeful to be able to start swimming again soon  - NN will FU next week. Erin Edwards RN    10/3/19 spoke with patient's daughter, Gregoria Stiles. Daughter reports:  - Daughter has not called [facility] regarding getting help in home. NN encouraged daughter to do so soon. Daughter inquired if patient could get home health nurse to visit to make sure patient is taking her medications correctly. Patient had [facility] PT/OT and was discharged from their care 9/19/19. NN will forward request to PCP for recommendations. - patient is taking medications as prescribed this week and seems better  - eating well now  - FU with Dr. Pancho Powers (ortho) last week. Instructed to wait on swimming but can get in the water and walk. - family told patient 9/28/19 not allowed to drive anymore. Patient is unhappy about that. * NN called to speak with patient. LVM. Will try again later  Erin Edwards RN    9/26/19  Returned call to patient's daughter, Keagan Arora. Daughter inquires details regarding NN following patient care. Writer provided explanation. Daughter reports:  - patient has not been eating or taking medications consistently  - Family in to check on patient Wednesday and does not think patient took any of her medications from Sunday - Wed.  NN encouraged daughter to set up a schedule with family to check on patient daily to make sure she is eating and taking medication. Daughter will try to arrange this. NN also suggested [facility] for additional help in the home and possibility of Assisted Living. Daughter stated parents will have to be eased into someone coming into the home, but will contact [facility] for help, she does not think patient is ready for assisted living at this time, but the family has considered and is looking into the financial aspect of that. NN will also look into other options for help and get back to daughter. Nelson Gonzalez RN    9/25/19  Patient reports:  - BP today 155/80. Has not been checking at home.   - discharged from [facility] PT 9/19/19. Patient met all goals  - No longer wearing immobilization brace for right shoulder surgery. Feels better, but had to cancel ortho follow up scheduled for this past Monday. She will reschedule and is anxious to restart swimming. NN encouraged patient to get ok from ortho prior to resuming.   - Last BM 9/23/19. Patient reports not eating much so not having BMs daily. Encouraged patient to supplement diet with Boost drink. Patient verbalized understanding and is trying to eat more. - Patient is alert, clear with speech and thought process, dressed appropriately with good hygiene.   - NN to review s/s stroke at next outreach. DEBORAH Spencer RN      9/12/19  Patient reports;  - checking BP and has been normal. Did not check today, but is being seen by [facility] and they check it. - feels like needs to have BM. NN encouraged plenty of fluids which patient reports she is doing. NN also encouraged prune juice. Patient was not receptive to that suggestion.   - attended follow up with ortho. Instructed to continue with immobilization brace for 2 more weeks and follow up with ortho. Patient verbalized understanding.   - denies s/s stroke.  Reviewed s/s with patient.  - NN supplied contact information and notified patient NN will be out of office next week but will FU the following week. Nena Fountainrich    9/5/19  Patient reports:  - PT/OT have been checking BP - no concerns at this time  - Patient verbalized 2 s/s stroke: speech and face droop. NN reviewed BeFast with patient. Patient will be able to verbalize additional s/s stroke by next outreach  - will attend FU with ortho on 9/9/19 for FU of right shoulder arthroscopic surgery  - hematoma R. shoulder, does not appear improved. Patient forgets to use ice. Reviewed not leaving ice on for longer than 15 min each time. Pt verbalized understanding   - using sling on R arm when out of bed  - schedule FU with PCP in 2 months  - Repeat labs in 2 weeks per PCP office visit note  - call Dr. Miguel Llamas regarding continued stomach nausea. Patient denies eating anything out of ordinary, indigestion, or taking anything for relief except occasional Pepto Bismol.  - NN will FU next week  Ari Buitrago RN        8/27/19  Spoke with patient's daughter, Rossi Riddle, regarding possibility or moving appointment to tomorrow but it would be limited to 15 min time vs. 30 min on Friday, 8/30/19. Patient's  had requested move if possible because grandson's wedding is Friday afternoon in [place]. Daughter states they will not be going to the wedding even if appointment is changed and would prefer they keep the 30 min appointment on Friday unless a 30 min appointment opens sooner.   - NN will notify patient and  if a 30 min appointment opens before then. Ari Buitrago RN    8/26/19  CVA/TIA  Patient will:  - check BP at home every day. If systolic greater than 688 restart amlodipine & FU with PCP  - will verbalized s/s stroke using BeFast acronym by third outreach  - Continue NWB through RUE - use sling when OOB  - Take Tylenol for pain.  No hydrocodone with ativan - may cause drowsiness or dizziness  - Work with PT/OT - encourage ROM through right elbow, wrist & hand.  No upper arm movement for now  - extensive icing of right shoulder to reduce hematoma/hemarthrosis  - attend FU appointments with PCP and ortho  - NN will FU next week  Broadus Sandhoff, RN

## 2019-10-04 NOTE — PROGRESS NOTES
Yes, should be able to get [facility] nursing for a med check. Has dementia. Can't drive.

## 2019-10-14 ENCOUNTER — PATIENT OUTREACH (OUTPATIENT)
Dept: INTERNAL MEDICINE CLINIC | Age: 84
End: 2019-10-14

## 2019-10-14 NOTE — PROGRESS NOTES
Goals      Prevent complications post hospitalization. 10/14/19  - patient has attended FU with Dr. Charisma Morris and was notable for elevated BP. Patient was instructed to check BP at home and FU with PCP. Patient reports systolic BP has been elevated as much as 186. Diastolic BP has been WDL. Patient is keeping a log and will bring to FU with PCP which we scheduled during today's outreach for 10/25/19  - patient continues to be irritable regarding inability to drive car. States she does not have any physical issues to prevent her ability to drive. ACM encouraged patient to work on improving BP and regaining strength from stroke and shoulder surgery. - continues with PT at [facility] [address] [place] [address] 2 times weekly and has FU with ortho later this month.   - daughter reports patient is doing a good job with her medications and does not feel the visit from [facility] discussed at earlier outreach would be beneficial. ACM explained after checking with HH, they could come out and review medications and provide education, but they would not come for checks to see if patient is taking the medications daily as the family had hoped. Daughter agreed patient is doing well at this time with medications and daughter checks them several times weekly also. - ACM will FU with patient next week  Kwadwo Lassiter RN    10/3/19 spoke with patient. Patient reports:  - upset about not being able to drive. Feels she is able to continue driving and does not think she has dementia. NN offered encouragement and asked if patient would like to come in to the office to discuss with PCP. Patient declined  - is eating 3 times daily. Has not started supplementing with Boost but will start  - complains of constipation. States drinks 3-4 8oz glasses of water daily and 8 oz ginger ale in morning. NN suggested patient take OTC stool softener.  Patient agrees to try  - states she is seeing PT Jennifer Alicea at [address] and hopeful to be able to start swimming again soon  - NN will FU next week. Gabriel Higgins RN    10/3/19 spoke with patient's daughter, Wale Avina. Daughter reports:  - Daughter has not called [facility] regarding getting help in home. NN encouraged daughter to do so soon. Daughter inquired if patient could get home health nurse to visit to make sure patient is taking her medications correctly. Patient had [facility] PT/OT and was discharged from their care 9/19/19. NN will forward request to PCP for recommendations. - patient is taking medications as prescribed this week and seems better  - eating well now  - FU with Dr. Ephraim Kelley (ortho) last week. Instructed to wait on swimming but can get in the water and walk. - family told patient 9/28/19 not allowed to drive anymore. Patient is unhappy about that. * NN called to speak with patient. LVM. Will try again later  Gabriel Higgins RN    9/26/19  Returned call to patient's daughter, Julieth Krishnan. Daughter inquires details regarding NN following patient care. Writer provided explanation. Daughter reports:  - patient has not been eating or taking medications consistently  - Family in to check on patient Wednesday and does not think patient took any of her medications from Sunday - Wed. NN encouraged daughter to set up a schedule with family to check on patient daily to make sure she is eating and taking medication. Daughter will try to arrange this. NN also suggested [facility] for additional help in the home and possibility of Assisted Living. Daughter stated parents will have to be eased into someone coming into the home, but will contact [facility] for help, she does not think patient is ready for assisted living at this time, but the family has considered and is looking into the financial aspect of that. NN will also look into other options for help and get back to daughter. Gabriel Higgins RN    9/25/19  Patient reports:  - BP today 155/80. Has not been checking at home.    - discharged from [facility] 9/19/19. Patient met all goals  - No longer wearing immobilization brace for right shoulder surgery. Feels better, but had to cancel ortho follow up scheduled for this past Monday. She will reschedule and is anxious to restart swimming. NN encouraged patient to get ok from ortho prior to resuming.   - Last BM 9/23/19. Patient reports not eating much so not having BMs daily. Encouraged patient to supplement diet with Boost drink. Patient verbalized understanding and is trying to eat more. - Patient is alert, clear with speech and thought process, dressed appropriately with good hygiene.   - NN to review s/s stroke at next outreach. Gabriel Higgins RN  Gabriel Higgins RN      9/12/19  Patient reports;  - checking BP and has been normal. Did not check today, but is being seen by [facility] and they check it. - feels like needs to have BM. NN encouraged plenty of fluids which patient reports she is doing. NN also encouraged prune juice. Patient was not receptive to that suggestion.   - attended follow up with ortho. Instructed to continue with immobilization brace for 2 more weeks and follow up with ortho. Patient verbalized understanding.   - denies s/s stroke. Reviewed s/s with patient.  - NN supplied contact information and notified patient NN will be out of office next week but will FU the following week. Alma Benton    9/5/19  Patient reports:  - PT/OT have been checking BP - no concerns at this time  - Patient verbalized 2 s/s stroke: speech and face droop. NN reviewed BeFast with patient. Patient will be able to verbalize additional s/s stroke by next outreach  - will attend FU with ortho on 9/9/19 for FU of right shoulder arthroscopic surgery  - hematoma R. shoulder, does not appear improved. Patient forgets to use ice. Reviewed not leaving ice on for longer than 15 min each time.  Pt verbalized understanding   - using sling on R arm when out of bed  - schedule FU with PCP in 2 months  - Repeat labs in 2 weeks per PCP office visit note  - call Dr. Gerardo Stout regarding continued stomach nausea. Patient denies eating anything out of ordinary, indigestion, or taking anything for relief except occasional Pepto Bismol.  - NN will FU next week  Nelson Gonzalez RN        8/27/19  Spoke with patient's daughter, Megan Aschoff, regarding possibility or moving appointment to tomorrow but it would be limited to 15 min time vs. 30 min on Friday, 8/30/19. Patient's  had requested move if possible because grandson's wedding is Friday afternoon in [place]. Daughter states they will not be going to the wedding even if appointment is changed and would prefer they keep the 30 min appointment on Friday unless a 30 min appointment opens sooner.   - NN will notify patient and  if a 30 min appointment opens before then. Nelson Gonzalez RN    8/26/19  CVA/TIA  Patient will:  - check BP at home every day. If systolic greater than 235 restart amlodipine & FU with PCP  - will verbalized s/s stroke using BeFast acronym by third outreach  - Continue NWB through RUE - use sling when OOB  - Take Tylenol for pain. No hydrocodone with ativan - may cause drowsiness or dizziness  - Work with PT/OT - encourage ROM through right elbow, wrist & hand.  No upper arm movement for now  - extensive icing of right shoulder to reduce hematoma/hemarthrosis  - attend FU appointments with PCP and ortho  - NN will FU next week  Nelson Gonzalez RN

## 2019-10-16 RX ORDER — LORAZEPAM 1 MG/1
TABLET ORAL
Status: CANCELLED | OUTPATIENT
Start: 2019-10-16

## 2019-10-16 RX ORDER — LORAZEPAM 1 MG/1
TABLET ORAL
OUTPATIENT
Start: 2019-10-16

## 2019-10-17 NOTE — TELEPHONE ENCOUNTER
Chart & [facility]  reviewed, medications being filled by psychiatrist.  I would recommend w/ memory issues limiting use but will defer to specialist.

## 2019-10-22 ENCOUNTER — TELEPHONE (OUTPATIENT)
Dept: INTERNAL MEDICINE CLINIC | Age: 84
End: 2019-10-22

## 2019-10-22 NOTE — TELEPHONE ENCOUNTER
Patient's daughter called to give Dr. Debra Cote update on what Dr. Michelle Guillen is. Advised to stop B12, vitamin D & probiotic x 1 month. Levcin needs to be used PRN for cramps not TID. Ranitidine was replaced with famotidine to take in the evenings, she is to take Zofran QHS & has the zofran OTD PRN. She states her mom told her that she doesn't get nauseated but she has called and been seen multiple times for this issue.  Just watned Dr. Debra Cote to be aware of this and she won't be at her visit with her on 10/25/19

## 2019-10-22 NOTE — TELEPHONE ENCOUNTER
Patient's daughter requesting a callback -- wants to give you information about her mother who is scheduled to see Dr. Susan Whitehead this Friday -- daughter cannot be here. Offered to take information, but she said it is too much and wants to speak to the nurse.

## 2019-10-25 ENCOUNTER — OFFICE VISIT (OUTPATIENT)
Dept: INTERNAL MEDICINE CLINIC | Age: 84
End: 2019-10-25

## 2019-10-25 ENCOUNTER — PATIENT OUTREACH (OUTPATIENT)
Dept: INTERNAL MEDICINE CLINIC | Age: 84
End: 2019-10-25

## 2019-10-25 VITALS
HEART RATE: 94 BPM | HEIGHT: 62 IN | TEMPERATURE: 98.3 F | SYSTOLIC BLOOD PRESSURE: 138 MMHG | OXYGEN SATURATION: 99 % | WEIGHT: 108 LBS | DIASTOLIC BLOOD PRESSURE: 90 MMHG | BODY MASS INDEX: 19.88 KG/M2 | RESPIRATION RATE: 18 BRPM

## 2019-10-25 DIAGNOSIS — G30.1 LATE ONSET ALZHEIMER'S DISEASE WITHOUT BEHAVIORAL DISTURBANCE (HCC): Primary | ICD-10-CM

## 2019-10-25 DIAGNOSIS — K58.8 OTHER IRRITABLE BOWEL SYNDROME: ICD-10-CM

## 2019-10-25 DIAGNOSIS — G45.9 TIA (TRANSIENT ISCHEMIC ATTACK): ICD-10-CM

## 2019-10-25 DIAGNOSIS — K31.84 GASTROPARESIS: ICD-10-CM

## 2019-10-25 DIAGNOSIS — I10 ESSENTIAL HYPERTENSION, BENIGN: ICD-10-CM

## 2019-10-25 DIAGNOSIS — F02.80 LATE ONSET ALZHEIMER'S DISEASE WITHOUT BEHAVIORAL DISTURBANCE (HCC): Primary | ICD-10-CM

## 2019-10-25 RX ORDER — METOPROLOL TARTRATE 25 MG/1
TABLET, FILM COATED ORAL
Qty: 90 TAB | Refills: 0 | Status: SHIPPED | OUTPATIENT
Start: 2019-10-25 | End: 2020-01-17 | Stop reason: DRUGHIGH

## 2019-10-25 RX ORDER — FAMOTIDINE 20 MG/1
20 TABLET, FILM COATED ORAL
COMMUNITY
End: 2021-04-19

## 2019-10-25 NOTE — PROGRESS NOTES
Denver Curry is a 80 y.o. female who was seen in clinic today (10/25/2019). She RTC for 2 month follow up after TCM appointment for TIA. Daughter not present but did send several messages regarding up dates. Assessment & Plan:     Diagnoses and all orders for this visit:    1. Late onset Alzheimer's disease without behavioral disturbance ([facility] [address]- stable, no appreciable changes, continue meds, reviewed expectations w/ . 2. TIA (transient ischemic attack)- asymptomatic and w/o residual deficit. BP is just above goal, lipids are well controlled. Continue: current plan except will increase BB dose from 1/4 tab to 1/2 tab. 3. Essential hypertension, benign- just above goal,  & patient worried about BP, reviewed expectations & limitations w/ amb BP monitoring, will decrease to 1-2 times per day, will increase dose of meds slightly to 1/2 tab daily from 1/4 tab daily. -     metoprolol tartrate (LOPRESSOR) 25 mg tablet; TAKE ONE-HALF TABLET BY MOUTH EVERY DAY    4. Gastroparesis- unclear, likely etiology of her nausea, appears to improve after eating ?? Encouraged to try to eat more, maybe a snack at night before bed, agree w/ stopping GI meds, will defer to specialist    5. Other irritable bowel syndrome- unclear, agree w/ medication changes outlined by specialist, reviewed rational for this and difference between prn vs standing meds w/ . Follow-up and Dispositions    · Return in about 3 months (around 1/25/2020) for Regular follow up. Subjective:   [place] was seen today for Hypertension and Dementia    Neurological Review  She is here today to talk about dementia and h/o TIA. Since last visit did restart on Namenda. She has called several times upset about not being able to drive.  reports no changes or concerns. She is taking medications as instructed and no medication side effects noted.         Cardiovascular Review  The patient has hypertension. Since last visit: no changes. She reports taking medications as instructed, no medication side effects noted, home BP monitoring in range of 250-701'M systolic over 68-37'K diastolic. Diet and Lifestyle: generally follows a low fat low cholesterol diet, generally follows a low sodium diet. Labs: reviewed and discussed with patient. Brief Labs:     Lab Results   Component Value Date/Time    Sodium 138 08/23/2019 02:33 AM    Potassium 3.8 08/23/2019 02:33 AM    Creatinine 1.12 08/23/2019 02:33 AM    Creatinine, External 1.32 09/24/2019    Cholesterol, total 158 08/21/2019 04:55 AM    HDL Cholesterol 53 08/21/2019 04:55 AM    LDL, calculated 89.4 08/21/2019 04:55 AM    Triglyceride 78 08/21/2019 04:55 AM    Hemoglobin A1c 5.9 08/21/2019 04:55 AM          Prior to Admission medications    Medication Sig Start Date End Date Taking? Authorizing Provider   famotidine (PEPCID) 20 mg tablet Take 20 mg by mouth nightly. Yes Provider, Historical   ondansetron (ZOFRAN ODT) 4 mg disintegrating tablet Take 1 Tab by mouth every eight (8) hours as needed for Nausea. 9/25/19  Yes Chey Jessica MD   memantine ER (NAMENDA XR) 7 mg capsule TAKE 1 CAPSULE BY MOUTH ONE TIME DAILY 9/8/19  Yes Sabrina Ashraf MD   LORazepam (ATIVAN) 1 mg tablet Take  by mouth. Takes 0.5 mg in the morning and 1 mg at night. Yes Provider, Historical   acetaminophen (TYLENOL ARTHRITIS PAIN) 650 mg TbER Take 1 Tab by mouth every eight (8) hours. 8/30/19  Yes Sabrina Ashraf MD   metoprolol tartrate (LOPRESSOR) 25 mg tablet TAKE ONE-QUARTER TABLET BY MOUTH EVERY DAY  Patient taking differently: every evening. TAKE ONE-QUARTER TABLET BY MOUTH EVERY DAY 4/9/19  Yes Sabrina Ashraf MD   Wheat Dextrin (BENEFIBER CLEAR) 3 gram/3.5 gram pwpk Take 3.5 g by mouth daily. Yes Provider, Historical   aspirin delayed-release 81 mg tablet Take 1 Tab by mouth daily.  8/4/17  Yes Nuha Torres, KRISTIN   Lactobacillus acidophilus (PROBIOTIC PO) Take  by mouth daily. Provider, Historical   bismuth subsalicylate (PEPTO-BISMOL PO) Take 2 Adjustable Dose Pre-filled Pen Syringe by mouth as needed for Diarrhea or Nausea. Provider, Historical   hyoscyamine SL (LEVSIN/SL) 0.125 mg SL tablet 0.125 mg by SubLINGual route Before breakfast, lunch, and dinner. Provider, Historical   raNITIdine (ZANTAC) 150 mg tablet Take 150 mg by mouth Before breakfast and dinner. Provider, Historical   cholecalciferol (VITAMIN D3) 1,000 unit tablet Take 1,000 Units by mouth daily. Provider, Historical   FERROUS FUMARATE/VIT BCOMP,C (SUPER B COMPLEX PO) Take 1 Cap by mouth daily (after dinner). Provider, Historical          Allergies   Allergen Reactions    Latex Itching    Dilaudid [Hydromorphone (Bulk)] Other (comments)     ? loss of consciousness    Altace [Ramipril] Unknown (comments)    Ambien [Zolpidem] Unknown (comments)    [employer] Test [Blood Sugar Diagnostic, Disc-Type] Unknown (comments)    Aspirin Unknown (comments)    Astelin [Azelastine] Unknown (comments)    Atenolol Unknown (comments)    Banex La Unknown (comments)    Benicar [Olmesartan] Unknown (comments)    Carbocaine [Mepivacaine] Unknown (comments)    Cleocin [Clindamycin Hcl] Hives    Codeine Nausea and Vomiting    Codeine-Asa-Salicyl-Acetam-Caf Other (comments)     Stomach pain    Cortisporin [Neomy-Polymyxinb-Bacitracin-Hc] Unknown (comments)    Cymbalta [Duloxetine] Other (comments)    Cytotec [Misoprostol] Unknown (comments)    Dilaudid [Hydromorphone] Unknown (comments)     Patient states that she was unable to respond, but was breathing    Effexor [Venlafaxine] Unknown (comments)    Epinephrine Unknown (comments)     Heart racing    Estra-D Unknown (comments)    Estrace [Estradiol] Unknown (comments)    Fentanyl Nausea and Vomiting and Unknown (comments)     shakes    Flexeril [Cyclobenzaprine] Unable to Obtain     \"drugs me\"    Flonase [Fluticasone] Unknown (comments)    Flunisolide Unknown (comments)    Guiatussin W/Codeine Unknown (comments)    Ketek [Telithromycin] Nausea Only    Klonopin [Clonazepam] Unknown (comments)    Lexapro [Escitalopram] Nausea and Vomiting    Lodine [Etodolac] Nausea and Vomiting    Morphine Unknown (comments)    Nsaids (Non-Steroidal Anti-Inflammatory Drug) Nausea and Vomiting    Other Medication Unknown (comments)     Gareth Alford    Paxil [Paroxetine Hcl] Nausea and Vomiting    Pcn [Penicillins] Hives    Percocet [Oxycodone-Acetaminophen] Unknown (comments)    Percodan [Oxycodone Hcl-Oxycodone-Asa] Unknown (comments)    Pneumococcal 23-Mary Ps Vaccine Swelling    Proctocream-Hc [Hydrocortisone] Unknown (comments)    Prozac [Fluoxetine] Nausea Only     Suicidal visions      Pseudoephedrine Other (comments)    Quinidine Unknown (comments)    Reglan [Metoclopramide] Unknown (comments)    Remeron [Mirtazapine] Other (comments)    Resaid Other (comments)     inflammed eyes    Robaxin [Methocarbamol] Unknown (comments)    Sonata [Zaleplon] Unknown (comments)    Sulfa (Sulfonamide Antibiotics) Hives    Surmontil [Trimipramine] Nausea and Vomiting    Talwin [Pentazocine Lactate] Unknown (comments)    Toradol [Ketorolac Tromethamine] Nausea and Vomiting    Ultram [Tramadol] Unknown (comments)    Vancomycin Nausea Only    Vasoconstrictor Drug Unknown (comments)    Vigamox [Moxifloxacin] Rash and Swelling    Vioxx [Rofecoxib] Unknown (comments)    Voltaren [Diclofenac Sodium] Itching    Wellbutrin [Bupropion Hcl] Nausea and Vomiting    Zelnorm [Tegaserod Hydrogen Maleate] Unknown (comments)    Zoloft [Sertraline] Nausea and Vomiting           Review of Systems   Constitutional: Negative for malaise/fatigue and weight loss. Respiratory: Negative for cough and shortness of breath. Cardiovascular: Negative for chest pain, palpitations and leg swelling.    Gastrointestinal: Positive for nausea (every morning). Negative for abdominal pain, constipation, diarrhea, heartburn and vomiting. Musculoskeletal: Negative for joint pain, myalgias and neck pain. Neurological: Negative for dizziness and headaches. Psychiatric/Behavioral: Positive for memory loss. Negative for depression. The patient is not nervous/anxious and does not have insomnia. Objective:   Physical Exam   Constitutional: No distress. Eyes: Conjunctivae are normal. No scleral icterus. Cardiovascular: Regular rhythm and normal heart sounds. No murmur heard. Pulmonary/Chest: Effort normal and breath sounds normal. She has no wheezes. She has no rales. Abdominal: Bowel sounds are normal. She exhibits no mass. There is no hepatosplenomegaly. There is no tenderness. Psychiatric:   Kept returning to why she can't drive. Walking w/o assistance, stable gait         Visit Vitals  BP (!) 140/92   Pulse 94   Temp 98.3 °F (36.8 °C) (Oral)   Resp 18   Ht 5' 2\" (1.575 m)   Wt 108 lb (49 kg)   SpO2 99%   BMI 19.75 kg/m²         Disclaimer:  Advised her to call back or return to office if symptoms worsen/change/persist.  Discussed expected course/resolution/complications of diagnosis in detail with patient. Medication risks/benefits/costs/interactions/alternatives discussed with patient. She was given an after visit summary which includes diagnoses, current medications, & vitals. She expressed understanding with the diagnosis and plan. Aspects of this note may have been generated using voice recognition software. Despite editing, there may be some syntax errors.        Zohaib Pollard MD

## 2019-10-25 NOTE — PROGRESS NOTES
Goals      Prevent complications post hospitalization. 10/25/19 Met with patient and  in office today. Patient reports:  - Has been cleared by ortho to begin swimming again  - upset about not being able to drive. ACM offered empathy and support. Suggested patient focus on positives of having friends and family willing to drive where ever she needs to go. Patient thanked ACM for reminding her. - concerned about elevated BP readings earlier in the month. Discussed alternative ways of lowering BP such as music therapy, pets, exercise, meditation, prayer. Patient receptive to try alternatives and excited about starting to swim again. Patient and  complained about long waits in ED for elevated BP. [facility] information supplied in case BP should elevate and patient unable to get in for office visit. - ACM will FU with patient for continued case management  Dung Brown RN      10/14/19  - patient has attended FU with Dr. Lesli Courtney and was notable for elevated BP. Patient was instructed to check BP at home and FU with PCP. Patient reports systolic BP has been elevated as much as 186. Diastolic BP has been WDL. Patient is keeping a log and will bring to FU with PCP which we scheduled during today's outreach for 10/25/19  - patient continues to be irritable regarding inability to drive car. States she does not have any physical issues to prevent her ability to drive. ACM encouraged patient to work on improving BP and regaining strength from stroke and shoulder surgery.    - continues with PT at [address] [place] [address] 2 times weekly and has FU with ortho later this month.   - daughter reports patient is doing a good job with her medications and does not feel the visit from [facility] discussed at earlier outreach would be beneficial. ACM explained after checking with HH, they could come out and review medications and provide education, but they would not come for checks to see if patient is taking the medications daily as the family had hoped. Daughter agreed patient is doing well at this time with medications and daughter checks them several times weekly also. - ACM will FU with patient next week  Toby Rosario RN    10/3/19 spoke with patient. Patient reports:  - upset about not being able to drive. Feels she is able to continue driving and does not think she has dementia. NN offered encouragement and asked if patient would like to come in to the office to discuss with PCP. Patient declined  - is eating 3 times daily. Has not started supplementing with Boost but will start  - complains of constipation. States drinks 3-4 8oz glasses of water daily and 8 oz ginger ale in morning. NN suggested patient take OTC stool softener. Patient agrees to try  - states she is seeing PT Hannah Quevedo at [address] [place] [address] and hopeful to be able to start swimming again soon  - NN will FU next week. Toby Rosario RN    10/3/19 spoke with patient's daughter, Petrona Moscoso. Daughter reports:  - Daughter has not called [facility] regarding getting help in home. NN encouraged daughter to do so soon. Daughter inquired if patient could get home health nurse to visit to make sure patient is taking her medications correctly. Patient had [facility] PT/OT and was discharged from their care 9/19/19. NN will forward request to PCP for recommendations. - patient is taking medications as prescribed this week and seems better  - eating well now  - FU with Dr. Michelle Hilton (ortho) last week. Instructed to wait on swimming but can get in the water and walk. - family told patient 9/28/19 not allowed to drive anymore. Patient is unhappy about that. * NN called to speak with patient. LVM. Will try again later  Toby Rosario RN    9/26/19  Returned call to patient's daughter, Hugh Gaitan. Daughter inquires details regarding NN following patient care. Writer provided explanation.    Daughter reports:  - patient has not been eating or taking medications consistently  - Family in to check on patient Wednesday and does not think patient took any of her medications from Sunday - Wed. NN encouraged daughter to set up a schedule with family to check on patient daily to make sure she is eating and taking medication. Daughter will try to arrange this. NN also suggested [facility] for additional help in the home and possibility of Assisted Living. Daughter stated parents will have to be eased into someone coming into the home, but will contact [facility] for help, she does not think patient is ready for assisted living at this time, but the family has considered and is looking into the financial aspect of that. NN will also look into other options for help and get back to daughter. Nigel Taylor RN    9/25/19  Patient reports:  - BP today 155/80. Has not been checking at home.   - discharged from [facility] PT 9/19/19. Patient met all goals  - No longer wearing immobilization brace for right shoulder surgery. Feels better, but had to cancel ortho follow up scheduled for this past Monday. She will reschedule and is anxious to restart swimming. NN encouraged patient to get ok from ortho prior to resuming.   - Last BM 9/23/19. Patient reports not eating much so not having BMs daily. Encouraged patient to supplement diet with Boost drink. Patient verbalized understanding and is trying to eat more. - Patient is alert, clear with speech and thought process, dressed appropriately with good hygiene.   - NN to review s/s stroke at next outreach. DEBORAH Hanley RN      9/12/19  Patient reports;  - checking BP and has been normal. Did not check today, but is being seen by [facility] and they check it. - feels like needs to have BM. NN encouraged plenty of fluids which patient reports she is doing. NN also encouraged prune juice. Patient was not receptive to that suggestion.   - attended follow up with ortho.  Instructed to continue with immobilization brace for 2 more weeks and follow up with ortho. Patient verbalized understanding.   - denies s/s stroke. Reviewed s/s with patient.  - NN supplied contact information and notified patient NN will be out of office next week but will FU the following week. Rossi Ortega    9/5/19  Patient reports:  - PT/OT have been checking BP - no concerns at this time  - Patient verbalized 2 s/s stroke: speech and face droop. NN reviewed BeFast with patient. Patient will be able to verbalize additional s/s stroke by next outreach  - will attend FU with ortho on 9/9/19 for FU of right shoulder arthroscopic surgery  - hematoma R. shoulder, does not appear improved. Patient forgets to use ice. Reviewed not leaving ice on for longer than 15 min each time. Pt verbalized understanding   - using sling on R arm when out of bed  - schedule FU with PCP in 2 months  - Repeat labs in 2 weeks per PCP office visit note  - call Dr. Latisha Lynch regarding continued stomach nausea. Patient denies eating anything out of ordinary, indigestion, or taking anything for relief except occasional Pepto Bismol.  - NN will FU next week  Dung Brown RN        8/27/19  Spoke with patient's daughter, Monika Valencia, regarding possibility or moving appointment to tomorrow but it would be limited to 15 min time vs. 30 min on Friday, 8/30/19. Patient's  had requested move if possible because grandson's wedding is Friday afternoon in [place]. Daughter states they will not be going to the wedding even if appointment is changed and would prefer they keep the 30 min appointment on Friday unless a 30 min appointment opens sooner.   - NN will notify patient and  if a 30 min appointment opens before then. Dung Brown RN    8/26/19  CVA/TIA  Patient will:  - check BP at home every day.  If systolic greater than 880 restart amlodipine & FU with PCP  - will verbalized s/s stroke using BeFast acronym by third outreach  - Continue NWB through RUE - use sling when OOB  - Take Tylenol for pain. No hydrocodone with ativan - may cause drowsiness or dizziness  - Work with PT/OT - encourage ROM through right elbow, wrist & hand.  No upper arm movement for now  - extensive icing of right shoulder to reduce hematoma/hemarthrosis  - attend FU appointments with PCP and ortho  - NN will FU next week  Temitope Kate RN

## 2019-11-13 ENCOUNTER — TELEPHONE (OUTPATIENT)
Dept: INTERNAL MEDICINE CLINIC | Age: 84
End: 2019-11-13

## 2019-11-13 NOTE — TELEPHONE ENCOUNTER
Verified patient identity with two identifiers. Spoke with patient by phone. Informed not cleared to drive per [facility], she is due for f/u in jan 2020 and she can discuss duration with [facility] at that time. Appt scheduled. Patient verbalized understanding.   Future Appointments   Date Time Provider Americo Rizo   12/2/2019  1:30 PM DO JACEK Childers/ [address]   1/14/2020 10:15 AM Edgar Devine MD [id number] [facility]

## 2019-12-02 ENCOUNTER — DOCUMENTATION ONLY (OUTPATIENT)
Dept: CARDIOLOGY CLINIC | Age: 84
End: 2019-12-02

## 2019-12-02 ENCOUNTER — OFFICE VISIT (OUTPATIENT)
Dept: NEUROLOGY | Age: 84
End: 2019-12-02

## 2019-12-02 ENCOUNTER — HOSPITAL ENCOUNTER (OUTPATIENT)
Dept: LAB | Age: 84
Discharge: HOME | End: 2019-12-02

## 2019-12-02 VITALS
BODY MASS INDEX: 20.12 KG/M2 | RESPIRATION RATE: 18 BRPM | OXYGEN SATURATION: 98 % | HEART RATE: 79 BPM | DIASTOLIC BLOOD PRESSURE: 78 MMHG | SYSTOLIC BLOOD PRESSURE: 156 MMHG | WEIGHT: 110 LBS

## 2019-12-02 DIAGNOSIS — G45.9 TIA (TRANSIENT ISCHEMIC ATTACK): Primary | ICD-10-CM

## 2019-12-02 DIAGNOSIS — F03.90 DEMENTIA WITHOUT BEHAVIORAL DISTURBANCE, UNSPECIFIED DEMENTIA TYPE: ICD-10-CM

## 2019-12-02 DIAGNOSIS — I10 HYPERTENSION, UNSPECIFIED TYPE: ICD-10-CM

## 2019-12-02 DIAGNOSIS — G45.9 TIA (TRANSIENT ISCHEMIC ATTACK): ICD-10-CM

## 2019-12-02 LAB — ASPIRIN TEST, ASPIRN: 436 ARU

## 2019-12-02 NOTE — PATIENT INSTRUCTIONS
PRESCRIPTION REFILL POLICY [facility] Statement to Patients April 1, 2014 In an effort to ensure the large volume of patient prescription refills is processed in the most efficient and expeditious manner, we are asking our patients to assist us by calling your Pharmacy for all prescription refills, this will include also your  Mail Order Pharmacy. The pharmacy will contact our office electronically to continue the refill process. Please do not wait until the last minute to call your pharmacy. We need at least 48 hours (2days) to fill prescriptions. We also encourage you to call your pharmacy before going to  your prescription to make sure it is ready. With regard to controlled substance prescription refill requests (narcotic refills) that need to be picked up at our office, we ask your cooperation by providing us with at least 72 hours (3days) notice that you will need a refill. We will not refill narcotic prescription refill requests after 4:00pm on any weekday, Monday through Thursday, or after 2:00pm on Fridays, or on the weekends. We encourage everyone to explore another way of getting your prescription refill request processed using [employer], our patient web portal through our electronic medical record system. [employer] is an efficient and effective way to communicate your medication request directly to the office and  downloadable as an francisca on your smart phone . [employer] also features a review functionality that allows you to view your medication list as well as leave messages for your physician. Are you ready to get connected? If so please review the attatched instructions or speak to any of our staff to get you set up right away! Thank you so much for your cooperation. Should you have any questions please contact our Practice Administrator. The Physicians and Staff,  [facility]

## 2019-12-02 NOTE — PROGRESS NOTES
Neurology Clinic Follow up Note    Patient ID:  Christian Goff  [id number]  89 y.o.  1935      Ms. Abel Naidu is here for follow up today of TIA       Last Appointment With Me:  9/14/17    Transient episode of expressive aphasia, possible dysarthria noted in the ED on admission lasting < 5 minutes with complete resolution. Interval History:   Patient returns for hospital F/U 8/20/19. Presented with slurred speech s/p tPA lasting 45 minutes to one hour. Seen by Dr. Javad Cordon. MRI Brain negative for acute ischemia. Dx: TIA. Continued on ASA daily. Denies recurrence of aphasia, dysarthria. No focal weakness, paresthesias, vision abnormalities since discharge. Family notes she has been reporting some headaches. She is compliant with ASA 81mg daily but not statin therapy (scared of side effects). PMHx/ PSHx/ FHx/ SHx:  Reviewed and unchanged previous visit. Past Medical History:   Diagnosis Date    Allergic rhinitis, cause unspecified     Anxiety     Back pain     C. difficile diarrhea 8/2/2017    Admitted to [address] [place] [address]    Cancer ([facility]     basal cell face, back, neck    Cancer (HCC)     squamous cell leg    Chronic kidney disease     Chronic pain     DJD (degenerative joint disease) of cervical spine     Fibromyalgia     GERD (gastroesophageal reflux disease)     Hypercholesterolemia     Hypertension     Hyponatremia     IBS (irritable bowel syndrome)     Irritable bowel syndrome with diarrhea 6/29/2016    GI- Dr Oxana Gaitan Kidney disease, chronic, stage III (GFR 30-59 ml/min) ([facility] 6/2011    Dr. Max Gonzalse    Nausea & vomiting     Neuropathy 6/22/2016    Both feet     Psychiatric disorder     DEPRESSION AND ANXIETY    Stroke ([facility] 2017    TIA    Thromboembolus ([facility] 2013    PE X2         ROS:  Comprehensive review of systems negative except for as noted above.        Objective:       Meds:  Current Outpatient Medications   Medication Sig Dispense Refill    famotidine (PEPCID) 20 mg tablet Take 20 mg by mouth nightly.  metoprolol tartrate (LOPRESSOR) 25 mg tablet TAKE ONE-HALF TABLET BY MOUTH EVERY DAY 90 Tab 0    memantine ER (NAMENDA XR) 7 mg capsule TAKE 1 CAPSULE BY MOUTH ONE TIME DAILY 90 Cap 0    LORazepam (ATIVAN) 1 mg tablet Take  by mouth. Takes 0.5 mg in the morning and 1 mg at night.  acetaminophen (TYLENOL ARTHRITIS PAIN) 650 mg TbER Take 1 Tab by mouth every eight (8) hours.  bismuth subsalicylate (PEPTO-BISMOL PO) Take 2 Adjustable Dose Pre-filled Pen Syringe by mouth as needed for Diarrhea or Nausea.  Wheat Dextrin (BENEFIBER CLEAR) 3 gram/3.5 gram pwpk Take 3.5 g by mouth daily.  aspirin delayed-release 81 mg tablet Take 1 Tab by mouth daily. 30 Tab 0       Exam:  Visit Vitals  /78   Pulse 79   Resp 18   Wt 49.9 kg (110 lb)   SpO2 98%   BMI 20.12 kg/m²     NEUROLOGICAL EXAM:  General: Awake, alert, speech fluent  CN: PERRL, EOMI without nystagmus, VFF to confrontation, facial sensation and strength are normal and symmetric, hearing is intact to finger rub bilaterally, palate and tongue movements are intact and symmetric. Motor: Normal tone, bulk and strength bilaterally. Reflexes: 1/4 and symmetric, plantar stimulation is flexor. Coordination: FNF, CARLA, HTS intact. Sensation: LT intact throughout. Gait: Normal-based and steady. LABS  Results for orders placed or performed in visit on 10/02/19   AMB EXT CREATININE   Result Value Ref Range    Creatinine, External 1.32        IMAGING:      MRI Results (maximum last 3): Results from [place] encounter on 08/20/19   MRA BRAIN WO CONT    Narrative INDICATION: stroke    COMPARISON:  None    TECHNIQUE:  3-D time-of-flight MRA of the brain was performed. Multiplanar  reconstructions were obtained. FINDINGS:    The vertebral arteries are codominant. The basilar artery and its branches are  normal. The internal carotid, anterior cerebral, and middle cerebral arteries  are patent. There is no flow-limiting intracranial stenosis. There is no  aneurysm. There are no sizable posterior communicating arteries. Impression IMPRESSION:  No flow limiting stenosis or intracranial aneurysm. MRI BRAIN WO CONT    Narrative EXAM: MRI BRAIN WO CONT    INDICATION: TIA/ APHASIA    COMPARISON: 5/25/2019. CONTRAST: None. TECHNIQUE:    Multiplanar multisequence acquisition without contrast of the brain. FINDINGS:  The ventricles are normal in size and position. There is mild white matter  disease likely to chronic small vessel ischemic disease. There is no acute  infarct, hemorrhage, extra-axial fluid collection, or mass effect. There is no  cerebellar tonsillar herniation. Expected arterial flow-voids are present. The paranasal sinuses, mastoid air cells, and middle ears are clear. The patient  is status post bilateral lens surgery. No significant osseous or scalp lesions  are identified. Impression IMPRESSION:   Mild white matter disease likely related to chronic small vessel ischemic  disease. No evidence of acute process. Results from [place] encounter on 05/25/19   MRI BRAIN WO CONT    Narrative EXAM: MRI BRAIN WO CONT    INDICATION: h/o TIA, memory changes, assess for change    COMPARISON: 8/3/2017. CONTRAST: None. TECHNIQUE:    Multiplanar multisequence acquisition without contrast of the brain. FINDINGS:  The ventricles are stable in size and position. There is confluent  periventricular white matter FLAIR hyperintensity. Some of the round to oval  foci of FLAIR hyperintensity scattered throughout the centrum semiovale and  corona radiata have become slightly bigger than seen on the 2017 study. However,  there is no corresponding diffusion abnormality. There is no acute infarct,  hemorrhage, extra-axial fluid collection, or mass effect. There is no cerebellar  tonsillar herniation. Expected arterial flow-voids are present.     The paranasal sinuses, mastoid air cells, and middle ears are clear. The orbital  contents are within normal limits. No significant osseous or scalp lesions are  identified. There may be a disc bulge at C3-4. (Series 2, image 12). Impression Interval mild progression of the nonspecific presumed small vessel ischemic  disease. No recent intracranial stroke  Possible C3-4 disc bulge. Assessment:     Encounter Diagnoses     ICD-10-CM ICD-9-CM   1. TIA (transient ischemic attack) G45.9 435.9   2. Hypertension, unspecified type I10 401.9   3. Dementia without behavioral disturbance, unspecified dementia type [facility]) F03.90 34.20     80year old female h/o IBS, gastroparesis, pre-DM, HTN, CKD, fibromyalgia, anxiety d/o, remote PE, dementia here for hospital f/u. Previously seen for TIA (expressive aphasia, dysarthria 8/2/17). She reports recent hospitalization for dysarthria lasting 45 minutes to 1 hour 8/20/19 s/p tPA. No residual deficits post discharge. MRI/MRA/CUS reviewed and without intracranial ischemia, large vessels patent. TTE completed without PFO. Dx recurrent TIA. Compliant with ASA therapy. Will check platelet inhibition assay. She is suffering from memory impairment- on Namenda for this. Family request AA packet and  contact which was provided. Plan:   Cont. ASA 81mg daily for stroke prevention  Check platelet inhibition assay  Cardiology evaluation for extended cardiac monitoring   SBP goal<140, LDL,70    Follow-up and Dispositions    · Return in about 6 months (around 6/2/2020).          Signed:  Gisele Rivas,   12/2/2019

## 2019-12-03 ENCOUNTER — TELEPHONE (OUTPATIENT)
Dept: CARDIOLOGY CLINIC | Age: 84
End: 2019-12-03

## 2019-12-03 ENCOUNTER — TELEPHONE (OUTPATIENT)
Dept: NEUROLOGY | Age: 84
End: 2019-12-03

## 2019-12-03 NOTE — TELEPHONE ENCOUNTER
Patient referred by Dr. Kelly Villarreal for stroke, possible a. Fib, evaluation for ILR. Will ask PSR to contact patient and schedule new patient appointment with Dr. Alton Pimentel

## 2019-12-06 ENCOUNTER — TELEPHONE (OUTPATIENT)
Dept: INTERNAL MEDICINE CLINIC | Age: 84
End: 2019-12-06

## 2019-12-06 ENCOUNTER — TELEPHONE (OUTPATIENT)
Dept: NEUROLOGY | Age: 84
End: 2019-12-06

## 2019-12-06 NOTE — TELEPHONE ENCOUNTER
Pt's  wanting a call back with the recent testing results.  Lab test (aspirin)    *Pt's  is hippa verified, please call

## 2019-12-06 NOTE — TELEPHONE ENCOUNTER
Gave phone number for Dr. Darlene Mullen [phone] he states that is the number he has  But he always has to leave a message. Advised that yes, it is pretty typical to have to leave messages for therapists/psychiatrist offices and someone will call back. He verbalized understanding states he did leave a message.

## 2019-12-09 ENCOUNTER — TELEPHONE (OUTPATIENT)
Dept: NEUROLOGY | Age: 84
End: 2019-12-09

## 2019-12-09 RX ORDER — MEMANTINE HYDROCHLORIDE 7 MG/1
CAPSULE, EXTENDED RELEASE ORAL
Qty: 90 CAP | Refills: 0 | Status: SHIPPED | OUTPATIENT
Start: 2019-12-09 | End: 2020-01-14

## 2019-12-09 NOTE — TELEPHONE ENCOUNTER
Spoke with Mr. Lynn Santos and verified that I was pretty sure his appt to see Erika Goncalves was tomorrow. I was at a different office today, and did not have my calendar with me today. It was my impression that their appt was tomorrow. He has verbalized understanding.

## 2019-12-09 NOTE — TELEPHONE ENCOUNTER
Pt's  calling to confirm appt with Gretta (Alzheimer) tomorrow December 10th at 11 am. Would like a call back to confirm appt time and date.

## 2019-12-10 ENCOUNTER — PATIENT OUTREACH (OUTPATIENT)
Dept: INTERNAL MEDICINE CLINIC | Age: 84
End: 2019-12-10

## 2019-12-10 NOTE — PROGRESS NOTES
Patient has graduated from the Disease Specific Care Management  program on 12/10/19. Patient's symptoms are stable at this time. Patient/family has the ability to self-manage. Care management goals have been completed at this time. No further nurse navigator follow up scheduled. Goals Addressed                 This Visit's Progress     COMPLETED: Prevent complications post hospitalization. 12/10/19 Patient reports:  - continues to be disappointed she is unable to drive due to dementia. - focus on positives:  takes her where she needs to go and has supportive friends and family. Patient verbalized understanding.  - denies s/s stroke  - reports BP is a little high at times. Checks daily and keeps log, but couldn't find log at today's outreach. Encouraged to keep log and bring in at next office visit. Patient reports she will. - Episode of care will be resolved as patient has not been readmitted in last 90 days. Dung Brown RN  Ambulatory Care Manager     10/25/19 Met with patient and  in office today. Patient reports:  - Has been cleared by ortho to begin swimming again  - upset about not being able to drive. ACM offered empathy and support. Suggested patient focus on positives of having friends and family willing to drive where ever she needs to go. Patient thanked ACM for reminding her. - concerned about elevated BP readings earlier in the month. Discussed alternative ways of lowering BP such as music therapy, pets, exercise, meditation, prayer. Patient receptive to try alternatives and excited about starting to swim again. Patient and  complained about long waits in ED for elevated BP. [facility] information supplied in case BP should elevate and patient unable to get in for office visit. - ACM will FU with patient for continued case management  Dung Brown RN      10/14/19  - patient has attended FU with Dr. Lesli Courtney and was notable for elevated BP.  Patient was instructed to check BP at home and FU with PCP. Patient reports systolic BP has been elevated as much as 186. Diastolic BP has been WDL. Patient is keeping a log and will bring to FU with PCP which we scheduled during today's outreach for 10/25/19  - patient continues to be irritable regarding inability to drive car. States she does not have any physical issues to prevent her ability to drive. ACM encouraged patient to work on improving BP and regaining strength from stroke and shoulder surgery. - continues with PT at [address] [place] [address] 2 times weekly and has FU with ortho later this month.   - daughter reports patient is doing a good job with her medications and does not feel the visit from [facility] discussed at earlier outreach would be beneficial. ACM explained after checking with HH, they could come out and review medications and provide education, but they would not come for checks to see if patient is taking the medications daily as the family had hoped. Daughter agreed patient is doing well at this time with medications and daughter checks them several times weekly also. - ACM will FU with patient next week  Nigel Taylor RN    10/3/19 spoke with patient. Patient reports:  - upset about not being able to drive. Feels she is able to continue driving and does not think she has dementia. NN offered encouragement and asked if patient would like to come in to the office to discuss with PCP. Patient declined  - is eating 3 times daily. Has not started supplementing with Boost but will start  - complains of constipation. States drinks 3-4 8oz glasses of water daily and 8 oz ginger ale in morning. NN suggested patient take OTC stool softener. Patient agrees to try  - states she is seeing PT Hyland Nageotte at [address] [place] [address] and hopeful to be able to start swimming again soon  - NN will FU next week. Nigel Taylor RN    10/3/19 spoke with patient's daughter, Rocío Johnson.    Daughter reports:  - Daughter has not called [facility] regarding getting help in home. NN encouraged daughter to do so soon. Daughter inquired if patient could get home health nurse to visit to make sure patient is taking her medications correctly. Patient had [facility] PT/OT and was discharged from their care 9/19/19. NN will forward request to PCP for recommendations. - patient is taking medications as prescribed this week and seems better  - eating well now  - FU with Dr. Krys Miller (ortho) last week. Instructed to wait on swimming but can get in the water and walk. - family told patient 9/28/19 not allowed to drive anymore. Patient is unhappy about that. * NN called to speak with patient. LVM. Will try again later  Frances Ramos RN    9/26/19  Returned call to patient's daughter, Trini Navarro. Daughter inquires details regarding NN following patient care. Writer provided explanation. Daughter reports:  - patient has not been eating or taking medications consistently  - Family in to check on patient Wednesday and does not think patient took any of her medications from Sunday - Wed. NN encouraged daughter to set up a schedule with family to check on patient daily to make sure she is eating and taking medication. Daughter will try to arrange this. NN also suggested [facility] for additional help in the home and possibility of Assisted Living. Daughter stated parents will have to be eased into someone coming into the home, but will contact [facility] for help, she does not think patient is ready for assisted living at this time, but the family has considered and is looking into the financial aspect of that. NN will also look into other options for help and get back to daughter. Frances Ramos RN    9/25/19  Patient reports:  - BP today 155/80. Has not been checking at home.   - discharged from [facility] PT 9/19/19. Patient met all goals  - No longer wearing immobilization brace for right shoulder surgery.  Feels better, but had to cancel ortho follow up scheduled for this past Monday. She will reschedule and is anxious to restart swimming. NN encouraged patient to get ok from ortho prior to resuming.   - Last BM 9/23/19. Patient reports not eating much so not having BMs daily. Encouraged patient to supplement diet with Boost drink. Patient verbalized understanding and is trying to eat more. - Patient is alert, clear with speech and thought process, dressed appropriately with good hygiene.   - NN to review s/s stroke at next outreach. Christine Sullivan RN  Christine Sullivan RN      9/12/19  Patient reports;  - checking BP and has been normal. Did not check today, but is being seen by [facility] and they check it. - feels like needs to have BM. NN encouraged plenty of fluids which patient reports she is doing. NN also encouraged prune juice. Patient was not receptive to that suggestion.   - attended follow up with ortho. Instructed to continue with immobilization brace for 2 more weeks and follow up with ortho. Patient verbalized understanding.   - denies s/s stroke. Reviewed s/s with patient.  - NN supplied contact information and notified patient NN will be out of office next week but will FU the following week. Benny Christensen    9/5/19  Patient reports:  - PT/OT have been checking BP - no concerns at this time  - Patient verbalized 2 s/s stroke: speech and face droop. NN reviewed BeFast with patient. Patient will be able to verbalize additional s/s stroke by next outreach  - will attend FU with ortho on 9/9/19 for FU of right shoulder arthroscopic surgery  - hematoma R. shoulder, does not appear improved. Patient forgets to use ice. Reviewed not leaving ice on for longer than 15 min each time. Pt verbalized understanding   - using sling on R arm when out of bed  - schedule FU with PCP in 2 months  - Repeat labs in 2 weeks per PCP office visit note  - call Dr. Marcelo Prior regarding continued stomach nausea.  Patient denies eating anything out of ordinary, indigestion, or taking anything for relief except occasional Pepto Bismol.  - NN will FU next week  Taylor Higgins RN        8/27/19  Spoke with patient's daughter, Balwinder Greer, regarding possibility or moving appointment to tomorrow but it would be limited to 15 min time vs. 30 min on Friday, 8/30/19. Patient's  had requested move if possible because grandson's wedding is Friday afternoon in [place]. Daughter states they will not be going to the wedding even if appointment is changed and would prefer they keep the 30 min appointment on Friday unless a 30 min appointment opens sooner.   - NN will notify patient and  if a 30 min appointment opens before then. Taylor Higgins RN    8/26/19  CVA/TIA  Patient will:  - check BP at home every day. If systolic greater than 508 restart amlodipine & FU with PCP  - will verbalized s/s stroke using BeFast acronym by third outreach  - Continue NWB through RUE - use sling when OOB  - Take Tylenol for pain. No hydrocodone with ativan - may cause drowsiness or dizziness  - Work with PT/OT - encourage ROM through right elbow, wrist & hand. No upper arm movement for now  - extensive icing of right shoulder to reduce hematoma/hemarthrosis  - attend FU appointments with PCP and ortho  - NN will FU next week  Taylor Higgins RN            Pt has nurse navigator's contact information for any further questions, concerns, or needs.   Patients upcoming visits:    Future Appointments   Date Time Provider [facility]   1/14/2020  4:30 PM Massiel Patel MD [id number] [facility]   1/16/2020  1:40 PM Markus Tay  [address]   6/2/2020  2:40 PM Amanuel Carroll, [address]

## 2020-01-09 ENCOUNTER — TELEPHONE (OUTPATIENT)
Dept: NEUROLOGY | Age: 85
End: 2020-01-09

## 2020-01-09 DIAGNOSIS — F03.90 DEMENTIA WITHOUT BEHAVIORAL DISTURBANCE, UNSPECIFIED DEMENTIA TYPE: Primary | ICD-10-CM

## 2020-01-09 NOTE — TELEPHONE ENCOUNTER
Pt's  calling about a meeting with Yobany Zepeda ([facility]), said she mentioned getting an adaptive drivers evaluation for pt and he was wondering if Dr Vin Gonzalez knew the number for that or how to go about getting it done for the pt. Please call back.

## 2020-01-10 NOTE — TELEPHONE ENCOUNTER
Pt's  calling asking where pt's driving evaluation will take place. He is aware Dr. Myriam Meehan is in the hospital and Nupur Bering it out until Monday.

## 2020-01-14 ENCOUNTER — OFFICE VISIT (OUTPATIENT)
Dept: INTERNAL MEDICINE CLINIC | Age: 85
End: 2020-01-14

## 2020-01-14 ENCOUNTER — PATIENT MESSAGE (OUTPATIENT)
Dept: INTERNAL MEDICINE CLINIC | Age: 85
End: 2020-01-14

## 2020-01-14 VITALS
OXYGEN SATURATION: 97 % | WEIGHT: 111 LBS | RESPIRATION RATE: 20 BRPM | SYSTOLIC BLOOD PRESSURE: 146 MMHG | HEIGHT: 62 IN | BODY MASS INDEX: 20.43 KG/M2 | DIASTOLIC BLOOD PRESSURE: 70 MMHG | HEART RATE: 97 BPM | TEMPERATURE: 97.7 F

## 2020-01-14 DIAGNOSIS — G30.1 LATE ONSET ALZHEIMER'S DISEASE WITHOUT BEHAVIORAL DISTURBANCE (HCC): ICD-10-CM

## 2020-01-14 DIAGNOSIS — I10 ESSENTIAL HYPERTENSION, BENIGN: Primary | ICD-10-CM

## 2020-01-14 DIAGNOSIS — F02.80 LATE ONSET ALZHEIMER'S DISEASE WITHOUT BEHAVIORAL DISTURBANCE (HCC): ICD-10-CM

## 2020-01-14 RX ORDER — MEMANTINE HYDROCHLORIDE 14 MG/1
CAPSULE, EXTENDED RELEASE ORAL
Qty: 30 CAP | Refills: 0 | Status: SHIPPED | OUTPATIENT
Start: 2020-01-14 | End: 2020-03-17

## 2020-01-14 NOTE — Clinical Note
Please call patient's daughter. I sent her a message also:Looks like your mother is on Metoprolol tartrate which is supposed to be twice a day. Metoprolol succinate is the once a day version. Let me know what you want to do:Option #1- start taking the metoprolol tartrate twice a day. Option #2- we can change her metoprolol succinate which is the once a day version. Also should schedule MWV for July.

## 2020-01-14 NOTE — PROGRESS NOTES
Angela Cadena is a 80 y.o. female who was seen in clinic today (1/14/2020). Assessment & Plan:   Diagnoses and all orders for this visit:    1. Essential hypertension, benign- not ideally controlled, family reports compliance, she is on Metoprolol tartrate that is supposed to be BID, but she is only taking once/day. YumDots message sent and call daughter. Options are to take 1 tab BID or we can change to Metoprolol succinate. 2. Late onset Alzheimer's disease without behavioral disturbance ([facility] [address]- stable, tolerating medication will increase to 14mg and plan on increasing monthly to max tolerated dose. -     memantine ER (NAMENDA XR) 14 mg capsule; TAKE 1 CAPSULE BY MOUTH ONE TIME DAILY      Follow-up and Dispositions    · Return in about 6 months (around 7/14/2020) for FULL PHYSICAL - 30 minutes. Subjective:   [place] was seen today for Hypertension    Cardiovascular Review  The patient has hypertension. Since last visit: metoprolol dose increased. She reports taking medications as instructed, no medication side effects noted, home BP monitoring in range of 019'N systolic over 86'J diastolic at baseline but over the last month has been 140's/80's. Family is concerned about her BP and her history of stroke. Diet and Lifestyle: generally follows a low fat low cholesterol diet, generally follows a low sodium diet. Labs: reviewed and discussed with patient. Neurological Review  She is here today to talk about dementia. Since last visit did see neurologist, note reviewed. She has driving simulator scheduled for Friday. She is tolerating Namenda without issues. She only wanted to talk why she can't drive.  had multiple questions about her driving.         Brief Labs:     Lab Results   Component Value Date/Time    Sodium 138 08/23/2019 02:33 AM    Potassium 3.8 08/23/2019 02:33 AM    Creatinine 1.12 08/23/2019 02:33 AM    Creatinine, External 1.32 09/24/2019    Cholesterol, total 158 08/21/2019 04:55 AM    HDL Cholesterol 53 08/21/2019 04:55 AM    LDL, calculated 89.4 08/21/2019 04:55 AM    Triglyceride 78 08/21/2019 04:55 AM    Hemoglobin A1c 5.9 08/21/2019 04:55 AM          Prior to Admission medications    Medication Sig Start Date End Date Taking? Authorizing Provider   APPLE CIDER VINEGAR PO Take  by mouth daily. Yes Provider, Historical   memantine ER (NAMENDA XR) 7 mg capsule TAKE 1 CAPSULE BY MOUTH ONE TIME DAILY 12/9/19  Yes Efren Jacobsen MD   famotidine (PEPCID) 20 mg tablet Take 20 mg by mouth nightly. Yes Provider, Historical   metoprolol tartrate (LOPRESSOR) 25 mg tablet TAKE ONE-HALF TABLET BY MOUTH EVERY DAY 10/25/19  Yes Efren Jacobsen MD   LORazepam (ATIVAN) 1 mg tablet Take  by mouth. Takes 0.5 mg in the morning and 1 mg at night. Yes Provider, Historical   acetaminophen (TYLENOL ARTHRITIS PAIN) 650 mg TbER Take 1 Tab by mouth every eight (8) hours. 8/30/19  Yes Efren Jacobsen MD   Wheat Dextrin (BENEFIBER CLEAR) 3 gram/3.5 gram pwpk Take 3.5 g by mouth daily. Yes Provider, Historical   aspirin delayed-release 81 mg tablet Take 1 Tab by mouth daily. 8/4/17  Yes Eleanor Torres, KRISTIN   bismuth subsalicylate (PEPTO-BISMOL PO) Take 2 Adjustable Dose Pre-filled Pen Syringe by mouth as needed for Diarrhea or Nausea. Provider, Historical          Allergies   Allergen Reactions    Latex Itching    Dilaudid [Hydromorphone (Bulk)] Other (comments)     ? loss of consciousness    Altace [Ramipril] Unknown (comments)    Ambien [Zolpidem] Unknown (comments)    [employer]ia [employer] Test [Blood Sugar Diagnostic, Disc-Type] Unknown (comments)    Aspirin Unknown (comments)    Astelin [Azelastine] Unknown (comments)    Atenolol Unknown (comments)    Banex La Unknown (comments)    Benicar [Olmesartan] Unknown (comments)    Carbocaine [Mepivacaine] Unknown (comments)    Cleocin [Clindamycin Hcl] Hives    Codeine Nausea and Vomiting    Codeine-Asa-Salicyl-Acetam-Caf Other (comments)     Stomach pain    Cortisporin [Neomy-Polymyxinb-Bacitracin-Hc] Unknown (comments)    Cymbalta [Duloxetine] Other (comments)    Cytotec [Misoprostol] Unknown (comments)    Dilaudid [Hydromorphone] Unknown (comments)     Patient states that she was unable to respond, but was breathing    Effexor [Venlafaxine] Unknown (comments)    Epinephrine Unknown (comments)     Heart racing    Estra-D Unknown (comments)    Estrace [Estradiol] Unknown (comments)    Fentanyl Nausea and Vomiting and Unknown (comments)     shakes    Flexeril [Cyclobenzaprine] Unable to Obtain     \"drugs me\"    Flonase [Fluticasone] Unknown (comments)    Flunisolide Unknown (comments)    Guiatussin W/Codeine Unknown (comments)    Ketek [Telithromycin] Nausea Only    Klonopin [Clonazepam] Unknown (comments)    Lexapro [Escitalopram] Nausea and Vomiting    Lodine [Etodolac] Nausea and Vomiting    Morphine Unknown (comments)    Nsaids (Non-Steroidal Anti-Inflammatory Drug) Nausea and Vomiting    Other Medication Unknown (comments)     Gareth Alford    Paxil [Paroxetine Hcl] Nausea and Vomiting    Pcn [Penicillins] Hives    Percocet [Oxycodone-Acetaminophen] Unknown (comments)    Percodan [Oxycodone Hcl-Oxycodone-Asa] Unknown (comments)    Pneumococcal 23-Mary Ps Vaccine Swelling    Proctocream-Hc [Hydrocortisone] Unknown (comments)    Prozac [Fluoxetine] Nausea Only     Suicidal visions      Pseudoephedrine Other (comments)    Quinidine Unknown (comments)    Reglan [Metoclopramide] Unknown (comments)    Remeron [Mirtazapine] Other (comments)    Resaid Other (comments)     inflammed eyes    Robaxin [Methocarbamol] Unknown (comments)    Sonata [Zaleplon] Unknown (comments)    Sulfa (Sulfonamide Antibiotics) Hives    Surmontil [Trimipramine] Nausea and Vomiting    Talwin [Pentazocine Lactate] Unknown (comments)    Toradol [Ketorolac Tromethamine] Nausea and Vomiting    Ultram [Tramadol] Unknown (comments)    Vancomycin Nausea Only    Vasoconstrictor Drug Unknown (comments)    Vigamox [Moxifloxacin] Rash and Swelling    Vioxx [Rofecoxib] Unknown (comments)    Voltaren [Diclofenac Sodium] Itching    Wellbutrin [Bupropion Hcl] Nausea and Vomiting    Zelnorm [Tegaserod Hydrogen Maleate] Unknown (comments)    Zoloft [Sertraline] Nausea and Vomiting           Review of Systems   Constitutional: Negative for malaise/fatigue and weight loss. Respiratory: Negative for cough and shortness of breath. Cardiovascular: Negative for chest pain, palpitations and leg swelling. Gastrointestinal: Negative for abdominal pain, constipation, diarrhea, heartburn, nausea and vomiting. Genitourinary: Negative for frequency. Musculoskeletal: Negative for joint pain and myalgias. Skin: Negative for rash. Neurological: Negative for tingling, sensory change, focal weakness and headaches. Psychiatric/Behavioral: Negative for depression. The patient is not nervous/anxious and does not have insomnia. Objective:   Physical Exam  Cardiovascular:      Rate and Rhythm: Regular rhythm. Heart sounds: Normal heart sounds. No murmur. Pulmonary:      Effort: Pulmonary effort is normal.      Breath sounds: Normal breath sounds. No wheezing or rales. Abdominal:      General: Bowel sounds are normal.      Palpations: There is no mass. Tenderness: There is no tenderness. Psychiatric:      Comments: Asked same questions multiple times           Visit Vitals  /70   Pulse 97   Temp 97.7 °F (36.5 °C) (Oral)   Resp 20   Ht 5' 2\" (1.575 m)   Wt 111 lb (50.3 kg)   SpO2 97%   BMI 20.30 kg/m²         Disclaimer:  Advised her to call back or return to office if symptoms worsen/change/persist.  Discussed expected course/resolution/complications of diagnosis in detail with patient. Medication risks/benefits/costs/interactions/alternatives discussed with patient.   She was given an after visit summary which includes diagnoses, current medications, & vitals. She expressed understanding with the diagnosis and plan. Aspects of this note may have been generated using voice recognition software. Despite editing, there may be some syntax errors.        Katie Dunaway MD

## 2020-01-15 ENCOUNTER — TELEPHONE (OUTPATIENT)
Dept: INTERNAL MEDICINE CLINIC | Age: 85
End: 2020-01-15

## 2020-01-15 NOTE — TELEPHONE ENCOUNTER
Spoke with patients daughter and let her know that the patient was supposed to be taking the 25mg metoprolol 1/2 tablet 2 times a day. Dr. Gennette Landau wants her to start taking the correct dose. Patient has the option of taking the extended release pill that is 1 a day or to keep taking the regular metoprolol and split it in half. Patients daughter will call back and let us know which one the patient will take.

## 2020-01-15 NOTE — TELEPHONE ENCOUNTER
[phone] [facility] her daughter      Please call to discuss her medication Metoprolol.     There are a few questions she has

## 2020-01-16 ENCOUNTER — OFFICE VISIT (OUTPATIENT)
Dept: CARDIOLOGY CLINIC | Age: 85
End: 2020-01-16

## 2020-01-16 ENCOUNTER — TELEPHONE (OUTPATIENT)
Dept: CARDIOLOGY CLINIC | Age: 85
End: 2020-01-16

## 2020-01-16 VITALS
HEART RATE: 67 BPM | OXYGEN SATURATION: 96 % | DIASTOLIC BLOOD PRESSURE: 64 MMHG | RESPIRATION RATE: 16 BRPM | SYSTOLIC BLOOD PRESSURE: 138 MMHG | HEIGHT: 62 IN | BODY MASS INDEX: 20.43 KG/M2 | WEIGHT: 111 LBS

## 2020-01-16 DIAGNOSIS — Z86.73 HX OF COMPLETED STROKE: Primary | ICD-10-CM

## 2020-01-16 DIAGNOSIS — I10 ESSENTIAL HYPERTENSION, BENIGN: ICD-10-CM

## 2020-01-16 NOTE — PROGRESS NOTES
Identified pt with two pt identifiers(name and ). Reviewed record in preparation for visit and have obtained necessary documentation. Chief Complaint   Patient presents with    New Patient    TIA        Coordination of Care Questionnaire:  :   1) Have you been to an emergency room, urgent care, or hospitalized since your last visit? If yes, where when, and reason for visit? no       2. Have seen or consulted any other health care provider since your last visit? If yes, where when, and reason for visit? NO    Patient is accompanied by  I have received verbal consent from [place] to discuss any/all medical information while they are present in the room.

## 2020-01-16 NOTE — PATIENT INSTRUCTIONS
Your Implantable Loop Procedure procedure has been scheduled for 2/7/2020 at 12:00 pm, at [facility].    Please report to Admitting Department by 10:00 am, or 2 hours prior to your scheduled procedure. Please bring a list of your current medications and medication bottles, if able, to the hospital on this day. You will be unable to drive after your procedure so please make sure to bring someone with you to your procedure. You will need to have nothing to eat or drink after midnight, the night prior to your procedure. You may have small sips of water, if needed, to take with your medication. You will need labs drawn prior to your procedure. Please go to [facility] to have this done as soon. You should not stop your medication. After your procedure, you will need to follow up with Dr. Amish Lee. Your follow-up appointment has been scheduled for 2/14/2020 at 10:45 am.     Hibiclens 4% topical solution has been ordered and sent into your pharmacy  Patient it start Hibiclens application 5 days prior to procedure date    Directions Hibiclens 4%: Start cleanse 5 days prior to procedure   1. Rinse area (upper chest and upper arms) with water. 2. Apply minimum amount necessary to scrub the upper chest area from shoulder/neck to mid line of chest and to below the nipple each of  5 nights before the day of the procedure  3. Let solution dry.

## 2020-01-16 NOTE — PROGRESS NOTES
Cardiac Electrophysiology OFFICE Consultation Note     Subjective:      Robe Wang is a 80 y.o. patient who is seen for evaluation of recurrent stroke TIA  She had aphasia and  said latest one in August 2019  She had tPA  Symptoms resolved in 5 min  She had another brain MRI in 2017 and 4/2019  She has small vessel disease and bilateral carotid stenoses 50%  She did not remember April 2019  Dr Isatu Maravilla referred for ILR    08/20/19   ECHO ADULT COMPLETE 08/21/2019 8/21/2019    Narrative · Left Ventricle: Normal cavity size, wall thickness and systolic function   (ejection fraction normal). Estimated left ventricular ejection fraction   is 56 - 60%. Visually measured ejection fraction. Age-appropriate left   ventricular diastolic function. · Aortic Valve: Mild aortic valve sclerosis. · Interatrial Septum: Agitated saline contrast study was performed. · Interatrial Septum: Agitated saline contrast study was performed. · Tricuspid Valve: Mild tricuspid valve regurgitation is present. · Pulmonary Artery: There is no evidence of pulmonary hypertension. · Pericardium: Small pericardial effusion adjacent to right ventricle.         Signed by: Salina Carr MD          Patient Active Problem List   Diagnosis Code    Essential hypertension, benign I10    DDD (degenerative disc disease), cervical M50.30    Anxiety F41.9    Allergic rhinitis J30.9    DDD (degenerative disc disease), lumbar M51.36    CKD (chronic kidney disease) stage 3, GFR 30-59 ml/min ([facility]) N18.3    Pre-diabetes R73.03    History of pulmonary embolism Z86.711    Gastroparesis K31.84    Spinal stenosis, lumbar M48.061    Fibromyalgia M79.7    Gastroesophageal reflux disease without esophagitis K21.9    Other irritable bowel syndrome K58.8    Recurrent major depressive disorder ([facility] [place] [address] F33.9    Alzheimer's disease ([facility] [address] G30.9, F02.80    TIA (transient ischemic attack) G45.9     Current Outpatient Medications Medication Sig Dispense Refill    APPLE CIDER VINEGAR PO Take  by mouth daily.  memantine ER (NAMENDA XR) 14 mg capsule TAKE 1 CAPSULE BY MOUTH ONE TIME DAILY 30 Cap 0    famotidine (PEPCID) 20 mg tablet Take 20 mg by mouth nightly.  metoprolol tartrate (LOPRESSOR) 25 mg tablet TAKE ONE-HALF TABLET BY MOUTH EVERY DAY (Patient taking differently: TAKE ONE-HALF TABLET BY MOUTH EVERY DAY    Pt started taking one 25mg tab daily 1/15/19.) 90 Tab 0    LORazepam (ATIVAN) 1 mg tablet Take  by mouth. Takes 0.5 mg in the morning and 1 mg at night.  acetaminophen (TYLENOL ARTHRITIS PAIN) 650 mg TbER Take 1 Tab by mouth every eight (8) hours.  Wheat Dextrin (BENEFIBER CLEAR) 3 gram/3.5 gram pwpk Take 3.5 g by mouth daily.  aspirin delayed-release 81 mg tablet Take 1 Tab by mouth daily. 30 Tab 0    bismuth subsalicylate (PEPTO-BISMOL PO) Take 2 Adjustable Dose Pre-filled Pen Syringe by mouth as needed for Diarrhea or Nausea. Allergies   Allergen Reactions    Latex Itching    Dilaudid [Hydromorphone (Bulk)] Other (comments)     ? loss of consciousness    Altace [Ramipril] Unknown (comments)    Ambien [Zolpidem] Unknown (comments)    Ascensia [employer] Test [Blood Sugar Diagnostic, Disc-Type] Unknown (comments)    Aspirin Unknown (comments)    Astelin [Azelastine] Unknown (comments)    Atenolol Unknown (comments)    Banex La Unknown (comments)    Benicar [Olmesartan] Unknown (comments)    Carbocaine [Mepivacaine] Unknown (comments)    Cleocin [Clindamycin Hcl] Hives    Codeine Nausea and Vomiting    Codeine-Asa-Salicyl-Acetam-Caf Other (comments)     Stomach pain    Cortisporin [Neomy-Polymyxinb-Bacitracin-Hc] Unknown (comments)    Cymbalta [Duloxetine] Other (comments)    Cytotec [Misoprostol] Unknown (comments)    Dilaudid [Hydromorphone] Unknown (comments)     Patient states that she was unable to respond, but was breathing    Effexor [Venlafaxine] Unknown (comments)    Epinephrine Unknown (comments)     Heart racing    Estra-D Unknown (comments)    Estrace [Estradiol] Unknown (comments)    Fentanyl Nausea and Vomiting and Unknown (comments)     shakes    Flexeril [Cyclobenzaprine] Unable to Obtain     \"drugs me\"    Flonase [Fluticasone] Unknown (comments)    Flunisolide Unknown (comments)    Guiatussin W/Codeine Unknown (comments)    Ketek [Telithromycin] Nausea Only    Klonopin [Clonazepam] Unknown (comments)    Lexapro [Escitalopram] Nausea and Vomiting    Lodine [Etodolac] Nausea and Vomiting    Morphine Unknown (comments)    Nsaids (Non-Steroidal Anti-Inflammatory Drug) Nausea and Vomiting    Other Medication Unknown (comments)     Gareth Alford    Paxil [Paroxetine Hcl] Nausea and Vomiting    Pcn [Penicillins] Hives    Percocet [Oxycodone-Acetaminophen] Unknown (comments)    Percodan [Oxycodone Hcl-Oxycodone-Asa] Unknown (comments)    Pneumococcal 23-Mary Ps Vaccine Swelling    Proctocream-Hc [Hydrocortisone] Unknown (comments)    Prozac [Fluoxetine] Nausea Only     Suicidal visions      Pseudoephedrine Other (comments)    Quinidine Unknown (comments)    Reglan [Metoclopramide] Unknown (comments)    Remeron [Mirtazapine] Other (comments)    Resaid Other (comments)     inflammed eyes    Robaxin [Methocarbamol] Unknown (comments)    Sonata [Zaleplon] Unknown (comments)    Sulfa (Sulfonamide Antibiotics) Hives    Surmontil [Trimipramine] Nausea and Vomiting    Talwin [Pentazocine Lactate] Unknown (comments)    Toradol [Ketorolac Tromethamine] Nausea and Vomiting    Ultram [Tramadol] Unknown (comments)    Vancomycin Nausea Only    Vasoconstrictor Drug Unknown (comments)    Vigamox [Moxifloxacin] Rash and Swelling    Vioxx [Rofecoxib] Unknown (comments)    Voltaren [Diclofenac Sodium] Itching    Wellbutrin [Bupropion Hcl] Nausea and Vomiting    Zelnorm [Tegaserod Hydrogen Maleate] Unknown (comments)    Zoloft [Sertraline] Nausea and Vomiting     Past Medical History:   Diagnosis Date    Allergic rhinitis, cause unspecified     Anxiety     Back pain     C. difficile diarrhea 8/2/2017    [facility]    Cancer ([facility] [place] [address]     basal cell face, back, neck    Cancer (HCC)     squamous cell leg    Chronic kidney disease     Chronic pain     DJD (degenerative joint disease) of cervical spine     Fibromyalgia     GERD (gastroesophageal reflux disease)     Hypercholesterolemia     Hypertension     Hyponatremia     IBS (irritable bowel syndrome)     Irritable bowel syndrome with diarrhea 6/29/2016    GI- Dr Byrnes Ards Kidney disease, chronic, stage III (GFR 30-59 ml/min) ([facility] [address] 6/2011    Dr. Odalys Hester) Christian    Nausea & vomiting     Neuropathy 6/22/2016    Both feet     Psychiatric disorder     DEPRESSION AND ANXIETY    Stroke ([facility] [address] 2017    TIA    Thromboembolus ([facility] [address] 2013    PE X2     Past Surgical History:   Procedure Laterality Date    COLONOSCOPY  1/20/11    diverticulosis    EGD  1/20/11    schatzki's ring--clear on barium swallow 7/11    HX ADENOIDECTOMY  1940    HX CATARACT REMOVAL Left 2013    HX CATARACT REMOVAL Right 01/27/2014    HX CHOLECYSTECTOMY  5/10    HX DILATION AND CURETTAGE  1963    HX GI      COLONOSCOPY    HX HEENT      BCCA REMOVED FROM FACE ,NOSE     HX HYSTERECTOMY  1970`S    hysterectomy    HX KNEE ARTHROSCOPY Left 1990    HX KNEE REPLACEMENT Right 9/29/09    HX ORTHOPAEDIC Right 2017    trigger finger release    HX ORTHOPAEDIC Right 07/18/2019     RING FINGRER TRIGGER FINGER REALSE     HX OTHER SURGICAL      skin cancer removed: L leg    HX OTHER SURGICAL      BCCA REMOVED FROM BACK     HX POLYPECTOMY  1989    HX ROTATOR CUFF REPAIR Right 08/15/2019    and sub-acromial decompression    HX SHOULDER ARTHROSCOPY Left 12/13/2018    arthroscopy & sub-acromial decompression    HX TONSILLECTOMY  1940    HX UROLOGICAL  1995    bladder suspension    REPAIR OF RECTOCELE       Family History Problem Relation Age of Onset    Cancer Mother         lung    Cancer Maternal Aunt         lung    Cancer Maternal Uncle         lung    Cancer Sister         breast    Other Brother         has to have blood tx every other week   [facility]\"" Cancer Son 36        prostate, recurrence 7 yrs later   24 \[facility]\"" Arthritis-osteo Son         hips   24 \[facility]\"" Arthritis-osteo Daughter     Arthritis-osteo Daughter     Arthritis-osteo Sister     No Known Problems Sister     No Known Problems Sister     Anesth Problems Neg Hx      Social History     Tobacco Use    Smoking status: Never Smoker    Smokeless tobacco: Never Used   Substance Use Topics    Alcohol use: Not Currently     Alcohol/week: 0.0 standard drinks     Frequency: Never     Binge frequency: Never     Comment: rarely        Review of Systems:   Constitutional: Negative for fever, chills, weight loss, malaise/fatigue. HEENT: Negative for nosebleeds, vision changes. Respiratory: Negative for cough, hemoptysis  Cardiovascular: Negative for chest pain, palpitations, orthopnea, claudication, leg swelling, syncope, and PND. Gastrointestinal: Negative for nausea, vomiting, diarrhea, blood in stool and melena. Genitourinary: Negative for dysuria, and hematuria. Musculoskeletal: Negative for myalgias, arthralgia. Skin: Negative for rash. Heme: Does not bleed or bruise easily. Neurological: Negative for speech change and focal weakness     Objective:     Visit Vitals  /64   Pulse 67   Resp 16   Ht 5' 2\" (1.575 m)   Wt 111 lb (50.3 kg)   SpO2 96%   BMI 20.30 kg/m²      Physical Exam:   Constitutional: Thin No respiratory distress. Head: Normocephalic and atraumatic. Eyes: Pupils are equal, round  ENT: hearing normal  Neck: supple. No JVD present. Cardiovascular: Normal rate, regular rhythm. Exam reveals no gallop and no friction rub. No murmur heard. Pulmonary/Chest: Effort normal and breath sounds normal. No wheezes. Abdominal: Soft, no tenderness. Musculoskeletal: no edema. Neurological: alert,oriented. Skin: Skin is warm and dry  Psychiatric: normal mood and affect. Behavior is normal. Judgment and thought content normal.        Assessment/Plan:       ICD-10-CM ICD-9-CM    1. Hx of completed stroke Z86.73 V12.54    2. Essential hypertension, benign I10 401.1    her strokes and TIA were concerning for PAF  We discussed risks and benefits of implantable loop recorder and I showed it to her and she wants to proceed    Thank you for involving me in this patient's care and please call with further concerns or questions. Katie Martinez M.D.   Electrophysiology/Cardiology  [facility] [place]  [address] [place] [address]                             [phone]

## 2020-01-16 NOTE — TELEPHONE ENCOUNTER
Patient's daughter is calling to speak with Dr. Dontae Ladd nurse in regards to her upcoming procedure and appts.      Phone: [phone]

## 2020-01-16 NOTE — TELEPHONE ENCOUNTER
Verified patient with two types of identifiers. Spoke with patient's daughter verified on HIPAA. Daughter had questions regarding ILR. Reviewed what ILR was and how it would be used. Reviewed with daughter follow up appointments. Daughter had questions regarding Hibiclens. Verified with Dr. Nish Ornelas that Hibiclens is not needed prior to ILR. Patient verbalized understanding and will call with any other questions.

## 2020-01-17 LAB
BASOPHILS # BLD AUTO: 0.1 X10E3/UL (ref 0–0.2)
BASOPHILS NFR BLD AUTO: 1 %
BUN SERPL-MCNC: 24 MG/DL (ref 8–27)
BUN/CREAT SERPL: 20 (ref 12–28)
CALCIUM SERPL-MCNC: 9.2 MG/DL (ref 8.7–10.3)
CHLORIDE SERPL-SCNC: 95 MMOL/L (ref 96–106)
CO2 SERPL-SCNC: 24 MMOL/L (ref 20–29)
CREAT SERPL-MCNC: 1.21 MG/DL (ref 0.57–1)
EOSINOPHIL # BLD AUTO: 0.1 X10E3/UL (ref 0–0.4)
EOSINOPHIL NFR BLD AUTO: 1 %
ERYTHROCYTE [DISTWIDTH] IN BLOOD BY AUTOMATED COUNT: 13.3 % (ref 11.7–15.4)
GLUCOSE SERPL-MCNC: 90 MG/DL (ref 65–99)
HCT VFR BLD AUTO: 36.3 % (ref 34–46.6)
HGB BLD-MCNC: 12.5 G/DL (ref 11.1–15.9)
IMM GRANULOCYTES # BLD AUTO: 0 X10E3/UL (ref 0–0.1)
IMM GRANULOCYTES NFR BLD AUTO: 0 %
INTERPRETATION: NORMAL
LYMPHOCYTES # BLD AUTO: 2.8 X10E3/UL (ref 0.7–3.1)
LYMPHOCYTES NFR BLD AUTO: 33 %
MCH RBC QN AUTO: 30.1 PG (ref 26.6–33)
MCHC RBC AUTO-ENTMCNC: 34.4 G/DL (ref 31.5–35.7)
MCV RBC AUTO: 88 FL (ref 79–97)
MONOCYTES # BLD AUTO: 0.8 X10E3/UL (ref 0.1–0.9)
MONOCYTES NFR BLD AUTO: 10 %
NEUTROPHILS # BLD AUTO: 4.8 X10E3/UL (ref 1.4–7)
NEUTROPHILS NFR BLD AUTO: 55 %
PLATELET # BLD AUTO: 343 X10E3/UL (ref 150–450)
POTASSIUM SERPL-SCNC: 4.6 MMOL/L (ref 3.5–5.2)
RBC # BLD AUTO: 4.15 X10E6/UL (ref 3.77–5.28)
SODIUM SERPL-SCNC: 133 MMOL/L (ref 134–144)
WBC # BLD AUTO: 8.7 X10E3/UL (ref 3.4–10.8)

## 2020-01-17 RX ORDER — METOPROLOL TARTRATE 25 MG/1
12.5 TABLET, FILM COATED ORAL 2 TIMES DAILY
Qty: 90 TAB | Refills: 0 | Status: SHIPPED | OUTPATIENT
Start: 2020-01-17 | End: 2020-05-04

## 2020-01-17 NOTE — TELEPHONE ENCOUNTER
From: [place]  Sent: 1/16/2020 8:24 AM EST  To: Mahi Mann [facility]  Subject: RE:BP medications - metoprolol    This message is being sent by Honey Meek on behalf of [place]    I think the metoprolol tartrate 25mg. .1/2 two times a day is a good idea due to her sensitivity to medications. But could you please make sure jyotsna changes the directions on her bottle. In speaking with them yesterday they said it stated once a day. Mom will only do what the bottle instructions say. Thanks,  Honey Meek  ----- Message -----  From: Otoniel King MD  Sent: 1/15/2020 12:27 PM EST  To: [place]  Subject: RE:BP medications - metoprolol  The once a day dose would be the same: 25mg. The twice a day dosing I would start with 1/2 tab twice a day but likely need to increase to 1 tab twice a day. Timur Garcia      ----- Message -----   From: [place]   Sent: 1/15/2020 8:24 AM EST   To: Otoniel King MD  Subject: RE:BP medications - metoprolol    This message is being sent by Honey Meek on behalf of [place]    Thank you for getting back so quickly. I will speak to her today and check to see would would be easiest for her to take. So if she does take it twice a day, is it 1 am and 1 pm pill? What would her dose be with the once a day pill?  ----- Message -----  From: Otoniel King MD  Sent: 1/14/2020 8:07 PM EST  To: [place]  Subject: BP medications - metoprolol  Vanessa Hernandez,    Looks like your mother is on Metoprolol tartrate which is supposed to be twice a day. Metoprolol succinate is the once a day version. Let me know what you want to do:    Option #1- start taking the metoprolol tartrate twice a day.   Option #2- we can change her metoprolol succinate which is the once a day version.    -Abbie Torrez

## 2020-01-22 ENCOUNTER — TELEPHONE (OUTPATIENT)
Dept: CARDIOLOGY CLINIC | Age: 85
End: 2020-01-22

## 2020-01-22 NOTE — TELEPHONE ENCOUNTER
Patient requesting to speak with Falguni Jenkins about the Hibiclens solution she has to take before her surgery on 2/7. Patient states she has used the solution before and broke out.  Please advise      BLOSSOM:[phone]

## 2020-01-22 NOTE — TELEPHONE ENCOUNTER
Verified patient with two types of identifiers. Notified patient that she does not need to use the Hibiclens solution for ILR. Patient verbalized understanding and will call with any other questions.

## 2020-02-07 ENCOUNTER — TELEPHONE (OUTPATIENT)
Dept: CARDIOLOGY CLINIC | Age: 85
End: 2020-02-07

## 2020-02-07 NOTE — TELEPHONE ENCOUNTER
Patient's  request that Delroy Maldonado give him a call so he can discuss the patient's situation to her.  Please advise      PXXWS:[phone]

## 2020-02-07 NOTE — TELEPHONE ENCOUNTER
Verified patient with two types of identifiers. Patient's  states that patient has just not been feeling well and requested to reschedule. Notified  to have patient call when she is ready to rescheduled. Notified  will cancel wound check appointments. Patient verbalized understanding and will call with any other questions.

## 2020-02-10 ENCOUNTER — TELEPHONE (OUTPATIENT)
Dept: CARDIOLOGY CLINIC | Age: 85
End: 2020-02-10

## 2020-02-10 NOTE — TELEPHONE ENCOUNTER
Called Pt. Two patient identifiers confirmed. Rescheduled Pt to have ILR on 2/28/2020. Scheduled Pt to see  Ivory Muir NP on 2/14/2020 to go over instructions. Pt verbalized understanding of information discussed w/ no further questions at this time.

## 2020-02-10 NOTE — TELEPHONE ENCOUNTER
Patient would like to speak with Alexa Robles or Alec Padilla to reschedule ILR. She would also like some information regarding the ILR.     Phone: [phone]

## 2020-02-11 ENCOUNTER — TELEPHONE (OUTPATIENT)
Dept: CARDIOLOGY CLINIC | Age: 85
End: 2020-02-11

## 2020-02-11 NOTE — TELEPHONE ENCOUNTER
Pt's ara Brand calling to speak with a nurse about some appts and the reason for them.     She can be reached at [phone]    [facility]

## 2020-02-11 NOTE — TELEPHONE ENCOUNTER
Verified patient with two types of identifiers. Spoke with patient's daughter, verified on HIPAA. Notified of different appointments and reasons for each. Patient verbalized understanding and will call with any other questions.

## 2020-02-12 ENCOUNTER — TELEPHONE (OUTPATIENT)
Dept: CARDIOLOGY CLINIC | Age: 85
End: 2020-02-12

## 2020-02-12 NOTE — TELEPHONE ENCOUNTER
Verified patient with two types of identifiers. Patient was questioning appointment on Friday. Notified patient that appointment is with Pelon Porras NP for an updated H&P for ILR on 2/28/20. Patient verbalized understanding and will call with any other questions.

## 2020-02-13 NOTE — PROGRESS NOTES
Cardiac Electrophysiology OFFICE Note     Subjective:      Sabina Guidry is a 80 y.o. patient who presents for H&P update prior to upcoming ILR implant (scheduled for 02/28/2020). She has had recurrent TIA/CVA, most recently in 08/2019, required tPA. Symptoms resolved in 5 minutes. No known history of PAF or atrial flutter. She denies chest pain, palpitations, SOB, PND, orthopnea, lightheadedness, syncope, or edema    BP initially elevated, controlled on recheck. Current medications include ASA 81 mg po daily & metoprolol 12.5 mg po bid. Previous:  Echo (08/21/2019): LVEF 56-60%, mild TR. Mild aortic valve sclerosis. Small pericardial effusion adjacent to RV. MRI/MRA brain (08/21/2019): Mild white matter disease likely r/t chronic small vessel ischemic disease. No acute process. MRI in 08/2017 without acute findings. Patient Active Problem List   Diagnosis Code    Essential hypertension, benign I10    DDD (degenerative disc disease), cervical M50.30    Anxiety F41.9    Allergic rhinitis J30.9    DDD (degenerative disc disease), lumbar M51.36    CKD (chronic kidney disease) stage 3, GFR 30-59 ml/min ([facility]) N18.3    Pre-diabetes R73.03    History of pulmonary embolism Z86.711    Gastroparesis K31.84    Spinal stenosis, lumbar M48.061    Fibromyalgia M79.7    Gastroesophageal reflux disease without esophagitis K21.9    Other irritable bowel syndrome K58.8    Recurrent major depressive disorder ([facility]) F33.9    Alzheimer's disease ([facility] [address] G30.9, F02.80    TIA (transient ischemic attack) G45.9     Current Outpatient Medications   Medication Sig Dispense Refill    metoprolol tartrate (LOPRESSOR) 25 mg tablet Take 0.5 Tabs by mouth two (2) times a day. 90 Tab 0    APPLE CIDER VINEGAR PO Take  by mouth daily.  memantine ER (NAMENDA XR) 14 mg capsule TAKE 1 CAPSULE BY MOUTH ONE TIME DAILY 30 Cap 0    famotidine (PEPCID) 20 mg tablet Take 20 mg by mouth nightly.  LORazepam (ATIVAN) 1 mg tablet Take  by mouth. Takes 0.5 mg in the morning and 1 mg at night.  acetaminophen (TYLENOL ARTHRITIS PAIN) 650 mg TbER Take 1 Tab by mouth every eight (8) hours.  bismuth subsalicylate (PEPTO-BISMOL PO) Take 2 Adjustable Dose Pre-filled Pen Syringe by mouth as needed for Diarrhea or Nausea.  Wheat Dextrin (BENEFIBER CLEAR) 3 gram/3.5 gram pwpk Take 3.5 g by mouth daily.  aspirin delayed-release 81 mg tablet Take 1 Tab by mouth daily. 30 Tab 0     Allergies   Allergen Reactions    Latex Itching    Dilaudid [Hydromorphone (Bulk)] Other (comments)     ? loss of consciousness    Altace [Ramipril] Unknown (comments)    Ambien [Zolpidem] Unknown (comments)    [employer]isc Test [Blood Sugar Diagnostic, Disc-Type] Unknown (comments)    Aspirin Unknown (comments)    Astelin [Azelastine] Unknown (comments)    Atenolol Unknown (comments)    Banex La Unknown (comments)    Benicar [Olmesartan] Unknown (comments)    Carbocaine [Mepivacaine] Unknown (comments)    Cleocin [Clindamycin Hcl] Hives    Codeine Nausea and Vomiting    Codeine-Asa-Salicyl-Acetam-Caf Other (comments)     Stomach pain    Cortisporin [Neomy-Polymyxinb-Bacitracin-Hc] Unknown (comments)    Cymbalta [Duloxetine] Other (comments)    Cytotec [Misoprostol] Unknown (comments)    Dilaudid [Hydromorphone] Unknown (comments)     Patient states that she was unable to respond, but was breathing    Effexor [Venlafaxine] Unknown (comments)    Epinephrine Unknown (comments)     Heart racing    Estra-D Unknown (comments)    Estrace [Estradiol] Unknown (comments)    Fentanyl Nausea and Vomiting and Unknown (comments)     shakes    Flexeril [Cyclobenzaprine] Unable to Obtain     \"drugs me\"    Flonase [Fluticasone] Unknown (comments)    Flunisolide Unknown (comments)    Guiatussin W/Codeine Unknown (comments)    Ketek [Telithromycin] Nausea Only    Klonopin [Clonazepam] Unknown (comments)    Lexapro [Escitalopram] Nausea and Vomiting    Lodine [Etodolac] Nausea and Vomiting    Morphine Unknown (comments)    Nsaids (Non-Steroidal Anti-Inflammatory Drug) Nausea and Vomiting    Other Medication Unknown (comments)     Gareth Alford    Paxil [Paroxetine Hcl] Nausea and Vomiting    Pcn [Penicillins] Hives    Percocet [Oxycodone-Acetaminophen] Unknown (comments)    Percodan [Oxycodone Hcl-Oxycodone-Asa] Unknown (comments)    Pneumococcal 23-Mary Ps Vaccine Swelling    Proctocream-Hc [Hydrocortisone] Unknown (comments)    Prozac [Fluoxetine] Nausea Only     Suicidal visions      Pseudoephedrine Other (comments)    Quinidine Unknown (comments)    Reglan [Metoclopramide] Unknown (comments)    Remeron [Mirtazapine] Other (comments)    Resaid Other (comments)     inflammed eyes    Robaxin [Methocarbamol] Unknown (comments)    Sonata [Zaleplon] Unknown (comments)    Sulfa (Sulfonamide Antibiotics) Hives    Surmontil [Trimipramine] Nausea and Vomiting    Talwin [Pentazocine Lactate] Unknown (comments)    Toradol [Ketorolac Tromethamine] Nausea and Vomiting    Ultram [Tramadol] Unknown (comments)    Vancomycin Nausea Only    Vasoconstrictor Drug Unknown (comments)    Vigamox [Moxifloxacin] Rash and Swelling    Vioxx [Rofecoxib] Unknown (comments)    Voltaren [Diclofenac Sodium] Itching    Wellbutrin [Bupropion Hcl] Nausea and Vomiting    Zelnorm [Tegaserod Hydrogen Maleate] Unknown (comments)    Zoloft [Sertraline] Nausea and Vomiting     Past Medical History:   Diagnosis Date    Allergic rhinitis, cause unspecified     Anxiety     Back pain     C. difficile diarrhea 8/2/2017    [facility]    Cancer ([facility] [address]     basal cell face, back, neck    Cancer (HCC)     squamous cell leg    Chronic kidney disease     Chronic pain     DJD (degenerative joint disease) of cervical spine     Fibromyalgia     GERD (gastroesophageal reflux disease)     Hypercholesterolemia     Hypertension     Hyponatremia     IBS (irritable bowel syndrome)     Irritable bowel syndrome with diarrhea 6/29/2016    GI- Dr Ximena Mott Kidney disease, chronic, stage III (GFR 30-59 ml/min) ([facility] [address] 6/2011    Dr. Mane Gonzales    Nausea & vomiting     Neuropathy 6/22/2016    Both feet     Psychiatric disorder     DEPRESSION AND ANXIETY    Stroke ([facility] [address] 2017    TIA    Thromboembolus ([facility] [address] 2013    PE X2     Past Surgical History:   Procedure Laterality Date    COLONOSCOPY  1/20/11    diverticulosis    EGD  1/20/11    schatzki's ring--clear on barium swallow 7/11    HX ADENOIDECTOMY  1940    HX CATARACT REMOVAL Left 2013    HX CATARACT REMOVAL Right 01/27/2014    HX CHOLECYSTECTOMY  5/10    HX DILATION AND CURETTAGE  1963    HX GI      COLONOSCOPY    HX HEENT      BCCA REMOVED FROM FACE ,NOSE     HX HYSTERECTOMY  1970`S    hysterectomy    HX KNEE ARTHROSCOPY Left 1990    HX KNEE REPLACEMENT Right 9/29/09    HX ORTHOPAEDIC Right 2017    trigger finger release    HX ORTHOPAEDIC Right 07/18/2019     RING FINGRER TRIGGER FINGER REALSE     HX OTHER SURGICAL      skin cancer removed: L leg    HX OTHER SURGICAL      BCCA REMOVED FROM BACK     HX POLYPECTOMY  1989    HX ROTATOR CUFF REPAIR Right 08/15/2019    and sub-acromial decompression    HX SHOULDER ARTHROSCOPY Left 12/13/2018    arthroscopy & sub-acromial decompression    HX TONSILLECTOMY  1940    HX UROLOGICAL  1995    bladder suspension    REPAIR OF RECTOCELE       Family History   Problem Relation Age of Onset    Cancer Mother         lung    Cancer Maternal Aunt         lung    Cancer Maternal Uncle         lung    Cancer Sister         breast    Other Brother         has to have blood tx every other week    Cancer Son 36        prostate, recurrence 7 yrs later   Alessandro.Darrell Arthritis-osteo Son         hips    Arthritis-osteo Daughter     Arthritis-osteo Daughter     Arthritis-osteo Sister     No Known Problems Sister     No Known Problems Sister     Anesth Problems Neg Hx      Social History     Tobacco Use    Smoking status: Never Smoker    Smokeless tobacco: Never Used   Substance Use Topics    Alcohol use: Not Currently     Alcohol/week: 0.0 standard drinks     Frequency: Never     Binge frequency: Never     Comment: rarely        Review of Systems:   Constitutional: Negative for fever, chills, weight loss, malaise/fatigue. HEENT: Negative for nosebleeds, vision changes. Respiratory: Negative for cough, hemoptysis  Cardiovascular: Negative for chest pain, palpitations, orthopnea, claudication, leg swelling, syncope, and PND. Gastrointestinal: Negative for nausea, vomiting, diarrhea, blood in stool and melena. Genitourinary: Negative for dysuria, and hematuria. Musculoskeletal: Negative for myalgias, arthralgia. Skin: Negative for rash. Heme: Does not bleed or bruise easily. Neurological: Negative for speech change and focal weakness. + short term memory deficit     Objective:     Visit Vitals  /68   Pulse 64   Ht 5' 2\" (1.575 m)   Wt 111 lb (50.3 kg)   BMI 20.30 kg/m²      Physical Exam:   Constitutional: Mildly thin. No respiratory distress. Head: Normocephalic and atraumatic. Eyes: Pupils are equal, round  ENT: hearing normal  Neck: supple. No JVD present. Cardiovascular: Normal rate, regular rhythm. Exam reveals no gallop and no friction rub. No murmur heard. Pulmonary/Chest: Effort normal and breath sounds normal. No wheezes. Abdominal: Soft, no tenderness. Musculoskeletal: No edema. Neurological: Alert,oriented. Skin: Skin is warm and dry. Psychiatric: Repetitive questions, otherwise appropriate responses. Assessment/Plan:       ICD-10-CM ICD-9-CM    1. History of stroke without residual deficits Z86.73 V12.54    2. Essential hypertension, benign I10 401.1      Ms. Tamica Salinas has had recurrent cryptogenic CVA/TIA, most recently in 08/2019.     Concerned regarding possible PAF/AFL as etiology for thrombus. No known history thereof. Continuing ASA 81 mg po daily for now. BP well controlled. Continue Toprol XL as previously prescribed. Normal LVEF in 08/2019. Risks/benefits of ILR implant for long term monitoring for PAF/AFL reviewed, & she agrees to proceed as scheduled. Should PAF or AFL be identified in the future, would then recommend [facility] for embolic CVA prophylaxis. Labs last obtained 01/16/2020, ok to proceed with upcoming procedure per Dr. Christopher Aranda. Pre procedure instructions reviewed with patient & family member in detail; they were given 2 copies of written instructions. Future Appointments   Date Time Provider Americo Rizo   3/13/2020  9:15 AM PACEMAKER3, [address]   3/13/2020  9:30 AM HOLTER, [address]   6/2/2020  2:40 PM DO Moses Hastings       Thank you for involving me in this patient's care and please call with further concerns or questions.     ZOHRA Salgado  [facility] [place]  02/14/20

## 2020-02-13 NOTE — H&P (VIEW-ONLY)
Cardiac Electrophysiology OFFICE Note Subjective:  
  
Lisa Cárdenas is a 80 y.o. patient who presents for H&P update prior to upcoming ILR implant (scheduled for 02/28/2020). She has had recurrent TIA/CVA, most recently in 08/2019, required tPA. Symptoms resolved in 5 minutes. No known history of PAF or atrial flutter. She denies chest pain, palpitations, SOB, PND, orthopnea, lightheadedness, syncope, or edema BP initially elevated, controlled on recheck. Current medications include ASA 81 mg po daily & metoprolol 12.5 mg po bid. Previous: 
Echo (08/21/2019): LVEF 56-60%, mild TR. Mild aortic valve sclerosis. Small pericardial effusion adjacent to RV. MRI/MRA brain (08/21/2019): Mild white matter disease likely r/t chronic small vessel ischemic disease. No acute process. MRI in 08/2017 without acute findings. Patient Active Problem List  
Diagnosis Code  Essential hypertension, benign I10  
 DDD (degenerative disc disease), cervical M50.30  Anxiety F41.9  Allergic rhinitis J30.9  DDD (degenerative disc disease), lumbar M51.36  
 CKD (chronic kidney disease) stage 3, GFR 30-59 ml/min ([facility]) N18.3  Pre-diabetes R73.03  
 History of pulmonary embolism Z86.711  Gastroparesis K31.84  Spinal stenosis, lumbar M48.061  
 Fibromyalgia M79.7  Gastroesophageal reflux disease without esophagitis K21.9  Other irritable bowel syndrome K58.8  Recurrent major depressive disorder ([facility] [address] F33.9  Alzheimer's disease ([facility] [address] G30.9, F02.80  TIA (transient ischemic attack) G45.9 Current Outpatient Medications Medication Sig Dispense Refill  metoprolol tartrate (LOPRESSOR) 25 mg tablet Take 0.5 Tabs by mouth two (2) times a day. 90 Tab 0  APPLE CIDER VINEGAR PO Take  by mouth daily.  memantine ER (NAMENDA XR) 14 mg capsule TAKE 1 CAPSULE BY MOUTH ONE TIME DAILY 30 Cap 0  
 famotidine (PEPCID) 20 mg tablet Take 20 mg by mouth nightly.  LORazepam (ATIVAN) 1 mg tablet Take  by mouth. Takes 0.5 mg in the morning and 1 mg at night.  acetaminophen (TYLENOL ARTHRITIS PAIN) 650 mg TbER Take 1 Tab by mouth every eight (8) hours.  bismuth subsalicylate (PEPTO-BISMOL PO) Take 2 Adjustable Dose Pre-filled Pen Syringe by mouth as needed for Diarrhea or Nausea.  Wheat Dextrin (BENEFIBER CLEAR) 3 gram/3.5 gram pwpk Take 3.5 g by mouth daily.  aspirin delayed-release 81 mg tablet Take 1 Tab by mouth daily. 30 Tab 0 Allergies Allergen Reactions  Latex Itching  Dilaudid [Hydromorphone (Bulk)] Other (comments) ? loss of consciousness  Altace [Ramipril] Unknown (comments)  Ambien [Zolpidem] Unknown (comments)  Ascensia Autodisc Test [Blood Sugar Diagnostic, Disc-Type] Unknown (comments)  Aspirin Unknown (comments)  Astelin [Azelastine] Unknown (comments)  Atenolol Unknown (comments)  Banex La Unknown (comments)  Benicar [Olmesartan] Unknown (comments)  Carbocaine [Mepivacaine] Unknown (comments)  Cleocin [Clindamycin Hcl] Hives  Codeine Nausea and Vomiting  Codeine-Asa-Salicyl-Acetam-Caf Other (comments) Stomach pain  Cortisporin [Neomy-Polymyxinb-Bacitracin-Hc] Unknown (comments)  Cymbalta [Duloxetine] Other (comments)  Cytotec [Misoprostol] Unknown (comments)  Dilaudid [Hydromorphone] Unknown (comments) Patient states that she was unable to respond, but was breathing  Effexor [Venlafaxine] Unknown (comments)  Epinephrine Unknown (comments) Heart racing  Estra-D Unknown (comments)  Estrace [Estradiol] Unknown (comments)  Fentanyl Nausea and Vomiting and Unknown (comments)  
  shakes  Flexeril [Cyclobenzaprine] Unable to Obtain \"drugs me\"  Flonase [Fluticasone] Unknown (comments)  Flunisolide Unknown (comments)  Guiatussin W/Codeine Unknown (comments)  Ketek [Telithromycin] Nausea Only  Klonopin [Clonazepam] Unknown (comments)  Lexapro [Escitalopram] Nausea and Vomiting  Lodine [Etodolac] Nausea and Vomiting  Morphine Unknown (comments)  Nsaids (Non-Steroidal Anti-Inflammatory Drug) Nausea and Vomiting  Other Medication Unknown (comments) Milagros Reyes  Paxil [Paroxetine Hcl] Nausea and Vomiting  Pcn [Penicillins] Hives  Percocet [Oxycodone-Acetaminophen] Unknown (comments)  Percodan [Oxycodone Hcl-Oxycodone-Asa] Unknown (comments)  Pneumococcal 23-Mary Ps Vaccine Swelling  Proctocream-Hc [Hydrocortisone] Unknown (comments)  Prozac [Fluoxetine] Nausea Only Suicidal visions  Pseudoephedrine Other (comments)  Quinidine Unknown (comments)  Reglan [Metoclopramide] Unknown (comments)  Remeron [Mirtazapine] Other (comments)  Resaid Other (comments)  
  inflammed eyes  Robaxin [Methocarbamol] Unknown (comments)  Sonata [Zaleplon] Unknown (comments)  Sulfa (Sulfonamide Antibiotics) Hives  Surmontil [Trimipramine] Nausea and Vomiting  Talwin [Pentazocine Lactate] Unknown (comments)  Toradol [Ketorolac Tromethamine] Nausea and Vomiting  Ultram [Tramadol] Unknown (comments)  Vancomycin Nausea Only  Vasoconstrictor Drug Unknown (comments)  Vigamox [Moxifloxacin] Rash and Swelling  Vioxx [Rofecoxib] Unknown (comments)  Voltaren [Diclofenac Sodium] Itching  Wellbutrin [Bupropion Hcl] Nausea and Vomiting  Zelnorm [Tegaserod Hydrogen Maleate] Unknown (comments)  Zoloft [Sertraline] Nausea and Vomiting Past Medical History:  
Diagnosis Date  Allergic rhinitis, cause unspecified  Anxiety  Back pain  C. difficile diarrhea 8/2/2017 Admitted to [facility] [place]  Cancer ([facility] [address]   
 basal cell face, back, neck  Cancer (HCC)   
 squamous cell leg  Chronic kidney disease  Chronic pain  DJD (degenerative joint disease) of cervical spine  Fibromyalgia  GERD (gastroesophageal reflux disease)  Hypercholesterolemia  Hypertension  Hyponatremia  IBS (irritable bowel syndrome)  Irritable bowel syndrome with diarrhea 6/29/2016 GI- Dr Singh Arroyo  Kidney disease, chronic, stage III (GFR 30-59 ml/min) ([facility]) 6/2011 Dr. Ty Alvares) [facility]  Nausea & vomiting  Neuropathy 6/22/2016 Both feet  Psychiatric disorder DEPRESSION AND ANXIETY  Stroke [facility]) 2017 TIA  Thromboembolus [facility]) 2013 PE X2 Past Surgical History:  
Procedure Laterality Date  COLONOSCOPY  1/20/11  
 diverticulosis  EGD  1/20/11  
 schatzki's ring--clear on barium swallow 7/11  
[address]  HX CATARACT REMOVAL Left 2013  HX CATARACT REMOVAL Right 01/27/2014  HX CHOLECYSTECTOMY  5/10  
[address]  HX GI    
 COLONOSCOPY  
 HX HEENT    
 BCCA REMOVED FROM FACE ,NOSE   
 HX HYSTERECTOMY  1970`S  
 hysterectomy  HX KNEE ARTHROSCOPY Left 1990  
 HX KNEE REPLACEMENT Right 9/29/09  
 HX ORTHOPAEDIC Right 2017  
 trigger finger release  HX ORTHOPAEDIC Right 07/18/2019 [address]  HX OTHER SURGICAL    
 skin cancer removed: L leg  HX OTHER SURGICAL    
 BCCA REMOVED FROM BACK [address]  HX ROTATOR CUFF REPAIR Right 08/15/2019  
 and sub-acromial decompression  HX SHOULDER ARTHROSCOPY Left 12/13/2018  
 arthroscopy & sub-acromial decompression [address]  HX UROLOGICAL  1995  
 bladder suspension  REPAIR OF RECTOCELE Family History Problem Relation Age of Onset  Cancer Mother   
     lung  Cancer Maternal Aunt   
     lung  Cancer Maternal Uncle   
     lung  Cancer Sister   
     breast  
 Other Brother   
     has to have blood tx every other week  Cancer Son 36  
     prostate, recurrence 7 yrs later  Arthritis-osteo Son   
     hips  Arthritis-osteo Daughter  Arthritis-osteo Daughter  Arthritis-osteo Sister  No Known Problems Sister  No Known Problems Sister  Anesth Problems Neg Hx Social History Tobacco Use  Smoking status: Never Smoker  Smokeless tobacco: Never Used Substance Use Topics  Alcohol use: Not Currently Alcohol/week: 0.0 standard drinks Frequency: Never Binge frequency: Never Comment: rarely Review of Systems:  
Constitutional: Negative for fever, chills, weight loss, malaise/fatigue. HEENT: Negative for nosebleeds, vision changes. Respiratory: Negative for cough, hemoptysis Cardiovascular: Negative for chest pain, palpitations, orthopnea, claudication, leg swelling, syncope, and PND. Gastrointestinal: Negative for nausea, vomiting, diarrhea, blood in stool and melena. Genitourinary: Negative for dysuria, and hematuria. Musculoskeletal: Negative for myalgias, arthralgia. Skin: Negative for rash. Heme: Does not bleed or bruise easily. Neurological: Negative for speech change and focal weakness. + short term memory deficit Objective:  
 
Visit Vitals /68 Pulse 64 Ht 5' 2\" (1.575 m) Wt 111 lb (50.3 kg) BMI 20.30 kg/m² Physical Exam:  
Constitutional: Mildly thin. No respiratory distress. Head: Normocephalic and atraumatic. Eyes: Pupils are equal, round ENT: hearing normal 
Neck: supple. No JVD present. Cardiovascular: Normal rate, regular rhythm. Exam reveals no gallop and no friction rub. No murmur heard. Pulmonary/Chest: Effort normal and breath sounds normal. No wheezes. Abdominal: Soft, no tenderness. Musculoskeletal: No edema. Neurological: Alert,oriented. Skin: Skin is warm and dry. Psychiatric: Repetitive questions, otherwise appropriate responses. Assessment/Plan: ICD-10-CM ICD-9-CM 1. History of stroke without residual deficits Z86.73 V12.54   
2. Essential hypertension, benign I10 401.1 Ms. Salomon Vivas has had recurrent cryptogenic CVA/TIA, most recently in 08/2019. Concerned regarding possible PAF/AFL as etiology for thrombus. No known history thereof. Continuing ASA 81 mg po daily for now. BP well controlled. Continue Toprol XL as previously prescribed. Normal LVEF in 08/2019. Risks/benefits of ILR implant for long term monitoring for PAF/AFL reviewed, & she agrees to proceed as scheduled. Should PAF or AFL be identified in the future, would then recommend [address] [place] [address] for embolic CVA prophylaxis. Labs last obtained 01/16/2020, ok to proceed with upcoming procedure per Dr. Mitchell Jauregui. Pre procedure instructions reviewed with patient & family member in detail; they were given 2 copies of written instructions. Future Appointments Date Time Provider Americo Rizo 3/13/2020  9:15 AM [email or web address], [address]  
3/13/2020  9:30 AM HOLTER, [address]  
6/2/2020  2:40 PM DO JACEK Hui/ [address] Thank you for involving me in this patient's care and please call with further concerns or questions. ZOHRA Duffy 9 [facility] 02/14/20

## 2020-02-14 ENCOUNTER — OFFICE VISIT (OUTPATIENT)
Dept: CARDIOLOGY CLINIC | Age: 85
End: 2020-02-14

## 2020-02-14 VITALS
WEIGHT: 111 LBS | BODY MASS INDEX: 20.43 KG/M2 | HEIGHT: 62 IN | SYSTOLIC BLOOD PRESSURE: 140 MMHG | HEART RATE: 64 BPM | DIASTOLIC BLOOD PRESSURE: 68 MMHG

## 2020-02-14 DIAGNOSIS — I10 ESSENTIAL HYPERTENSION, BENIGN: ICD-10-CM

## 2020-02-14 DIAGNOSIS — Z86.73 HISTORY OF STROKE WITHOUT RESIDUAL DEFICITS: Primary | ICD-10-CM

## 2020-02-14 NOTE — PATIENT INSTRUCTIONS
Your Implantable Loop Recorder (ILR) procedure has been scheduled for 2/28/20 at 12:00 pm, at [facility] address:  [address] [place] [address], [address]    Please report to Admitting Department by 10:00 am, or 2 hours prior to your scheduled procedure. Please enter through the main entrance off of [address] [place] [address], where they do [address] parking, and go immediately to your left. Please bring a list of your current medications. You should NOT stop your medication prior to your scheduled procedure. After your procedure, you will need to follow up with Dr. Matheus Mccarty or Serge Higuera, for a wound and device check.  Your follow-up appointment has been scheduled for 3/13/20 at 9:15 am.

## 2020-02-14 NOTE — LETTER
2/14/2020 11:29 AM 
 
Ms. Yi Weiss 
[address] [facility] [address] [id number] Your Implantable Loop Recorder (ILR) procedure has been scheduled for 2/28/20 at 12:00 pm, at [address] [facility] address: 
Antony Kraft, [address] Please report to Admitting Department by 10:00 am, or 2 hours prior to your scheduled procedure. Please enter through the main entrance off of [address] [place] [address], where they do [address] parking, and go immediately to your left. Please bring a list of your current medications. You should NOT stop your medication prior to your scheduled procedure. After your procedure, you will need to follow up with Dr. Akilah Benitez or Jacinta Valladares, for a wound and device check.  Your follow-up appointment has been scheduled for 3/13/20 at 9:15 am.

## 2020-02-28 ENCOUNTER — HOSPITAL ENCOUNTER (OUTPATIENT)
Age: 85
Setting detail: OUTPATIENT SURGERY
Discharge: HOME | End: 2020-02-28
Attending: INTERNAL MEDICINE | Admitting: INTERNAL MEDICINE
Payer: MEDICARE

## 2020-02-28 VITALS
HEIGHT: 62 IN | SYSTOLIC BLOOD PRESSURE: 153 MMHG | HEART RATE: 100 BPM | WEIGHT: 111.38 LBS | BODY MASS INDEX: 20.5 KG/M2 | DIASTOLIC BLOOD PRESSURE: 53 MMHG | TEMPERATURE: 97.7 F | RESPIRATION RATE: 16 BRPM | OXYGEN SATURATION: 98 %

## 2020-02-28 DIAGNOSIS — Z86.73 HISTORY OF STROKE WITHOUT RESIDUAL DEFICITS: ICD-10-CM

## 2020-02-28 PROBLEM — Z95.818 STATUS POST PLACEMENT OF IMPLANTABLE LOOP RECORDER: Status: ACTIVE | Noted: 2020-02-28

## 2020-02-28 PROCEDURE — C1764 EVENT RECORDER, CARDIAC: HCPCS | Performed by: INTERNAL MEDICINE

## 2020-02-28 PROCEDURE — 77030031139 HC SUT VCRL2 J&J -A: Performed by: INTERNAL MEDICINE

## 2020-02-28 PROCEDURE — 77030019580 HC CBL PACE MEDT -B: Performed by: INTERNAL MEDICINE

## 2020-02-28 PROCEDURE — 74011000250 HC RX REV CODE- 250: Performed by: INTERNAL MEDICINE

## 2020-02-28 PROCEDURE — 77030028698 HC BLD TISS PLSM MEDT -D: Performed by: INTERNAL MEDICINE

## 2020-02-28 PROCEDURE — 77030018673: Performed by: INTERNAL MEDICINE

## 2020-02-28 PROCEDURE — 33285 INSJ SUBQ CAR RHYTHM MNTR: CPT | Performed by: INTERNAL MEDICINE

## 2020-02-28 DEVICE — RECORDER CARD MON REVEAL LINQ MYCARELINK IMPL LINQSYS: Type: IMPLANTABLE DEVICE | Status: FUNCTIONAL

## 2020-02-28 RX ORDER — LIDOCAINE HYDROCHLORIDE AND EPINEPHRINE 5; 5 MG/ML; UG/ML
1.5 INJECTION, SOLUTION INFILTRATION; PERINEURAL ONCE
Status: COMPLETED | OUTPATIENT
Start: 2020-02-28 | End: 2020-02-28

## 2020-02-28 RX ORDER — DOXYCYCLINE 100 MG/1
100 TABLET ORAL 2 TIMES DAILY
Qty: 6 TAB | Refills: 0 | Status: SHIPPED | OUTPATIENT
Start: 2020-02-28 | End: 2020-03-02

## 2020-02-28 RX ADMIN — LIDOCAINE HYDROCHLORIDE,EPINEPHRINE BITARTRATE 7.5 MG: 5; .005 INJECTION, SOLUTION INFILTRATION; PERINEURAL at 12:35

## 2020-02-28 NOTE — PROGRESS NOTES
Patient discharged home with . Patient was not given sedation so a wheelchair was not needed. Discharge instructions given.

## 2020-02-28 NOTE — DISCHARGE INSTRUCTIONS
Patient Instructions Post-Loop recorder Implant    1. Do not shower for 1 day after discharge from the hospital.  Sponge baths are O.K., provided the site is kept dry   2. Please remove dressing in am but do not remove dermabond glue. It will wash off  3. Call Dr. Cornelious Felty at [phone] if you experience any of the following symptoms:  Redness at the site  4. Follow-up with Dr. Camilla Moody   Date Time Provider Americo [place]   3/13/2020  9:15 AM [address] [place] [address], [address] [facility]   3/13/2020  9:30 AM HOLTER, [address]   6/2/2020  2:40 PM DO JACEK Burnham/ [address]       5.   Medications to take at home: doxycycline 2 times a day for 3 days

## 2020-02-28 NOTE — ROUTINE PROCESS
Cardiac Cath Lab Recovery Arrival Note: 
 
 
[place] arrived to Cardiac Cath Lab, Recovery Area. Staff introduced to patient. Patient identifiers verified with NAME and DATE OF BIRTH. Procedure verified with patient. Consent forms reviewed and signed by patient or authorized representative and verified. Allergies verified. Patient and family oriented to department. Patient and family informed of procedure and plan of care. Questions answered with review. Patient prepped for procedure, per orders from physician, prior to arrival. 
 
Patient on cardiac monitor, non-invasive blood pressure, SPO2 monitor. On RA. Patient is A&Ox 4. Patient reports no pain. Patient in stretcher, in low position, with side rails up, call bell within reach, patient instructed to call if assistance as needed. Patient prep in: [address], [place]. Patient family has pager # Family in: . Prep by: FROILAN Tavares RN

## 2020-02-28 NOTE — INTERVAL H&P NOTE
H&P Update: 
[place] was seen and examined. History and physical has been reviewed. The patient has been examined.  There have been no significant clinical changes since the completion of the originally dated History and Physical.

## 2020-02-28 NOTE — PROCEDURES
Cardiac Procedure Note   Patient: Kristina Stevens  MRN: [id number]  SSN: [id number]   YOB: 1935 Age: 80 y.o.   Sex: female    Date of Procedure: 2/28/2020   Pre-procedure Diagnosis: hx of embolic cryptogenic stroke  Post-procedure Diagnosis: same  Procedure: loop recorder Insertion  :  Dr. Nona Juárez MD    Assistant(s):  None  Anesthesia: local  Estimated Blood Loss: Less than 10 mL   Specimens Removed: None  Findings: loop recorder in left chest  Complications: None   Implants:  [employer] loop recorder  Signed by:  Nona Juárez MD  2/28/2020  12:54 PM

## 2020-03-03 ENCOUNTER — TELEPHONE (OUTPATIENT)
Dept: INTERNAL MEDICINE CLINIC | Age: 85
End: 2020-03-03

## 2020-03-03 ENCOUNTER — TELEPHONE (OUTPATIENT)
Dept: CARDIOLOGY CLINIC | Age: 85
End: 2020-03-03

## 2020-03-03 NOTE — TELEPHONE ENCOUNTER
Patients  just hooked up his wife's machine for her pacemaker and would like to verify that the transmission has been received and it ids working as it should. Please advise.     Phone: [phone]

## 2020-03-03 NOTE — TELEPHONE ENCOUNTER
Spoke to . Ok per HIPAA . Initial transmission came over. Explain on how the monitor works. Will do more education when in clinic on 3-13-20. .. Patient will call if they need help with anything.

## 2020-03-13 ENCOUNTER — OFFICE VISIT (OUTPATIENT)
Dept: CARDIOLOGY CLINIC | Age: 85
End: 2020-03-13

## 2020-03-13 ENCOUNTER — TELEPHONE (OUTPATIENT)
Dept: CARDIOLOGY CLINIC | Age: 85
End: 2020-03-13

## 2020-03-13 DIAGNOSIS — Z95.818 STATUS POST PLACEMENT OF IMPLANTABLE LOOP RECORDER: Primary | ICD-10-CM

## 2020-03-13 NOTE — TELEPHONE ENCOUNTER
Called Pt. Two patient identifiers confirmed. Pt stated that she would like to cancel appointment for today. Notified pt we will cancel appointment as long as she is not activity bleeding for the incision. Pt stated that she is not bleeding skin glue has fallen off and has healed very well. Notified pt that we will cancel appointment and to call back if she needs anything. Pt verbalized understanding of information discussed w/ no further questions at this time.

## 2020-03-13 NOTE — TELEPHONE ENCOUNTER
Patient would like to cancel/reschedule today's 2 week wound check due to being \"scared to leave the house\". She stated that she \"feels fine\" and denies any symptoms. Please advise.     Phone #: [phone]  Thanks

## 2020-03-16 ENCOUNTER — TELEPHONE (OUTPATIENT)
Dept: CARDIOLOGY CLINIC | Age: 85
End: 2020-03-16

## 2020-03-16 ENCOUNTER — TELEPHONE (OUTPATIENT)
Dept: INTERNAL MEDICINE CLINIC | Age: 85
End: 2020-03-16

## 2020-03-16 NOTE — TELEPHONE ENCOUNTER
Can mail her a list, with the disclaimer it was last updated on (what ever date it was last reviewed). Pt with dementia.

## 2020-03-16 NOTE — TELEPHONE ENCOUNTER
Patients   is calling to verify that you are receiving the patients loop recorder checks that he has sent. Please adise.     Phone: [phone]

## 2020-03-16 NOTE — TELEPHONE ENCOUNTER
Returned pt's husbands call. Confirmed that the box is working and the last data send was from early this morning. Went over how to use the pt activator and he verbalized understanding had no further questions.

## 2020-03-17 DIAGNOSIS — G30.1 LATE ONSET ALZHEIMER'S DISEASE WITHOUT BEHAVIORAL DISTURBANCE (HCC): ICD-10-CM

## 2020-03-17 DIAGNOSIS — F02.80 LATE ONSET ALZHEIMER'S DISEASE WITHOUT BEHAVIORAL DISTURBANCE (HCC): ICD-10-CM

## 2020-03-17 RX ORDER — MEMANTINE HYDROCHLORIDE 14 MG/1
CAPSULE, EXTENDED RELEASE ORAL
Qty: 30 CAP | Refills: 1 | Status: SHIPPED | OUTPATIENT
Start: 2020-03-17 | End: 2020-06-01

## 2020-03-18 ENCOUNTER — TELEPHONE (OUTPATIENT)
Dept: CARDIOLOGY CLINIC | Age: 85
End: 2020-03-18

## 2020-03-18 ENCOUNTER — TELEPHONE (OUTPATIENT)
Dept: INTERNAL MEDICINE CLINIC | Age: 85
End: 2020-03-18

## 2020-03-18 NOTE — TELEPHONE ENCOUNTER
Patient's  would like a call regarding a letter that was received via mail. Please advise.     Phone #: [phone]  Thanks

## 2020-03-19 NOTE — TELEPHONE ENCOUNTER
Returned pt's husbands call but talked with pt herself. She was unsure why her  called and did not know anything about a letter he received in the mail. Her  was not home at the moment and I told her to please relay that I returned his call and to call back if he still has any questions. Pt verbalized understanding and had no further questions.

## 2020-03-19 NOTE — TELEPHONE ENCOUNTER
Return call to patient, identified with 2 identifiers. She reports the constipation has resolved. She used a fleets enema. She does take Benefiber daily. She will let us know if any questions.

## 2020-03-31 ENCOUNTER — OFFICE VISIT (OUTPATIENT)
Dept: CARDIOLOGY CLINIC | Age: 85
End: 2020-03-31

## 2020-03-31 DIAGNOSIS — Z95.818 STATUS POST PLACEMENT OF IMPLANTABLE LOOP RECORDER: Primary | ICD-10-CM

## 2020-04-22 ENCOUNTER — TELEPHONE (OUTPATIENT)
Dept: CARDIOLOGY CLINIC | Age: 85
End: 2020-04-22

## 2020-04-22 NOTE — TELEPHONE ENCOUNTER
Patient's  would like to speak with you about the patient's monitor. Please advise.     Phone #: [phone]  Thanks

## 2020-04-23 NOTE — TELEPHONE ENCOUNTER
Patients  requesting to speak with [address] regarding patient monitor.      Phone: [phone]

## 2020-05-04 RX ORDER — METOPROLOL TARTRATE 25 MG/1
TABLET, FILM COATED ORAL
Qty: 90 TAB | Refills: 0 | Status: SHIPPED | OUTPATIENT
Start: 2020-05-04 | End: 2020-08-24

## 2020-05-13 ENCOUNTER — TELEPHONE (OUTPATIENT)
Dept: CARDIOLOGY CLINIC | Age: 85
End: 2020-05-13

## 2020-06-01 ENCOUNTER — OFFICE VISIT (OUTPATIENT)
Dept: CARDIOLOGY CLINIC | Age: 85
End: 2020-06-01

## 2020-06-01 DIAGNOSIS — F02.80 LATE ONSET ALZHEIMER'S DISEASE WITHOUT BEHAVIORAL DISTURBANCE (HCC): ICD-10-CM

## 2020-06-01 DIAGNOSIS — Z95.818 STATUS POST PLACEMENT OF IMPLANTABLE LOOP RECORDER: Primary | ICD-10-CM

## 2020-06-01 DIAGNOSIS — G30.1 LATE ONSET ALZHEIMER'S DISEASE WITHOUT BEHAVIORAL DISTURBANCE (HCC): ICD-10-CM

## 2020-06-01 RX ORDER — MEMANTINE HYDROCHLORIDE 21 MG/1
CAPSULE, EXTENDED RELEASE ORAL
Qty: 30 CAP | Refills: 1 | Status: SHIPPED | OUTPATIENT
Start: 2020-06-01 | End: 2020-07-14

## 2020-06-01 NOTE — TELEPHONE ENCOUNTER
Please call patient's daughter. Notify her of dose increased from 14 mg to 21 mg.  Goal is to get her up to 28 mg. Also due for follow-up in July, nothing scheduled.

## 2020-06-02 NOTE — TELEPHONE ENCOUNTER
Call to daughter, identified with 2 identifiers. Advised of Dr. Noel Dodge' note and recommendations. She will schedule follow up for July with Dr. Noel Dodge.

## 2020-06-03 ENCOUNTER — VIRTUAL VISIT (OUTPATIENT)
Dept: NEUROLOGY | Age: 85
End: 2020-06-03

## 2020-06-03 VITALS — RESPIRATION RATE: 18 BRPM | WEIGHT: 110 LBS | HEIGHT: 64 IN | BODY MASS INDEX: 18.78 KG/M2

## 2020-06-03 DIAGNOSIS — F03.90 DEMENTIA WITHOUT BEHAVIORAL DISTURBANCE, UNSPECIFIED DEMENTIA TYPE: ICD-10-CM

## 2020-06-03 DIAGNOSIS — G45.9 TIA (TRANSIENT ISCHEMIC ATTACK): Primary | ICD-10-CM

## 2020-06-03 NOTE — PROGRESS NOTES
Neurology Clinic Follow up Note    Patient ID:  Selena Dawit  [id number]  1 y.o.  1935      Ms. Surya Nogueira is here for follow up today of TIA       Last Appointment With Me:  12/2/2019    This is a telemedicine visit that was performed with the originating site at [facility] and the distant site at patient's home. Verbal consent to participate in video visit was obtained. The patient was identified by name and date of birth. This visit occurred during the Coronavirus [phone]) [facility] Emergency. I discussed with the patient the nature of our telemedicine visits, that:     I would evaluate the patient and recommend diagnostics and treatments based on my assessment   Our sessions are not being recorded and that personal health information is protected   Our team would provide follow up care in person if/when the patient needs it    Transient episode of expressive aphasia, possible dysarthria noted in the ED on admission lasting < 5 minutes with complete resolution. Interval History:   Patient returns for f/u of TIA. Presented with slurred speech initially s/p tPA lasting 45 minutes to one hour. Seen by Dr. Dot Cisneros. MRI Brain negative for acute ischemia. Continued on ASA daily. Saw cardiology s/p ILR without recorded events. Denies recurrence of aphasia, dysarthria. No focal weakness, paresthesias, vision abnormalities since discharge. Headaches are reportedly improved from last visit. She is compliant with ASA 81mg daily but not statin therapy (scared of side effects). Pt does not feel memory is a problem, although her family tells her otherwise. She remains on Namenda-no reported side effects. PMHx/ PSHx/ FHx/ SHx:  Reviewed and unchanged previous visit.    Past Medical History:   Diagnosis Date    Allergic rhinitis, cause unspecified     Anxiety     Back pain     C. difficile diarrhea 8/2/2017    Admitted to [facility]    Cancer ([facility]     basal cell face, back, neck    Cancer (HCC)     squamous cell leg    Chronic kidney disease     Chronic pain     DJD (degenerative joint disease) of cervical spine     Fibromyalgia     GERD (gastroesophageal reflux disease)     Hypercholesterolemia     Hypertension     Hyponatremia     IBS (irritable bowel syndrome)     Irritable bowel syndrome with diarrhea 6/29/2016    GI- Dr Sher Oneil Kidney disease, chronic, stage III (GFR 30-59 ml/min) [facility] 6/2011    Dr. Billy Gonzales    Nausea & vomiting     Neuropathy 6/22/2016    Both feet     Psychiatric disorder     DEPRESSION AND ANXIETY    Stroke ([facility] 2017    TIA    Thromboembolus ([facility] 2013    PE X2         ROS:  Comprehensive review of systems negative except for as noted above. Objective:       Meds:  Current Outpatient Medications   Medication Sig Dispense Refill    memantine ER (NAMENDA XR) 21 mg capsule TAKE 1 CAPSULE BY MOUTH ONE TIME DAILY (Patient taking differently: 14 mg. TAKE 1 CAPSULE BY MOUTH ONE TIME DAILY) 30 Cap 1    metoprolol tartrate (LOPRESSOR) 25 mg tablet TAKE 1/2 TABLET BY MOUTH TWICE DAILY  90 Tab 0    LORazepam (ATIVAN) 1 mg tablet Take  by mouth. Takes 0.5 mg in the morning and 1 mg at night.  acetaminophen (TYLENOL ARTHRITIS PAIN) 650 mg TbER Take 1 Tab by mouth every eight (8) hours.  aspirin delayed-release 81 mg tablet Take 1 Tab by mouth daily. 30 Tab 0    APPLE CIDER VINEGAR PO Take  by mouth daily.  famotidine (PEPCID) 20 mg tablet Take 20 mg by mouth nightly.  bismuth subsalicylate (PEPTO-BISMOL PO) Take 2 Adjustable Dose Pre-filled Pen Syringe by mouth as needed for Diarrhea or Nausea.  Wheat Dextrin (BENEFIBER CLEAR) 3 gram/3.5 gram pwpk Take 3.5 g by mouth daily. Exam:  Visit Vitals  Resp 18   Ht 5' 4\" (1.626 m)   Wt 49.9 kg (110 lb)   BMI 18.88 kg/m²     Due to this being a TeleHealth evaluation, many elements of the physical examination are unable to be assessed.    NEUROLOGICAL EXAM:  General: Awake, alert, speech fluent, non dysarthria. Oriented to self, place and date. LABS  Results for orders placed or performed in visit on 01/16/20   CBC WITH AUTOMATED DIFF   Result Value Ref Range    WBC 8.7 3.4 - 10.8 x10E3/uL    RBC 4.15 3.77 - 5.28 x10E6/uL    HGB 12.5 11.1 - 15.9 g/dL    HCT 36.3 34.0 - 46.6 %    MCV 88 79 - 97 fL    MCH 30.1 26.6 - 33.0 pg    MCHC 34.4 31.5 - 35.7 g/dL    RDW 13.3 11.7 - 15.4 %    PLATELET 433 492 - 689 x10E3/uL    NEUTROPHILS 55 Not Estab. %    Lymphocytes 33 Not Estab. %    MONOCYTES 10 Not Estab. %    EOSINOPHILS 1 Not Estab. %    BASOPHILS 1 Not Estab. %    ABS. NEUTROPHILS 4.8 1.4 - 7.0 x10E3/uL    Abs Lymphocytes 2.8 0.7 - 3.1 x10E3/uL    ABS. MONOCYTES 0.8 0.1 - 0.9 x10E3/uL    ABS. EOSINOPHILS 0.1 0.0 - 0.4 x10E3/uL    ABS. BASOPHILS 0.1 0.0 - 0.2 x10E3/uL    IMMATURE GRANULOCYTES 0 Not Estab. %    ABS. IMM. GRANS. 0.0 0.0 - 0.1 [id number]/JX   METABOLIC PANEL, BASIC   Result Value Ref Range    Glucose 90 65 - 99 mg/dL    BUN 24 8 - 27 mg/dL    Creatinine 1.21 (H) 0.57 - 1.00 mg/dL    GFR est non-AA 41 (L) >59 mL/min/1.73    GFR est AA 47 (L) >59 mL/min/1.73    BUN/Creatinine ratio 20 12 - 28    Sodium 133 (L) 134 - 144 mmol/L    Potassium 4.6 3.5 - 5.2 mmol/L    Chloride 95 (L) 96 - 106 mmol/L    CO2 24 20 - 29 mmol/L    Calcium 9.2 8.7 - 10.3 mg/dL   CKD REPORT   Result Value Ref Range    Interpretation Note        IMAGING:  MRI Results (maximum last 3): Results from [place] encounter on 08/20/19   MRA BRAIN WO CONT    Narrative INDICATION: stroke    COMPARISON:  None    TECHNIQUE:  3-D time-of-flight MRA of the brain was performed. Multiplanar  reconstructions were obtained. FINDINGS:    The vertebral arteries are codominant. The basilar artery and its branches are  normal. The internal carotid, anterior cerebral, and middle cerebral arteries  are patent. There is no flow-limiting intracranial stenosis. There is no  aneurysm.  There are no sizable posterior communicating arteries. Impression IMPRESSION:  No flow limiting stenosis or intracranial aneurysm. MRI BRAIN WO CONT    Narrative EXAM: MRI BRAIN WO CONT    INDICATION: TIA/ APHASIA    COMPARISON: 5/25/2019. CONTRAST: None. TECHNIQUE:    Multiplanar multisequence acquisition without contrast of the brain. FINDINGS:  The ventricles are normal in size and position. There is mild white matter  disease likely to chronic small vessel ischemic disease. There is no acute  infarct, hemorrhage, extra-axial fluid collection, or mass effect. There is no  cerebellar tonsillar herniation. Expected arterial flow-voids are present. The paranasal sinuses, mastoid air cells, and middle ears are clear. The patient  is status post bilateral lens surgery. No significant osseous or scalp lesions  are identified. Impression IMPRESSION:   Mild white matter disease likely related to chronic small vessel ischemic  disease. No evidence of acute process. Results from [place] encounter on 05/25/19   MRI BRAIN WO CONT    Narrative EXAM: MRI BRAIN WO CONT    INDICATION: h/o TIA, memory changes, assess for change    COMPARISON: 8/3/2017. CONTRAST: None. TECHNIQUE:    Multiplanar multisequence acquisition without contrast of the brain. FINDINGS:  The ventricles are stable in size and position. There is confluent  periventricular white matter FLAIR hyperintensity. Some of the round to oval  foci of FLAIR hyperintensity scattered throughout the centrum semiovale and  corona radiata have become slightly bigger than seen on the 2017 study. However,  there is no corresponding diffusion abnormality. There is no acute infarct,  hemorrhage, extra-axial fluid collection, or mass effect. There is no cerebellar  tonsillar herniation. Expected arterial flow-voids are present. The paranasal sinuses, mastoid air cells, and middle ears are clear. The orbital  contents are within normal limits. No significant osseous or scalp lesions are  identified. There may be a disc bulge at C3-4. (Series 2, image 12). Impression Interval mild progression of the nonspecific presumed small vessel ischemic  disease. No recent intracranial stroke  Possible C3-4 disc bulge. Assessment:     Encounter Diagnoses     ICD-10-CM ICD-9-CM   1. TIA (transient ischemic attack) G45.9 435.9   2. Dementia without behavioral disturbance, unspecified dementia type [facility]) F03.90 34.20   80year old female h/o IBS, gastroparesis, pre-DM, HTN, CKD, fibromyalgia, anxiety d/o, remote PE, dementia here for f/u. Previously seen for TIA (expressive aphasia, dysarthria 8/2/17) with more recent hospitalization for dysarthria lasting 45 minutes to 1 hour 8/20/19 s/p tPA. No residual deficits post discharge. MRI/MRA/CUS reviewed and without intracranial ischemia, large vessels patent. TTE completed without PFO. Dx recurrent TIA. Compliant with ASA therapy. She has done well since her last visit without recurrent episodes. S/P ILR placement by cardiology without recorded events. Will continue current antiplatelet therapy. She remains on Namenda for her memory impairment, being managed by her PCP. She feels this is stable currently. Plan:   Cont. ASA 81mg daily for stroke prevention  SBP goal<140, LDL<70  Cont. Namenda for cognitive impairment     Follow-up and Dispositions    · Return if symptoms worsen or fail to improve. Signed:  All Fernandez DO  6/3/2020    The duration of this appointment visit was 8 minutes of time with the patient. At least 50% of this time was spent in counseling, explanation of diagnosis, planning of further management, and coordination of care.

## 2020-06-25 ENCOUNTER — OFFICE VISIT (OUTPATIENT)
Dept: CARDIOLOGY CLINIC | Age: 85
End: 2020-06-25

## 2020-06-25 DIAGNOSIS — Z95.818 STATUS POST PLACEMENT OF IMPLANTABLE LOOP RECORDER: Primary | ICD-10-CM

## 2020-06-29 ENCOUNTER — DOCUMENTATION ONLY (OUTPATIENT)
Dept: CARDIOLOGY CLINIC | Age: 85
End: 2020-06-29

## 2020-06-29 NOTE — PROGRESS NOTES
ILR showed PAT 6 seconds 176 bpm on 6/24/2020  Continue to monitor    Future Appointments   Date Time Provider Americo Sallie   8/17/2020  2:00 PM Keon Gonzalez MD [id number] [facility]

## 2020-07-01 ENCOUNTER — TELEPHONE (OUTPATIENT)
Dept: INTERNAL MEDICINE CLINIC | Age: 85
End: 2020-07-01

## 2020-07-01 NOTE — TELEPHONE ENCOUNTER
Pt called said that she has had a sore throat the past couples of days    I advised pt that we do not sick any sick pt    [phone]    Please call

## 2020-07-01 NOTE — TELEPHONE ENCOUNTER
Spoke with patient and she stated that she started have a sore throat on Sunday and it has gotten worse. She also has a cough. Let patient know that she can not be seen in the office and unfortunately we did not have any appointments for her to be seen through . Gave the patient the number to [facility] that was near her and the she refused to call [facility].

## 2020-07-02 ENCOUNTER — TELEPHONE (OUTPATIENT)
Dept: INTERNAL MEDICINE CLINIC | Age: 85
End: 2020-07-02

## 2020-07-06 NOTE — TELEPHONE ENCOUNTER
Call to patient, identified with 2 identifiers. States she was seen at [facility] on Thursday, called and requested records. Reports she was started on Zpak and is not feeling any better, throat is still sore. Advised may take a couple more days for her to feel better.   She will let us know if doesn't improve, will complete antibiotic, keep self hydrated and rest.

## 2020-07-08 ENCOUNTER — TELEPHONE (OUTPATIENT)
Dept: INTERNAL MEDICINE CLINIC | Age: 85
End: 2020-07-08

## 2020-07-08 NOTE — TELEPHONE ENCOUNTER
Galo Richard (Self) [phone] (H)     Pt glenn she [facility] for a sore [facility] and cough, they put her on zithromycin and she is not any better. What should she do?

## 2020-07-09 NOTE — TELEPHONE ENCOUNTER
Returned patient call. Per Dr Maegan Hooker it is recommended patient be seen at [facility].

## 2020-07-09 NOTE — TELEPHONE ENCOUNTER
PAFP.  Can't be seen her since she is sick.   Would prefer for her to be seen in person then virtual.

## 2020-07-10 ENCOUNTER — OFFICE VISIT (OUTPATIENT)
Dept: INTERNAL MEDICINE CLINIC | Age: 85
End: 2020-07-10

## 2020-07-10 DIAGNOSIS — J02.9 SORE THROAT: Primary | ICD-10-CM

## 2020-07-10 RX ORDER — METHYLPREDNISOLONE 4 MG/1
TABLET ORAL
Qty: 1 DOSE PACK | Refills: 0 | Status: SHIPPED | OUTPATIENT
Start: 2020-07-10 | End: 2020-08-17

## 2020-07-10 NOTE — PROGRESS NOTES
Chacorta Padilla is a 80 y.o. female evaluated via telephone on 7/10/2020. Documentation:  I communicated with the patient and/or health care decision maker regarding: Patient reports sore throat x 2-3 weeks. [facility] went to [facility] over a week ago and was placed on azithromycin. She completed it on July 6 with no improvement in her sore throat. She denies cough or fever. She denies issues with her acid reflux. She is taking daily pepcid for this. She does report a tightness feeling in her throat and sometimes it is hard to swallow. Her sore throat does not change throughout the day. She has taken tylenol with no relief. She does note a watery nasal drip at times. She walks outside a few times per week. She denies history of environmental allergies, but states she often has mild congestion. Details of this discussion including any medical advice provided is documented below. Diagnoses and all orders for this visit:    1. Sore throat    Other orders  -     methylPREDNISolone (MEDROL DOSEPACK) 4 mg tablet;  Follow dose pack instructions     start claritin nightly  This may be allergy related  Complete medrol dose pack due to swollen sensation in throat  Follow up in 1 week if no improvement      The following sections were reviewed and/or updated:  Patient Active Problem List   Diagnosis Code    Essential hypertension, benign I10    DDD (degenerative disc disease), cervical M50.30    Anxiety F41.9    Allergic rhinitis J30.9    DDD (degenerative disc disease), lumbar M51.36    CKD (chronic kidney disease) stage 3, GFR 30-59 ml/min ([facility] N18.3    Pre-diabetes R73.03    History of pulmonary embolism Z86.711    Gastroparesis K31.84    Spinal stenosis, lumbar M48.061    Fibromyalgia M79.7    Gastroesophageal reflux disease without esophagitis K21.9    Other irritable bowel syndrome K58.8    Recurrent major depressive disorder ([facility]) F33.9    Alzheimer's disease ([facility]) G30.9, F02.80    TIA (transient ischemic attack) G45.9    Status post placement of implantable loop recorder Z95.818     Patient Active Problem List    Diagnosis Date Noted    Status post placement of implantable loop recorder 02/28/2020    TIA (transient ischemic attack) 08/20/2019    Alzheimer's disease ([facility] [address] 07/05/2019    Recurrent major depressive disorder ([facility] [address] 07/17/2018    Other irritable bowel syndrome 06/29/2016    Gastroparesis 06/22/2016    Spinal stenosis, lumbar 06/22/2016    Fibromyalgia 06/22/2016    Gastroesophageal reflux disease without esophagitis 06/22/2016    History of pulmonary embolism 06/21/2016    Pre-diabetes 12/09/2011    CKD (chronic kidney disease) stage 3, GFR 30-59 ml/min ([facility]) 07/21/2011    DDD (degenerative disc disease), lumbar 10/12/2010    Allergic rhinitis 10/11/2010    Anxiety 08/04/2010    Essential hypertension, benign 04/12/2010    DDD (degenerative disc disease), cervical 04/12/2010     Current Outpatient Medications   Medication Sig Dispense Refill    methylPREDNISolone (MEDROL DOSEPACK) 4 mg tablet Follow dose pack instructions 1 Dose Pack 0    memantine ER (NAMENDA XR) 21 mg capsule TAKE 1 CAPSULE BY MOUTH ONE TIME DAILY (Patient taking differently: 14 mg. TAKE 1 CAPSULE BY MOUTH ONE TIME DAILY) 30 Cap 1    metoprolol tartrate (LOPRESSOR) 25 mg tablet TAKE 1/2 TABLET BY MOUTH TWICE DAILY  90 Tab 0    APPLE CIDER VINEGAR PO Take  by mouth daily.  famotidine (PEPCID) 20 mg tablet Take 20 mg by mouth nightly.  LORazepam (ATIVAN) 1 mg tablet Take  by mouth. Takes 0.5 mg in the morning and 1 mg at night.  acetaminophen (TYLENOL ARTHRITIS PAIN) 650 mg TbER Take 1 Tab by mouth every eight (8) hours.  bismuth subsalicylate (PEPTO-BISMOL PO) Take 2 Adjustable Dose Pre-filled Pen Syringe by mouth as needed for Diarrhea or Nausea.  Wheat Dextrin (BENEFIBER CLEAR) 3 gram/3.5 gram pwpk Take 3.5 g by mouth daily.       aspirin delayed-release 81 mg tablet Take 1 Tab by mouth daily. 30 Tab 0     Allergies   Allergen Reactions    Latex Itching    Dilaudid [Hydromorphone (Bulk)] Other (comments)     ? loss of consciousness    Altace [Ramipril] Unknown (comments)    Ambien [Zolpidem] Unknown (comments)    [employer]ia [employer] Test [Blood Sugar Diagnostic, Disc-Type] Unknown (comments)    Aspirin Unknown (comments)    Astelin [Azelastine] Unknown (comments)    Atenolol Unknown (comments)    Banex La Unknown (comments)    Benicar [Olmesartan] Unknown (comments)    Carbocaine [Mepivacaine] Unknown (comments)    Cleocin [Clindamycin Hcl] Hives    Codeine Nausea and Vomiting    Codeine-Asa-Salicyl-Acetam-Caf Other (comments)     Stomach pain    Cortisporin [Neomy-Polymyxinb-Bacitracin-Hc] Unknown (comments)    Cymbalta [Duloxetine] Other (comments)    Cytotec [Misoprostol] Unknown (comments)    Dilaudid [Hydromorphone] Unknown (comments)     Patient states that she was unable to respond, but was breathing    Effexor [Venlafaxine] Unknown (comments)    Epinephrine Unknown (comments)     Heart racing    Estra-D Unknown (comments)    Estrace [Estradiol] Unknown (comments)    Fentanyl Nausea and Vomiting and Unknown (comments)     shakes    Flexeril [Cyclobenzaprine] Unable to Obtain     \"drugs me\"    Flonase [Fluticasone] Unknown (comments)    Flunisolide Unknown (comments)    Guiatussin W/Codeine Unknown (comments)    Ketek [Telithromycin] Nausea Only    Klonopin [Clonazepam] Unknown (comments)    Lexapro [Escitalopram] Nausea and Vomiting    Lodine [Etodolac] Nausea and Vomiting    Morphine Unknown (comments)    Nsaids (Non-Steroidal Anti-Inflammatory Drug) Nausea and Vomiting    Other Medication Unknown (comments)     Gareth Alford    Paxil [Paroxetine Hcl] Nausea and Vomiting    Pcn [Penicillins] Hives    Percocet [Oxycodone-Acetaminophen] Unknown (comments)    Percodan [Oxycodone Hcl-Oxycodone-Asa] Unknown (comments)    Pneumococcal 23-Mary Ps Vaccine Swelling    Proctocream-Hc [Hydrocortisone] Unknown (comments)    Prozac [Fluoxetine] Nausea Only     Suicidal visions      Pseudoephedrine Other (comments)    Quinidine Unknown (comments)    Reglan [Metoclopramide] Unknown (comments)    Remeron [Mirtazapine] Other (comments)    Resaid Other (comments)     inflammed eyes    Robaxin [Methocarbamol] Unknown (comments)    Sonata [Zaleplon] Unknown (comments)    Sulfa (Sulfonamide Antibiotics) Hives    Surmontil [Trimipramine] Nausea and Vomiting    Talwin [Pentazocine Lactate] Unknown (comments)    Toradol [Ketorolac Tromethamine] Nausea and Vomiting    Ultram [Tramadol] Unknown (comments)    Vancomycin Nausea Only    Vasoconstrictor Drug Unknown (comments)    Vigamox [Moxifloxacin] Rash and Swelling    Vioxx [Rofecoxib] Unknown (comments)    Voltaren [Diclofenac Sodium] Itching    Wellbutrin [Bupropion Hcl] Nausea and Vomiting    Zelnorm [Tegaserod Hydrogen Maleate] Unknown (comments)    Zoloft [Sertraline] Nausea and Vomiting     Past Medical History:   Diagnosis Date    Allergic rhinitis, cause unspecified     Anxiety     Back pain     C. difficile diarrhea 8/2/2017    Admitted to [facility]    Cancer ([facility] [address]     basal cell face, back, neck    Cancer (HCC)     squamous cell leg    Chronic kidney disease     Chronic pain     DJD (degenerative joint disease) of cervical spine     Fibromyalgia     GERD (gastroesophageal reflux disease)     Hypercholesterolemia     Hypertension     Hyponatremia     IBS (irritable bowel syndrome)     Irritable bowel syndrome with diarrhea 6/29/2016    GI- Dr Murtaza Callahan    Kidney disease, chronic, stage III (GFR 30-59 ml/min) ([facility] [address] 6/2011    Dr. Winston Gonzales    Nausea & vomiting     Neuropathy 6/22/2016    Both feet     Psychiatric disorder     DEPRESSION AND ANXIETY    Stroke ([facility] [address] 2017    TIA    Thromboembolus ([facility] [address] 2013    PE X2     Past Surgical History:   Procedure Laterality Date    COLONOSCOPY  1/20/11    diverticulosis    EGD  1/20/11    schatzki's ring--clear on barium swallow 7/11    HX ADENOIDECTOMY  1940    HX CATARACT REMOVAL Left 2013    HX CATARACT REMOVAL Right 01/27/2014    HX CHOLECYSTECTOMY  5/10    HX DILATION AND CURETTAGE  1963    HX GI      COLONOSCOPY    HX HEENT      BCCA REMOVED FROM FACE ,NOSE     HX HYSTERECTOMY  1970`S    hysterectomy    HX KNEE ARTHROSCOPY Left 1990    HX KNEE REPLACEMENT Right 9/29/09    HX ORTHOPAEDIC Right 2017    trigger finger release    HX ORTHOPAEDIC Right 07/18/2019     RING FINGRER TRIGGER FINGER REALSE     HX OTHER SURGICAL      skin cancer removed: L leg    HX OTHER SURGICAL      BCCA REMOVED FROM BACK     HX POLYPECTOMY  1989    HX ROTATOR CUFF REPAIR Right 08/15/2019    and sub-acromial decompression    HX SHOULDER ARTHROSCOPY Left 12/13/2018    arthroscopy & sub-acromial decompression    HX TONSILLECTOMY  1940    HX UROLOGICAL  1995    bladder suspension    MD INSERTION SUBQ CARDIAC RHYTHM MONITOR W/PRGRMG N/A 2/28/2020    LOOP RECORDER INSERT performed by Omega Hall MD at [address] CATH LAB    REPAIR OF RECTOCELE       Family History   Problem Relation Age of Onset    Cancer Mother         lung    Cancer Maternal Aunt         lung    Cancer Maternal Uncle         lung    Cancer Sister         breast    Other Brother         has to have blood tx every other week   John Kindler Cancer Son 36        prostate, recurrence 7 yrs later   John Kindler Arthritis-osteo Son         hips   John Kindler Arthritis-osteo Daughter     Arthritis-osteo Daughter     Arthritis-osteo Sister     No Known Problems Sister     No Known Problems Sister     Anesth Problems Neg Hx      Social History     Tobacco Use    Smoking status: Never Smoker    Smokeless tobacco: Never Used   Substance Use Topics    Alcohol use: Not Currently     Alcohol/week: 0.0 standard drinks     Frequency: Never     Binge frequency: Never     Comment: rarely Consent:  [place], who was seen by synchronous (real-time) audio only technology, and/or her healthcare decision maker, is aware that this patient-initiated, Telehealth encounter on 7/10/2020 is a billable service. She is aware that she may receive a bill for any such additional services and has provided verbal consent to proceed: Yes. Patient identification was verified prior to start of the visit. A caregiver was present when appropriate. Due to this being a TeleHealth encounter (during [address] emergency), evaluation of the following organ systems was limited: VS/Constitutional/EENT/Resp/CV/GI//MS/Neuro/Skin/Heme-Lymph-Imm. Pursuant to the emergency declaration under the [facility], [facility] waiver authority and the [employer] Act, this Virtual Visit was conducted, with patient's (and/or legal guardian's) consent, to reduce the patient's risk of exposure to COVID-19 and provide necessary medical care. Services were provided through a synchronous discussion virtually to substitute for in-person clinic visit. I was in the office. The patient was at home. I affirm this is a Patient Initiated Episode with an Established Patient who has not had a related appointment within my department in the past 7 days or scheduled within the next 24 hours.     Total Time: minutes: 21-30 minutes    Note: not billable if this call serves to triage the patient into an appointment for the relevant concern    Leah Babin NP

## 2020-07-13 DIAGNOSIS — F02.80 LATE ONSET ALZHEIMER'S DISEASE WITHOUT BEHAVIORAL DISTURBANCE (HCC): ICD-10-CM

## 2020-07-13 DIAGNOSIS — G30.1 LATE ONSET ALZHEIMER'S DISEASE WITHOUT BEHAVIORAL DISTURBANCE (HCC): ICD-10-CM

## 2020-07-14 RX ORDER — MEMANTINE HYDROCHLORIDE 21 MG/1
CAPSULE, EXTENDED RELEASE ORAL
Qty: 30 CAP | Refills: 0 | Status: SHIPPED | OUTPATIENT
Start: 2020-07-14 | End: 2020-08-17

## 2020-07-14 NOTE — TELEPHONE ENCOUNTER
Has f/u next month, will address dose increase then.     Future Appointments   Date Time Provider Americo Rizo   8/17/2020  2:00 PM Jaspreet Tavares MD [id number] [facility]

## 2020-07-24 ENCOUNTER — TELEPHONE (OUTPATIENT)
Dept: INTERNAL MEDICINE CLINIC | Age: 85
End: 2020-07-24

## 2020-07-24 DIAGNOSIS — J02.9 SORE THROAT: Primary | ICD-10-CM

## 2020-07-24 RX ORDER — OMEPRAZOLE 40 MG/1
40 CAPSULE, DELAYED RELEASE ORAL DAILY
Qty: 30 CAP | Refills: 0 | Status: SHIPPED | OUTPATIENT
Start: 2020-07-24 | End: 2020-08-23

## 2020-07-24 RX ORDER — FLUTICASONE PROPIONATE 50 MCG
2 SPRAY, SUSPENSION (ML) NASAL DAILY
Qty: 1 BOTTLE | Refills: 1 | Status: SHIPPED | OUTPATIENT
Start: 2020-07-24 | End: 2020-08-17

## 2020-07-24 NOTE — TELEPHONE ENCOUNTER
ON CALL NOTE:  Called due to persistent sore throat. Seen at [facility] beginning of July, given zpak. Seen by Teri Ramirez on 7/10 and given medrol. She reports zpak did not help but medrol dose pack did. No changes w/ eating or drinking but then a few minutes later says there is some worsening pain w/ swallowing. Denies GERD. Denies fevers or chills. Will send in Flonase and Omeprazole. Will hold off on oral H1B for now. Reviewed w/ her allergy list.  To ENT if no changes. Red flags were reviewed & discussed with the patient to notify me or go to the ED. She verbalized understanding.

## 2020-07-27 DIAGNOSIS — F41.9 ANXIETY: Primary | ICD-10-CM

## 2020-07-27 NOTE — TELEPHONE ENCOUNTER
Pt said dr Sarah Jenkins never refill this medication  The dr who refill it before no longer practice  Can you call it in to [facility]  [phone]

## 2020-07-29 NOTE — TELEPHONE ENCOUNTER
Chart & [facility]  reviewed. Previously given medication by Jessica Smiley (NP - psychiatry). Last filled on 4/6/20, #90, 1mg.  1/24/20 - 0.5mg, #135. Has been on medication from her for > 1 yr. Need more information. She has f/u on 817/20. Please call patient. I have concerns with these medications, especially with her memory. It is not recommended to be on medications at her age & w/ her memory. I have deferred to psychiatry but she will need to see psychiatrist to get further refills. I will refill once. Need to know how she is taking medication.

## 2020-07-30 RX ORDER — LORAZEPAM 0.5 MG/1
0.5 TABLET ORAL
Status: CANCELLED | OUTPATIENT
Start: 2020-07-30

## 2020-07-30 NOTE — TELEPHONE ENCOUNTER
Please call pt. This request keeps getting routed back to me. I need more information before I can refill.

## 2020-07-31 ENCOUNTER — TELEPHONE (OUTPATIENT)
Dept: INTERNAL MEDICINE CLINIC | Age: 85
End: 2020-07-31

## 2020-07-31 RX ORDER — LORAZEPAM 0.5 MG/1
0.5 TABLET ORAL DAILY
Qty: 30 TAB | Refills: 0 | Status: SHIPPED | OUTPATIENT
Start: 2020-07-31 | End: 2020-08-17 | Stop reason: SDUPTHER

## 2020-07-31 RX ORDER — LORAZEPAM 1 MG/1
1 TABLET ORAL
Qty: 30 TAB | Refills: 0 | Status: SHIPPED | OUTPATIENT
Start: 2020-07-31 | End: 2020-08-17 | Stop reason: ALTCHOICE

## 2020-07-31 NOTE — TELEPHONE ENCOUNTER
Spoke to daughter. NP left. Is out of medications as of tonight. Reviewed my concerns w/ medications. Refilled x 1 month. Future Appointments Date Time Provider Americo Rizo 8/17/2020  2:00 PM MD BLAIRE Barbosa [facility] Last PDMP Eric Goodman as Reviewed: 
Review User Review Instant Review Result LEXIE GOLD 7/31/2020  4:14 PM Reviewed PDMP [1]

## 2020-07-31 NOTE — TELEPHONE ENCOUNTER
Attempted to reach patient via phone, unsucessful. Left message to return call.   Need to know if she is taking this medication, how she is taking, who has prescribed and why

## 2020-08-03 NOTE — TELEPHONE ENCOUNTER
[phone]    Please call to discuss the script Lorazepam
Call to pharmacy. No longer needed to discuss medication.
Yes, the patient is being discharged from [facility]...

## 2020-08-10 ENCOUNTER — TELEPHONE (OUTPATIENT)
Dept: INTERNAL MEDICINE CLINIC | Age: 85
End: 2020-08-10

## 2020-08-10 ENCOUNTER — OFFICE VISIT (OUTPATIENT)
Dept: CARDIOLOGY CLINIC | Age: 85
End: 2020-08-10
Payer: MEDICARE

## 2020-08-10 DIAGNOSIS — Z95.818 STATUS POST PLACEMENT OF IMPLANTABLE LOOP RECORDER: Primary | ICD-10-CM

## 2020-08-10 PROCEDURE — 93298 REM INTERROG DEV EVAL SCRMS: CPT | Performed by: INTERNAL MEDICINE

## 2020-08-17 ENCOUNTER — OFFICE VISIT (OUTPATIENT)
Dept: INTERNAL MEDICINE CLINIC | Age: 85
End: 2020-08-17
Payer: MEDICARE

## 2020-08-17 ENCOUNTER — HOSPITAL ENCOUNTER (OUTPATIENT)
Dept: LAB | Age: 85
Discharge: HOME | End: 2020-08-17
Payer: MEDICARE

## 2020-08-17 ENCOUNTER — TELEPHONE (OUTPATIENT)
Dept: INTERNAL MEDICINE CLINIC | Age: 85
End: 2020-08-17

## 2020-08-17 VITALS
DIASTOLIC BLOOD PRESSURE: 76 MMHG | WEIGHT: 119 LBS | SYSTOLIC BLOOD PRESSURE: 138 MMHG | TEMPERATURE: 98 F | OXYGEN SATURATION: 97 % | RESPIRATION RATE: 20 BRPM | HEART RATE: 74 BPM | BODY MASS INDEX: 20.32 KG/M2 | HEIGHT: 64 IN

## 2020-08-17 DIAGNOSIS — N18.30 CKD (CHRONIC KIDNEY DISEASE) STAGE 3, GFR 30-59 ML/MIN (HCC): ICD-10-CM

## 2020-08-17 DIAGNOSIS — I10 ESSENTIAL HYPERTENSION, BENIGN: ICD-10-CM

## 2020-08-17 DIAGNOSIS — Z23 ENCOUNTER FOR IMMUNIZATION: ICD-10-CM

## 2020-08-17 DIAGNOSIS — Z00.00 MEDICARE ANNUAL WELLNESS VISIT, SUBSEQUENT: Primary | ICD-10-CM

## 2020-08-17 DIAGNOSIS — F02.80 LATE ONSET ALZHEIMER'S DISEASE WITHOUT BEHAVIORAL DISTURBANCE (HCC): ICD-10-CM

## 2020-08-17 DIAGNOSIS — G45.9 TIA (TRANSIENT ISCHEMIC ATTACK): ICD-10-CM

## 2020-08-17 DIAGNOSIS — G30.1 LATE ONSET ALZHEIMER'S DISEASE WITHOUT BEHAVIORAL DISTURBANCE (HCC): ICD-10-CM

## 2020-08-17 DIAGNOSIS — F41.9 ANXIETY: ICD-10-CM

## 2020-08-17 DIAGNOSIS — Z71.89 ADVANCED CARE PLANNING/COUNSELING DISCUSSION: ICD-10-CM

## 2020-08-17 DIAGNOSIS — F33.0 MILD EPISODE OF RECURRENT MAJOR DEPRESSIVE DISORDER (HCC): ICD-10-CM

## 2020-08-17 DIAGNOSIS — Z71.89 EDUCATED ABOUT COVID-19 VIRUS INFECTION: ICD-10-CM

## 2020-08-17 DIAGNOSIS — K21.9 GASTROESOPHAGEAL REFLUX DISEASE WITHOUT ESOPHAGITIS: ICD-10-CM

## 2020-08-17 PROCEDURE — G8752 SYS BP LESS 140: HCPCS | Performed by: INTERNAL MEDICINE

## 2020-08-17 PROCEDURE — G9717 DOC PT DX DEP/BP F/U NT REQ: HCPCS | Performed by: INTERNAL MEDICINE

## 2020-08-17 PROCEDURE — G0439 PPPS, SUBSEQ VISIT: HCPCS | Performed by: INTERNAL MEDICINE

## 2020-08-17 PROCEDURE — G8399 PT W/DXA RESULTS DOCUMENT: HCPCS | Performed by: INTERNAL MEDICINE

## 2020-08-17 PROCEDURE — 99214 OFFICE O/P EST MOD 30 MIN: CPT | Performed by: INTERNAL MEDICINE

## 2020-08-17 PROCEDURE — 1090F PRES/ABSN URINE INCON ASSESS: CPT | Performed by: INTERNAL MEDICINE

## 2020-08-17 PROCEDURE — 36415 COLL VENOUS BLD VENIPUNCTURE: CPT

## 2020-08-17 PROCEDURE — G8420 CALC BMI NORM PARAMETERS: HCPCS | Performed by: INTERNAL MEDICINE

## 2020-08-17 PROCEDURE — 80053 COMPREHEN METABOLIC PANEL: CPT

## 2020-08-17 PROCEDURE — 85027 COMPLETE CBC AUTOMATED: CPT

## 2020-08-17 PROCEDURE — G8536 NO DOC ELDER MAL SCRN: HCPCS | Performed by: INTERNAL MEDICINE

## 2020-08-17 PROCEDURE — G8754 DIAS BP LESS 90: HCPCS | Performed by: INTERNAL MEDICINE

## 2020-08-17 PROCEDURE — G8427 DOCREV CUR MEDS BY ELIG CLIN: HCPCS | Performed by: INTERNAL MEDICINE

## 2020-08-17 PROCEDURE — 1101F PT FALLS ASSESS-DOCD LE1/YR: CPT | Performed by: INTERNAL MEDICINE

## 2020-08-17 RX ORDER — MEMANTINE HYDROCHLORIDE 28 MG/1
28 CAPSULE, EXTENDED RELEASE ORAL DAILY
Qty: 90 CAP | Refills: 1 | Status: SHIPPED | OUTPATIENT
Start: 2020-08-17 | End: 2020-10-19 | Stop reason: DRUGHIGH

## 2020-08-17 RX ORDER — LORAZEPAM 0.5 MG/1
TABLET ORAL
Qty: 135 TAB | Refills: 0 | Status: SHIPPED | OUTPATIENT
Start: 2020-08-31 | End: 2020-12-27 | Stop reason: SDUPTHER

## 2020-08-17 NOTE — PROGRESS NOTES
Abby Topete is a 80 y.o. female who was seen in clinic today (8/17/2020) for a full physical.   and daughter are both present today. Assessment & Plan:     Diagnoses and all orders for this visit:    1. Medicare annual wellness visit, subsequent  -     REFERRAL TO OPHTHALMOLOGY    2. Advanced care planning/counseling discussion    3. Encounter for immunization    4. Educated about COVID-19 virus infection    5. Anxiety- stable, no concerns from family, reviewed my concerns w/ benzo's in her age & with her dementia, she has 60+ allergies and limited options. Will refill but will decrease by 1/2 both AM and PM dose. Reviewed my rational.  -     LORazepam (ATIVAN) 0.5 mg tablet; Takes 0.25 mg in the morning and 0.5 mg at night. 6. Mild episode of recurrent major depressive disorder ([facility] [address]- slightly worse, she attributed to being in the house due to [employer], On the basis of positive PHQ-9 screening (PHQ 9 Score: 9), reviewed no medication options, needs to get into old routine, family will watch for changes. 7. Late onset Alzheimer's disease without behavioral disturbance ([facility] [address]- worsening, will increase to max dose  -     memantine ER (NAMENDA XR) 28 mg capsule; Take 1 Cap by mouth daily. 8. Essential hypertension, benign- well controlled, continue current treatment pending review of labs   -     METABOLIC PANEL, COMPREHENSIVE  -     CBC W/O DIFF    9. Gastroesophageal reflux disease without esophagitis- stable, continue current treatment     10. TIA (transient ischemic attack)- asymptomatic, no med changes    11. CKD (chronic kidney disease) stage 3, GFR 30-59 ml/min ([facility])- fluctuating, up slightly on last check, will repeat. Follow-up and Dispositions    · Return in about 6 months (around 2/17/2021), or if symptoms worsen or fail to improve.          Subjective:   [place] is here today for a full physical.      Annual Wellness Visit- Subsequent Visit    Since last visit: psychiatrist has left and requested I start filling her Ativan, they RTC to discuss this. End of Life Planning: This was discussed with her today and she has an advanced directive - a copy has been provided. Reviewed DNR/DNI and patient is not interested. Depression Screen:  3 most recent PHQ Screens 8/17/2020   PHQ Not Done -   Little interest or pleasure in doing things Not at all   Feeling down, depressed, irritable, or hopeless Nearly every day   Total Score PHQ 2 3   Trouble falling or staying asleep, or sleeping too much Not at all   Feeling tired or having little energy Nearly every day   Poor appetite, weight loss, or overeating Not at all   Feeling bad about yourself - or that you are a failure or have let yourself or your family down Nearly every day   Trouble concentrating on things such as school, work, reading, or watching TV Not at all   Moving or speaking so slowly that other people could have noticed; or the opposite being so fidgety that others notice Not at all   Thoughts of being better off dead, or hurting yourself in some way Not at all   PHQ 9 Score 9   How difficult have these problems made it for you to do your work, take care of your home and get along with others Somewhat difficult         Fall Risk:   Fall Risk Assessment, last 12 mths 8/17/2020   Able to walk? Yes   Fall in past 12 months? No   Fall with injury? -   Number of falls in past 12 months -   Fall Risk Score -       Abuse Screen:  Abuse Screening Questionnaire 8/17/2020   Do you ever feel afraid of your partner? N   Are you in a relationship with someone who physically or mentally threatens you? N   Is it safe for you to go home?  Y         Alcohol Risk Factor Screening:   Do you average 1 drink per night or more than 7 drinks a week:  No    On any one occasion in the past three months have you have had more than 3 drinks containing alcohol:  No    Functional Ability and Level of Safety:   Hearing Screen: hearing is poor, she has hearing aides, but does not wear them regularly    Cognition Screen:  Has your family/caregiver stated any concerns about your memory: yes - she reports no changes, family reports it is worse  Cognitive Screening: Abnormal - MMSE (Mini Mental Status Exam) - 23/30. Ambulation: with no difficulty    Activities of Daily Living:    Exercise: not active  The home contains: handrails and grab bars  Patient needs help with:  transportation, preparing meals, managing medications and managing money    Adult Nutrition Screen:  No risk factors noted. Health Maintenance:   Daily Aspirin: yes  Bone Density: done on 10/20/15 - normal  Glaucoma Screening: UTD    Immunizations:   Influenza: will plan on getting it this fall  Tetanus: up to date  Shingles: will look into  Pneumonia: declined due to reported alleryg  Cancer screening:   Cervical: reviewed guidelines, n/a  Breast: reviewed guidelines, patient declined  Colon: reviewed guidelines, up to date       In addition to the above issues we also reviewed the following acute and/or chronic problems:    Cardiovascular Review  The patient has hypertension and h/o TIA's. She reports taking medications as instructed, no medication side effects noted. Diet and Lifestyle: generally follows a low fat low cholesterol diet, generally follows a low sodium diet, sedentary, no formal exercise but active during the day. Labs: reviewed and discussed with patient. GI Review  She has a history of GERD. Stopped PPI and is back on Ranitidine. She denies: abdominal bloating, heartburn, midepigastric pain and nausea. Neurological Review  She is here today to talk about dementia.  she reports no changes. She is concerned I want to increase her Namenda. Family reports memory is worse. She is managing her own medications. Daughter is asking about having [facility] come out to the house and help w/ medications. Mental Health Review  Patient is seen for insomnia, anxiety.   PHQ9 reviewed. She has been on ativan 0.5mg in AM and 1mg PM.  She is no longer seeing psychiatrist.   Reports experiences the following side effects from the treatment: none. Last PDMP [place] as Reviewed:  Review User Review Instant Review Result   Tirso Arms 8/17/2020  Reviewed PDMP [1]           Patient Care Team:  Brandon Barajas MD as PCP - General (Internal Medicine)  Brandon Barajas MD as PCP [place] [facility]an[place]  Jennifer Goyal MD (Nephrology)  Richy Pollard MD (Dermatology)  Katya Ulrich MD (Ophthalmology)  Delbert Javier NP (Psychiatry)  Rachel Sumner (Pain Management)  Robel Moon MD as Physician (Gastroenterology)  Anthony Claudio(Loreta) Nixon Cardona MD as Physician (Orthopedic Surgery)  Katya Ulrich MD as Physician (Ophthalmology)  Daniel Gutierrez MD (Cardiology)       The following sections were reviewed & updated as appropriate: Allergies, PMH, PSH, FH, and SH. Patient Active Problem List   Diagnosis Code    Essential hypertension, benign I10    DDD (degenerative disc disease), cervical M50.30    Anxiety F41.9    Allergic rhinitis J30.9    DDD (degenerative disc disease), lumbar M51.36    CKD (chronic kidney disease) stage 3, GFR 30-59 ml/min ([facility]) N18.3    Pre-diabetes R73.03    History of pulmonary embolism Z86.711    Gastroparesis K31.84    Spinal stenosis, lumbar M48.061    Fibromyalgia M79.7    Gastroesophageal reflux disease without esophagitis K21.9    Other irritable bowel syndrome K58.8    Recurrent major depressive disorder ([facility]) F33.9    Alzheimer's disease ([facility] [address] G30.9, F02.80    TIA (transient ischemic attack) G45.9    Status post placement of implantable loop recorder Z95.818          Prior to Admission medications    Medication Sig Start Date End Date Taking? Authorizing Provider   LORazepam (ATIVAN) 1 mg tablet Take 1 Tab by mouth nightly. Max Daily Amount: 1 mg.  Takes 0.5 mg in the morning and 1 mg at night. 7/31/20  Yes Janna Courtney MD   LORazepam (ATIVAN) 0.5 mg tablet Take 1 Tab by mouth daily. Max Daily Amount: 0.5 mg. 7/31/20  Yes Janna Courtney MD   memantine ER (NAMENDA XR) 21 mg capsule TAKE 1 CAPSULE BY MOUTH ONE TIME DAILY 7/14/20  Yes Janna Courtney MD   metoprolol tartrate (LOPRESSOR) 25 mg tablet TAKE 1/2 TABLET BY MOUTH TWICE DAILY  5/4/20  Yes Janna Courtney MD   APPLE CIDER VINEGAR PO Take  by mouth daily. Yes Provider, Historical   famotidine (PEPCID) 20 mg tablet Take 20 mg by mouth nightly. Yes Provider, Historical   acetaminophen (TYLENOL ARTHRITIS PAIN) 650 mg TbER Take 1 Tab by mouth every eight (8) hours. 8/30/19  Yes Janna Courtney MD   Wheat Dextrin (BENEFIBER CLEAR) 3 gram/3.5 gram pwpk Take 3.5 g by mouth daily. Yes Provider, Historical   aspirin delayed-release 81 mg tablet Take 1 Tab by mouth daily. 8/4/17  Yes Quinn Torres NP   omeprazole (PRILOSEC) 40 mg capsule Take 1 Cap by mouth daily for 30 days. 7/24/20 8/23/20  Janna Courtney MD   fluticasone propionate (FLONASE) 50 mcg/actuation nasal spray 2 Sprays by Both Nostrils route daily. 7/24/20 8/17/20  Janna Courtney MD   methylPREDNISolone (MEDROL DOSEPACK) 4 mg tablet Follow dose pack instructions 7/10/20 8/17/20  Edda Locke NP   bismuth subsalicylate (PEPTO-BISMOL PO) Take 2 Adjustable Dose Pre-filled Pen Syringe by mouth as needed for Diarrhea or Nausea. 8/17/20  Provider, Historical          Allergies   Allergen Reactions    Latex Itching    Dilaudid [Hydromorphone (Bulk)] Other (comments)     ? loss of consciousness    Altace [Ramipril] Unknown (comments)    Ambien [Zolpidem] Unknown (comments)    [employer] Test [Blood Sugar Diagnostic, Disc-Type] Unknown (comments)    Aspirin Unknown (comments)    Astelin [Azelastine] Unknown (comments)    Atenolol Unknown (comments)    Banex La Unknown (comments)    Benicar [Olmesartan] Unknown (comments)    Carbocaine [Mepivacaine] Unknown (comments)    Cleocin [Clindamycin Hcl] Hives    Codeine Nausea and Vomiting    Codeine-Asa-Salicyl-Acetam-Caf Other (comments)     Stomach pain    Cortisporin [Neomy-Polymyxinb-Bacitracin-Hc] Unknown (comments)    Cymbalta [Duloxetine] Other (comments)    Cytotec [Misoprostol] Unknown (comments)    Dilaudid [Hydromorphone] Unknown (comments)     Patient states that she was unable to respond, but was breathing    Effexor [Venlafaxine] Unknown (comments)    Epinephrine Unknown (comments)     Heart racing    Estra-D Unknown (comments)    Estrace [Estradiol] Unknown (comments)    Fentanyl Nausea and Vomiting and Unknown (comments)     shakes    Flexeril [Cyclobenzaprine] Unable to Obtain     \"drugs me\"    Flonase [Fluticasone] Unknown (comments)    Flunisolide Unknown (comments)    Guiatussin W/Codeine Unknown (comments)    Ketek [Telithromycin] Nausea Only    Klonopin [Clonazepam] Unknown (comments)    Lexapro [Escitalopram] Nausea and Vomiting    Lodine [Etodolac] Nausea and Vomiting    Morphine Unknown (comments)    Nsaids (Non-Steroidal Anti-Inflammatory Drug) Nausea and Vomiting    Other Medication Unknown (comments)     Gareth Alford    Paxil [Paroxetine Hcl] Nausea and Vomiting    Pcn [Penicillins] Hives    Percocet [Oxycodone-Acetaminophen] Unknown (comments)    Percodan [Oxycodone Hcl-Oxycodone-Asa] Unknown (comments)    Pneumococcal 23-Mary Ps Vaccine Swelling    Proctocream-Hc [Hydrocortisone] Unknown (comments)    Prozac [Fluoxetine] Nausea Only     Suicidal visions      Pseudoephedrine Other (comments)    Quinidine Unknown (comments)    Reglan [Metoclopramide] Unknown (comments)    Remeron [Mirtazapine] Other (comments)    Resaid Other (comments)     inflammed eyes    Robaxin [Methocarbamol] Unknown (comments)    Sonata [Zaleplon] Unknown (comments)    Sulfa (Sulfonamide Antibiotics) Hives    Surmontil [Trimipramine] Nausea and Vomiting  Talwin [Pentazocine Lactate] Unknown (comments)    Toradol [Ketorolac Tromethamine] Nausea and Vomiting    Ultram [Tramadol] Unknown (comments)    Vancomycin Nausea Only    Vasoconstrictor Drug Unknown (comments)    Vigamox [Moxifloxacin] Rash and Swelling    Vioxx [Rofecoxib] Unknown (comments)    Voltaren [Diclofenac Sodium] Itching    Wellbutrin [Bupropion Hcl] Nausea and Vomiting    Zelnorm [Tegaserod Hydrogen Maleate] Unknown (comments)    Zoloft [Sertraline] Nausea and Vomiting              Review of Systems   Unable to perform ROS: Dementia         Objective:   Physical Exam  Constitutional:       Appearance: Normal appearance. Cardiovascular:      Rate and Rhythm: Regular rhythm. Heart sounds: Normal heart sounds. No murmur. Pulmonary:      Effort: Pulmonary effort is normal.      Breath sounds: Normal breath sounds. No wheezing or rales. Abdominal:      General: Bowel sounds are normal.      Palpations: There is no mass. Tenderness: There is no abdominal tenderness. Psychiatric:         Mood and Affect: Mood normal.         Behavior: Behavior normal.      Comments: Asked same questions multiple times            Visit Vitals  /76   Pulse 74   Temp 98 °F (36.7 °C) (Temporal)   Resp 20   Ht 5' 4\" (1.626 m)   Wt 119 lb (54 kg)   SpO2 97%   BMI 20.43 kg/m²          Advised her to call back or return to office if symptoms worsen/change/persist.  Discussed expected course/resolution/complications of diagnosis in detail with patient. Medication risks/benefits/costs/interactions/alternatives discussed with patient. She was given an after visit summary which includes diagnoses, current medications, & vitals. She expressed understanding with the diagnosis and plan. Aspects of this note may have been generated using voice recognition software. Despite editing, there may be some syntax errors.        Lorelei Felix MD

## 2020-08-17 NOTE — TELEPHONE ENCOUNTER
[phone]   [facility] [place]    The patient memory is getting worse . She did not want to mention this in front of her
No

## 2020-08-17 NOTE — ACP (ADVANCE CARE PLANNING)
Advance Care Planning     Advance Care Planning (ACP) Physician/NP/PA (Provider) Conversation    Date of ACP Conversation: 08/17/20  Persons included in Conversation:  patient  Length of ACP Conversation in minutes: <16 minutes (Non-Billable)    Authorized Decision Maker (if patient is incapable of making informed decisions):   Named in Advance Directive or Healthcare Power of         She has an advanced directive - a copy has been provided & still reflects her wishes. Reviewed DNR/DNI and patient is not interested.          Aram Muniz MD

## 2020-08-17 NOTE — PATIENT INSTRUCTIONS
Medicare Wellness Visit, Female The best way to live healthy is to have a lifestyle where you eat a well-balanced diet, exercise regularly, limit alcohol use, and quit all forms of tobacco/nicotine, if applicable. Regular preventive services are another way to keep healthy. Preventive services (vaccines, screening tests, monitoring & exams) can help personalize your care plan, which helps you manage your own care. Screening tests can find health problems at the earliest stages, when they are easiest to treat. [facility] follows the current, evidence-based guidelines published by the [facility] Joshua Rosales ([facility]) when recommending preventive services for our patients. Because we follow these guidelines, sometimes recommendations change over time as research supports it. (For example, mammograms used to be recommended annually. Even though Medicare will still pay for an annual mammogram, the newer guidelines recommend a mammogram every two years for women of average risk). Of course, you and your doctor may decide to screen more often for some diseases, based on your risk and your co-morbidities (chronic disease you are already diagnosed with). Preventive services for you include: - Medicare offers their members a free annual wellness visit, which is time for you and your primary care provider to discuss and plan for your preventive service needs. Take advantage of this benefit every year! 
-All adults over the age of 72 should receive the recommended pneumonia vaccines. Current USPSTF guidelines recommend a series of two vaccines for the best pneumonia protection.  
-All adults should have a flu vaccine yearly and a tetanus vaccine every 10 years.  
-All adults age 48 and older should receive the shingles vaccines (series of two vaccines). -All adults age 38-68 who are overweight should have a diabetes screening test once every three years. -All adults born between [birth year] and 1965 should be screened once for Hepatitis C. 
-Other screening tests and preventive services for persons with diabetes include: an eye exam to screen for diabetic retinopathy, a kidney function test, a foot exam, and stricter control over your cholesterol.  
-Cardiovascular screening for adults with routine risk involves an electrocardiogram (ECG) at intervals determined by your doctor.  
-Colorectal cancer screenings should be done for adults age 54-65 with no increased risk factors for colorectal cancer. There are a number of acceptable methods of screening for this type of cancer. Each test has its own benefits and drawbacks. Discuss with your doctor what is most appropriate for you during your annual wellness visit. The different tests include: colonoscopy (considered the best screening method), a fecal occult blood test, a fecal DNA test, and sigmoidoscopy. 
 
-A bone mass density test is recommended when a woman turns 65 to screen for osteoporosis. This test is only recommended one time, as a screening. Some providers will use this same test as a disease monitoring tool if you already have osteoporosis. -Breast cancer screenings are recommended every other year for women of normal risk, age 54-69. 
-Cervical cancer screenings for women over age 72 are only recommended with certain risk factors. Here is a list of your current Health Maintenance items (your personalized list of preventive services) with a due date: 
Health Maintenance Due Topic Date Due  Shingles Vaccine (1 of 2) 09[birth year]/1985  Glaucoma Screening   11/30/2019  Flu Vaccine  08/01/2020 [place] Annual Well Visit  07/23/2020 Marlene [id number] What is a living will? A living will is a legal form you use to write down the kind of care you want at the end of your life. It is used by the health professionals who will treat you if you aren't able to decide for yourself. If you put your wishes in writing, your loved ones and others will know what kind of care you want. They won't need to guess. This can ease your mind and be helpful to others. A living will is not the same as an estate or property will. An estate will explains what you want to happen with your money and property after you die. Is a living will a legal document? A living will is a legal document. Each state has its own laws about living lin. If you move to another state, make sure that your living will is legal in the state where you now live. Or you might use a universal form that has been approved by many states. This kind of form can sometimes be completed and stored online. Your electronic copy will then be available wherever you have a connection to the Internet. In most cases, doctors will respect your wishes even if you have a form from a different state. · You don't need an  to complete a living will. But legal advice can be helpful if your state's laws are unclear, your health history is complicated, or your family can't agree on what should be in your living will. · You can change your living will at any time. Some people find that their wishes about end-of-life care change as their health changes. · In addition to making a living will, think about completing a medical power of  form. This form lets you name the person you want to make end-of-life treatment decisions for you (your \"health care agent\") if you're not able to. Many hospitals and nursing homes will give you the forms you need to complete a living will and a medical power of . · Your living will is used only if you can't make or communicate decisions for yourself anymore. If you become able to make decisions again, you can accept or refuse any treatment, no matter what you wrote in your living will. · Your state may offer an online registry.  This is a place where you can store your living will online so the doctors and nurses who need to treat you can find it right away. What should you think about when creating a living will? Talk about your end-of-life wishes with your family members and your doctor. Let them know what you want. That way the people making decisions for you won't be surprised by your choices. Think about these questions as you make your living will: · Do you know enough about life support methods that might be used? If not, talk to your doctor so you know what might be done if you can't breathe on your own, your heart stops, or you're unable to swallow. · What things would you still want to be able to do after you receive life-support methods? Would you want to be able to walk? To speak? To eat on your own? To live without the help of machines? · If you have a choice, where do you want to be cared for? In your home? At a hospital or nursing home? · Do you want certain Ep[employer] practices performed if you become very ill? · If you have a choice at the end of your life, where would you prefer to die? At home? In a hospital or nursing home? Somewhere else? · Would you prefer to be buried or cremated? · Do you want your organs to be donated after you die? What should you do with your living will? · Make sure that your family members and your health care agent have copies of your living will. · Give your doctor a copy of your living will to keep in your medical record. If you have more than one doctor, make sure that each one has a copy. · You may want to put a copy of your living will where it can be easily found. Where can you learn more? Go to http://yoly-jackie.info/. Enter N321 in the search box to learn more about \"Learning About Living Perroy. \" Current as of: August 8, 2016 Content Version: 11.3 © 7620-6018 [employer].  Care instructions adapted under license by [address] (which disclaims liability or warranty for this information). If you have questions about a medical condition or this instruction, always ask your healthcare professional. [address] any warranty or liability for your use of this information.

## 2020-08-18 ENCOUNTER — TELEPHONE (OUTPATIENT)
Dept: CARDIOLOGY CLINIC | Age: 85
End: 2020-08-18

## 2020-08-18 ENCOUNTER — TELEPHONE (OUTPATIENT)
Dept: INTERNAL MEDICINE CLINIC | Age: 85
End: 2020-08-18

## 2020-08-18 LAB
ALBUMIN SERPL-MCNC: 4.2 G/DL (ref 3.6–4.6)
ALBUMIN/GLOB SERPL: 1.9 {RATIO} (ref 1.2–2.2)
ALP SERPL-CCNC: 64 IU/L (ref 39–117)
ALT SERPL-CCNC: 9 IU/L (ref 0–32)
AST SERPL-CCNC: 20 IU/L (ref 0–40)
BILIRUB SERPL-MCNC: 0.4 MG/DL (ref 0–1.2)
BUN SERPL-MCNC: 22 MG/DL (ref 8–27)
BUN/CREAT SERPL: 16 (ref 12–28)
CALCIUM SERPL-MCNC: 9.3 MG/DL (ref 8.7–10.3)
CHLORIDE SERPL-SCNC: 95 MMOL/L (ref 96–106)
CO2 SERPL-SCNC: 25 MMOL/L (ref 20–29)
CREAT SERPL-MCNC: 1.35 MG/DL (ref 0.57–1)
ERYTHROCYTE [DISTWIDTH] IN BLOOD BY AUTOMATED COUNT: 13.1 % (ref 11.7–15.4)
GLOBULIN SER CALC-MCNC: 2.2 G/DL (ref 1.5–4.5)
GLUCOSE SERPL-MCNC: 109 MG/DL (ref 65–99)
HCT VFR BLD AUTO: 37.2 % (ref 34–46.6)
HGB BLD-MCNC: 12.6 G/DL (ref 11.1–15.9)
MCH RBC QN AUTO: 30.5 PG (ref 26.6–33)
MCHC RBC AUTO-ENTMCNC: 33.9 G/DL (ref 31.5–35.7)
MCV RBC AUTO: 90 FL (ref 79–97)
PLATELET # BLD AUTO: 289 X10E3/UL (ref 150–450)
POTASSIUM SERPL-SCNC: 4.3 MMOL/L (ref 3.5–5.2)
PROT SERPL-MCNC: 6.4 G/DL (ref 6–8.5)
RBC # BLD AUTO: 4.13 X10E6/UL (ref 3.77–5.28)
SODIUM SERPL-SCNC: 135 MMOL/L (ref 134–144)
WBC # BLD AUTO: 8.1 X10E3/UL (ref 3.4–10.8)

## 2020-08-18 NOTE — TELEPHONE ENCOUNTER
Pt wants to get an after visit summary  Pt was in yesterday she wants to know what  The things you told her  Mostly her medication

## 2020-08-18 NOTE — PROGRESS NOTES
Results released to patient via [employer]. All labs are stable or at goal for her. Cr stable fluctuating, overall stable. Non-fasting BS.

## 2020-08-18 NOTE — TELEPHONE ENCOUNTER
Called pt's  regarding her ILR. Confirmed that her Carelink box is working and that I would notify him if it ever becomes disconnected. Also explained that if Dr Julia Guaman saw anything on her ILR that needed to be addressed we would be in contact. Pt's  verbalized understanding and had no further questions.

## 2020-08-18 NOTE — TELEPHONE ENCOUNTER
Call to daughter, Rocío Johnson, on HIPAA. Inquired about HH, obtained information on [facility]. Advised would send referral over tomorrow. She is asking if covered. Told her I couldn't answer that, generally, have to demonstrate patient is home bound. She reports cannot leave home alone due to memory concerns,  must accompany. She wanted Dr. Nellie Tarango to know that  is dealing with Stage 4 lung cancer with mets to scapula, T ribs, has lymph nodes around heart, so there is a lot going on with the family. She started discussing withdrawal symptoms with me regarding Lorazepam.  Asked her to let me discuss with Dr. Nellie Tarango and will get back in touch.

## 2020-08-19 ENCOUNTER — TELEPHONE (OUTPATIENT)
Dept: INTERNAL MEDICINE CLINIC | Age: 85
End: 2020-08-19

## 2020-08-19 NOTE — TELEPHONE ENCOUNTER
[phone]    Please mail the patient a report regarding what she should do and not do. She can not remember and would like it in writing.  This is from her appt this week

## 2020-08-19 NOTE — TELEPHONE ENCOUNTER
Call to daughter, on HIPAA, advised of Dr. Gagandeep Lassiter' note. Order for [place] faxed to [place] [phone] with confirmation for skilled nursing for medication review with instructions to call daughter to arrange.

## 2020-08-24 ENCOUNTER — TELEPHONE (OUTPATIENT)
Dept: INTERNAL MEDICINE CLINIC | Age: 85
End: 2020-08-24

## 2020-08-24 RX ORDER — METOPROLOL TARTRATE 25 MG/1
TABLET, FILM COATED ORAL
Qty: 90 TAB | Refills: 1 | Status: SHIPPED | OUTPATIENT
Start: 2020-08-24 | End: 2020-12-01 | Stop reason: ALTCHOICE

## 2020-08-24 NOTE — TELEPHONE ENCOUNTER
Patient needs to speak with you about her medications. Wanted to schedule an appt with Dr. Shamika Box, but nothing available until 10/9. Since he has changed some meds, she doesn't know what to get refills on. Please call.

## 2020-08-25 ENCOUNTER — PATIENT MESSAGE (OUTPATIENT)
Dept: INTERNAL MEDICINE CLINIC | Age: 85
End: 2020-08-25

## 2020-08-25 DIAGNOSIS — F41.9 ANXIETY: ICD-10-CM

## 2020-08-25 RX ORDER — LORAZEPAM 1 MG/1
TABLET ORAL
Qty: 30 TAB | Refills: 0 | OUTPATIENT
Start: 2020-08-25

## 2020-08-25 NOTE — TELEPHONE ENCOUNTER
Attempted to reach patient via phone, unsucessful. Left message has spoke to her daughter and if she still had questions to please call back.

## 2020-08-25 NOTE — TELEPHONE ENCOUNTER
RC from daughter. Reviewed RX for Lorazepam that have been sent in. She had forgotten that one had been sent to fill 08/31/20. She will check with pharmacy to make sure when she can  and is aware this RX will be denied. She will let me know if any further questions.

## 2020-08-25 NOTE — TELEPHONE ENCOUNTER
Attempted to reach patient's daughter via phone, unsucessful. Left message to return call.   Need to discuss prescriptions that were sent in and what she needs with regards to Lorazepam.

## 2020-08-25 NOTE — TELEPHONE ENCOUNTER
----- Message from Gagan Cosme on behalf of Alea Frias sent at 8/25/2020  9:45 AM EDT -----  Regarding: Prescription Question  Contact: [phone]  This message is being sent by Real Zhang on behalf of Alea Frias    Upon moms last visit Dr. Nette Huang said he would refill mom's lorazepam 0.5mg, to be halved in am and pm.  Damaso hasn't gotten that request. Could you please check into that and let me know.     Thanks,  Real Zhang

## 2020-09-02 ENCOUNTER — TELEPHONE (OUTPATIENT)
Dept: INTERNAL MEDICINE CLINIC | Age: 85
End: 2020-09-02

## 2020-09-02 NOTE — TELEPHONE ENCOUNTER
[phone]    The patient has been sick to her stomach for 2 days.  she has been taking Odansitron what else can she take

## 2020-09-02 NOTE — TELEPHONE ENCOUNTER
Patient explained stomach sx. Feel nauseated, taking Zofran but not helping. Patient feel like vomiting. No diarrhea. No fever. Last BM yesterday morning. No new medication. Patient only able to consume crackers and ginger ale. No pain. Patient has GI/Dr White. Advise continue to monitor, if sx persist seek medical attention at urgent care. Understanding verbalized.

## 2020-09-28 ENCOUNTER — TELEPHONE (OUTPATIENT)
Dept: CARDIOLOGY CLINIC | Age: 85
End: 2020-09-28

## 2020-09-28 NOTE — TELEPHONE ENCOUNTER
Called and spoke with pt's  Glenda Pierre him that his wife's ILR is working properly and she has not had any episodes over the past month since we talked last. He verbalized understanding and had no further questions.

## 2020-10-14 ENCOUNTER — HOSPITAL ENCOUNTER (EMERGENCY)
Age: 85
Discharge: HOME | End: 2020-10-14
Attending: EMERGENCY MEDICINE | Admitting: EMERGENCY MEDICINE
Payer: MEDICARE

## 2020-10-14 ENCOUNTER — APPOINTMENT (OUTPATIENT)
Dept: CT IMAGING | Age: 85
End: 2020-10-14
Attending: EMERGENCY MEDICINE
Payer: MEDICARE

## 2020-10-14 VITALS
TEMPERATURE: 97.2 F | DIASTOLIC BLOOD PRESSURE: 85 MMHG | OXYGEN SATURATION: 100 % | RESPIRATION RATE: 13 BRPM | SYSTOLIC BLOOD PRESSURE: 160 MMHG | HEART RATE: 67 BPM

## 2020-10-14 DIAGNOSIS — R11.2 NON-INTRACTABLE VOMITING WITH NAUSEA, UNSPECIFIED VOMITING TYPE: Primary | ICD-10-CM

## 2020-10-14 LAB
ALBUMIN SERPL-MCNC: 3.9 G/DL (ref 3.5–5)
ALBUMIN/GLOB SERPL: 1.2 {RATIO} (ref 1.1–2.2)
ALP SERPL-CCNC: 65 U/L (ref 45–117)
ALT SERPL-CCNC: 14 U/L (ref 12–78)
ANION GAP SERPL CALC-SCNC: 9 MMOL/L (ref 5–15)
AST SERPL-CCNC: 19 U/L (ref 15–37)
ATRIAL RATE: 74 BPM
BASOPHILS # BLD: 0.1 K/UL (ref 0–0.1)
BASOPHILS NFR BLD: 1 % (ref 0–1)
BILIRUB SERPL-MCNC: 1 MG/DL (ref 0.2–1)
BUN SERPL-MCNC: 18 MG/DL (ref 6–20)
BUN/CREAT SERPL: 10 (ref 12–20)
CALCIUM SERPL-MCNC: 9.6 MG/DL (ref 8.5–10.1)
CALCULATED P AXIS, ECG09: 59 DEGREES
CALCULATED R AXIS, ECG10: 72 DEGREES
CALCULATED T AXIS, ECG11: 73 DEGREES
CHLORIDE SERPL-SCNC: 100 MMOL/L (ref 97–108)
CO2 SERPL-SCNC: 26 MMOL/L (ref 21–32)
COMMENT, HOLDF: NORMAL
CREAT SERPL-MCNC: 1.75 MG/DL (ref 0.55–1.02)
DIAGNOSIS, 93000: NORMAL
DIFFERENTIAL METHOD BLD: NORMAL
EOSINOPHIL # BLD: 0.1 K/UL (ref 0–0.4)
EOSINOPHIL NFR BLD: 1 % (ref 0–7)
ERYTHROCYTE [DISTWIDTH] IN BLOOD BY AUTOMATED COUNT: 12.6 % (ref 11.5–14.5)
GLOBULIN SER CALC-MCNC: 3.2 G/DL (ref 2–4)
GLUCOSE SERPL-MCNC: 98 MG/DL (ref 65–100)
HCT VFR BLD AUTO: 39 % (ref 35–47)
HGB BLD-MCNC: 13 G/DL (ref 11.5–16)
IMM GRANULOCYTES # BLD AUTO: 0 K/UL (ref 0–0.04)
IMM GRANULOCYTES NFR BLD AUTO: 0 % (ref 0–0.5)
LIPASE SERPL-CCNC: 153 U/L (ref 73–393)
LYMPHOCYTES # BLD: 2.3 K/UL (ref 0.8–3.5)
LYMPHOCYTES NFR BLD: 37 % (ref 12–49)
MCH RBC QN AUTO: 30.5 PG (ref 26–34)
MCHC RBC AUTO-ENTMCNC: 33.3 G/DL (ref 30–36.5)
MCV RBC AUTO: 91.5 FL (ref 80–99)
MONOCYTES # BLD: 0.7 K/UL (ref 0–1)
MONOCYTES NFR BLD: 10 % (ref 5–13)
NEUTS SEG # BLD: 3.3 K/UL (ref 1.8–8)
NEUTS SEG NFR BLD: 51 % (ref 32–75)
NRBC # BLD: 0 K/UL (ref 0–0.01)
NRBC BLD-RTO: 0 PER 100 WBC
P-R INTERVAL, ECG05: 190 MS
PLATELET # BLD AUTO: 301 K/UL (ref 150–400)
PMV BLD AUTO: 9.2 FL (ref 8.9–12.9)
POTASSIUM SERPL-SCNC: 3.9 MMOL/L (ref 3.5–5.1)
PROT SERPL-MCNC: 7.1 G/DL (ref 6.4–8.2)
Q-T INTERVAL, ECG07: 374 MS
QRS DURATION, ECG06: 66 MS
QTC CALCULATION (BEZET), ECG08: 415 MS
RBC # BLD AUTO: 4.26 M/UL (ref 3.8–5.2)
SAMPLES BEING HELD,HOLD: NORMAL
SODIUM SERPL-SCNC: 135 MMOL/L (ref 136–145)
VENTRICULAR RATE, ECG03: 74 BPM
WBC # BLD AUTO: 6.4 K/UL (ref 3.6–11)

## 2020-10-14 PROCEDURE — 74176 CT ABD & PELVIS W/O CONTRAST: CPT

## 2020-10-14 PROCEDURE — 36415 COLL VENOUS BLD VENIPUNCTURE: CPT

## 2020-10-14 PROCEDURE — 99285 EMERGENCY DEPT VISIT HI MDM: CPT

## 2020-10-14 PROCEDURE — 93005 ELECTROCARDIOGRAM TRACING: CPT

## 2020-10-14 PROCEDURE — 83690 ASSAY OF LIPASE: CPT

## 2020-10-14 PROCEDURE — 80053 COMPREHEN METABOLIC PANEL: CPT

## 2020-10-14 PROCEDURE — 96360 HYDRATION IV INFUSION INIT: CPT

## 2020-10-14 PROCEDURE — 85025 COMPLETE CBC W/AUTO DIFF WBC: CPT

## 2020-10-14 PROCEDURE — 74011250636 HC RX REV CODE- 250/636: Performed by: EMERGENCY MEDICINE

## 2020-10-14 PROCEDURE — 74011250637 HC RX REV CODE- 250/637: Performed by: EMERGENCY MEDICINE

## 2020-10-14 RX ORDER — SODIUM CHLORIDE 0.9 % (FLUSH) 0.9 %
10 SYRINGE (ML) INJECTION
Status: DISCONTINUED | OUTPATIENT
Start: 2020-10-14 | End: 2020-10-14 | Stop reason: HOSPADM

## 2020-10-14 RX ORDER — PROMETHAZINE HYDROCHLORIDE 25 MG/1
25 TABLET ORAL
Status: COMPLETED | OUTPATIENT
Start: 2020-10-14 | End: 2020-10-14

## 2020-10-14 RX ORDER — PROMETHAZINE HYDROCHLORIDE 25 MG/1
25 TABLET ORAL
Qty: 12 TAB | Refills: 0 | Status: SHIPPED | OUTPATIENT
Start: 2020-10-14 | End: 2020-12-01

## 2020-10-14 RX ADMIN — PROMETHAZINE HYDROCHLORIDE 25 MG: 25 TABLET ORAL at 11:49

## 2020-10-14 RX ADMIN — SODIUM CHLORIDE 500 ML: 900 INJECTION, SOLUTION INTRAVENOUS at 09:01

## 2020-10-14 NOTE — ED TRIAGE NOTES
Arrives via EMS from home where she resides with  for c/o lower abdominal pain and nausea. +constipation. Denies diarrhea or vomiting. LBM last Thursday.

## 2020-10-14 NOTE — ED PROVIDER NOTES
30-year-old female with history of chronic pain syndromes, hypertension, irritable bowel syndrome, CKD, and previous CVA presents with abdominal pain with nausea and constipation. Last bowel movement was approximately 1 week ago. He endorses nausea without vomiting. No fevers. She denies any previous abdominal surgeries. The history is provided by the patient and the spouse. Abdominal Pain    This is a new problem. The current episode started more than 2 days ago. The problem occurs constantly. The problem has been gradually worsening. The pain is located in the generalized abdominal region. The pain is moderate. Associated symptoms include anorexia, nausea and constipation. Pertinent negatives include no fever, no diarrhea, no vomiting, no dysuria, no frequency, no hematuria, no headaches, no arthralgias, no myalgias, no trauma and no chest pain. Nothing worsens the pain. The pain is relieved by nothing.         Past Medical History:   Diagnosis Date    Allergic rhinitis, cause unspecified     Anxiety     Back pain     C. difficile diarrhea 8/2/2017    Ad[facility] to [facility]    Cancer ([facility]     basal cell face, back, neck    Cancer (HCC)     squamous cell leg    Chronic kidney disease     Chronic pain     DJD (degenerative joint disease) of cervical spine     Fibromyalgia     GERD (gastroesophageal reflux disease)     Hypercholesterolemia     Hypertension     Hyponatremia     IBS (irritable bowel syndrome)     Irritable bowel syndrome with diarrhea 6/29/2016    GI- Dr Sabine Rodriguez Kidney disease, chronic, stage III (GFR 30-59 ml/min) ([facility] 6/2011    Dr. Jc Mirza) Christian    Nausea & vomiting     Neuropathy 6/22/2016    Both feet     Psychiatric disorder     DEPRESSION AND ANXIETY    Stroke ([facility] 2017    TIA    Thromboembolus ([facility] 2013    PE X2       Past Surgical History:   Procedure Laterality Date    COLONOSCOPY  1/20/11    diverticulosis    EGD  1/20/11    schatzki's ring--clear on barium swallow 7/11    HX ADENOIDECTOMY  1940    HX CATARACT REMOVAL Left 2013    HX CATARACT REMOVAL Right 01/27/2014    HX CHOLECYSTECTOMY  8/11    HX DILATION AND CURETTAGE  1963    HX GI      COLONOSCOPY    HX HEENT      BCCA REMOVED FROM FACE ,NOSE     HX HYSTERECTOMY  1970`S    hysterectomy    HX KNEE ARTHROSCOPY Left 1990    HX KNEE REPLACEMENT Right 9/29/09    HX ORTHOPAEDIC Right 2017    trigger finger release    HX ORTHOPAEDIC Right 07/18/2019     RING FINGRER TRIGGER FINGER REALSE     HX OTHER SURGICAL      skin cancer removed: L leg    HX OTHER SURGICAL      BCCA REMOVED FROM BACK     HX POLYPECTOMY  1989    HX ROTATOR CUFF REPAIR Right 08/15/2019    and sub-acromial decompression    HX SHOULDER ARTHROSCOPY Left 12/13/2018    arthroscopy & sub-acromial decompression    HX TONSILLECTOMY  1940    HX UROLOGICAL  1995    bladder suspension    AZ INSERTION SUBQ CARDIAC RHYTHM MONITOR W/PRGRMG N/A 2/28/2020    LOOP RECORDER INSERT performed by Eduardo Sultana MD at [address] [facility]    REPAIR OF RECTOCELE           Family History:   Problem Relation Age of Onset    Cancer Mother         lung    Cancer Maternal Aunt         lung    Cancer Maternal Uncle         lung    Cancer Sister         breast    Other Brother         has to have blood tx every other week   [facility] Cancer Son 36        prostate, recurrence 7 yrs later   [facility] Arthritis-osteo Son         hips    Arthritis-osteo Daughter     Arthritis-osteo Daughter     Arthritis-osteo Sister     No Known Problems Sister     No Known Problems Sister     Anesth Problems Neg Hx        Social History     Socioeconomic History    Marital status:      Spouse name: Not on file    Number of children: Not on file    Years of education: Not on file    Highest education level: Not on file   Occupational History    Not on file   Social Needs    Financial resource strain: Not on file    Food insecurity     Worry: Not on file Inability: Not on file    Transportation needs     Medical: Not on file     Non-medical: Not on file   Tobacco Use    Smoking status: Never Smoker    Smokeless tobacco: Never Used   Substance and Sexual Activity    Alcohol use: Not Currently     Alcohol/week: 0.0 standard drinks     Frequency: Never     Binge frequency: Never     Comment: rarely    Drug use: No    Sexual activity: Not Currently     Partners: Male   Lifestyle    Physical activity     Days per week: Not on file     Minutes per session: Not on file    Stress: Not on file   Relationships    Social connections     Talks on phone: Not on file     Gets together: Not on file     Attends Evangelical service: Not on file     Active member of club or organization: Not on file     Attends meetings of clubs or organizations: Not on file     Relationship status: Not on file    Intimate partner violence     Fear of current or ex partner: Not on file     Emotionally abused: Not on file     Physically abused: Not on file     Forced sexual activity: Not on file   Other Topics Concern    Not on file   Social History Narrative    Not on file         ALLERGIES: Latex; Dilaudid [hydromorphone (bulk)]; Altace [ramipril]; Ambien [zolpidem]; Ascensia autodisc test [blood sugar diagnostic, disc-type]; Aspirin; Astelin [azelastine]; Atenolol; Banex la; Bronte [place]; Carbocaine [mepivacaine]; Cleocin [clindamycin hcl]; Codeine; Codeine-asa-salicyl-acetam-caf; Cortisporin [neomy-polymyxinb-bacitracin-hc]; Cymbalta [duloxetine]; Cytotec [misoprostol]; Dilaudid [hydromorphone]; Effexor [venlafaxine]; Epinephrine; Estra-d; Estrace [estradiol]; Fentanyl; Flexeril [cyclobenzaprine]; Flonase [fluticasone]; Flunisolide; Guiatussin w/codeine; Ketek [telithromycin]; Eliu Haydee; Lexapro [escitalopram]; Lodine [etodolac]; Morphine; Nsaids (non-steroidal anti-inflammatory drug); Other medication; Paxil [paroxetine hcl]; Pcn [penicillins];  Percocet [oxycodone-acetaminophen]; Percodan [oxycodone hcl-oxycodone-asa]; Pneumococcal 23-davida ps vaccine; Proctocream-hc [hydrocortisone]; Prozac [fluoxetine]; Pseudoephedrine; Quinidine; Reglan [metoclopramide]; Remeron [mirtazapine]; Resaid; Robaxin [methocarbamol]; Sonata [zaleplon]; Sulfa (sulfonamide antibiotics); Surmontil [trimipramine]; Talwin [pentazocine lactate]; Toradol [ketorolac tromethamine]; Ultram [tramadol]; Vancomycin; Vasoconstrictor drug; Vigamox [moxifloxacin]; Vioxx [rofecoxib]; Voltaren [diclofenac sodium]; Wellbutrin [bupropion hcl]; Zelnorm [tegaserod hydrogen maleate]; and Zoloft [sertraline]    Review of Systems   Constitutional: Negative for fatigue and fever. HENT: Negative for sneezing and sore throat. Respiratory: Negative for cough and shortness of breath. Cardiovascular: Negative for chest pain and leg swelling. Gastrointestinal: Positive for abdominal pain, anorexia, constipation and nausea. Negative for diarrhea and vomiting. Genitourinary: Negative for difficulty urinating, dysuria, frequency and hematuria. Musculoskeletal: Negative for arthralgias and myalgias. Skin: Negative for color change and rash. Neurological: Negative for weakness and headaches. Psychiatric/Behavioral: Negative for agitation and behavioral problems. Vitals:    10/14/20 0829   BP: (!) 160/85   Pulse: 74   Resp: 16   Temp: (!) 95.5 °F (35.3 °C)            Physical Exam  Vitals signs and nursing note reviewed. Constitutional:       General: She is not in acute distress. Appearance: She is well-developed and normal weight. She is not ill-appearing or toxic-appearing. HENT:      Head: Normocephalic and atraumatic. Nose: Nose normal.      Mouth/Throat:      Mouth: Mucous membranes are dry. Pharynx: Oropharynx is clear. Eyes:      Extraocular Movements: Extraocular movements intact. Pupils: Pupils are equal, round, and reactive to light.    Neck:      Musculoskeletal: Normal range of motion and neck supple. No muscular tenderness. Cardiovascular:      Rate and Rhythm: Normal rate and regular rhythm. Pulses: Normal pulses. Heart sounds: Normal heart sounds. Pulmonary:      Effort: Pulmonary effort is normal. No respiratory distress. Breath sounds: Normal breath sounds. Abdominal:      General: There is no distension. Palpations: Abdomen is soft. Tenderness: There is abdominal tenderness. There is no guarding or rebound. Musculoskeletal: Normal range of motion. General: No deformity or signs of injury. Right lower leg: No edema. Left lower leg: No edema. Skin:     General: Skin is warm and dry. Capillary Refill: Capillary refill takes less than 2 seconds. Neurological:      General: No focal deficit present. Mental Status: She is alert and oriented to person, place, and time. Psychiatric:         Mood and Affect: Mood normal.         Behavior: Behavior normal.          MDM  Number of Diagnoses or Management Options  Diagnosis management comments: 78-year-old female presents as above with complaints of nausea difficulty stooling. Although she still having a bowel movement every day she continues to have nausea. CT scan and labs have been reassuring with evidence of mild dehydration. She received IV fluids in the emergency department. She has multiple allergies making treatment difficult. Plan to start her on Phenergan at home. Amount and/or Complexity of Data Reviewed  Clinical lab tests: reviewed  Tests in the radiology section of CPT®: reviewed  Tests in the medicine section of CPT®: reviewed  Decide to obtain previous medical records or to obtain history from someone other than the patient: yes           Procedures               Rhythm: normal sinus rhythm. Rate (approx.): 74. Axis: normal.  ST segment:  No concerning ST elevations or depressions.  This EKG was interpreted by Kristen Riley MD,ED Provider.

## 2020-10-16 ENCOUNTER — TELEPHONE (OUTPATIENT)
Dept: INTERNAL MEDICINE CLINIC | Age: 85
End: 2020-10-16

## 2020-10-16 NOTE — TELEPHONE ENCOUNTER
RC to daughter, on HIPAA. Wanted to let us know mother has gone back to ER, was sent to [facility] since [facility] was full and was admitted under \"anorexia\". Reports she has not been eating or taking meds. Expects she may be having endoscopy of Monday. Just wanted to update Dr. Aldo Khan.

## 2020-10-16 NOTE — TELEPHONE ENCOUNTER
Andre Grant [facility]) [phone] (M)     Pt's daughter is requesting a call back regarding her mother's er visit to [facility] on Monday.

## 2020-10-16 NOTE — TELEPHONE ENCOUNTER
Called and spoke to daughter (Bee Rae). Cr up from ER, CT normal.  Namenda XR increased from 21mg to 28mg in August.  She has not been taking medications regularly and reports 21mg and 28mg have been mixed together. Told her to d/w hospitalist, my recommendation would be to lower Namenda to 14mg or 21mg. Will defer care otherwise to him and did agree EGD sounds reasonable if no changes over the weekend.

## 2020-10-19 ENCOUNTER — TELEPHONE (OUTPATIENT)
Dept: INTERNAL MEDICINE CLINIC | Age: 85
End: 2020-10-19

## 2020-10-19 RX ORDER — MEMANTINE HYDROCHLORIDE 10 MG/1
10 TABLET ORAL 2 TIMES DAILY
Qty: 180 TAB | Refills: 0 | Status: SHIPPED | OUTPATIENT
Start: 2020-10-19 | End: 2021-04-19

## 2020-10-19 NOTE — TELEPHONE ENCOUNTER
maria t with at home care [phone]     Says that pt is being released from [address] [place] [address] today and wants to know if dr Michael Cadena will follow pt?

## 2020-10-19 NOTE — TELEPHONE ENCOUNTER
Call to patient's daughter, identified with 2 identifiers. She reports that in the hospital instead of Namenda 21 mg daily, she was given Namenda 10 mg twice a day. Wants to know if okay to send in that way, per eriberto Chowdhury with Dr. Velma Moses.   Will send to [facility].

## 2020-10-23 ENCOUNTER — TELEPHONE (OUTPATIENT)
Dept: INTERNAL MEDICINE CLINIC | Age: 85
End: 2020-10-23

## 2020-10-23 NOTE — TELEPHONE ENCOUNTER
Called daughter, on HIPAA. She confirmed patient is receiving home health from [facility]. Was recommending daily visits from nurses, PT, OT, since discharge from hospital.  Attempted to reach [facility] Living from home care. Mailbox not set up, could not leave a message. Called agency, number was incorrect, it is [phone], Mat Corley. She is asking for daily skilled nursing visits to monitor/assess medication usage. Also, PT and OT. BP today 168/88, she was able to convince patient to take her medications today but states patient will require close monitoring initially to ensure compliance. Daughter agrees. Requesting daily SN visits initially to ensure compliance. She will fax orders to Dr. Chantal Martinez for review.

## 2020-10-23 NOTE — TELEPHONE ENCOUNTER
yony with at [facility] [phone]     Requesting a call back regarding having a problem with pt taking her meds. Requesting that pt have a nurse for the next 7 to 14 days to monitor pt takes her meds.

## 2020-10-27 ENCOUNTER — TRANSCRIBE ORDER (OUTPATIENT)
Dept: SCHEDULING | Age: 85
End: 2020-10-27

## 2020-10-27 DIAGNOSIS — K31.84 GASTROPARESIS: ICD-10-CM

## 2020-10-27 DIAGNOSIS — R93.89 ABNORMAL CT SCAN: ICD-10-CM

## 2020-10-27 DIAGNOSIS — R10.9 ABDOMINAL PAIN: ICD-10-CM

## 2020-10-27 DIAGNOSIS — R11.0 NAUSEA: Primary | ICD-10-CM

## 2020-10-29 ENCOUNTER — TELEPHONE (OUTPATIENT)
Dept: INTERNAL MEDICINE CLINIC | Age: 85
End: 2020-10-29

## 2020-10-29 NOTE — TELEPHONE ENCOUNTER
Patient's daughter states At [address] told them they cannot transport medications, so in order to give her mother a flu shot, a script would need to be sent to [facility] and she would pick it up and have it at the house when they come --- they are scheduled to come every day until next Tues. Please let daughter know if this cannot be done.

## 2020-10-30 NOTE — TELEPHONE ENCOUNTER
Spoke with Bianca Bustamante and let her know that I called the Pharmacy and that is something that can not be done. I let her know to try an call [facility] to see if they will come out and give the patient a flu shot.

## 2020-11-01 ENCOUNTER — TELEPHONE (OUTPATIENT)
Dept: INTERNAL MEDICINE CLINIC | Age: 85
End: 2020-11-01

## 2020-11-02 ENCOUNTER — OFFICE VISIT (OUTPATIENT)
Dept: CARDIOLOGY CLINIC | Age: 85
End: 2020-11-02
Payer: MEDICARE

## 2020-11-02 DIAGNOSIS — Z95.818 STATUS POST PLACEMENT OF IMPLANTABLE LOOP RECORDER: Primary | ICD-10-CM

## 2020-11-02 PROCEDURE — 93298 REM INTERROG DEV EVAL SCRMS: CPT

## 2020-11-02 PROCEDURE — 93291 INTERROG DEV EVAL SCRMS IP: CPT

## 2020-11-02 NOTE — TELEPHONE ENCOUNTER
On Call Note       Primary Care Doctor: Riki Bumpers     Reason for call: patient had syncopal episode. At [address] was with her at the time. EMS called and patient was transported to [facility].           Reviewed with patient that if symptoms are getting worse that she should call back or see a licensed provider at an urgent care or ER. Answered all questions to patient's satisfaction. Please call your PCP next business day for an office visit with any unresolved issues as face to face encounters provide better care with an exam than phone call conversations.     Signed By: Petty Turner MD     November 1, 2020

## 2020-11-20 ENCOUNTER — TELEPHONE (OUTPATIENT)
Dept: INTERNAL MEDICINE CLINIC | Age: 85
End: 2020-11-20

## 2020-11-20 DIAGNOSIS — R30.0 DYSURIA: Primary | ICD-10-CM

## 2020-11-20 NOTE — TELEPHONE ENCOUNTER
Dr. Lucky Hamman, please send order for UA to [facility] EMERGENCY [facility] Registration and daughter will  the script and will take it to the lab there. Daughter says her mother was discharged from [facility] today and is complaining of burning when urinating.       Pharmacy:  [facility] [address]

## 2020-11-20 NOTE — TELEPHONE ENCOUNTER
Yes    Future Appointments   Date Time Provider Americo Rizo   12/1/2020  3:00 PM Dmitry Amezcua MD [facility]

## 2020-11-20 NOTE — TELEPHONE ENCOUNTER
marce with at home care [phone]     Pt released from [facility] yesterday and she is requesting a verbal order to resume skilled nursing.

## 2020-11-20 NOTE — TELEPHONE ENCOUNTER
Chart reviewed. She has 61 medication allergies. Most abx are listed on here. She was given Doxy after her PM but this is not good for UTI's. She has not had any other abx in the last 5 yrs from us. UC ordered, please complete. I would not treat her until we know what we are dealing w/ b/c of her extensive allergies. Pyridium. Cranberry. Push fluids. Cx should be back 2-3 days. With symptoms < 1 day and no h/o recurrent UTI's, think it is okay to wait.

## 2020-11-23 DIAGNOSIS — R30.0 DYSURIA: Primary | ICD-10-CM

## 2020-11-24 ENCOUNTER — TELEPHONE (OUTPATIENT)
Dept: INTERNAL MEDICINE CLINIC | Age: 85
End: 2020-11-24

## 2020-11-24 ENCOUNTER — DOCUMENTATION ONLY (OUTPATIENT)
Dept: CARDIOLOGY CLINIC | Age: 85
End: 2020-11-24

## 2020-11-24 LAB
BACTERIA SPEC CULT: ABNORMAL
CC UR VC: ABNORMAL
SERVICE CMNT-IMP: ABNORMAL

## 2020-11-24 NOTE — PROGRESS NOTES
ILR 11/6/2020 11 second of fast  bpm  Current Outpatient Medications on File Prior to Visit   Medication Sig Dispense Refill    memantine (Namenda) 10 mg tablet Take 1 Tab by mouth two (2) times a day. 180 Tab 0    promethazine (PHENERGAN) 25 mg tablet Take 1 Tab by mouth every six (6) hours as needed for Nausea. 12 Tab 0    metoprolol tartrate (LOPRESSOR) 25 mg tablet TAKE HALF TABLET BY MOUTH TWICE DAILY  90 Tab 1    LORazepam (ATIVAN) 0.5 mg tablet Takes 0.25 mg in the morning and 0.5 mg at night. 135 Tab 0    APPLE CIDER VINEGAR PO Take  by mouth daily.  famotidine (PEPCID) 20 mg tablet Take 20 mg by mouth nightly.  acetaminophen (TYLENOL ARTHRITIS PAIN) 650 mg TbER Take 1 Tab by mouth every eight (8) hours.  Wheat Dextrin (BENEFIBER CLEAR) 3 gram/3.5 gram pwpk Take 3.5 g by mouth daily.  aspirin delayed-release 81 mg tablet Take 1 Tab by mouth daily. 30 Tab 0     No current facility-administered medications on file prior to visit.       Future Appointments   Date Time Provider Americo Rizo   12/1/2020  3:00 PM Aaron Street MD [facility]

## 2020-11-24 NOTE — TELEPHONE ENCOUNTER
Comfort Comes with [facility] [phone]     States pt's daughter wants to add nursing to her home care.

## 2020-11-25 NOTE — PROGRESS NOTES
Results released to patient via [facility]. No UTI. Small number of bacteria, likely contamination.

## 2020-12-01 ENCOUNTER — OFFICE VISIT (OUTPATIENT)
Dept: INTERNAL MEDICINE CLINIC | Age: 85
End: 2020-12-01
Payer: MEDICARE

## 2020-12-01 ENCOUNTER — TELEPHONE (OUTPATIENT)
Dept: INTERNAL MEDICINE CLINIC | Age: 85
End: 2020-12-01

## 2020-12-01 VITALS
RESPIRATION RATE: 20 BRPM | SYSTOLIC BLOOD PRESSURE: 142 MMHG | OXYGEN SATURATION: 98 % | DIASTOLIC BLOOD PRESSURE: 62 MMHG | HEART RATE: 82 BPM | HEIGHT: 64 IN | BODY MASS INDEX: 18.95 KG/M2 | WEIGHT: 111 LBS | TEMPERATURE: 97.1 F

## 2020-12-01 DIAGNOSIS — R11.0 NAUSEA: Primary | ICD-10-CM

## 2020-12-01 DIAGNOSIS — F41.9 ANXIETY: ICD-10-CM

## 2020-12-01 DIAGNOSIS — Z71.89 ADVANCED CARE PLANNING/COUNSELING DISCUSSION: ICD-10-CM

## 2020-12-01 DIAGNOSIS — I10 ESSENTIAL HYPERTENSION, BENIGN: ICD-10-CM

## 2020-12-01 PROCEDURE — G8754 DIAS BP LESS 90: HCPCS | Performed by: INTERNAL MEDICINE

## 2020-12-01 PROCEDURE — G9717 DOC PT DX DEP/BP F/U NT REQ: HCPCS | Performed by: INTERNAL MEDICINE

## 2020-12-01 PROCEDURE — 1090F PRES/ABSN URINE INCON ASSESS: CPT | Performed by: INTERNAL MEDICINE

## 2020-12-01 PROCEDURE — G8427 DOCREV CUR MEDS BY ELIG CLIN: HCPCS | Performed by: INTERNAL MEDICINE

## 2020-12-01 PROCEDURE — 1101F PT FALLS ASSESS-DOCD LE1/YR: CPT | Performed by: INTERNAL MEDICINE

## 2020-12-01 PROCEDURE — G0463 HOSPITAL OUTPT CLINIC VISIT: HCPCS | Performed by: INTERNAL MEDICINE

## 2020-12-01 PROCEDURE — G8753 SYS BP > OR = 140: HCPCS | Performed by: INTERNAL MEDICINE

## 2020-12-01 PROCEDURE — G8536 NO DOC ELDER MAL SCRN: HCPCS | Performed by: INTERNAL MEDICINE

## 2020-12-01 PROCEDURE — G8399 PT W/DXA RESULTS DOCUMENT: HCPCS | Performed by: INTERNAL MEDICINE

## 2020-12-01 PROCEDURE — 99214 OFFICE O/P EST MOD 30 MIN: CPT | Performed by: INTERNAL MEDICINE

## 2020-12-01 PROCEDURE — G8420 CALC BMI NORM PARAMETERS: HCPCS | Performed by: INTERNAL MEDICINE

## 2020-12-01 RX ORDER — ERYTHROMYCIN 250 MG/1
250 TABLET, COATED ORAL 4 TIMES DAILY
Qty: 120 TAB | Refills: 0 | Status: SHIPPED | OUTPATIENT
Start: 2020-12-01 | End: 2021-04-19

## 2020-12-01 RX ORDER — HYDRALAZINE HYDROCHLORIDE 10 MG/1
10 TABLET, FILM COATED ORAL 3 TIMES DAILY
COMMUNITY
End: 2021-01-02

## 2020-12-01 RX ORDER — MIRTAZAPINE 7.5 MG/1
7.5 TABLET, FILM COATED ORAL
COMMUNITY
End: 2020-12-07

## 2020-12-01 RX ORDER — ONDANSETRON 4 MG/1
4 TABLET, FILM COATED ORAL
Qty: 60 TAB | Refills: 0 | Status: SHIPPED | OUTPATIENT
Start: 2020-12-01 | End: 2020-12-27

## 2020-12-01 RX ORDER — ONDANSETRON 4 MG/1
4 TABLET, FILM COATED ORAL
COMMUNITY
End: 2020-12-01 | Stop reason: SDUPTHER

## 2020-12-01 RX ORDER — PANTOPRAZOLE SODIUM 40 MG/1
40 TABLET, DELAYED RELEASE ORAL DAILY
COMMUNITY

## 2020-12-01 NOTE — ACP (ADVANCE CARE PLANNING)
Advance Care Planning     Advance Care Planning (ACP) Physician/NP/PA (Provider) Conversation    Date of ACP Conversation: 12/01/20  Persons included in Conversation:  patient and POA  Length of ACP Conversation in minutes: <16 minutes (Non-Billable)    Authorized Decision Maker (if patient is incapable of making informed decisions):   Named in Advance Directive or Healthcare Power of       Primary Decision MakeFabianlilliam Van - [phone]    Secondary Decision Maker: Margaret Rosario - Daughter - [phone]    She has an advanced directive - a copy has been provided & still reflects her wishes. Reviewed DNR/DNI and patient is interested- forms filled out & order placed.          Joby Ruff MD

## 2020-12-01 NOTE — TELEPHONE ENCOUNTER
Patient's daughter states her mother had a really good day yesterday, so she would rather bring her in for the appointment today instead of doing a virtual.  Please let her know you got this message.

## 2020-12-01 NOTE — PROGRESS NOTES
Ela Cloud is a 80 y.o. female who was seen by synchronous (real-time) audio-video technology on 12/1/2020. She RTC with her daughter. Assessment & Plan:   Diagnoses and all orders for this visit:    1. Nausea- chronic issue, fluctuating but not improving, differential dx reviewed with the patient, exact etiology is unclear at this time. Likely multi-factorial: medication side effects vs gastroparesis. Will stop Namenda. Reviewed gastroparesis diet. Was on erythromycin in the past, will refill until she can see GI. Red flags were reviewed with the patient to RTC or notify me, expected time course for resolution reviewed. .   -     ondansetron hcl (Zofran) 4 mg tablet; Take 1 Tab by mouth every eight (8) hours as needed for Nausea. 2. Anxiety- stable, I reviewed treatment options with her, I reviewed life style changes to help improve mood, continue current treatment. Will defer daily medications due to extensive allergy list.  Did review trying to decrease ativan dose but will defer for now pending nausea w/u.     3. Essential hypertension, benign- at goal for her age, no changes    4. Advanced care planning/counseling discussion  -     DO NOT RESUSCITATE      Follow-up and Dispositions    · Return in about 3 months (around 3/1/2021) for Regular follow up. Subjective:   Ela Cloud was seen for Hospital Follow Up; Nausea; and Dementia      Since last visit: seen in [facility] [place] [facility] on 10/14 for nausea/vomiting. Thought to be due to Namenda so dose reduced (Namenda IR 10mg BID). Went to and admitted to -F on 10/16. She was home for a week and then went back and was re-admitted to HD-F on 11/1. Then sent to [facility] rehab x 2 weeks. She was discharged on 11/19. Daughter called two weeks ago about concerns for a UTI. UC was obtained w/o evidence of infection. Per daughter previous symptoms (burning and odor) has resolved. She reports nausea is constant. No vomiting.     BP medications were adjusted due to orthostatic BP. Metoprolol was replaced by hydralazine. Normally BP is running 120-140's/70-80's. Ativan last filled on 8/25/20 - #135. Kalia Ott Last PDMP Charanjit Mariscal as Reviewed:  Review User Review Instant Review Result   LEXIE GOLD ANDREA 12/1/2020  8:07 PM Reviewed PDMP [1]          Prior to Admission medications    Medication Sig Start Date End Date Taking? Authorizing Provider   ondansetron hcl (Zofran) 4 mg tablet Take 4 mg by mouth two (2) times daily as needed for Nausea or Vomiting. Yes Provider, Historical   hydrALAZINE (APRESOLINE) 10 mg tablet Take 10 mg by mouth three (3) times daily. Yes Provider, Historical   pantoprazole (PROTONIX) 40 mg tablet Take 40 mg by mouth daily. Yes Provider, Historical   mirtazapine (REMERON) 7.5 mg tablet Take 7.5 mg by mouth nightly. Yes Provider, Historical   memantine (Namenda) 10 mg tablet Take 1 Tab by mouth two (2) times a day. 10/19/20  Yes Jayme Griffin MD   LORazepam (ATIVAN) 0.5 mg tablet Takes 0.25 mg in the morning and 0.5 mg at night. 8/31/20  Yes Jayme Griffin MD   famotidine (PEPCID) 20 mg tablet Take 20 mg by mouth nightly. Yes Provider, Historical   acetaminophen (TYLENOL ARTHRITIS PAIN) 650 mg TbER Take 1 Tab by mouth every eight (8) hours. 8/30/19  Yes Jayme Griffin MD   aspirin delayed-release 81 mg tablet Take 1 Tab by mouth daily. 8/4/17  Yes Brian, Mrytle Cobian NP   promethazine (PHENERGAN) 25 mg tablet Take 1 Tab by mouth every six (6) hours as needed for Nausea. 10/14/20 12/1/20  Ricardo Pedraza MD   metoprolol tartrate (LOPRESSOR) 25 mg tablet TAKE HALF TABLET BY MOUTH TWICE DAILY  8/24/20 12/1/20  Jayme Griffin MD   APPLE CIDER VINEGAR PO Take  by mouth daily. 12/1/20  Provider, Historical   Wheat Dextrin (BENEFIBER CLEAR) 3 gram/3.5 gram pwpk Take 3.5 g by mouth daily. Provider, Historical         Review of Systems   Constitutional: Positive for malaise/fatigue.  Negative for weight loss. Respiratory: Negative for cough and shortness of breath. Cardiovascular: Negative for chest pain, palpitations and leg swelling. Gastrointestinal: Positive for nausea. Negative for abdominal pain, constipation, diarrhea, heartburn and vomiting. Musculoskeletal: Negative for joint pain and myalgias. Skin: Negative for rash. Neurological: Negative for dizziness and headaches. Psychiatric/Behavioral: Positive for memory loss. Negative for depression. The patient is not nervous/anxious and does not have insomnia. Physical Exam  Constitutional:       General: She is not in acute distress. Appearance: Normal appearance. Eyes:      Conjunctiva/sclera: Conjunctivae normal.   Cardiovascular:      Rate and Rhythm: Regular rhythm. Heart sounds: No murmur. Pulmonary:      Effort: Pulmonary effort is normal.      Breath sounds: Normal breath sounds. No decreased breath sounds or wheezing. Abdominal:      General: Bowel sounds are normal.      Palpations: Abdomen is soft. Tenderness: There is no abdominal tenderness. Musculoskeletal:      Right lower leg: No edema. Left lower leg: No edema. Psychiatric:         Mood and Affect: Affect normal.         Cognition and Memory: Memory is impaired. Visit Vitals  BP (!) 142/62   Pulse 82   Temp 97.1 °F (36.2 °C) (Temporal)   Resp 20   Ht 5' 4\" (1.626 m)   Wt 111 lb (50.3 kg)   SpO2 98%   BMI 19.05 kg/m²         We discussed the expected course, resolution and complications of the diagnosis(es) in detail. Medication risks, benefits, costs, interactions, and alternatives were discussed as indicated. I advised her to contact the office if her condition worsens, changes or fails to improve as anticipated. She expressed understanding with the diagnosis(es) and plan.        Jaiden Manriquez MD

## 2020-12-07 RX ORDER — MIRTAZAPINE 7.5 MG/1
TABLET, FILM COATED ORAL
Qty: 30 TAB | Refills: 1 | Status: SHIPPED | OUTPATIENT
Start: 2020-12-07

## 2020-12-17 NOTE — PROGRESS NOTES
80F pmhx HTN, COPD, Hypothyroidism, MI (age 36), lung ca s/p partial right pneumonectomy, 30 pack/yr smoker, open appy (70 yrs ago) now s/p laparoscopic cholecystectomy, POD 4. Patient with increasing total bilirubin but now trending down and LFTs as well. Undergoing MRCP for evaluation of retained stone which is likely to have passed recently explaining improving LFTs and Bilirubin.      PLAN:  - GI  - Diet: Regular after MRCP  - Zosyn  - Pain meds prn  - DVT ppx lovenox   - ASA 81  - F/U MRCP   - Enema for constipation; bowel regimen  - Discharge planning    ACS Surgery [phone]  Annual wellness 80F pmhx HTN, COPD, Hypothyroidism, MI (age 36), lung ca s/p partial right pneumonectomy, 30 pack/yr smoker, open appy (70 yrs ago) now s/p laparoscopic cholecystectomy, POD 4. Patient with increasing total bilirubin but now trending down and LFTs as well. Undergoing MRCP for evaluation of retained stone which is likely to have passed recently explaining improving LFTs and Bilirubin.  Now with rectal bleeding    PLAN:  - stat repeat CBC  - NPO, GI evaluation called given rectal bleeding  - Diet: Regular after MRCP  - Zosyn  - Pain meds prn  - DVT ppx lovenox   - ASA 81      ACS Surgery [phone]

## 2020-12-26 DIAGNOSIS — R11.0 NAUSEA: ICD-10-CM

## 2020-12-27 DIAGNOSIS — F41.9 ANXIETY: ICD-10-CM

## 2020-12-27 RX ORDER — ONDANSETRON 4 MG/1
TABLET, FILM COATED ORAL
Qty: 30 TAB | Refills: 0 | Status: SHIPPED | OUTPATIENT
Start: 2020-12-27 | End: 2021-03-08 | Stop reason: SDUPTHER

## 2020-12-27 RX ORDER — LORAZEPAM 0.5 MG/1
TABLET ORAL
Qty: 135 TAB | Refills: 0 | Status: SHIPPED | OUTPATIENT
Start: 2020-12-27 | End: 2021-03-08 | Stop reason: SDUPTHER

## 2020-12-27 NOTE — TELEPHONE ENCOUNTER
Previously provided 60 tabs on 12/1, refill requested on 12/27, not sure if automatic refill, will reduce to 30

## 2020-12-28 NOTE — TELEPHONE ENCOUNTER
Chart & VA  reviewed. Will need to address decrease usage at next f/u. Last visit with me:  12/1/2020 Last refilled on: 8/25/20 No future appointments. Last PDMP Varsha Oneil as Reviewed: 
Review User Review Instant Review Result LEXIE GOLD 12/27/2020  8:58 PM Reviewed PDMP [1]

## 2021-01-02 RX ORDER — HYDRALAZINE HYDROCHLORIDE 10 MG/1
TABLET, FILM COATED ORAL
Qty: 180 TAB | Refills: 0 | Status: SHIPPED | OUTPATIENT
Start: 2021-01-02 | End: 2021-02-04 | Stop reason: SDUPTHER

## 2021-01-05 RX ORDER — HYDRALAZINE HYDROCHLORIDE 10 MG/1
TABLET, FILM COATED ORAL
Qty: 180 TAB | Refills: 0 | OUTPATIENT
Start: 2021-01-05

## 2021-02-01 ENCOUNTER — OFFICE VISIT (OUTPATIENT)
Dept: CARDIOLOGY CLINIC | Age: 86
End: 2021-02-01
Payer: MEDICARE

## 2021-02-01 DIAGNOSIS — Z95.818 STATUS POST PLACEMENT OF IMPLANTABLE LOOP RECORDER: Primary | ICD-10-CM

## 2021-02-01 PROCEDURE — 93298 REM INTERROG DEV EVAL SCRMS: CPT | Performed by: INTERNAL MEDICINE

## 2021-02-01 PROCEDURE — 93291 INTERROG DEV EVAL SCRMS IP: CPT

## 2021-02-03 ENCOUNTER — DOCUMENTATION ONLY (OUTPATIENT)
Dept: CARDIOLOGY CLINIC | Age: 86
End: 2021-02-03

## 2021-02-03 NOTE — PROGRESS NOTES
PAT on ILR to 188 bpm PVC  Current Outpatient Medications on File Prior to Visit   Medication Sig Dispense Refill    hydrALAZINE (APRESOLINE) 10 mg tablet Take two tablets by mouth three times a day. 180 Tab 0    ondansetron hcl (ZOFRAN) 4 mg tablet TAKE ONE TABLET BY MOUTH EVERY EIGHT HOURS AS NEEDED for nausea 30 Tab 0    LORazepam (ATIVAN) 0.5 mg tablet Takes 0.25 mg in the morning and 0.5 mg at night. 135 Tab 0    mirtazapine (REMERON) 7.5 mg tablet Take 1 tablet by mouth at bedtime. 30 Tab 1    pantoprazole (PROTONIX) 40 mg tablet Take 40 mg by mouth daily.  erythromycin base (E-MYCIN) 250 mg tablet Take 1 Tab by mouth four (4) times daily. 120 Tab 0    memantine (Namenda) 10 mg tablet Take 1 Tab by mouth two (2) times a day. 180 Tab 0    famotidine (PEPCID) 20 mg tablet Take 20 mg by mouth nightly.  acetaminophen (TYLENOL ARTHRITIS PAIN) 650 mg TbER Take 1 Tab by mouth every eight (8) hours.  Wheat Dextrin (BENEFIBER CLEAR) 3 gram/3.5 gram pwpk Take 3.5 g by mouth daily.  aspirin delayed-release 81 mg tablet Take 1 Tab by mouth daily. 30 Tab 0     No current facility-administered medications on file prior to visit. No future appointments.   Continue to monitor AFIB

## 2021-02-04 RX ORDER — HYDRALAZINE HYDROCHLORIDE 10 MG/1
20 TABLET, FILM COATED ORAL 3 TIMES DAILY
Qty: 180 TAB | Refills: 2 | Status: SHIPPED | OUTPATIENT
Start: 2021-02-04 | End: 2021-05-13

## 2021-03-08 ENCOUNTER — TELEPHONE (OUTPATIENT)
Dept: INTERNAL MEDICINE CLINIC | Age: 86
End: 2021-03-08

## 2021-03-08 DIAGNOSIS — R11.0 NAUSEA: ICD-10-CM

## 2021-03-08 DIAGNOSIS — F41.9 ANXIETY: ICD-10-CM

## 2021-03-08 NOTE — TELEPHONE ENCOUNTER
Can you refill pt medication asap  Will call  Back to sched an appt  Can someone  Call desi pt's  daughter

## 2021-03-09 RX ORDER — ONDANSETRON 4 MG/1
TABLET, FILM COATED ORAL
Qty: 30 TAB | Refills: 0 | Status: SHIPPED | OUTPATIENT
Start: 2021-03-09 | End: 2021-03-25

## 2021-03-09 RX ORDER — LORAZEPAM 0.5 MG/1
TABLET ORAL
Qty: 135 TAB | Refills: 0 | Status: SHIPPED | OUTPATIENT
Start: 2021-03-09 | End: 2021-06-22

## 2021-03-09 NOTE — CONSULTS
Appts and labs up to date. Refills granted.   ORTHO CONSULT NOTE    Date of Consultation:  August 22, 2019  Referring Physician:  Thaddeus Mon  CC: right shoulder surgery    HPI:  [place] is a 80 y.o. female s/p arthroscopic right shoulder cuff repair 8/15/19 admitted for possible CVA/TIA 8/20/19. She received tPA in ER with subsequent concern for increase in right shoulder swelling treated with ice. Patient has poor recollection of events.  is present and says hydrocodone helped for shoulder pain at home with some nausea. She has numerous listed allergies including Dilaudid, oxycodone, tramadol, etc.  Per family, Dr. Marcelle Prabhakar evaluated her shoulder yesterday.     Past Medical History:   Diagnosis Date    Allergic rhinitis, cause unspecified     Anxiety     Back pain     C. difficile diarrhea 8/2/2017    Ad[facility] to [facility]    Cancer ([facility]     basal cell face, back, neck    Cancer (HCC)     squamous cell leg    Chronic kidney disease     Chronic pain     DJD (degenerative joint disease) of cervical spine     Fibromyalgia     GERD (gastroesophageal reflux disease)     Hypercholesterolemia     Hypertension     Hyponatremia     IBS (irritable bowel syndrome)     Irritable bowel syndrome with diarrhea 6/29/2016    GI- Dr Veena Ortiz Kidney disease, chronic, stage III (GFR 30-59 ml/min) ([facility] 6/2011    Dr. Angie Fitzpatrick) [facility]    Nausea & vomiting     Neuropathy 6/22/2016    Both feet     Psychiatric disorder     DEPRESSION AND ANXIETY    Stroke ([facility] 2017    TIA    Thromboembolus ([facility] 2013    PE X2      Past Surgical History:   Procedure Laterality Date    COLONOSCOPY  1/20/11    diverticulosis    EGD  1/20/11    schatzki's ring--clear on barium swallow 7/11    HX ADENOIDECTOMY  1940    HX CATARACT REMOVAL Left 2013    HX CATARACT REMOVAL Right 01/27/2014    HX CHOLECYSTECTOMY  5/10    HX DILATION AND CURETTAGE  1963    HX GI      COLONOSCOPY    HX HEENT      BCCA REMOVED FROM FACE ,NOSE     HX HYSTERECTOMY  1970`S    hysterectomy  HX KNEE ARTHROSCOPY Left 1990    HX KNEE REPLACEMENT Right 9/29/09    HX ORTHOPAEDIC Right 2017    trigger finger release    HX ORTHOPAEDIC Right 07/18/2019     RING FINGRER TRIGGER FINGER REALSE     HX OTHER SURGICAL      skin cancer removed: L leg    HX OTHER SURGICAL      BCCA REMOVED FROM BACK     HX POLYPECTOMY  1989    HX ROTATOR CUFF REPAIR Right 08/15/2019    and sub-acromial decompression    HX SHOULDER ARTHROSCOPY Left 12/13/2018    arthroscopy & sub-acromial decompression    HX TONSILLECTOMY  1940    HX UROLOGICAL  1995    bladder suspension    REPAIR OF RECTOCELE           Social History     Tobacco Use    Smoking status: Never Smoker    Smokeless tobacco: Never Used   Substance Use Topics    Alcohol use: Not Currently     Alcohol/week: 0.0 standard drinks     Frequency: Never     Binge frequency: Never     Comment: rarely        Allergies   Allergen Reactions    Latex Itching    Dilaudid [Hydromorphone (Bulk)] Other (comments)     ? loss of consciousness    Altace [Ramipril] Unknown (comments)    Ambien [Zolpidem] Unknown (comments)    [employer] Test [Blood Sugar Diagnostic, Disc-Type] Unknown (comments)    Aspirin Unknown (comments)    Astelin [Azelastine] Unknown (comments)    Atenolol Unknown (comments)    Banex La Unknown (comments)    Benicar [Olmesartan] Unknown (comments)    Carbocaine [Mepivacaine] Unknown (comments)    Cleocin [Clindamycin Hcl] Hives    Codeine Nausea and Vomiting    Codeine-Asa-Salicyl-Acetam-Caf Other (comments)     Stomach pain    Cortisporin [Neomy-Polymyxinb-Bacitracin-Hc] Unknown (comments)    Cymbalta [Duloxetine] Other (comments)    Cytotec [Misoprostol] Unknown (comments)    Dilaudid [Hydromorphone] Unknown (comments)     Patient states that she was unable to respond, but was breathing    Effexor [Venlafaxine] Unknown (comments)    Epinephrine Unknown (comments)     Heart racing    Estra-D Unknown (comments)    Estrace [Estradiol] Unknown (comments)    Fentanyl Nausea and Vomiting and Unknown (comments)     shakes    Flexeril [Cyclobenzaprine] Unable to Obtain     \"drugs me\"    Flonase [Fluticasone] Unknown (comments)    Flunisolide Unknown (comments)    Guiatussin W/Codeine Unknown (comments)    Ketek [Telithromycin] Nausea Only    Klonopin [Clonazepam] Unknown (comments)    Lexapro [Escitalopram] Nausea and Vomiting    Lodine [Etodolac] Nausea and Vomiting    Morphine Unknown (comments)    Nsaids (Non-Steroidal Anti-Inflammatory Drug) Nausea and Vomiting    Other Medication Unknown (comments)     Gareth Alford    Paxil [Paroxetine Hcl] Nausea and Vomiting    Pcn [Penicillins] Hives    Percocet [Oxycodone-Acetaminophen] Unknown (comments)    Percodan [Oxycodone Hcl-Oxycodone-Asa] Unknown (comments)    Pneumococcal 23-Mary Ps Vaccine Swelling    Proctocream-Hc [Hydrocortisone] Unknown (comments)    Prozac [Fluoxetine] Nausea Only     Suicidal visions      Pseudoephedrine Other (comments)    Quinidine Unknown (comments)    Reglan [Metoclopramide] Unknown (comments)    Remeron [Mirtazapine] Other (comments)    Resaid Other (comments)     inflammed eyes    Robaxin [Methocarbamol] Unknown (comments)    Sonata [Zaleplon] Unknown (comments)    Sulfa (Sulfonamide Antibiotics) Hives    Surmontil [Trimipramine] Nausea and Vomiting    Talwin [Pentazocine Lactate] Unknown (comments)    Toradol [Ketorolac Tromethamine] Nausea and Vomiting    Ultram [Tramadol] Unknown (comments)    Vancomycin Nausea Only    Vasoconstrictor Drug Unknown (comments)    Vigamox [Moxifloxacin] Rash and Swelling    Vioxx [Rofecoxib] Unknown (comments)    Voltaren [Diclofenac Sodium] Itching    Wellbutrin [Bupropion Hcl] Nausea and Vomiting    Zelnorm [Tegaserod Hydrogen Maleate] Unknown (comments)    Zoloft [Sertraline] Nausea and Vomiting        Review of Systems:  Per HPI.     Objective:     Patient Vitals for the past 8 hrs:   BP Temp Pulse Resp SpO2   19 0800 (!) 126/95 97.8 °F (36.6 °C)  30    19 0600 159/57  76 15 98 %   19 0500 (!) 125/107  82 17 100 %     Temp (24hrs), Av.5 °F (36.9 °C), Min:97.8 °F (36.6 °C), Max:99.1 °F (37.3 °C)      EXAM:   NAD. Lying in ICU bed.  present. She is aware of recent right shoulder surgery but confused on timeline of events. Right shoulder incisions with sutures intact and no bleeding/oozing. She had moderate ecchymosis anterior and posterior shoulder extending to elbow. Obvious shoulder edema/effusion. Moves right elbow/wrist/digits OK. Right arm SILT. Palp radial pulse. Moves LUE spontaneously. Labs:  WBC 8.8    Impression:     Patient Active Problem List    Diagnosis Date Noted    Aphasia 2019    TIA (transient ischemic attack) 2019    Slurred speech 2019    Alzheimer's disease 2019    Recurrent major depressive disorder [facility] 2018    Other irritable bowel syndrome 2016    Gastroparesis 2016    Spinal stenosis, lumbar 2016    Fibromyalgia 2016    Gastroesophageal reflux disease without esophagitis 2016    History of pulmonary embolism 2016    Pre-diabetes 2011    CKD (chronic kidney disease) stage 3, GFR 30-59 ml/min ([facility]) 2011    DDD (degenerative disc disease), lumbar 10/12/2010    Allergic rhinitis 10/11/2010    Anxiety 2010    Essential hypertension, benign 2010    DDD (degenerative disc disease), cervical 2010     Active Problems:    Anxiety (2010)      Aphasia (2019)      TIA (transient ischemic attack) (2019)      Slurred speech (2019)    1 week s/p arthroscopic right shoulder cuff repair. Some exacerbation of post-op edema/effusion d/t tPA but no evidence of infection.     Plan:   Her shoulder seems to be following expected post-op course at this time with potential mild increase in edema/effusion d/t tPA.  I expect her shoulder will be fine with time. NWB RUE. Sling when OOB. OK elbow/wrist/digit ROM but no PROM/AROM shoulder at this time. Can use cane LUE if needed for gait. Ice right shoulder. Keep sutures intact. Given her extensive allergy list, hydrocodone prn (with food) is probably best answer for shoulder pain. Dr. Brandt Moscoso is aware.     COREY Rashid  [facility]  [address] [facility]

## 2021-03-09 NOTE — TELEPHONE ENCOUNTER
Medication was refilled this afternoon. Controlled medication. They are aware they need to schedule a follow up prior to getting refills. Next time this happens w/o appointment will not be able to refill.

## 2021-03-09 NOTE — TELEPHONE ENCOUNTER
Chart & VA  reviewed. Due for 6 month f/u, nothing scheduled. Needs appointment. Last visit with me:  12/1/2020   Last refilled on: 12/28/20 - #135    No future appointments.      Last PDMP Denoli Barnett as Reviewed:  Review User Review Instant Review Result   LEXIE GOLD 12/27/2020  8:58 PM Reviewed PDMP [1]

## 2021-03-09 NOTE — TELEPHONE ENCOUNTER
CT daughter, advised of Dr. Oc Weinberg' note, scheduled appointment for April. She wanted Dr. Oc Weinberg to know when she was told she needed appointment, we called the pharmacy with a scheduled date for appointment and told her to let patient know. She didn't like this and could not make that date anyway. Just wanted us to be aware.

## 2021-03-24 DIAGNOSIS — R11.0 NAUSEA: ICD-10-CM

## 2021-03-25 RX ORDER — ONDANSETRON 4 MG/1
TABLET, FILM COATED ORAL
Qty: 30 TAB | Refills: 0 | Status: SHIPPED | OUTPATIENT
Start: 2021-03-25 | End: 2021-04-16

## 2021-04-15 RX ORDER — HYDRALAZINE HYDROCHLORIDE 10 MG/1
20 TABLET, FILM COATED ORAL 3 TIMES DAILY
Qty: 180 TAB | Refills: 2 | OUTPATIENT
Start: 2021-04-15

## 2021-04-16 DIAGNOSIS — R11.0 NAUSEA: ICD-10-CM

## 2021-04-16 RX ORDER — ONDANSETRON 4 MG/1
TABLET, FILM COATED ORAL
Qty: 30 TAB | Refills: 0 | Status: SHIPPED | OUTPATIENT
Start: 2021-04-16 | End: 2021-05-13

## 2021-04-19 ENCOUNTER — OFFICE VISIT (OUTPATIENT)
Dept: INTERNAL MEDICINE CLINIC | Age: 86
End: 2021-04-19
Payer: MEDICARE

## 2021-04-19 VITALS
HEART RATE: 91 BPM | BODY MASS INDEX: 19.29 KG/M2 | OXYGEN SATURATION: 99 % | WEIGHT: 113 LBS | RESPIRATION RATE: 20 BRPM | TEMPERATURE: 98.2 F | DIASTOLIC BLOOD PRESSURE: 64 MMHG | SYSTOLIC BLOOD PRESSURE: 138 MMHG | HEIGHT: 64 IN

## 2021-04-19 DIAGNOSIS — F33.0 MILD EPISODE OF RECURRENT MAJOR DEPRESSIVE DISORDER (HCC): ICD-10-CM

## 2021-04-19 DIAGNOSIS — F02.80 LATE ONSET ALZHEIMER'S DISEASE WITHOUT BEHAVIORAL DISTURBANCE (HCC): ICD-10-CM

## 2021-04-19 DIAGNOSIS — Z23 ENCOUNTER FOR IMMUNIZATION: ICD-10-CM

## 2021-04-19 DIAGNOSIS — F43.21 GRIEF: Primary | ICD-10-CM

## 2021-04-19 DIAGNOSIS — G30.1 LATE ONSET ALZHEIMER'S DISEASE WITHOUT BEHAVIORAL DISTURBANCE (HCC): ICD-10-CM

## 2021-04-19 DIAGNOSIS — I10 ESSENTIAL HYPERTENSION, BENIGN: ICD-10-CM

## 2021-04-19 DIAGNOSIS — R00.2 PALPITATIONS: ICD-10-CM

## 2021-04-19 DIAGNOSIS — F41.9 ANXIETY: ICD-10-CM

## 2021-04-19 PROCEDURE — 1101F PT FALLS ASSESS-DOCD LE1/YR: CPT | Performed by: INTERNAL MEDICINE

## 2021-04-19 PROCEDURE — G8752 SYS BP LESS 140: HCPCS | Performed by: INTERNAL MEDICINE

## 2021-04-19 PROCEDURE — G8427 DOCREV CUR MEDS BY ELIG CLIN: HCPCS | Performed by: INTERNAL MEDICINE

## 2021-04-19 PROCEDURE — G0463 HOSPITAL OUTPT CLINIC VISIT: HCPCS | Performed by: INTERNAL MEDICINE

## 2021-04-19 PROCEDURE — G9717 DOC PT DX DEP/BP F/U NT REQ: HCPCS | Performed by: INTERNAL MEDICINE

## 2021-04-19 PROCEDURE — 1090F PRES/ABSN URINE INCON ASSESS: CPT | Performed by: INTERNAL MEDICINE

## 2021-04-19 PROCEDURE — 99214 OFFICE O/P EST MOD 30 MIN: CPT | Performed by: INTERNAL MEDICINE

## 2021-04-19 PROCEDURE — G8420 CALC BMI NORM PARAMETERS: HCPCS | Performed by: INTERNAL MEDICINE

## 2021-04-19 PROCEDURE — G8399 PT W/DXA RESULTS DOCUMENT: HCPCS | Performed by: INTERNAL MEDICINE

## 2021-04-19 PROCEDURE — G8536 NO DOC ELDER MAL SCRN: HCPCS | Performed by: INTERNAL MEDICINE

## 2021-04-19 PROCEDURE — G8754 DIAS BP LESS 90: HCPCS | Performed by: INTERNAL MEDICINE

## 2021-04-19 RX ORDER — MEMANTINE HYDROCHLORIDE 5 MG/1
TABLET ORAL
Qty: 60 TAB | Refills: 1 | Status: SHIPPED | OUTPATIENT
Start: 2021-04-19 | End: 2021-04-28 | Stop reason: SDUPTHER

## 2021-04-19 RX ORDER — ZOSTER VACCINE RECOMBINANT, ADJUVANTED 50 MCG/0.5
KIT INTRAMUSCULAR
Qty: 0.5 ML | Refills: 1 | Status: SHIPPED | OUTPATIENT
Start: 2021-04-19

## 2021-04-19 RX ORDER — NORTRIPTYLINE HYDROCHLORIDE 10 MG/1
10 CAPSULE ORAL
COMMUNITY
Start: 2021-04-15

## 2021-04-19 RX ORDER — POLYETHYLENE GLYCOL 3350 17 G/17G
17 POWDER, FOR SOLUTION ORAL
COMMUNITY

## 2021-04-19 NOTE — PROGRESS NOTES
Balbir Myers is a 80 y.o. female who was seen in clinic today (4/19/2021). RTC with her daughter. Assessment & Plan:   1. Grief  Comments:  new dx, does not look complicated, reviewed expectations, recommended counselor thru hospice, reviewed life style changes  2. Anxiety  Assessment & Plan:  poorly controlled due to 's recent passing, I reviewed treatment options with her, I reviewed life style changes to help improve mood, continue current treatment. Reviewed my concerns with adjusting medications. Reviewed seeing counselor thru hospice. 3. Mild episode of recurrent major depressive disorder ([facility]  Assessment & Plan:  Unclear control, seem like anxiety is worse and depression is stable, no changes at this time to medications, work on life style changes  4. Late onset Alzheimer's disease without behavioral disturbance (HCC)  -     memantine (Namenda) 5 mg tablet; 1 tab daily x 7 days, increase to 1 tab twice a day x 7 days, increase to 2 tabs in AM and 1 tab in PM x 7 days, and then 2 tabs twice a day, Normal, Disp-60 Tab, R-1  5. Palpitations  Comments:  new dx, intermittent, sounds more anxiety related, reviewed red flags w/ daughter, reviewed life style changes  6. Essential hypertension, benign  Assessment & Plan:  well controlled, continue current treatment   7. Encounter for immunization  -     varicella-zoster recombinant, PF, (Shingrix, PF,) 50 mcg/0.5 mL susr injection; 0.5 ml IM once and then repeat in 2-6 months., Normal, Disp-0.5 mL, R-1    Follow-up and Dispositions    · Return in about 4 months (around 8/19/2021) for Regular follow up. Subjective/Objective:   [place] was seen today for Hypertension and Anxiety      Since last visit:  passed away from metastatic cancer (1 month ago). Patient is seen for anxiety. She feels lonely. She is at home alone. She does not want to leave. Family is asking about counseling thru hospice.   Looking into seniors helping seniors. She is getting some activity (walkign around the track at [facility]) and thinking about ways to get her back to the pool. She is off Namenda. Daughter did not think it made a big difference. Only using zofran 1 tab in AM.  Started on TCA by GI and reports GI issues have improved. She is not checking her BP. She is taking her medications w/o side effects. Prior to Admission medications    Medication Sig Start Date End Date Taking? Authorizing Provider   nortriptyline (PAMELOR) 10 mg capsule Take 10 mg by mouth nightly. 4/15/21  Yes Provider, Historical   polyethylene glycol (MIRALAX) 17 gram packet Take 17 g by mouth daily as needed for Constipation. Yes Provider, Historical   ondansetron hcl (ZOFRAN) 4 mg tablet TAKE ONE TABLET BY MOUTH EVERY 8 HOURS AS NEEDED FOR NAUSEA 4/16/21  Yes Taylor Astorga MD   LORazepam (ATIVAN) 0.5 mg tablet Takes 0.25 mg in the morning and 0.5 mg at night. 3/9/21  Yes Taylor Astorga MD   hydrALAZINE (APRESOLINE) 10 mg tablet Take 2 Tabs by mouth three (3) times daily. 2/4/21  Yes Taylor Astorga MD   mirtazapine (REMERON) 7.5 mg tablet Take 1 tablet by mouth at bedtime. 12/7/20  Yes Taylor Astorga MD   pantoprazole (PROTONIX) 40 mg tablet Take 40 mg by mouth daily. Yes Provider, Historical   acetaminophen (TYLENOL ARTHRITIS PAIN) 650 mg TbER Take 1 Tab by mouth every eight (8) hours. 8/30/19  Yes Taylor Astorga MD   aspirin delayed-release 81 mg tablet Take 1 Tab by mouth daily. 8/4/17  Yes Minor, Windsor Schlatter, NP   erythromycin base (E-MYCIN) 250 mg tablet Take 1 Tab by mouth four (4) times daily. 12/1/20 4/19/21  Taylor Astorga MD   memantine (Namenda) 10 mg tablet Take 1 Tab by mouth two (2) times a day. 10/19/20   Taylor Astorga MD   famotidine (PEPCID) 20 mg tablet Take 20 mg by mouth nightly.   4/19/21  Provider, Historical   Wheat Dextrin (BENEFIBER CLEAR) 3 gram/3.5 gram pwpk Take 3.5 g by mouth daily. 4/19/21  Provider, Historical        Review of Systems   Cardiovascular: Positive for chest pain. Psychiatric/Behavioral: The patient is nervous/anxious and has insomnia. All other systems reviewed and are negative. Physical Exam  Cardiovascular:      Rate and Rhythm: Regular rhythm. Heart sounds: Normal heart sounds. No murmur. Pulmonary:      Effort: Pulmonary effort is normal.      Breath sounds: Normal breath sounds. No wheezing or rales. Abdominal:      General: Bowel sounds are normal.      Palpations: There is no mass. Tenderness: There is no abdominal tenderness.          Visit Vitals  /64   Pulse 91   Temp 98.2 °F (36.8 °C) (Temporal)   Resp 20   Ht 5' 4\" (1.626 m)   Wt 113 lb (51.3 kg)   SpO2 99%   BMI 19.40 kg/m²         Teofilo Weathers MD

## 2021-04-20 NOTE — ASSESSMENT & PLAN NOTE
Stable, off medications, reviewed pros/cons to meds, will restart Namenda to see if related to her nausea.

## 2021-04-20 NOTE — ASSESSMENT & PLAN NOTE
Unclear control, seem like anxiety is worse and depression is stable, no changes at this time to medications, work on life style changes

## 2021-04-20 NOTE — ASSESSMENT & PLAN NOTE
poorly controlled due to 's recent passing, I reviewed treatment options with her, I reviewed life style changes to help improve mood, continue current treatment. Reviewed my concerns with adjusting medications. Reviewed seeing counselor thru hospice.

## 2021-04-27 ENCOUNTER — TELEPHONE (OUTPATIENT)
Dept: INTERNAL MEDICINE CLINIC | Age: 86
End: 2021-04-27

## 2021-04-27 NOTE — TELEPHONE ENCOUNTER
Katya Robles ([place] [address]) [phone] (M)     pt's daughter requesting a call back regarding her mother's numenda rx, there is a problem with ins covering this for her mother.

## 2021-04-27 NOTE — TELEPHONE ENCOUNTER
Return call to patient's daughter, identified with 2 identifiers. We reviewed good RX, appears to be $15 at [place] with free coupon. She wants to pursue this before attempting prior auth, will let me know if any problems.

## 2021-04-28 DIAGNOSIS — G30.1 LATE ONSET ALZHEIMER'S DISEASE WITHOUT BEHAVIORAL DISTURBANCE (HCC): ICD-10-CM

## 2021-04-28 DIAGNOSIS — F02.80 LATE ONSET ALZHEIMER'S DISEASE WITHOUT BEHAVIORAL DISTURBANCE (HCC): ICD-10-CM

## 2021-04-28 RX ORDER — MEMANTINE HYDROCHLORIDE 5 MG/1
TABLET ORAL
Qty: 30 TAB | Refills: 0 | Status: SHIPPED
Start: 2021-04-28 | End: 2021-07-28 | Stop reason: SINTOL

## 2021-04-28 RX ORDER — MEMANTINE HYDROCHLORIDE 10 MG/1
10 TABLET ORAL 2 TIMES DAILY
Qty: 180 TAB | Refills: 0 | Status: SHIPPED
Start: 2021-05-18 | End: 2021-07-28 | Stop reason: SINTOL

## 2021-05-07 ENCOUNTER — OFFICE VISIT (OUTPATIENT)
Dept: CARDIOLOGY CLINIC | Age: 86
End: 2021-05-07
Payer: MEDICARE

## 2021-05-07 DIAGNOSIS — Z95.818 STATUS POST PLACEMENT OF IMPLANTABLE LOOP RECORDER: Primary | ICD-10-CM

## 2021-05-07 PROCEDURE — 93298 REM INTERROG DEV EVAL SCRMS: CPT | Performed by: INTERNAL MEDICINE

## 2021-05-07 PROCEDURE — 93291 INTERROG DEV EVAL SCRMS IP: CPT

## 2021-05-13 DIAGNOSIS — R11.0 NAUSEA: ICD-10-CM

## 2021-05-13 RX ORDER — ONDANSETRON 4 MG/1
TABLET, FILM COATED ORAL
Qty: 30 TAB | Refills: 2 | Status: SHIPPED | OUTPATIENT
Start: 2021-05-13 | End: 2021-08-06

## 2021-05-13 RX ORDER — HYDRALAZINE HYDROCHLORIDE 10 MG/1
TABLET, FILM COATED ORAL
Qty: 180 TAB | Refills: 4 | Status: SHIPPED | OUTPATIENT
Start: 2021-05-13 | End: 2021-10-15

## 2021-06-17 ENCOUNTER — PATIENT OUTREACH (OUTPATIENT)
Dept: CASE MANAGEMENT | Age: 86
End: 2021-06-17

## 2021-06-17 NOTE — PROGRESS NOTES
Patient received by [facility] assignment     Ambulatory Care Management Note    Date/Time:  6/17/2021 12:59 PM    This patient was received as a referral from [facility] with a reported risk score of 65%. .  Ambulatory Care Manager outreached to patient today to offer care management services. Introduction to self and role of care manager provided. Patient will consider care management services but prefers this writer contact her daughter,    Follow up call scheduled at this time. Patient has Ambulatory Care Manager's contact number for for any questions or concerns. Hx: TIA, depression, IBS, fibromyalgia, DDD, anxiety, Alzheimer's  Patient lives alone, her  passed away March 2021, has four children and a daughter who is involved with her care. Admits she is grieving. Pt goes to [facility] 2x/week and has a  2x/month. Daughter arranges transportation. Patient needed to end call because her ride would be arriving soon. 06/18/21  Called daughter, Uziel Carrera (on HIPPA) at the request of the patient. Introduction provided and inquired if the patient would benefit from case management. Ms. Darrin Haji advised there are 4 children, each helping patient. Joselyn Sheets manages medications and coordinates transportation, her sister does the shopping and one brother manages patient's finances. The patient is receiving grief counseling in the home through Hospice. Patient goes to [address] [place] [address] 2x/wk for swimming and 2x/mo patient is able to see high school friends and a bridge group for socialization. A  visits the patient 2x/mo through [employer]. The family is doing a great job trying to keep patient involved in activities. Ms. Darrin Haji had only one inquiry regarding a counselor for anxiety. Advised I would research and advise as she needed to end the call at that time. Will call back and provide the following: (1) [address] provides geriatric counseling for anxiety.  Family would need to complete online intake process and JFS will make outreach with 24/48 hours. Currently, new patient appointments are starting in late August. (2) [facility] [address] [place] [address]  (3) [address] [place] [address] [phone]. ACM will be available if additional needs arise.  KG

## 2021-06-21 DIAGNOSIS — F41.9 ANXIETY: ICD-10-CM

## 2021-06-22 ENCOUNTER — PATIENT OUTREACH (OUTPATIENT)
Dept: CASE MANAGEMENT | Age: 86
End: 2021-06-22

## 2021-06-22 RX ORDER — LORAZEPAM 0.5 MG/1
TABLET ORAL
Qty: 135 TABLET | Refills: 0 | Status: SHIPPED | OUTPATIENT
Start: 2021-06-22 | End: 2021-09-16

## 2021-06-22 NOTE — TELEPHONE ENCOUNTER
Chart & VA  reviewed.       Last visit with me:  4/19/2021   Last refilled on: 3/26//21 - #135    Future Appointments   Date Time Provider Americo Rizo   7/28/2021  3:15 PM Rajwinder Arreola MD [facility]        Last PDMP Kaushik Gamble as Reviewed:  Review User Review Instant Review Result   LEXIE GOLD 6/22/2021  7:27 AM Reviewed PDMP [1]

## 2021-06-22 NOTE — PROGRESS NOTES
[address], daughter of patient (on HIPPA) to provide names/numbers of counselors. Ms. Arianne Hummel was not able to take the information during this call. Agreed to provide information on voicemail at the current number. Called back and left information on voicemail. (see previous note). No other concerns at this time. Episode resolved at this time.  KG

## 2021-07-28 ENCOUNTER — OFFICE VISIT (OUTPATIENT)
Dept: INTERNAL MEDICINE CLINIC | Age: 86
End: 2021-07-28
Payer: MEDICARE

## 2021-07-28 VITALS
HEART RATE: 102 BPM | HEIGHT: 64 IN | TEMPERATURE: 97.8 F | DIASTOLIC BLOOD PRESSURE: 61 MMHG | SYSTOLIC BLOOD PRESSURE: 123 MMHG | WEIGHT: 115 LBS | BODY MASS INDEX: 19.63 KG/M2 | RESPIRATION RATE: 16 BRPM | OXYGEN SATURATION: 98 %

## 2021-07-28 DIAGNOSIS — N28.9 RENAL INSUFFICIENCY: Primary | ICD-10-CM

## 2021-07-28 DIAGNOSIS — G30.9 ALZHEIMER'S DISEASE (HCC): ICD-10-CM

## 2021-07-28 DIAGNOSIS — F02.80 ALZHEIMER'S DISEASE (HCC): ICD-10-CM

## 2021-07-28 DIAGNOSIS — F41.9 ANXIETY: ICD-10-CM

## 2021-07-28 DIAGNOSIS — R11.0 NAUSEA: ICD-10-CM

## 2021-07-28 PROCEDURE — 99214 OFFICE O/P EST MOD 30 MIN: CPT | Performed by: INTERNAL MEDICINE

## 2021-07-28 PROCEDURE — G8536 NO DOC ELDER MAL SCRN: HCPCS | Performed by: INTERNAL MEDICINE

## 2021-07-28 PROCEDURE — G0463 HOSPITAL OUTPT CLINIC VISIT: HCPCS | Performed by: INTERNAL MEDICINE

## 2021-07-28 PROCEDURE — G8399 PT W/DXA RESULTS DOCUMENT: HCPCS | Performed by: INTERNAL MEDICINE

## 2021-07-28 PROCEDURE — G8427 DOCREV CUR MEDS BY ELIG CLIN: HCPCS | Performed by: INTERNAL MEDICINE

## 2021-07-28 PROCEDURE — G8752 SYS BP LESS 140: HCPCS | Performed by: INTERNAL MEDICINE

## 2021-07-28 PROCEDURE — 1090F PRES/ABSN URINE INCON ASSESS: CPT | Performed by: INTERNAL MEDICINE

## 2021-07-28 PROCEDURE — G9717 DOC PT DX DEP/BP F/U NT REQ: HCPCS | Performed by: INTERNAL MEDICINE

## 2021-07-28 PROCEDURE — 1101F PT FALLS ASSESS-DOCD LE1/YR: CPT | Performed by: INTERNAL MEDICINE

## 2021-07-28 PROCEDURE — G8420 CALC BMI NORM PARAMETERS: HCPCS | Performed by: INTERNAL MEDICINE

## 2021-07-28 PROCEDURE — G8754 DIAS BP LESS 90: HCPCS | Performed by: INTERNAL MEDICINE

## 2021-07-28 RX ORDER — ZOSTER VACCINE RECOMBINANT, ADJUVANTED 50 MCG/0.5
KIT INTRAMUSCULAR
Qty: 0.5 ML | Refills: 1 | Status: CANCELLED | OUTPATIENT
Start: 2021-07-28

## 2021-07-28 RX ORDER — ONDANSETRON 4 MG/1
TABLET, FILM COATED ORAL
Status: CANCELLED | OUTPATIENT
Start: 2021-07-28

## 2021-07-28 NOTE — PROGRESS NOTES
April Rios is a 80 y.o. female who was seen in clinic today (7/28/2021). She RTC with her daughter. Assessment & Plan:   Below is the assessment and plan developed based on review of pertinent history, physical exam, labs, studies, and medications. 1. Renal insufficiency  Comments:  elevated on last labs, due for repeat labs to verify resolution  Orders:  -     METABOLIC PANEL, BASIC; Future  2. Nausea  Assessment & Plan:  Chronic issue, on/off, reviewed should not be using zofran BID-TID regularly, supposed to be prn, will continue w/ daily use but can change around timing of the day depending on symptoms   3. Alzheimer's disease ([facility] [address]  Assessment & Plan:  Stable overall, no significant decline from family, has not been able to tolerate Namenda on several attempts, will stop, will hold off on retrying any medications, family is aware of expectations    4. Anxiety  Assessment & Plan:  Poorly controlled, chronic issue, reviewed her symptoms sound like panic attacks more then cardiac (reviewed normal loop monitor w/o arrhythmia). Will defer stress test, reviewed red flags. Do not want to go up on ativan. Declined trying SSRI/SNRI due to h/o allergies (has reported 61). Reviewed life style changes and stress reduction techniques       Follow-up and Dispositions    · Return in about 4 months (around 11/28/2021) for Regular follow up. Subjective/Objective:   [place] was seen today for Anxiety and Rapid Heart Rate (intermittently)      Since last visit: still having some palpitations. Still occuring, she is needing to lay down about once a day. Her description keeps changing. She did try Namenda but had to stop due to nausea. She did okay on the 5mg but did not tolerate the 10mg. She is taking zofran once/day and daughter is asking about taking BID. She is having more issues w/ nausea, but no vomiting or abdominal pain. She reports her mood is better, daughter not quite as sure.   She is seeing counselor thru hospice. She is getting out with friends several times per week and has [employer]. Daughter things she is still having anxiety. She is having episodes of palpations and feeling short of breath about once a day. Resolves with laying down and deep breathing. Prior to Admission medications    Medication Sig Start Date End Date Taking? Authorizing Provider   LORazepam (ATIVAN) 0.5 mg tablet TAKE 1/2 TABLET BY MOUTH EVERY MORNING AND TAKE ONE TABLET BY MOUTH EVERY EVENING 6/22/21  Yes Halie Humhpries MD   hydrALAZINE (APRESOLINE) 10 mg tablet TAKE TWO TABLETS BY MOUTH THREE TIMES A DAY 5/13/21  Yes Halie Humphries MD   ondansetron hcl (ZOFRAN) 4 mg tablet TAKE ONE TABLET BY MOUTH EVERY 8 HOURS AS NEEDED FOR NAUSEA 5/13/21  Yes Halie Humphries MD   nortriptyline (PAMELOR) 10 mg capsule Take 10 mg by mouth nightly. 4/15/21  Yes Provider, Historical   polyethylene glycol (MIRALAX) 17 gram packet Take 17 g by mouth daily as needed for Constipation. Yes Provider, Historical   mirtazapine (REMERON) 7.5 mg tablet Take 1 tablet by mouth at bedtime. 12/7/20  Yes Halie Humphries MD   pantoprazole (PROTONIX) 40 mg tablet Take 40 mg by mouth daily. Yes Provider, Historical   acetaminophen (TYLENOL ARTHRITIS PAIN) 650 mg TbER Take 1 Tab by mouth every eight (8) hours. 8/30/19  Yes Halie Humphries MD   aspirin delayed-release 81 mg tablet Take 1 Tab by mouth daily. 8/4/17  Yes Minor, Luisa Florian NP   memantine (Namenda) 5 mg tablet 1 tab daily x 10 days, increase to 1 tab twice a day x 10 days. After 20 days change to 10mg tabs  Patient not taking: Reported on 7/28/2021 4/28/21   Halie Humphries MD   memantine [facility]) 10 mg tablet Take 1 Tab by mouth two (2) times a day.   Patient not taking: Reported on 7/28/2021 5/18/21   Halie Humphries MD   varicella-zoster recombinant, PF, (Shingrix, PF,) 50 mcg/0.5 mL susr injection 0.5 ml IM once and then repeat in 2-6 months. Patient not taking: Reported on 7/28/2021 4/19/21   Yousif Natarajan MD        Review of Systems   Respiratory: Positive for shortness of breath. Cardiovascular: Positive for palpitations. Negative for chest pain. Gastrointestinal: Positive for nausea. Negative for abdominal pain, constipation, diarrhea, heartburn and vomiting. Neurological: Negative for dizziness, tremors and headaches. Psychiatric/Behavioral: Positive for memory loss. Negative for depression. The patient is nervous/anxious. The patient does not have insomnia. Physical Exam - repeated same questions and wanted same things explained over and over. Well dressed. Good eye contact. Pleasant.       Visit Vitals  /61   Pulse (!) 102   Temp 97.8 °F (36.6 °C) (Temporal)   Resp 16   Ht 5' 4\" (1.626 m)   Wt 115 lb (52.2 kg)   SpO2 98%   BMI 19.74 kg/m²         Eduardo Albrecht MD

## 2021-07-29 DIAGNOSIS — N28.9 RENAL INSUFFICIENCY: Primary | ICD-10-CM

## 2021-07-29 NOTE — ASSESSMENT & PLAN NOTE
Stable overall, no significant decline from family, has not been able to tolerate Namenda on several attempts, will stop, will hold off on retrying any medications, family is aware of expectations

## 2021-07-29 NOTE — ASSESSMENT & PLAN NOTE
Chronic issue, on/off, reviewed should not be using zofran BID-TID regularly, supposed to be prn, will continue w/ daily use but can change around timing of the day depending on symptoms

## 2021-07-29 NOTE — ASSESSMENT & PLAN NOTE
Poorly controlled, chronic issue, reviewed her symptoms sound like panic attacks more then cardiac (reviewed normal loop monitor w/o arrhythmia). Will defer stress test, reviewed red flags. Do not want to go up on ativan. Declined trying SSRI/SNRI due to h/o allergies (has reported 61).   Reviewed life style changes and stress reduction techniques

## 2021-08-06 ENCOUNTER — OFFICE VISIT (OUTPATIENT)
Dept: CARDIOLOGY CLINIC | Age: 86
End: 2021-08-06
Payer: MEDICARE

## 2021-08-06 DIAGNOSIS — Z95.818 STATUS POST PLACEMENT OF IMPLANTABLE LOOP RECORDER: Primary | ICD-10-CM

## 2021-08-06 DIAGNOSIS — R11.0 NAUSEA: ICD-10-CM

## 2021-08-06 PROCEDURE — 93298 REM INTERROG DEV EVAL SCRMS: CPT | Performed by: INTERNAL MEDICINE

## 2021-08-06 RX ORDER — ONDANSETRON 4 MG/1
TABLET, FILM COATED ORAL
Qty: 30 TABLET | Refills: 1 | Status: SHIPPED | OUTPATIENT
Start: 2021-08-06 | End: 2021-09-16

## 2021-08-13 ENCOUNTER — PATIENT MESSAGE (OUTPATIENT)
Dept: INTERNAL MEDICINE CLINIC | Age: 86
End: 2021-08-13

## 2021-08-13 NOTE — TELEPHONE ENCOUNTER
----- Message from Michell Cottrell LPN sent at 7/92/6612 11:29 AM EDT -----  Regarding: FW: Non-Urgent Medical Question  Contact: [phone]    ----- Message -----  From: Leonoradanielito Reyes: 8/13/2021  10:00 AM EDT  To: Angus Das [facility]  Subject: Non-Urgent Medical Question                      This message is being sent by Umm Bear on behalf of [facility]. UNFORTUNELY THEY WANT THIS BACK ON THE 18TH. I JUST RECEIVED THE PAPERS  ON THE  11TH.   Victoriano TAM

## 2021-08-13 NOTE — TELEPHONE ENCOUNTER
----- Message from Wilian Wells LPN sent at 3/62/2968 11:29 AM EDT -----  Regarding: FW: Non-Urgent Medical Question  Contact: [phone]    ----- Message -----  From: Natalirussell Andersenon: 8/13/2021   9:49 AM EDT  To: [facility]  Subject: Non-Urgent Medical Question                      This message is being sent by Michael Ornelas on behalf of [address] [place] [address]. I AM FAXING MY PAPERS TO YOU. I HOPE YOU CAN SIGN THEM FOR ME SO I AM ABLE TO LEAVE WORK TO  HELP  HER TO APPOINTMENTS AND OTHER NEEDED TREATMENTS. I CAN  PICK THEM UP WHEN THEY ARE READY.   Kami [address], [address] [facility]  [phone]

## 2021-08-13 NOTE — TELEPHONE ENCOUNTER
Call to patient's daughter. She hasn't called to check on extension yet but getting ready to . She is okay waiting until Dr. Daris Rinne returns to the office.

## 2021-08-13 NOTE — TELEPHONE ENCOUNTER
Call to patient's daughter, on HIPAA, identified with 2 identifiers. Advised Dr. Sravanthi Oates' out of the office until 08/23/21. We just received this paperwork today. She will see if can get extended. Told her we'd talk before I leave today. Also off until 08/23/21.

## 2021-08-25 NOTE — TELEPHONE ENCOUNTER
CT daughter, on HIPAA. Advised paper work ready. Requested fax to secure fax [phone]. Faxed with confirmation of receipt.

## 2021-08-31 ENCOUNTER — PATIENT MESSAGE (OUTPATIENT)
Dept: INTERNAL MEDICINE CLINIC | Age: 86
End: 2021-08-31

## 2021-09-15 DIAGNOSIS — F41.9 ANXIETY: ICD-10-CM

## 2021-09-15 DIAGNOSIS — R11.0 NAUSEA: ICD-10-CM

## 2021-09-16 RX ORDER — LORAZEPAM 0.5 MG/1
TABLET ORAL
Qty: 135 TABLET | Refills: 0 | Status: SHIPPED | OUTPATIENT
Start: 2021-09-16 | End: 2021-12-22

## 2021-09-16 RX ORDER — ONDANSETRON 4 MG/1
TABLET, FILM COATED ORAL
Qty: 30 TABLET | Refills: 1 | Status: SHIPPED | OUTPATIENT
Start: 2021-09-16 | End: 2021-11-27

## 2021-09-16 NOTE — TELEPHONE ENCOUNTER
Chart & VA  reviewed.       Last visit with me:  7/28/2021   Last refilled on: 6/25/21 - #135    Future Appointments   Date Time Provider Americo Rizo   11/17/2021  3:00 PM Marian Oropeza MD [facility]        Last PDMP Media Madden as Reviewed:  Review User Review Instant Review Result   LEXIE GOLD 9/16/2021  5:46 AM Reviewed PDMP [1]

## 2021-09-20 ENCOUNTER — TELEPHONE (OUTPATIENT)
Dept: INTERNAL MEDICINE CLINIC | Age: 86
End: 2021-09-20

## 2021-09-20 NOTE — TELEPHONE ENCOUNTER
CT Kiran Narayanan, daughter, on HIPAA. Requesting update to for listing frequency as 2. Printed, signed and dated per Dr. Crystal Balderas. Requesting fax to her [phone] tomorrow morning. Told her no promises. Form ready will try to fax then.

## 2021-09-20 NOTE — TELEPHONE ENCOUNTER
Reason for call:  Did you receive the fax and has it been signed and dated?     Is this a new problem: yes     Date of last appointment:  7/28/2021     Can we respond via My [employer]: no    Best call back number:[phone]

## 2021-10-12 ENCOUNTER — CLINICAL SUPPORT (OUTPATIENT)
Dept: INTERNAL MEDICINE CLINIC | Age: 86
End: 2021-10-12
Payer: MEDICARE

## 2021-10-12 DIAGNOSIS — R30.0 DYSURIA: ICD-10-CM

## 2021-10-12 DIAGNOSIS — N28.9 RENAL INSUFFICIENCY: ICD-10-CM

## 2021-10-12 DIAGNOSIS — Z23 NEEDS FLU SHOT: Primary | ICD-10-CM

## 2021-10-12 PROCEDURE — 90694 VACC AIIV4 NO PRSRV 0.5ML IM: CPT | Performed by: INTERNAL MEDICINE

## 2021-10-12 NOTE — PATIENT INSTRUCTIONS
Vaccine Information Statement    Influenza (Flu) Vaccine (Inactivated or Recombinant): What You Need to Know    Many vaccine information statements are available in Sinhala and other languages. See www.immunize.org/vis. Hojas de información sobre vacunas están disponibles en español y en muchos otros idiomas. Visite www.immunize.org/vis. 1. Why get vaccinated? Influenza vaccine can prevent influenza (flu). Flu is a contagious disease that spreads around the [place] [facility] every year, usually between October and May. Anyone can get the flu, but it is more dangerous for some people. Infants and young children, people 72 years and older, pregnant people, and people with certain health conditions or a weakened immune system are at greatest risk of flu complications. Pneumonia, bronchitis, sinus infections, and ear infections are examples of flu-related complications. If you have a medical condition, such as heart disease, cancer, or diabetes, flu can make it worse. Flu can cause fever and chills, sore throat, muscle aches, fatigue, cough, headache, and runny or stuffy nose. Some people may have vomiting and diarrhea, though this is more common in children than adults. In an average year, thousands of people in the [facility] die from flu, and many more are hospitalized. Flu vaccine prevents millions of illnesses and flu-related visits to the doctor each year. 2. Influenza vaccines     CDC recommends everyone 6 months and older get vaccinated every flu season. Children 6 months through 6years of age may need 2 doses during a single flu season. Everyone else needs only 1 dose each flu season. It takes about 2 weeks for protection to develop after vaccination. There are many flu viruses, and they are always changing. Each year a new flu vaccine is made to protect against the influenza viruses believed to be likely to cause disease in the upcoming flu season.  Even when the vaccine doesnt exactly match these viruses, it may still provide some protection. Influenza vaccine does not cause flu. Influenza vaccine may be given at the same time as other vaccines. 3. Talk with your health care provider    Tell your vaccination provider if the person getting the vaccine:   Has had an allergic reaction after a previous dose of influenza vaccine, or has any severe, life-threatening allergies    Has ever had Guillain-Barré Syndrome (also called GBS)    In some cases, your health care provider may decide to postpone influenza vaccination until a future visit. Influenza vaccine can be administered at any time during pregnancy. People who are or will be pregnant during influenza season should receive inactivated influenza vaccine. People with minor illnesses, such as a cold, may be vaccinated. People who are moderately or severely ill should usually wait until they recover before getting influenza vaccine. Your health care provider can give you more information. 4. Risks of a vaccine reaction     Soreness, redness, and swelling where the shot is given, fever, muscle aches, and headache can happen after influenza vaccination.  There may be a very small increased risk of Guillain-Barré Syndrome (GBS) after inactivated influenza vaccine (the flu shot). [facility] who get the flu shot along with pneumococcal vaccine (PCV13) and/or DTaP vaccine at the same time might be slightly more likely to have a seizure caused by fever. Tell your health care provider if a child who is getting flu vaccine has ever had a seizure. People sometimes faint after medical procedures, including vaccination. Tell your provider if you feel dizzy or have vision changes or ringing in the ears. As with any medicine, there is a very remote chance of a vaccine causing a severe allergic reaction, other serious injury, or death. 5. What if there is a serious problem?     An allergic reaction could occur after the vaccinated person leaves the clinic. If you see signs of a severe allergic reaction (hives, swelling of the face and throat, difficulty breathing, a fast heartbeat, dizziness, or weakness), call 9-1-1 and get the person to the nearest hospital.    For other signs that concern you, call your health care provider. Adverse reactions should be reported to the Vaccine Adverse Event Reporting System (VAERS). Your health care provider will usually file this report, or you can do it yourself. Visit the VAERS website at www.vaers. [facility].gov or call 3-140.650.7290. VAERS is only for reporting reactions, and VAERS staff members do not give medical advice. 6. The National Vaccine Injury Compensation Program    The [facility] Vaccine Injury Compensation Program (VICP) is a federal program that was created to compensate people who may have been injured by certain vaccines. Claims regarding alleged injury or death due to vaccination have a time limit for filing, which may be as short as two years. Visit the VICP website at www.[facility].gov/vaccinecompensation or call 3-796.424.5954 to learn about the program and about filing a claim. 7. How can I learn more?  Ask your health care provider.  Call your local or state health department.  Visit the website of the Food and Drug Administration (FDA) for vaccine package inserts and additional information at www.fda.gov/vaccines-blood-biologics/vaccines.  Contact the Centers for Disease Control and Prevention (CDC):  - Call 0-140.503.7637 (1-800-CDC-INFO) or  - Visit CDCs influenza website at www.cdc.gov/flu. Vaccine Information Statement   Inactivated Influenza Vaccine   8/6/2021  42 LIN[address] Catherine [id number]   Department of Health and Human Services  Centers for Disease Control and Prevention    Office Use Only

## 2021-10-12 NOTE — PROGRESS NOTES
Poly Ary  is a 80 y.o. @  who present for routine immunizations. Prior to vaccine administration: Consent was obtained. Risks and adverse reactions were discussed. The patient was provided the VIS and they were given an opportunity to ask questions; all questions were addressed. She  denies any symptoms, reactions or allergies that would exclude them from being immunized today. There were no adverse reactions observed post vaccination. Patient was advised to seek medical or call the office with any questions or concerns post vaccination. Patient verbalized understanding.    Raford Nissen, LPN

## 2021-10-13 LAB
ANION GAP SERPL CALC-SCNC: 6 MMOL/L
BUN SERPL-MCNC: 28 MG/DL
BUN/CREAT SERPL: 17
CALCIUM SERPL-MCNC: 8.9 MG/DL
CHLORIDE SERPL-SCNC: 98 MMOL/L
CO2 SERPL-SCNC: 29 MMOL/L
CREAT SERPL-MCNC: 1.66 MG/DL
GLUCOSE SERPL-MCNC: 109 MG/DL
POTASSIUM SERPL-SCNC: 4.1 MMOL/L
SODIUM SERPL-SCNC: 133 MMOL/L

## 2021-10-15 RX ORDER — HYDRALAZINE HYDROCHLORIDE 10 MG/1
TABLET, FILM COATED ORAL
Qty: 180 TABLET | Refills: 4 | Status: SHIPPED | OUTPATIENT
Start: 2021-10-15 | End: 2022-03-27

## 2021-11-17 ENCOUNTER — OFFICE VISIT (OUTPATIENT)
Dept: INTERNAL MEDICINE CLINIC | Age: 86
End: 2021-11-17
Payer: MEDICARE

## 2021-11-17 VITALS
HEIGHT: 62 IN | OXYGEN SATURATION: 99 % | HEART RATE: 98 BPM | TEMPERATURE: 97.8 F | DIASTOLIC BLOOD PRESSURE: 68 MMHG | SYSTOLIC BLOOD PRESSURE: 153 MMHG | RESPIRATION RATE: 20 BRPM | WEIGHT: 116 LBS | BODY MASS INDEX: 21.35 KG/M2

## 2021-11-17 DIAGNOSIS — F41.9 ANXIETY: ICD-10-CM

## 2021-11-17 DIAGNOSIS — F02.80 ALZHEIMER'S DISEASE (HCC): ICD-10-CM

## 2021-11-17 DIAGNOSIS — N18.31 STAGE 3A CHRONIC KIDNEY DISEASE (HCC): ICD-10-CM

## 2021-11-17 DIAGNOSIS — F33.0 MILD EPISODE OF RECURRENT MAJOR DEPRESSIVE DISORDER (HCC): ICD-10-CM

## 2021-11-17 DIAGNOSIS — Z71.89 ADVANCED CARE PLANNING/COUNSELING DISCUSSION: ICD-10-CM

## 2021-11-17 DIAGNOSIS — G30.9 ALZHEIMER'S DISEASE (HCC): ICD-10-CM

## 2021-11-17 DIAGNOSIS — Z00.00 MEDICARE ANNUAL WELLNESS VISIT, SUBSEQUENT: Primary | ICD-10-CM

## 2021-11-17 DIAGNOSIS — I10 ESSENTIAL HYPERTENSION, BENIGN: ICD-10-CM

## 2021-11-17 PROCEDURE — G8536 NO DOC ELDER MAL SCRN: HCPCS | Performed by: INTERNAL MEDICINE

## 2021-11-17 PROCEDURE — G8420 CALC BMI NORM PARAMETERS: HCPCS | Performed by: INTERNAL MEDICINE

## 2021-11-17 PROCEDURE — G0439 PPPS, SUBSEQ VISIT: HCPCS | Performed by: INTERNAL MEDICINE

## 2021-11-17 PROCEDURE — 1101F PT FALLS ASSESS-DOCD LE1/YR: CPT | Performed by: INTERNAL MEDICINE

## 2021-11-17 PROCEDURE — G9717 DOC PT DX DEP/BP F/U NT REQ: HCPCS | Performed by: INTERNAL MEDICINE

## 2021-11-17 PROCEDURE — G8427 DOCREV CUR MEDS BY ELIG CLIN: HCPCS | Performed by: INTERNAL MEDICINE

## 2021-11-17 RX ORDER — ZOSTER VACCINE RECOMBINANT, ADJUVANTED 50 MCG/0.5
KIT INTRAMUSCULAR
Qty: 0.5 ML | Refills: 1 | Status: CANCELLED | OUTPATIENT
Start: 2021-11-17

## 2021-11-17 RX ORDER — MIRTAZAPINE 7.5 MG/1
7.5 TABLET, FILM COATED ORAL
Qty: 30 TABLET | Refills: 1 | Status: CANCELLED | OUTPATIENT
Start: 2021-11-17

## 2021-11-17 NOTE — PROGRESS NOTES
Edgar Justice is a 80 y.o. female who was seen in clinic today (11/17/2021) for a full physical.  She RC with her mother. Assessment & Plan:   Below is the assessment and plan developed based on review of pertinent history, physical exam, labs, studies, and medications. 1. Medicare annual wellness visit, subsequent  2. Advanced care planning/counseling discussion  3. Stage 3a chronic kidney disease (HCC)  Assessment & Plan:  Stable, not on any obvious medications, no changes, stay hydrated, avoid NSAID's   4. Alzheimer's disease ([facility] [address]  Assessment & Plan:  Stable decline as expected, no changes   5. Mild episode of recurrent major depressive disorder [facility])  Assessment & Plan:  Improved, denies any issues, no medication changes   6. Anxiety  Assessment & Plan:  Uncontrolled, reviewed palpitations are likely anxiety since holter monitor has been normal.  No changes to benzo. Declined daily medication. [facility]. Previously 6/25 and 3/26. No Remeron filled in 1+ yrs, so will decline   7. Essential hypertension, benign  Assessment & Plan:  Previously well controlled, based on CKD, age, and dementia no medication changes. Daughter is aware of BP goals. Subjective:   [place] is here today for a full physical.      Annual Wellness Visit- Subsequent Visit    Since last visit: no changes    End of Life Planning: This was discussed with her today and she has an advanced directive - a copy has been provided. Reviewed DNR/DNI and patient is interested in remaining a DNR, no changes made.       Depression Screen:  3 most recent PHQ Screens 11/17/2021   PHQ Not Done -   Little interest or pleasure in doing things Not at all   Feeling down, depressed, irritable, or hopeless Nearly every day   Total Score PHQ 2 3   Trouble falling or staying asleep, or sleeping too much Not at all   Feeling tired or having little energy Nearly every day   Poor appetite, weight loss, or overeating Not at all   Feeling bad about yourself - or that you are a failure or have let yourself or your family down Not at all   Trouble concentrating on things such as school, work, reading, or watching TV Not at all   Moving or speaking so slowly that other people could have noticed; or the opposite being so fidgety that others notice Not at all   Thoughts of being better off dead, or hurting yourself in some way Not at all   PHQ 9 Score 6   How difficult have these problems made it for you to do your work, take care of your home and get along with others Not difficult at all         Fall Risk:   Fall Risk Assessment, last 12 mths 11/17/2021   Able to walk? Yes   Fall in past 12 months? 0   Do you feel unsteady? 0   Are you worried about falling 0   Number of falls in past 12 months -   Fall with injury? -       Abuse Screen:  Abuse Screening Questionnaire 11/17/2021   Do you ever feel afraid of your partner? N   Are you in a relationship with someone who physically or mentally threatens you? N   Is it safe for you to go home? Y       Do you average more than 1 drink per night or more than 7 drinks a week:  No    On any one occasion in the past three months have you have had more than 3 drinks containing alcohol:  No    Health Maintenance:   Daily Aspirin: yes  Bone Density: done 10/20/15 - normal    Immunizations:   Covid: up to date  Influenza: up to date  Tetanus: up to date  Shingles: up to date  Pneumonia: declined - reports allergy  Cancer screening:   Cervical: reviewed guidelines, n/a  Breast: reviewed guidelines, patient declined  Colon: reviewed guidelines, n/a       Functional Ability and Level of Safety:    Hearing: The patient needs further evaluation. Cognition Screen:   Has your family/caregiver stated any concerns about your memory: yes - known dementia - see A/P     Ambulation: with mild difficulty     Activities of Daily Living: The home contains: no safety equipment.   Patient needs help with: transportation, shopping, managing medications and managing money  Exercise: not active    Adult Nutrition Screen:  No risk factors noted. Patient Care Team:  Marianna Torres MD as PCP - General (Internal Medicine)  Marianna Torres MD as PCP - [facility] Empaneled Provider  Jonathna Daugherty MD (Nephrology)  Erik Rivero MD (Dermatology)  Lonnie Soto MD (Ophthalmology)  oY Mullen NP (Psychiatry)  Yolie Pugh (Pain Management)  Linda Patrick MD as Physician (Gastroenterology)  Anthony Lake(Loreta) Ariel Manriquez MD as Physician (Orthopedic Surgery)  Lonnie Soto MD as Physician (Ophthalmology)  Caroline Carrillo MD (Cardiology)       The following sections were reviewed & updated as appropriate: Problem List, Allergies, PMH, PSH, FH, and SH. Prior to Admission medications    Medication Sig Start Date End Date Taking? Authorizing Provider   hydrALAZINE (APRESOLINE) 10 mg tablet TAKE TWO TABLETS BY MOUTH THREE TIMES A DAY 10/15/21  Yes Marianna Torres MD   LORazepam (ATIVAN) 0.5 mg tablet TAKE 1/2 TABLET BY MOUTH EVERY MORNING AND TAKE ONE TABLET BY MOUTH EVERY EVENING 9/16/21  Yes Marianna Torres MD   ondansetron hcl (ZOFRAN) 4 mg tablet TAKE ONE TABLET BY MOUTH DAILY AS NEEDED FOR NAUSEA 9/16/21  Yes Marianna Torres MD   nortriptyline (PAMELOR) 10 mg capsule Take 10 mg by mouth nightly. 4/15/21  Yes Provider, Historical   polyethylene glycol (MIRALAX) 17 gram packet Take 17 g by mouth daily as needed for Constipation. Yes Provider, Historical   mirtazapine (REMERON) 7.5 mg tablet Take 1 tablet by mouth at bedtime. 12/7/20  Yes Marianna Torres MD   pantoprazole (PROTONIX) 40 mg tablet Take 40 mg by mouth daily. Yes Provider, Historical   acetaminophen (TYLENOL ARTHRITIS PAIN) 650 mg TbER Take 1 Tab by mouth every eight (8) hours. 8/30/19  Yes Marianna Torres MD   aspirin delayed-release 81 mg tablet Take 1 Tab by mouth daily.  8/4/17  Yes Minor, Luba Gain, NP   varicella-zoster recombinant, PF, (Shingrix, PF,) 50 mcg/0.5 mL susr injection 0.5 ml IM once and then repeat in 2-6 months. Patient not taking: Reported on 7/28/2021 4/19/21   Fam Zamudio MD          Review of Systems   Constitutional: Negative for malaise/fatigue and weight loss. Respiratory: Negative for cough and shortness of breath. Cardiovascular: Negative for chest pain, palpitations and leg swelling. Gastrointestinal: Negative for abdominal pain, constipation, diarrhea, heartburn, nausea and vomiting. Musculoskeletal: Negative for joint pain and myalgias. Skin: Negative for rash. Neurological: Positive for tingling (finger tips). Negative for dizziness and headaches. Psychiatric/Behavioral: Negative for depression. The patient is not nervous/anxious and does not have insomnia. Objective:   Physical Exam  Cardiovascular:      Rate and Rhythm: Regular rhythm. Heart sounds: Normal heart sounds. No murmur heard. Pulmonary:      Effort: Pulmonary effort is normal.      Breath sounds: Normal breath sounds. No wheezing or rales. Abdominal:      General: Bowel sounds are normal.      Palpations: There is no mass. Tenderness: There is no abdominal tenderness.             Visit Vitals  BP (!) 153/68   Pulse 98   Temp 97.8 °F (36.6 °C) (Temporal)   Resp 20   Ht 5' 1.5\" (1.562 m)   Wt 116 lb (52.6 kg)   SpO2 99%   BMI 21.56 kg/m²          Shira Prince MD

## 2021-11-17 NOTE — ACP (ADVANCE CARE PLANNING)
Advance Care Planning   Advance Care Planning (ACP) Physician/NP/PA (Provider) Conversation    Date of ACP Conversation: 11/17/21  Persons included in Conversation:  patient and POA  Length of ACP Conversation in minutes: <16 minutes (Non-Billable)    Authorized Decision Maker (if patient is incapable of making informed decisions):   Named in Advance Directive or Healthcare Power of       Primary Decision Maker: Maynor Ann - Daughter - [phone]    Secondary Decision Maker: Trentonvladimir Rachel - Daughter - [phone]    Secondary Decision Maker: Chika Krishnan - [phone]    She has an advanced directive - a copy has been provided & still reflects her wishes. Reviewed DNR/DNI and patient is interested in remaining a DNR, no changes made.        Gabriela Melendrez MD

## 2021-11-18 NOTE — ASSESSMENT & PLAN NOTE
Previously well controlled, based on CKD, age, and dementia no medication changes. Daughter is aware of BP goals.

## 2021-11-18 NOTE — PATIENT INSTRUCTIONS

## 2021-11-18 NOTE — ASSESSMENT & PLAN NOTE
Uncontrolled, reviewed palpitations are likely anxiety since holter monitor has been normal.  No changes to benzo. Declined daily medication[facility]135. Previously 6/25 and 3/26.   No Remeron filled in 1+ yrs, so will decline

## 2021-11-26 DIAGNOSIS — R11.0 NAUSEA: ICD-10-CM

## 2021-11-26 NOTE — TELEPHONE ENCOUNTER
Requested Prescriptions     Pending Prescriptions Disp Refills    mirtazapine (REMERON) 7.5 mg tablet 30 Tablet 1     Sig: Take 1 Tablet by mouth nightly.      [address] [id number] [address], [address] [facility]  [phone]

## 2021-11-27 RX ORDER — MIRTAZAPINE 7.5 MG/1
7.5 TABLET, FILM COATED ORAL
Qty: 30 TABLET | Refills: 1 | OUTPATIENT
Start: 2021-11-27

## 2021-11-27 RX ORDER — ONDANSETRON 4 MG/1
TABLET, FILM COATED ORAL
Qty: 30 TABLET | Refills: 4 | Status: SHIPPED | OUTPATIENT
Start: 2021-11-27 | End: 2022-04-21

## 2021-11-29 PROCEDURE — 93298 REM INTERROG DEV EVAL SCRMS: CPT | Performed by: INTERNAL MEDICINE

## 2021-12-03 ENCOUNTER — OFFICE VISIT (OUTPATIENT)
Dept: CARDIOLOGY CLINIC | Age: 86
End: 2021-12-03
Payer: MEDICARE

## 2021-12-03 DIAGNOSIS — Z95.818 STATUS POST PLACEMENT OF IMPLANTABLE LOOP RECORDER: Primary | ICD-10-CM

## 2021-12-21 DIAGNOSIS — F41.9 ANXIETY: ICD-10-CM

## 2021-12-22 RX ORDER — LORAZEPAM 0.5 MG/1
TABLET ORAL
Qty: 135 TABLET | Refills: 1 | Status: SHIPPED | OUTPATIENT
Start: 2021-12-22 | End: 2022-06-20

## 2021-12-22 NOTE — TELEPHONE ENCOUNTER
[facility] & [facility]  reviewed.       Last visit with me:  11/17/2021   Last refilled on: 9/24/21    Future Appointments   Date Time Provider Americo Rizo   3/9/2022  3:15 PM Alejandra Sanchez MD [facility]        Last PDMP Dontae Solis as Reviewed:  Review User Review Instant Review Result   LEXIE GOLD 12/22/2021  3:59 PM Reviewed PDMP [1]

## 2022-02-08 ENCOUNTER — VIRTUAL VISIT (OUTPATIENT)
Dept: INTERNAL MEDICINE CLINIC | Age: 87
End: 2022-02-08
Payer: MEDICARE

## 2022-02-08 DIAGNOSIS — R09.81 NASAL CONGESTION: ICD-10-CM

## 2022-02-08 DIAGNOSIS — J02.9 SORE THROAT: Primary | ICD-10-CM

## 2022-02-08 PROCEDURE — G0463 HOSPITAL OUTPT CLINIC VISIT: HCPCS | Performed by: NURSE PRACTITIONER

## 2022-02-08 PROCEDURE — 99213 OFFICE O/P EST LOW 20 MIN: CPT | Performed by: NURSE PRACTITIONER

## 2022-02-08 PROCEDURE — G9717 DOC PT DX DEP/BP F/U NT REQ: HCPCS | Performed by: NURSE PRACTITIONER

## 2022-02-08 PROCEDURE — G8427 DOCREV CUR MEDS BY ELIG CLIN: HCPCS | Performed by: NURSE PRACTITIONER

## 2022-02-08 PROCEDURE — 1101F PT FALLS ASSESS-DOCD LE1/YR: CPT | Performed by: NURSE PRACTITIONER

## 2022-02-08 PROCEDURE — 1090F PRES/ABSN URINE INCON ASSESS: CPT | Performed by: NURSE PRACTITIONER

## 2022-02-08 NOTE — PROGRESS NOTES
Tom Melendez is a 80 y.o. female who was seen by synchronous (real-time) audio-video technology on 2/8/2022. Assessment & Plan:   Below is the assessment and plan developed based on review of pertinent history, physical exam (if applicable), labs, studies, and medications. 1. Sore throat  2. Nasal congestion  advised to report to urgent care for covid/strep testing-concern for covid  May continue tylenol    I spent at least 23 minutes on this visit with this established patient. ([id number])  Subjective:   Tom Mleendez was seen for Sore Throat    Patient reports sore throat, nasal congestion, and cough x 5 days(2/3/22). She had negative home Covid test 2 days ago. No fever, chills, or body aches. Patient has taken tylenol. She had sore throat on 1/27/22, but then it got better. Patient has long list of allergies and sensitivities to medications. She is accompanied by a caretaker. Prior to Admission medications    Medication Sig Start Date End Date Taking? Authorizing Provider   LORazepam (ATIVAN) 0.5 mg tablet TAKE 1/2 TABLET BY MOUTH EVERY MORNING AND TAKE ONE TABLET BY MOUTH EVERY EVENING 12/22/21   Fidelia Khan MD   ondansetron hcl (ZOFRAN) 4 mg tablet TAKE ONE TABLET BY MOUTH DAILY AS NEEDED FOR NAUSEA 11/27/21   Fidelia Khan MD   hydrALAZINE (APRESOLINE) 10 mg tablet TAKE TWO TABLETS BY MOUTH THREE TIMES A DAY 10/15/21   Fidelia Khan MD   nortriptyline (PAMELOR) 10 mg capsule Take 10 mg by mouth nightly. 4/15/21   Provider, Historical   polyethylene glycol (MIRALAX) 17 gram packet Take 17 g by mouth daily as needed for Constipation. Provider, Historical   varicella-zoster recombinant, PF, (Shingrix, PF,) 50 mcg/0.5 mL susr injection 0.5 ml IM once and then repeat in 2-6 months. Patient not taking: Reported on 7/28/2021 4/19/21   Fidelia Khan MD   mirtazapine (REMERON) 7.5 mg tablet Take 1 tablet by mouth at bedtime.   12/7/20   Fidelia Khan MD pantoprazole (PROTONIX) 40 mg tablet Take 40 mg by mouth daily. Provider, Historical   acetaminophen (TYLENOL ARTHRITIS PAIN) 650 mg TbER Take 1 Tab by mouth every eight (8) hours. 8/30/19   Anival Zepeda MD   aspirin delayed-release 81 mg tablet Take 1 Tab by mouth daily.  8/4/17   Minor, Lennox Hook, NP       Patient Active Problem List   Diagnosis Code    Essential hypertension, benign I10    DDD (degenerative disc disease), cervical M50.30    Anxiety F41.9    Allergic rhinitis J30.9    DDD (degenerative disc disease), lumbar M51.36    CKD (chronic kidney disease) stage 3, GFR 30-59 ml/min ([facility]) N18.30    Pre-diabetes R73.03    History of pulmonary embolism Z86.711    Gastroparesis K31.84    Fibromyalgia M79.7    Gastroesophageal reflux disease without esophagitis K21.9    Other irritable bowel syndrome K58.8    Recurrent major depressive disorder ([facility]) F33.9    Alzheimer's disease ([facility] [address] G30.9, F02.80    TIA (transient ischemic attack) G45.9    Status post placement of implantable loop recorder Z95.818    Nausea R11.0     Patient Active Problem List    Diagnosis Date Noted    Nausea 07/28/2021    Status post placement of implantable loop recorder 02/28/2020    TIA (transient ischemic attack) 08/20/2019    Alzheimer's disease ([facility] [address] 07/05/2019    Recurrent major depressive disorder ([facility] [address] 07/17/2018    Other irritable bowel syndrome 06/29/2016    Gastroparesis 06/22/2016    Fibromyalgia 06/22/2016    Gastroesophageal reflux disease without esophagitis 06/22/2016    History of pulmonary embolism 06/21/2016    Pre-diabetes 12/09/2011    CKD (chronic kidney disease) stage 3, GFR 30-59 ml/min ([facility]) 07/21/2011    DDD (degenerative disc disease), lumbar 10/12/2010    Allergic rhinitis 10/11/2010    Anxiety 08/04/2010    Essential hypertension, benign 04/12/2010    DDD (degenerative disc disease), cervical 04/12/2010     Current Outpatient Medications   Medication Sig Dispense Refill    LORazepam (ATIVAN) 0.5 mg tablet TAKE 1/2 TABLET BY MOUTH EVERY MORNING AND TAKE ONE TABLET BY MOUTH EVERY EVENING 135 Tablet 1    ondansetron hcl (ZOFRAN) 4 mg tablet TAKE ONE TABLET BY MOUTH DAILY AS NEEDED FOR NAUSEA 30 Tablet 4    hydrALAZINE (APRESOLINE) 10 mg tablet TAKE TWO TABLETS BY MOUTH THREE TIMES A  Tablet 4    nortriptyline (PAMELOR) 10 mg capsule Take 10 mg by mouth nightly.  polyethylene glycol (MIRALAX) 17 gram packet Take 17 g by mouth daily as needed for Constipation.  varicella-zoster recombinant, PF, (Shingrix, PF,) 50 mcg/0.5 mL susr injection 0.5 ml IM once and then repeat in 2-6 months. (Patient not taking: Reported on 7/28/2021) 0.5 mL 1    mirtazapine (REMERON) 7.5 mg tablet Take 1 tablet by mouth at bedtime. 30 Tab 1    pantoprazole (PROTONIX) 40 mg tablet Take 40 mg by mouth daily.  acetaminophen (TYLENOL ARTHRITIS PAIN) 650 mg TbER Take 1 Tab by mouth every eight (8) hours.  aspirin delayed-release 81 mg tablet Take 1 Tab by mouth daily. 30 Tab 0     Allergies   Allergen Reactions    Latex Itching    Dilaudid [Hydromorphone (Bulk)] Other (comments)     ? loss of consciousness    Altace [Ramipril] Unknown (comments)    Ambien [Zolpidem] Unknown (comments)    [employer] Test [Blood Sugar Diagnostic, Disc-Type] Unknown (comments)    Aspirin Unknown (comments)    Astelin [Azelastine] Unknown (comments)    Atenolol Unknown (comments)    Banex La Unknown (comments)    Benicar [Olmesartan] Unknown (comments)    Carbocaine [Mepivacaine] Unknown (comments)    Cleocin [Clindamycin Hcl] Hives    Codeine Nausea and Vomiting    Codeine-Asa-Salicyl-Acetam-Caf Other (comments)     Stomach pain    Cortisporin [Neomy-Polymyxinb-Bacitracin-Hc] Unknown (comments)    Cymbalta [Duloxetine] Other (comments)    Cytotec [Misoprostol] Unknown (comments)    Dilaudid [Hydromorphone] Unknown (comments)     Patient states that she was unable to respond, but was breathing    Effexor [Venlafaxine] Unknown (comments)    Epinephrine Unknown (comments)     Heart racing    Estra-D Unknown (comments)    Estrace [Estradiol] Unknown (comments)    Fentanyl Nausea and Vomiting and Unknown (comments)     shakes    Flexeril [Cyclobenzaprine] Unable to Obtain     \"drugs me\"    Flonase [Fluticasone] Unknown (comments)    Flunisolide Unknown (comments)    Guiatussin W/Codeine Unknown (comments)    Ketek [Telithromycin] Nausea Only    Klonopin [Clonazepam] Unknown (comments)    Lexapro [Escitalopram] Nausea and Vomiting    Lodine [Etodolac] Nausea and Vomiting    Morphine Unknown (comments)    Nsaids (Non-Steroidal Anti-Inflammatory Drug) Nausea and Vomiting    Other Medication Unknown (comments)     Gareth Alford    Paxil [Paroxetine Hcl] Nausea and Vomiting    Pcn [Penicillins] Hives    Percocet [Oxycodone-Acetaminophen] Unknown (comments)    Percodan [Oxycodone Hcl-Oxycodone-Asa] Unknown (comments)    Pneumococcal 23-Mary Ps Vaccine Swelling    Proctocream-Hc [Hydrocortisone] Unknown (comments)    Prozac [Fluoxetine] Nausea Only     Suicidal visions      Pseudoephedrine Other (comments)    Quinidine Unknown (comments)    Reglan [Metoclopramide] Unknown (comments)    Remeron [Mirtazapine] Other (comments)    Resaid Other (comments)     inflammed eyes    Robaxin [Methocarbamol] Unknown (comments)    Sonata [Zaleplon] Unknown (comments)    Sulfa (Sulfonamide Antibiotics) Hives    Surmontil [Trimipramine] Nausea and Vomiting    Talwin [Pentazocine Lactate] Unknown (comments)    Toradol [Ketorolac Tromethamine] Nausea and Vomiting    Ultram [Tramadol] Unknown (comments)    Vancomycin Nausea Only    Vasoconstrictor Drug Unknown (comments)    Vigamox [Moxifloxacin] Rash and Swelling    Vioxx [Rofecoxib] Unknown (comments)    Voltaren [Diclofenac Sodium] Itching    Wellbutrin [Bupropion Hcl] Nausea and Vomiting    Zelnorm [Tegaserod Hydrogen Maleate] Unknown (comments)    Zoloft [Sertraline] Nausea and Vomiting     Past Medical History:   Diagnosis Date    Allergic rhinitis, cause unspecified     Anxiety     Back pain     C. difficile diarrhea 8/2/2017    [facility]    Cancer ([facility]     basal cell face, back, neck    Cancer (HCC)     squamous cell leg    Chronic kidney disease     Chronic pain     DJD (degenerative joint disease) of cervical spine     Fibromyalgia     GERD (gastroesophageal reflux disease)     Hypercholesterolemia     Hypertension     Hyponatremia     IBS (irritable bowel syndrome)     Irritable bowel syndrome with diarrhea 6/29/2016    GI- Dr Shara Vickers Kidney disease, chronic, stage III (GFR 30-59 ml/min) ([facility] [address] 6/2011    Dr. Kristen Jesus) Christian    Nausea & vomiting     Neuropathy 6/22/2016    Both feet     Psychiatric disorder     DEPRESSION AND ANXIETY    Stroke ([facility] [address] 2017    TIA    Thromboembolus ([facility] [address] 2013    PE X2     Past Surgical History:   Procedure Laterality Date    COLONOSCOPY  1/20/11    diverticulosis    EGD  1/20/11    schatzki's ring--clear on barium swallow 7/11    HX ADENOIDECTOMY  1940    HX CATARACT REMOVAL Left 2013    HX CATARACT REMOVAL Right 01/27/2014    HX CHOLECYSTECTOMY  5/10    HX DILATION AND CURETTAGE  1963    HX GI      COLONOSCOPY    HX HEENT      BCCA REMOVED FROM FACE ,NOSE     HX HYSTERECTOMY  1970`S    hysterectomy    HX KNEE ARTHROSCOPY Left 1990    HX KNEE REPLACEMENT Right 9/29/09    HX ORTHOPAEDIC Right 2017    trigger finger release    HX ORTHOPAEDIC Right 07/18/2019     RING FINGRER TRIGGER FINGER REALSE     HX OTHER SURGICAL      skin cancer removed: L leg    HX OTHER SURGICAL      BCCA REMOVED FROM BACK     HX POLYPECTOMY  1989    HX ROTATOR CUFF REPAIR Right 08/15/2019    and sub-acromial decompression    HX SHOULDER ARTHROSCOPY Left 12/13/2018    arthroscopy & sub-acromial decompression    HX TONSILLECTOMY  1940    HX UROLOGICAL  1995    bladder suspension    TX INSERTION SUBQ CARDIAC RHYTHM MONITOR W/[facility] N/A 2/28/2020    LOOP RECORDER INSERT performed by Lyndon Erwin MD at [address], [facility]  CATH LAB    TX REPAIR OF RECTOCELE       Family History   Problem Relation Age of Onset    Cancer Mother         lung    Cancer Maternal Aunt         lung    Cancer Maternal Uncle         lung    Cancer Sister         breast    Other Brother         has to have blood tx every other week    Hemachromatosis Brother     Cancer Son 36        prostate, recurrence 7 yrs later   Naresh Lakshmilajara OSTEOARTHRITIS Son         hips    OSTEOARTHRITIS Daughter     OSTEOARTHRITIS Daughter     OSTEOARTHRITIS Sister     No Known Problems Sister     No Known Problems Sister     Anesth Problems Neg Hx      Social History     Tobacco Use    Smoking status: Never Smoker    Smokeless tobacco: Never Used   Substance Use Topics    Alcohol use: Not Currently     Alcohol/week: 0.0 standard drinks       ROS - per HPI      Objective:   No flowsheet data found. General: alert, fatigued, cooperative, no distress   Mental  status: normal mood, behavior, speech, dress, motor activity, and thought processes, able to follow commands   Eyes: unable to see due to poor video quality   Mouth: Patient wearing mask   Neck: no visualized mass   Resp: normal effort, no respiratory distress and coughing - dry   Neuro: no gross deficits   Musculoskeletal: normal ROM of neck   Skin: no discoloration or lesions of concern on visible areas   Psychiatric: normal affect, no hallucinations   + sore throat, nasal congestion  Anna Mejia, was evaluated through a synchronous (real-time) audio-video encounter. The patient (or guardian if applicable) is aware that this is a billable service. Verbal consent to proceed has been obtained within the past 12 months.  The visit was conducted pursuant to the emergency declaration under the [address] [address] waiver authority and the [employer] General Act. Patient identification was verified, and a caregiver was present when appropriate. The patient was located in a state where the provider was credentialed to provide care.      Mona Lunsford NP

## 2022-03-04 ENCOUNTER — OFFICE VISIT (OUTPATIENT)
Dept: CARDIOLOGY CLINIC | Age: 87
End: 2022-03-04
Payer: MEDICARE

## 2022-03-04 DIAGNOSIS — Z95.818 STATUS POST PLACEMENT OF IMPLANTABLE LOOP RECORDER: Primary | ICD-10-CM

## 2022-03-04 PROCEDURE — 93298 REM INTERROG DEV EVAL SCRMS: CPT | Performed by: INTERNAL MEDICINE

## 2022-03-09 ENCOUNTER — OFFICE VISIT (OUTPATIENT)
Dept: INTERNAL MEDICINE CLINIC | Age: 87
End: 2022-03-09
Payer: MEDICARE

## 2022-03-09 VITALS
BODY MASS INDEX: 20.72 KG/M2 | SYSTOLIC BLOOD PRESSURE: 140 MMHG | OXYGEN SATURATION: 98 % | HEART RATE: 104 BPM | DIASTOLIC BLOOD PRESSURE: 70 MMHG | HEIGHT: 62 IN | WEIGHT: 112.6 LBS | RESPIRATION RATE: 16 BRPM | TEMPERATURE: 97.5 F

## 2022-03-09 DIAGNOSIS — N18.31 STAGE 3A CHRONIC KIDNEY DISEASE (HCC): ICD-10-CM

## 2022-03-09 DIAGNOSIS — I10 ESSENTIAL HYPERTENSION, BENIGN: ICD-10-CM

## 2022-03-09 DIAGNOSIS — G30.9 ALZHEIMER'S DISEASE (HCC): ICD-10-CM

## 2022-03-09 DIAGNOSIS — F33.0 MILD EPISODE OF RECURRENT MAJOR DEPRESSIVE DISORDER (HCC): ICD-10-CM

## 2022-03-09 DIAGNOSIS — K31.84 GASTROPARESIS: Primary | ICD-10-CM

## 2022-03-09 DIAGNOSIS — F02.80 ALZHEIMER'S DISEASE (HCC): ICD-10-CM

## 2022-03-09 DIAGNOSIS — R20.0 NUMBNESS AND TINGLING IN BOTH HANDS: ICD-10-CM

## 2022-03-09 DIAGNOSIS — R20.2 NUMBNESS AND TINGLING IN BOTH HANDS: ICD-10-CM

## 2022-03-09 DIAGNOSIS — F41.9 ANXIETY: ICD-10-CM

## 2022-03-09 PROCEDURE — G9717 DOC PT DX DEP/BP F/U NT REQ: HCPCS | Performed by: INTERNAL MEDICINE

## 2022-03-09 PROCEDURE — 1101F PT FALLS ASSESS-DOCD LE1/YR: CPT | Performed by: INTERNAL MEDICINE

## 2022-03-09 PROCEDURE — 1090F PRES/ABSN URINE INCON ASSESS: CPT | Performed by: INTERNAL MEDICINE

## 2022-03-09 PROCEDURE — G8420 CALC BMI NORM PARAMETERS: HCPCS | Performed by: INTERNAL MEDICINE

## 2022-03-09 PROCEDURE — 99214 OFFICE O/P EST MOD 30 MIN: CPT | Performed by: INTERNAL MEDICINE

## 2022-03-09 PROCEDURE — G0463 HOSPITAL OUTPT CLINIC VISIT: HCPCS | Performed by: INTERNAL MEDICINE

## 2022-03-09 PROCEDURE — G8536 NO DOC ELDER MAL SCRN: HCPCS | Performed by: INTERNAL MEDICINE

## 2022-03-09 PROCEDURE — G8427 DOCREV CUR MEDS BY ELIG CLIN: HCPCS | Performed by: INTERNAL MEDICINE

## 2022-03-09 NOTE — PROGRESS NOTES
Yobani Christian is a 80 y.o. female who was seen in clinic today (3/9/2022). She RTC with her daughter. Assessment & Plan:   Below is the assessment and plan developed based on review of pertinent history, physical exam, labs, studies, and medications. 1. Gastroparesis  Comments:  chronic, likely etiology of her nausea, reviewed triggers & diet changes, cont meds, work on diet  2. Numbness and tingling in both hands  Comments:  chronic issue, is on/off, seems like raynauds as it has a temperature component, avoid cold, expectations & red flags reviewed   3. Alzheimer's disease ([facility]  Assessment & Plan:  Slightly worse, still functioning well, reviewed expectations   4. Mild episode of recurrent major depressive disorder ([facility] [address] [facility]  Assessment & Plan:  Well controlled, stable, continue current meds   5. Anxiety  Assessment & Plan:  Stable, [facility]  reviewed, no med changes, do not think we can decrease ativan, 61 + allergies so will not try to add in a new medication   6. Essential hypertension, benign  Assessment & Plan:  At goal for age, fluctuating, as long as consistently < 150 no changes   7. Stage 3a chronic kidney disease ([facility] [address] [facility]  Assessment & Plan:  Stable, is above baseline, will stay hydrated, will repeat labs at f/u     Follow-up and Dispositions    · Return in about 3 months (around 6/9/2022) for Regular follow up. Subjective/Objective:   [place] was seen today for Anxiety    Since last visit: no changes. Son took her to her 's grave site    Nausea - about the same per daughter, using zofran daily. She is also on Protonix and Remeron (both by GI). Numbness- both hands, finger tips, feels like there is sand on them, did improve w/ the warm temperature. Depression/Anxiety - minimal frustration with word finding issues, but easily able to get over it. Did have a \"mini anxiety attack\" this weekend, while watching the news about [place]. Available PHQ9 & GAD7 reviewed.   Reports experiences the following side effects from the treatment: none. [facility]  reviewed. Ativan 0.5mg, last refilled on 12/27/21 - #135. Dementia - daughter reports it is worsening. Decreased word recognition. She is hyper-focusing on some things, lost her back brace recently. Hypertension - She reports taking medications as instructed, no medication side effects noted, home BP monitoring in range of 861-399'X systolic over 09-28'N diastolic. Prior to Admission medications    Medication Sig Start Date End Date Taking? Authorizing Provider   LORazepam (ATIVAN) 0.5 mg tablet TAKE 1/2 TABLET BY MOUTH EVERY MORNING AND TAKE ONE TABLET BY MOUTH EVERY EVENING 12/22/21   Teddy De Paz MD   ondansetron hcl (ZOFRAN) 4 mg tablet TAKE ONE TABLET BY MOUTH DAILY AS NEEDED FOR NAUSEA 11/27/21   Teddy De Paz MD   hydrALAZINE (APRESOLINE) 10 mg tablet TAKE TWO TABLETS BY MOUTH THREE TIMES A DAY 10/15/21   Teddy De Paz MD   nortriptyline (PAMELOR) 10 mg capsule Take 10 mg by mouth nightly. 4/15/21   Provider, Historical   polyethylene glycol (MIRALAX) 17 gram packet Take 17 g by mouth daily as needed for Constipation. Provider, Historical   varicella-zoster recombinant, PF, (Shingrix, PF,) 50 mcg/0.5 mL susr injection 0.5 ml IM once and then repeat in 2-6 months. Patient not taking: Reported on 7/28/2021 4/19/21   Teddy De Paz MD   mirtazapine (REMERON) 7.5 mg tablet Take 1 tablet by mouth at bedtime. 12/7/20   Teddy De Paz MD   pantoprazole (PROTONIX) 40 mg tablet Take 40 mg by mouth daily. Provider, Historical   acetaminophen (TYLENOL ARTHRITIS PAIN) 650 mg TbER Take 1 Tab by mouth every eight (8) hours. 8/30/19   Teddy De Paz MD   aspirin delayed-release 81 mg tablet Take 1 Tab by mouth daily. 8/4/17   MinorReina, NP        Review of Systems   Constitutional: Positive for weight loss. Respiratory: Negative for cough and shortness of breath. Cardiovascular: Negative for chest pain and palpitations. Gastrointestinal: Positive for heartburn. Negative for abdominal pain, constipation, diarrhea, nausea and vomiting. Physical Exam  Cardiovascular:      Rate and Rhythm: Regular rhythm. Heart sounds: Normal heart sounds. No murmur heard. Pulmonary:      Effort: Pulmonary effort is normal.      Breath sounds: Normal breath sounds. Musculoskeletal:      Right lower leg: No edema. Left lower leg: No edema.        Visit Vitals  BP (!) 140/70 (BP 1 Location: Right arm, BP Patient Position: Sitting, BP Cuff Size: Adult) Comment: 134/80 home monitor   Pulse (!) 104   Temp 97.5 °F (36.4 °C) (Temporal)   Resp 16   Ht 5' 1.5\" (1.562 m)   Wt 112 lb 9.6 oz (51.1 kg)   SpO2 98%   BMI 20.93 kg/m²       Yajaira Krause MD

## 2022-03-10 NOTE — ASSESSMENT & PLAN NOTE
Stable, [facility]  reviewed, no med changes, do not think we can decrease ativan, 61 + allergies so will not try to add in a new medication

## 2022-03-18 PROBLEM — R11.0 NAUSEA: Status: ACTIVE | Noted: 2021-07-28

## 2022-03-18 PROBLEM — F33.9 RECURRENT MAJOR DEPRESSIVE DISORDER (HCC): Status: ACTIVE | Noted: 2018-07-17

## 2022-03-19 PROBLEM — Z95.818 STATUS POST PLACEMENT OF IMPLANTABLE LOOP RECORDER: Status: ACTIVE | Noted: 2020-02-28

## 2022-03-19 PROBLEM — G45.9 TIA (TRANSIENT ISCHEMIC ATTACK): Status: ACTIVE | Noted: 2019-08-20

## 2022-03-20 PROBLEM — F02.80 ALZHEIMER'S DISEASE (HCC): Status: ACTIVE | Noted: 2019-07-05

## 2022-03-20 PROBLEM — G30.9 ALZHEIMER'S DISEASE (HCC): Status: ACTIVE | Noted: 2019-07-05

## 2022-03-27 RX ORDER — HYDRALAZINE HYDROCHLORIDE 10 MG/1
TABLET, FILM COATED ORAL
Qty: 180 TABLET | Refills: 1 | Status: SHIPPED | OUTPATIENT
Start: 2022-03-27 | End: 2022-05-27 | Stop reason: SDUPTHER

## 2022-04-20 DIAGNOSIS — R11.0 NAUSEA: ICD-10-CM

## 2022-04-21 RX ORDER — ONDANSETRON 4 MG/1
TABLET, FILM COATED ORAL
Qty: 30 TABLET | Refills: 4 | Status: SHIPPED | OUTPATIENT
Start: 2022-04-21 | End: 2022-09-09

## 2022-05-27 ENCOUNTER — TELEPHONE (OUTPATIENT)
Dept: INTERNAL MEDICINE CLINIC | Age: 87
End: 2022-05-27

## 2022-05-27 RX ORDER — HYDRALAZINE HYDROCHLORIDE 10 MG/1
TABLET, FILM COATED ORAL
Qty: 180 TABLET | Refills: 1 | Status: SHIPPED | OUTPATIENT
Start: 2022-05-27 | End: 2022-07-27

## 2022-05-27 NOTE — TELEPHONE ENCOUNTER
Spoke to [facility], pt's daughter. Informed that pt has refills available at the pharmacy. Stated that the she did not think that was the case but would call the pharmacy again.

## 2022-05-27 NOTE — TELEPHONE ENCOUNTER
30 day script sent in on 03/27/22 with 1 refill. Pt used 1 refill on 04/27/22. Pt needs new script for this med as there are no more refills.     Requested Prescriptions     Pending Prescriptions Disp Refills    hydrALAZINE (APRESOLINE) 10 mg tablet 180 Tablet 1       [facility] [id number]  [phone]

## 2022-05-27 NOTE — TELEPHONE ENCOUNTER
----- Message from Georgiana Ramos sent at 5/27/2022 11:12 AM EDT -----  Subject: Refill Request    QUESTIONS  Name of Medication? hydrALAZINE (APRESOLINE) 10 mg tablet  Patient-reported dosage and instructions? Pt takes 2 tablets 3 times daily   @ 10 mg   How many days do you have left? 14  [facility]erred [facility]acy? [facility] [phone]  Pharmacy phone number (if available)? [phone]  Additional Information for Provider? Tamela Thornton would like to requesting a 90   day supply with refills  ---------------------------------------------------------------------------  --------------  CALL BACK INFO  What is the best way for the office to contact you? OK to leave message on   voicemail  Preferred Call Back Phone Number? [phone]  ---------------------------------------------------------------------------  --------------  SCRIPT ANSWERS  Relationship to Patient? Other  Representative Name? Kendy Daughter  Is the Representative on the appropriate HIPAA document in Epic?  Yes

## 2022-06-03 ENCOUNTER — OFFICE VISIT (OUTPATIENT)
Dept: CARDIOLOGY CLINIC | Age: 87
End: 2022-06-03
Payer: MEDICARE

## 2022-06-03 DIAGNOSIS — Z95.818 STATUS POST PLACEMENT OF IMPLANTABLE LOOP RECORDER: Primary | ICD-10-CM

## 2022-06-03 PROCEDURE — 93298 REM INTERROG DEV EVAL SCRMS: CPT | Performed by: INTERNAL MEDICINE

## 2022-06-17 DIAGNOSIS — F41.9 ANXIETY: ICD-10-CM

## 2022-06-20 RX ORDER — LORAZEPAM 0.5 MG/1
TABLET ORAL
Qty: 135 TABLET | Refills: 1 | Status: SHIPPED | OUTPATIENT
Start: 2022-06-20

## 2022-06-20 NOTE — TELEPHONE ENCOUNTER
Chart & VA  reviewed.       Last visit with me:  3/9/2022   Last refilled on: 328/22    Future Appointments   Date Time Provider Americo Rizo   7/26/2022  2:15 PM Shanel Jones MD [facility] AMB        Last PDMP Wyatt Casanova as Reviewed:  Review User Review Instant Review Result   LEXIE GOLD 6/20/2022  4:37 PM Reviewed PDMP [1]

## 2022-07-26 ENCOUNTER — OFFICE VISIT (OUTPATIENT)
Dept: INTERNAL MEDICINE CLINIC | Age: 87
End: 2022-07-26
Payer: MEDICARE

## 2022-07-26 VITALS
TEMPERATURE: 98.3 F | WEIGHT: 109 LBS | RESPIRATION RATE: 18 BRPM | HEART RATE: 101 BPM | HEIGHT: 61 IN | SYSTOLIC BLOOD PRESSURE: 127 MMHG | DIASTOLIC BLOOD PRESSURE: 71 MMHG | BODY MASS INDEX: 20.58 KG/M2 | OXYGEN SATURATION: 97 %

## 2022-07-26 DIAGNOSIS — F02.80 ALZHEIMER'S DISEASE (HCC): ICD-10-CM

## 2022-07-26 DIAGNOSIS — I10 ESSENTIAL HYPERTENSION, BENIGN: ICD-10-CM

## 2022-07-26 DIAGNOSIS — F41.9 ANXIETY: ICD-10-CM

## 2022-07-26 DIAGNOSIS — G30.9 ALZHEIMER'S DISEASE (HCC): ICD-10-CM

## 2022-07-26 PROCEDURE — G0463 HOSPITAL OUTPT CLINIC VISIT: HCPCS | Performed by: INTERNAL MEDICINE

## 2022-07-26 PROCEDURE — 1090F PRES/ABSN URINE INCON ASSESS: CPT | Performed by: INTERNAL MEDICINE

## 2022-07-26 PROCEDURE — G8427 DOCREV CUR MEDS BY ELIG CLIN: HCPCS | Performed by: INTERNAL MEDICINE

## 2022-07-26 PROCEDURE — 1101F PT FALLS ASSESS-DOCD LE1/YR: CPT | Performed by: INTERNAL MEDICINE

## 2022-07-26 PROCEDURE — G9717 DOC PT DX DEP/BP F/U NT REQ: HCPCS | Performed by: INTERNAL MEDICINE

## 2022-07-26 PROCEDURE — 99214 OFFICE O/P EST MOD 30 MIN: CPT | Performed by: INTERNAL MEDICINE

## 2022-07-26 PROCEDURE — G8536 NO DOC ELDER MAL SCRN: HCPCS | Performed by: INTERNAL MEDICINE

## 2022-07-26 PROCEDURE — G8420 CALC BMI NORM PARAMETERS: HCPCS | Performed by: INTERNAL MEDICINE

## 2022-07-26 NOTE — ASSESSMENT & PLAN NOTE
At goal today, fluctuating at home, is acceptable for age & risk factors.   Reviewed concerning readings

## 2022-07-26 NOTE — ASSESSMENT & PLAN NOTE
Declining, as expected, getting into a good regimen, continue w/ aide, agree w/ counselor, no medication changes

## 2022-07-26 NOTE — PROGRESS NOTES
Laura Benitez is a 80 y.o. female who was seen in clinic today (7/26/2022). She RTC with her daughter. Assessment & Plan:   Below is the assessment and plan developed based on review of pertinent history, physical exam, labs, studies, and medications. 1. Alzheimer's disease ([address]  Assessment & Plan:  Declining, as expected, getting into a good regimen, continue w/ aide, agree w/ counselor, no medication changes   2. Anxiety  Assessment & Plan:  Was worsening, is improved, okay with using ativan 1/2 tab TID prn, reviewed life style changes w/ daughter. 3. Essential hypertension, benign  Assessment & Plan:  At goal today, fluctuating at home, is acceptable for age & risk factors. Reviewed concerning readings     Follow-up and Dispositions    Return in about 4 months (around 11/26/2022) for FULL PHYSICAL. Subjective/Objective:   [place] was seen today for Anxiety    Since last visit:  no changes. Depression/Anxiety - daughter changed around her medications and is giving her 1/2 tab TID instead of 1/2 tab in AM and 1 tab in PM.  Daughter reports her anxiety is worse (SOB and increase in anxiety). Available PHQ9 & GAD7 reviewed. Reports experiences the following side effects from the treatment: none. [facility]  reviewed. Ativan 0.5mg, last refilled on 6/26/22 - #135 for 3 months. Dementia - daughter reports it is worsening. She is noticing she is not remember as much. CNA is coming to the house daily, 4 hrs. Family is very happy. [place] is not sure she needs her. [employer] [facility] is coming 2 hrs/day. They reached out to [facility], counselor for dementia/geriatric patients. She is on a wait list.  Her nausea is improving per daughter. Hypertension - She reports taking medications as instructed, no medication side effects noted, home BP monitoring in range of 740-307'R systolic over 59-54'Z diastolic.       Prior to Admission medications    Medication Sig Start Date End Date Taking? Authorizing Provider   LORazepam (ATIVAN) 0.5 mg tablet TAKE 1/2 TABLET BY MOUTH EVERY MORNING AND TAKE ONE TABLET BY MOUTH EVERY EVENING 6/20/22  Yes Danie Benedict MD   hydrALAZINE (APRESOLINE) 10 mg tablet TAKE TWO TABLETS BY MOUTH THREE TIMES A DAY 5/27/22  Yes Danie Benedict MD   ondansetron hcl (ZOFRAN) 4 mg tablet TAKE ONE TABLET BY MOUTH DAILY AS NEEDED FOR NAUSEA 4/21/22  Yes Danie Benedict MD   nortriptyline (PAMELOR) 10 mg capsule Take 10 mg by mouth nightly. 4/15/21  Yes Provider, Historical   polyethylene glycol (MIRALAX) 17 gram packet Take 17 g by mouth daily as needed for Constipation. Yes Provider, Historical   mirtazapine (REMERON) 7.5 mg tablet Take 1 tablet by mouth at bedtime. 12/7/20  Yes Danie Benedict MD   pantoprazole (PROTONIX) 40 mg tablet Take 40 mg by mouth daily. Yes Provider, Historical   acetaminophen (TYLENOL) 650 mg TbER Take 1 Tab by mouth every eight (8) hours. 8/30/19  Yes Danie Benedict MD   aspirin delayed-release 81 mg tablet Take 1 Tab by mouth daily. 8/4/17  Yes Minor, Tanya Tobias NP   varicella-zoster recombinant, PF, (Shingrix, PF,) 50 mcg/0.5 mL susr injection 0.5 ml IM once and then repeat in 2-6 months. Patient not taking: Reported on 7/26/2022 4/19/21   Danie Benedict MD        Review of Systems   Unable to perform ROS: Dementia      Physical Exam  Cardiovascular:      Rate and Rhythm: Regular rhythm. Tachycardia present. Heart sounds: Normal heart sounds. No murmur heard. Pulmonary:      Effort: Pulmonary effort is normal.      Breath sounds: Normal breath sounds. Musculoskeletal:      Right lower leg: No edema. Left lower leg: No edema.        Visit Vitals  /71 (BP 1 Location: Left arm, BP Patient Position: Sitting, BP Cuff Size: Adult)   Pulse (!) 101   Temp 98.3 °F (36.8 °C) (Temporal)   Resp 18   Ht 5' 1\" (1.549 m)   Wt 109 lb (49.4 kg)   SpO2 97%   BMI 20.60 kg/m²       Montserrat Net P Alyssa Andersen MD

## 2022-07-26 NOTE — ASSESSMENT & PLAN NOTE
Was worsening, is improved, okay with using ativan 1/2 tab TID prn, reviewed life style changes w/ daughter.

## 2022-07-27 RX ORDER — HYDRALAZINE HYDROCHLORIDE 10 MG/1
TABLET, FILM COATED ORAL
Qty: 180 TABLET | Refills: 1 | Status: SHIPPED | OUTPATIENT
Start: 2022-07-27 | End: 2022-09-26

## 2022-08-02 ENCOUNTER — PATIENT MESSAGE (OUTPATIENT)
Dept: INTERNAL MEDICINE CLINIC | Age: 87
End: 2022-08-02

## 2022-08-03 NOTE — TELEPHONE ENCOUNTER
Good morning, Quick Update,   The FMLA forms had been faxed and I re-faxed them again to [facility] @ [phone]. Please let us know if there are more forms that need to be filled out.  Sean Regan

## 2022-08-26 ENCOUNTER — OFFICE VISIT (OUTPATIENT)
Dept: CARDIOLOGY CLINIC | Age: 87
End: 2022-08-26
Payer: MEDICARE

## 2022-08-26 DIAGNOSIS — Z95.818 STATUS POST PLACEMENT OF IMPLANTABLE LOOP RECORDER: Primary | ICD-10-CM

## 2022-08-26 PROCEDURE — 93298 REM INTERROG DEV EVAL SCRMS: CPT | Performed by: INTERNAL MEDICINE

## 2022-08-26 NOTE — LETTER
9/8/2022 8:11 PM    Ms. Laura Benitez  [address] [address]      Dear Ms. Laura Benitez,    We have received the remote transmission for your implanted loop recorder dated 8/26/22. You have had no unusual heart rate episodes recorded. Your battery status is \"OK\". Your next scheduled remote transmission is 9/30/22. We will continue to monitor your device remotely as long as your home monitor remains plugged into power. Thank you for remaining connected remotely!     Sincerely,    Miracle Arellano RN, BSN  Device Coordinator RN  [facility]  [phone]  [facility]  [phone]

## 2022-09-08 DIAGNOSIS — R11.0 NAUSEA: ICD-10-CM

## 2022-09-09 RX ORDER — ONDANSETRON 4 MG/1
TABLET, FILM COATED ORAL
Qty: 30 TABLET | Refills: 4 | Status: SHIPPED | OUTPATIENT
Start: 2022-09-09

## 2022-09-09 NOTE — PROGRESS NOTES
Chargeable implanted loop remote      Since 5/28/22 no events recorded. See scanned report in  for details.

## 2022-09-26 RX ORDER — HYDRALAZINE HYDROCHLORIDE 10 MG/1
TABLET, FILM COATED ORAL
Qty: 180 TABLET | Refills: 2 | Status: SHIPPED | OUTPATIENT
Start: 2022-09-26

## 2022-09-26 NOTE — TELEPHONE ENCOUNTER
Please call patient. Due for f/u in Nov, nothing scheduled, needs to schedule now. Ideally 30 min for CPE, okay to put two slots together.

## 2022-09-30 ENCOUNTER — OFFICE VISIT (OUTPATIENT)
Dept: CARDIOLOGY CLINIC | Age: 87
End: 2022-09-30
Payer: MEDICARE

## 2022-09-30 DIAGNOSIS — Z95.818 STATUS POST PLACEMENT OF IMPLANTABLE LOOP RECORDER: Primary | ICD-10-CM

## 2022-09-30 PROCEDURE — 93298 REM INTERROG DEV EVAL SCRMS: CPT | Performed by: INTERNAL MEDICINE

## 2022-09-30 NOTE — LETTER
9/30/2022 6:30 PM    Ms. Yousif De La Garza  [address] [address]    Dear Ms. Yousif De La Garza,    We have received the remote transmission for your implanted loop recorder dated 9/30/22. You have had no unusual heart rate episodes recorded. Your battery status is \"OK\". Your next scheduled remote transmission is 11/4/22. We will continue to monitor your device remotely as long as your home monitor remains plugged into power. Thank you for remaining connected remotely!     Sincerely,    Claudia Aggarwal RN, BSN  Device Coordinator RN  [facility]  [phone]  [id number]  [phone]

## 2022-09-30 NOTE — PROGRESS NOTES
QM MDT implanted loop remote    Since 8/26/22 no events recorded. NSR 90's. See scanned report in  for details.

## 2022-10-11 DIAGNOSIS — F41.9 ANXIETY: ICD-10-CM

## 2022-10-12 RX ORDER — LORAZEPAM 0.5 MG/1
TABLET ORAL
Qty: 135 TABLET | OUTPATIENT
Start: 2022-10-12

## 2022-10-12 NOTE — TELEPHONE ENCOUNTER
Chart & VA  reviewed. Notify daughter to early for a refill. She is due for f/u in November, nothing scheduled!!  Needs appointment.     Last visit with me:  7/26/2022   Last refilled on: 9/25/22 - #135    Future Appointments   Date Time Provider Americo Rizo   11/4/2022 10:10 AM NURIA1, MISAEL ALCANTAR BS AMB        Last PDMP [place] as Reviewed:  Review User Review Instant Review Result   LEXIE GOLD 10/12/2022  6:18 AM Reviewed PDMP [1]

## 2022-11-04 ENCOUNTER — OFFICE VISIT (OUTPATIENT)
Dept: CARDIOLOGY CLINIC | Age: 87
End: 2022-11-04
Payer: MEDICARE

## 2022-11-04 ENCOUNTER — TELEPHONE (OUTPATIENT)
Dept: INTERNAL MEDICINE CLINIC | Age: 87
End: 2022-11-04

## 2022-11-04 DIAGNOSIS — Z95.818 PRESENCE OF CARDIAC DEVICE: Primary | ICD-10-CM

## 2022-11-04 PROCEDURE — 93298 REM INTERROG DEV EVAL SCRMS: CPT | Performed by: INTERNAL MEDICINE

## 2022-11-04 NOTE — TELEPHONE ENCOUNTER
Reason for call: The patient may have a UTI.  Can she get an order to check     Is this a new problem: yes     Date of last appointment:  7/26/2022     Can we respond via [employer]est[employer]: no    Best call back number:  Lanse [phone]

## 2022-11-08 ENCOUNTER — OFFICE VISIT (OUTPATIENT)
Dept: INTERNAL MEDICINE CLINIC | Age: 87
End: 2022-11-08
Payer: MEDICARE

## 2022-11-08 VITALS
HEIGHT: 61 IN | HEART RATE: 83 BPM | RESPIRATION RATE: 18 BRPM | SYSTOLIC BLOOD PRESSURE: 128 MMHG | DIASTOLIC BLOOD PRESSURE: 62 MMHG | BODY MASS INDEX: 20.39 KG/M2 | OXYGEN SATURATION: 99 % | WEIGHT: 108 LBS | TEMPERATURE: 98.2 F

## 2022-11-08 DIAGNOSIS — I10 ESSENTIAL HYPERTENSION, BENIGN: ICD-10-CM

## 2022-11-08 DIAGNOSIS — F02.80 ALZHEIMER'S DISEASE (HCC): ICD-10-CM

## 2022-11-08 DIAGNOSIS — F33.0 MILD EPISODE OF RECURRENT MAJOR DEPRESSIVE DISORDER (HCC): ICD-10-CM

## 2022-11-08 DIAGNOSIS — Z71.89 ADVANCED CARE PLANNING/COUNSELING DISCUSSION: ICD-10-CM

## 2022-11-08 DIAGNOSIS — Z00.00 MEDICARE ANNUAL WELLNESS VISIT, SUBSEQUENT: Primary | ICD-10-CM

## 2022-11-08 DIAGNOSIS — G30.9 ALZHEIMER'S DISEASE (HCC): ICD-10-CM

## 2022-11-08 DIAGNOSIS — K31.84 GASTROPARESIS: ICD-10-CM

## 2022-11-08 DIAGNOSIS — F41.9 ANXIETY: ICD-10-CM

## 2022-11-08 PROCEDURE — G8536 NO DOC ELDER MAL SCRN: HCPCS | Performed by: INTERNAL MEDICINE

## 2022-11-08 PROCEDURE — G0439 PPPS, SUBSEQ VISIT: HCPCS | Performed by: INTERNAL MEDICINE

## 2022-11-08 PROCEDURE — G8420 CALC BMI NORM PARAMETERS: HCPCS | Performed by: INTERNAL MEDICINE

## 2022-11-08 PROCEDURE — G8427 DOCREV CUR MEDS BY ELIG CLIN: HCPCS | Performed by: INTERNAL MEDICINE

## 2022-11-08 PROCEDURE — G9717 DOC PT DX DEP/BP F/U NT REQ: HCPCS | Performed by: INTERNAL MEDICINE

## 2022-11-08 PROCEDURE — 1101F PT FALLS ASSESS-DOCD LE1/YR: CPT | Performed by: INTERNAL MEDICINE

## 2022-11-08 NOTE — PROGRESS NOTES
Jarrod Betancur is a 80 y.o. female who was seen in clinic today (11/8/2022) for a full physical.  She RTC with her daughter. Assessment & Plan:   Below is the assessment and plan developed based on review of pertinent history, physical exam, labs, studies, and medications. 1. Medicare annual wellness visit, subsequent  2. Advanced care planning/counseling discussion  3. Alzheimer's disease ([facility]  Assessment & Plan:  Worsening, reviewed expectations, no medication changes, continue w/ as much support in the home as possible and stick with a good regimented schedule   4. Mild episode of recurrent major depressive disorder ([facility]  Assessment & Plan:  well controlled, all mood issues seem to be anxiety, continue current treatment    5. Anxiety  Assessment & Plan:  Not ideally controlled, slightly worse even on ativan 1/2 tab TID. Reviewed options. I don't think adjusting ativan is going to help. They can adjust timing of dosing to see if that helps. We reviewed valium. Also reviewed trying an SSRI as I'm not sure how much of this is for gastroparesis vs true med side effect   6. Essential hypertension, benign  Assessment & Plan:  Fluctuating, worsening at home, well controlled in the office, no medication changes, reviewed home triggers that will make things worse   Orders:  -     CBC W/O DIFF; Future  -     METABOLIC PANEL, COMPREHENSIVE; Future  7. Gastroparesis  Assessment & Plan:  Chronic, unclear if this is driving some of her symptoms, continue to monitor, continue to keep an eye on portion size     Follow-up and Dispositions    Return in about 6 months (around 5/8/2023) for Regular follow up. Subjective:   [place] is here today for a full physical.      Annual Wellness Visit- Subsequent Visit    Since last visit: no major changes, daughter's concerns highlighted below.     The following acute and/or chronic problems were addressed today:    Depression/Anxiety - has been taking 1/2 tab TID instead of 1/2 tab in AM and 1 tab in PM.  Since last visit daughter reports 2-3 \"panic attacks\" per day. She feels SOB, lays on the couch, and daughter talks to her. VS are normal.  No correlation w/ time of the day. Available PHQ9 & GAD7 reviewed. Reports experiences the following side effects from the treatment: none. VA  reviewed. Ativan 0.5mg, last refilled on 925/22 - #135 for 3 months. Dementia - CNA is coming to the house daily, 4 hrs. [facility] is coming 2 hrs/day. They reached out to [facility], saw them a few times, and then stopped. They wanted her to see geriatric psychiatrist.         Hypertension - She reports taking medications as instructed, no medication side effects noted, home BP monitoring in range of 243-579'L systolic over 95-46'N diastolic. End of Life Planning: This was discussed with her today and she has an advanced directive - a copy has been provided. Reviewed DNR/DNI and patient is interested in remaining a DNR, no changes made.       Depression Screen:  3 most recent PHQ Screens 11/8/2022   PHQ Not Done -   Little interest or pleasure in doing things Not at all   Feeling down, depressed, irritable, or hopeless Not at all   Total Score PHQ 2 0   Trouble falling or staying asleep, or sleeping too much -   Feeling tired or having little energy -   Poor appetite, weight loss, or overeating -   Feeling bad about yourself - or that you are a failure or have let yourself or your family down -   Trouble concentrating on things such as school, work, reading, or watching TV -   Moving or speaking so slowly that other people could have noticed; or the opposite being so fidgety that others notice -   Thoughts of being better off dead, or hurting yourself in some way -   PHQ 9 Score -   How difficult have these problems made it for you to do your work, take care of your home and get along with others -         Fall Risk:   Fall Risk Assessment, last 12 mths 11/8/2022   Able to walk? Yes   Fall in past 12 months? 0   Do you feel unsteady? 0   Are you worried about falling 0   Number of falls in past 12 months -   Fall with injury? -       Abuse Screen:  Abuse Screening Questionnaire 11/8/2022   Do you ever feel afraid of your partner? N   Are you in a relationship with someone who physically or mentally threatens you? N   Is it safe for you to go home? Y       Do you average more than 1 drink per night or more than 7 drinks a week:  No    On any one occasion in the past three months have you have had more than 3 drinks containing alcohol:  No    Health Maintenance:   Daily Aspirin: yes  Bone Density: done 10/20/15 - normal    Immunizations:   Covid: not up to date - declined  Influenza: will get this fall  Tetanus: up to date  Shingles: declined  Pneumonia: declined - reports allergy  Cancer screening:   Cervical: reviewed guidelines, n/a  Breast: reviewed guidelines, patient declined  Colon: reviewed guidelines, n/a       Functional Ability and Level of Safety:    Hearing: The patient needs further evaluation. Cognition Screen:   Has your family/caregiver stated any concerns about your memory: yes - known dementia - see A/P     Ambulation: with mild difficulty     Activities of Daily Living: The home contains: grab bars and walk in shower and chair. Patient needs help with:  transportation, shopping, managing medications and managing money  Exercise: not active    Adult Nutrition Screen:  No risk factors noted.        Patient Care Team:  Lee Campos MD as PCP - General (Internal Medicine Physician)  Lee Campos MD as PCP - REHABILITATION [facility] Empaneled Provider  Margaret Griffiths MD (Nephrology)  West Reinoso MD (Dermatology Physician)  Shell Weems MD (Ophthalmology)  Nighat Butler NP (Psychiatry)  Malachi Osler (Pain Management)  Cici Clark MD as Physician (Gastroenterology)  Anthony Slaughter([place]) Fritz Navarrete MD as Physician (Orthopedic Surgery)  Erlinda Aranda MD as Physician (Ophthalmology)  Robe Calhoun MD (Cardiovascular Disease Physician)       The following sections were reviewed & updated as appropriate: Problem List, Allergies, PMH, PSH, FH, and SH. Prior to Admission medications    Medication Sig Start Date End Date Taking? Authorizing Provider   hydrALAZINE (APRESOLINE) 10 mg tablet TAKE TWO TABLETS BY MOUTH THREE TIMES A DAY 9/26/22  Yes Pablo Aponte MD   ondansetron hcl (ZOFRAN) 4 mg tablet TAKE ONE TABLET BY MOUTH DAILY AS NEEDED FOR NAUSEA 9/9/22  Yes Pablo Aponte MD   LORazepam (ATIVAN) 0.5 mg tablet TAKE 1/2 TABLET BY MOUTH EVERY MORNING AND TAKE ONE TABLET BY MOUTH EVERY EVENING 6/20/22  Yes Pablo Aponte MD   nortriptyline (PAMELOR) 10 mg capsule Take 10 mg by mouth nightly. 4/15/21  Yes Provider, Historical   polyethylene glycol (MIRALAX) 17 gram packet Take 17 g by mouth daily as needed for Constipation. Yes Provider, Historical   mirtazapine (REMERON) 7.5 mg tablet Take 1 tablet by mouth at bedtime. 12/7/20  Yes Pablo Aponte MD   pantoprazole (PROTONIX) 40 mg tablet Take 40 mg by mouth daily. Yes Provider, Historical   acetaminophen (TYLENOL) 650 mg TbER Take 1 Tab by mouth every eight (8) hours. 8/30/19  Yes Pablo Aponte MD   aspirin delayed-release 81 mg tablet Take 1 Tab by mouth daily. 8/4/17  Yes Minor, Colmenares Brightly, NP   varicella-zoster recombinant, PF, (Shingrix, PF,) 50 mcg/0.5 mL susr injection 0.5 ml IM once and then repeat in 2-6 months. Patient not taking: Reported on 11/8/2022 4/19/21   Pablo Aponte MD          Review of Systems   All other systems reviewed and are negative. Per her  Daughter reports rare palpitations and frequent nausea. Objective:   Physical Exam  Cardiovascular:      Rate and Rhythm: Regular rhythm. Heart sounds: Normal heart sounds. No murmur heard.   Pulmonary:      Effort: Pulmonary effort is normal.      Breath sounds: Normal breath sounds. No wheezing or rales. Abdominal:      General: Bowel sounds are normal.      Palpations: There is no mass. Tenderness: There is no abdominal tenderness. Musculoskeletal:      Right lower leg: No edema. Left lower leg: No edema.    Psychiatric:         Mood and Affect: Mood normal.         Behavior: Behavior normal.          Visit Vitals  /62   Pulse 83   Temp 98.2 °F (36.8 °C) (Temporal)   Resp 18   Ht 5' 1\" (1.549 m)   Wt 108 lb (49 kg)   SpO2 99%   BMI 20.41 kg/m²          Cynthia Goddard MD

## 2022-11-08 NOTE — ASSESSMENT & PLAN NOTE
Fluctuating, worsening at home, well controlled in the office, no medication changes, reviewed home triggers that will make things worse

## 2022-11-08 NOTE — ASSESSMENT & PLAN NOTE
Chronic, unclear if this is driving some of her symptoms, continue to monitor, continue to keep an eye on portion size

## 2022-11-08 NOTE — ASSESSMENT & PLAN NOTE
Not ideally controlled, slightly worse even on ativan 1/2 tab TID. Reviewed options. I don't think adjusting ativan is going to help. They can adjust timing of dosing to see if that helps. We reviewed valium.   Also reviewed trying an SSRI as I'm not sure how much of this is for gastroparesis vs true med side effect

## 2022-11-08 NOTE — ASSESSMENT & PLAN NOTE
Worsening, reviewed expectations, no medication changes, continue w/ as much support in the home as possible and stick with a good regimented schedule

## 2022-11-08 NOTE — PATIENT INSTRUCTIONS

## 2022-11-08 NOTE — ACP (ADVANCE CARE PLANNING)
Advance Care Planning   Advance Care Planning (ACP) Physician/NP/PA (Provider) Conversation    Date of ACP Conversation: 11/08/22  Persons included in Conversation:  patient and family  Length of ACP Conversation in minutes: <16 minutes (Non-Billable)    Authorized Decision Maker (if patient is incapable of making informed decisions):   Named in Advance Directive or Healthcare Power of       Primary Decision Maker: Jacky Marin - Daughter - [phone]    Secondary Decision Maker: Nick Erasmo - Daughter - [phone]    Secondary Decision Maker: Renae Camarillo - [phone]    She has an advanced directive - a copy has been provided & still reflects her wishes. Reviewed DNR/DNI and patient is interested in remaining a DNR, no changes made.        Sindhu De Lenó MD

## 2022-11-09 LAB
ALBUMIN SERPL-MCNC: 3.8 G/DL (ref 3.5–5)
ALBUMIN/GLOB SERPL: 1.4 {RATIO} (ref 1.1–2.2)
ALP SERPL-CCNC: 60 U/L (ref 45–117)
ALT SERPL-CCNC: 21 U/L (ref 12–78)
ANION GAP SERPL CALC-SCNC: 12 MMOL/L (ref 5–15)
AST SERPL-CCNC: 17 U/L (ref 15–37)
BILIRUB SERPL-MCNC: 0.4 MG/DL (ref 0.2–1)
BUN SERPL-MCNC: 22 MG/DL (ref 6–20)
BUN/CREAT SERPL: 13 (ref 12–20)
CALCIUM SERPL-MCNC: 9 MG/DL (ref 8.5–10.1)
CHLORIDE SERPL-SCNC: 95 MMOL/L (ref 97–108)
CO2 SERPL-SCNC: 28 MMOL/L (ref 21–32)
CREAT SERPL-MCNC: 1.65 MG/DL (ref 0.55–1.02)
ERYTHROCYTE [DISTWIDTH] IN BLOOD BY AUTOMATED COUNT: 13.8 % (ref 11.5–14.5)
GLOBULIN SER CALC-MCNC: 2.7 G/DL (ref 2–4)
GLUCOSE SERPL-MCNC: 115 MG/DL (ref 65–100)
HCT VFR BLD AUTO: 36.4 % (ref 35–47)
HGB BLD-MCNC: 11.8 G/DL (ref 11.5–16)
MCH RBC QN AUTO: 29.8 PG (ref 26–34)
MCHC RBC AUTO-ENTMCNC: 32.4 G/DL (ref 30–36.5)
MCV RBC AUTO: 91.9 FL (ref 80–99)
NRBC # BLD: 0 K/UL (ref 0–0.01)
NRBC BLD-RTO: 0 PER 100 WBC
PLATELET # BLD AUTO: 353 K/UL (ref 150–400)
PMV BLD AUTO: 9.4 FL (ref 8.9–12.9)
POTASSIUM SERPL-SCNC: 4.4 MMOL/L (ref 3.5–5.1)
PROT SERPL-MCNC: 6.5 G/DL (ref 6.4–8.2)
RBC # BLD AUTO: 3.96 M/UL (ref 3.8–5.2)
SODIUM SERPL-SCNC: 135 MMOL/L (ref 136–145)
WBC # BLD AUTO: 8.1 K/UL (ref 3.6–11)

## 2022-11-11 NOTE — PROGRESS NOTES
Results released to patient via [employer] Remember. All labs are stable or at goal for her. Na at baseline. Renal fxn at baseline. Non-fasting sugar.

## 2022-11-14 DIAGNOSIS — F41.9 ANXIETY: Primary | ICD-10-CM

## 2022-11-14 RX ORDER — DIAZEPAM 5 MG/1
5 TABLET ORAL
Qty: 30 TABLET | Refills: 0 | Status: SHIPPED | OUTPATIENT
Start: 2022-11-14 | End: 2022-11-24

## 2022-11-15 NOTE — TELEPHONE ENCOUNTER
Chart & VA  reviewed. Changing from ativan to valium.     Last visit with me:  11/8/2022   Last refilled on: 9/25/22 - 3 months    Future Appointments   Date Time Provider Americo Rizo   2/10/2023  9:00 AM NURIA1, MISAEL ALCANTAR BS AMB   3/8/2023  3:20 PM MD BLAIRE Méndez BS AMB        Last PDMP Laura Osborne as Reviewed:  Review User Review Instant Review Result   LEXIE GOLD 11/14/2022  7:11 PM Reviewed PDMP [1]

## 2022-12-02 DIAGNOSIS — F41.9 ANXIETY: ICD-10-CM

## 2022-12-02 RX ORDER — LORAZEPAM 0.5 MG/1
TABLET ORAL
Qty: 135 TABLET | Refills: 0 | Status: SHIPPED | OUTPATIENT
Start: 2022-12-02

## 2022-12-02 NOTE — TELEPHONE ENCOUNTER
Chart & VA  reviewed. Last visit with me:  11/8/2022   Last refilled on: 9/25 - #135 (ativan) and 11/18 - #30 (valium). Future Appointments   Date Time Provider Americo Rizo   2/10/2023  9:00 AM Pilar SANCHEZ AMB   3/8/2023  3:20 PM MD BLAIRE Da Silva AMB        Last PDMP Peewee Valdez as Reviewed:  Review User Review Instant Review Result   Marty Cazares 12/2/2022  2:43 PM Reviewed PDMP [1]            MyChart message: This message is being sent by Melissa Isaac on behalf of Alvarado Cochran. Morning,  The 5 mg valium was a fail, even with 1/2ing the pill. She was so drugged up and whimpering and said she didn't feel right. So we didn't give her anything on Monday until bed time, which was the [address]. We decided to only give at night time. We are not putting them in pill case except for evening. So far so good. she did have one of her episodes at 8:30 am yesterday so I gave 1/2 a pill. So we are done with the valium and will need a rx for adival in the very near future. [facility], and they have all her other medications. I am not happy with jared. Just want want to let you know.   Thanks,  Melissa Isaac

## 2022-12-19 ENCOUNTER — DOCUMENTATION ONLY (OUTPATIENT)
Dept: INTERNAL MEDICINE CLINIC | Age: 87
End: 2022-12-19

## 2022-12-19 NOTE — PROGRESS NOTES
Faxed [facility] paperwork to [facility] @[phone] . Received confirmation same day. Will have documents scanned into chart. . please advise Nica Fitzpatrick

## 2023-01-18 ENCOUNTER — OFFICE VISIT (OUTPATIENT)
Dept: INTERNAL MEDICINE CLINIC | Age: 88
End: 2023-01-18
Payer: MEDICARE

## 2023-01-18 VITALS
TEMPERATURE: 96.8 F | DIASTOLIC BLOOD PRESSURE: 74 MMHG | RESPIRATION RATE: 18 BRPM | HEIGHT: 61 IN | WEIGHT: 105 LBS | BODY MASS INDEX: 19.83 KG/M2 | OXYGEN SATURATION: 96 % | SYSTOLIC BLOOD PRESSURE: 176 MMHG | HEART RATE: 83 BPM

## 2023-01-18 DIAGNOSIS — Z11.1 SCREENING-PULMONARY TB: Primary | ICD-10-CM

## 2023-01-18 DIAGNOSIS — Z02.2 ENCOUNTER FOR EXAMINATION FOR ADMISSION TO ASSISTED LIVING FACILITY: ICD-10-CM

## 2023-01-18 PROCEDURE — G8420 CALC BMI NORM PARAMETERS: HCPCS | Performed by: NURSE PRACTITIONER

## 2023-01-18 PROCEDURE — G9717 DOC PT DX DEP/BP F/U NT REQ: HCPCS | Performed by: NURSE PRACTITIONER

## 2023-01-18 PROCEDURE — G8536 NO DOC ELDER MAL SCRN: HCPCS | Performed by: NURSE PRACTITIONER

## 2023-01-18 PROCEDURE — 1101F PT FALLS ASSESS-DOCD LE1/YR: CPT | Performed by: NURSE PRACTITIONER

## 2023-01-18 PROCEDURE — G0463 HOSPITAL OUTPT CLINIC VISIT: HCPCS | Performed by: NURSE PRACTITIONER

## 2023-01-18 PROCEDURE — G8427 DOCREV CUR MEDS BY ELIG CLIN: HCPCS | Performed by: NURSE PRACTITIONER

## 2023-01-18 PROCEDURE — 99214 OFFICE O/P EST MOD 30 MIN: CPT | Performed by: NURSE PRACTITIONER

## 2023-01-18 PROCEDURE — 1090F PRES/ABSN URINE INCON ASSESS: CPT | Performed by: NURSE PRACTITIONER

## 2023-01-18 NOTE — PROGRESS NOTES
Verified name and birth date for privacy precautions. Chart reviewed in preparation for today's visit.      Chief Complaint   Patient presents with    Other     Physical for paperwork           Health Maintenance Due   Topic    Shingles Vaccine (1 of 2)    COVID-19 Vaccine (4 - Booster for Moderna series)    Flu Vaccine (1)         Wt Readings from Last 3 Encounters:   01/18/23 105 lb (47.6 kg)   11/08/22 108 lb (49 kg)   07/26/22 109 lb (49.4 kg)     Temp Readings from Last 3 Encounters:   01/18/23 96.8 °F (36 °C) (Temporal)   11/08/22 98.2 °F (36.8 °C) (Temporal)   07/26/22 98.3 °F (36.8 °C) (Temporal)     BP Readings from Last 3 Encounters:   01/18/23 (!) 176/74   11/08/22 128/62   07/26/22 127/71     Pulse Readings from Last 3 Encounters:   01/18/23 83   11/08/22 83   07/26/22 (!) 101         Learning Assessment:  :     Learning Assessment 8/8/2017 7/18/2016   PRIMARY LEARNER Patient Patient   HIGHEST LEVEL OF EDUCATION - PRIMARY LEARNER  > 4 YEARS OF COLLEGE -   BARRIERS PRIMARY LEARNER HEARING -   PRIMARY LANGUAGE ENGLISH ENGLISH   LEARNER PREFERENCE PRIMARY READING READING     - OTHER (COMMENT)   ANSWERED BY patient patient   RELATIONSHIP SELF SELF       Depression Screening:  :     3 most recent PHQ Screens 11/8/2022   PHQ Not Done -   Little interest or pleasure in doing things Not at all   Feeling down, depressed, irritable, or hopeless Not at all   Total Score PHQ 2 0   Trouble falling or staying asleep, or sleeping too much -   Feeling tired or having little energy -   Poor appetite, weight loss, or overeating -   Feeling bad about yourself - or that you are a failure or have let yourself or your family down -   Trouble concentrating on things such as school, work, reading, or watching TV -   Moving or speaking so slowly that other people could have noticed; or the opposite being so fidgety that others notice -   Thoughts of being better off dead, or hurting yourself in some way -   PHQ 9 Score -   How difficult have these problems made it for you to do your work, take care of your home and get along with others -       Fall Risk Assessment:  :     Fall Risk Assessment, last 12 mths 11/8/2022   Able to walk? Yes   Fall in past 12 months? 0   Do you feel unsteady? 0   Are you worried about falling 0   Number of falls in past 12 months -   Fall with injury? -       Abuse Screening:  :     Abuse Screening Questionnaire 11/8/2022 11/17/2021 8/17/2020 7/23/2019 7/14/2017   Do you ever feel afraid of your partner? N N N N N   Are you in a relationship with someone who physically or mentally threatens you? N N N N N   Is it safe for you to go home?  Cameron Carvalho

## 2023-01-18 NOTE — PROGRESS NOTES
HISTORY OF PRESENT ILLNESS  Sheena Ormond Leonarda Patterson is a 80 y.o. female. Patient presents for physical for clearance to enter assisted living facility([facility]). She is accompanied by daughter Tierra Gentleman today. Daughter is concerned about increased anxiety lately, seems worse as she gets closer to the transition to GUI. Daughter reports patient has some shortness of breath with increased panic attacks lately. Patient has them nightly over the past few months. daughter reports increased fatigue the past 3-4 weeks and decreased appetite. Patient is taking ativan 0.5 mg 3 times per day, which seems to help anxiety. She is taking zofran every morning to help to help with nausea. Blood pressure (!) 176/74, pulse 83, temperature 96.8 °F (36 °C), temperature source Temporal, resp. rate 18, height 5' 1\" (1.549 m), weight 105 lb (47.6 kg), SpO2 96 %. HPI    Review of Systems   Constitutional:  Positive for malaise/fatigue. Psychiatric/Behavioral:  The patient is nervous/anxious. Physical Exam  Cardiovascular:      Rate and Rhythm: Normal rate and regular rhythm. Pulmonary:      Effort: Pulmonary effort is normal.      Breath sounds: Normal breath sounds. Abdominal:      General: Abdomen is flat. Bowel sounds are normal.      Palpations: Abdomen is soft. Tenderness: There is no abdominal tenderness. Skin:     General: Skin is warm and dry. Coloration: Skin is pale. Neurological:      General: No focal deficit present. Mental Status: She is oriented to person, place, and time and easily aroused. She is lethargic. Psychiatric:         Attention and Perception: She is inattentive. Mood and Affect: Mood is anxious. Cognition and Memory: Memory is impaired. Judgment: Judgment is inappropriate. ASSESSMENT and PLAN    ICD-10-CM ICD-9-CM    1. Screening-pulmonary TB  Z11.1 V74.1 QUANTIFERON-TB GOLD PLUS      2.  Encounter for examination for admission to assisted living facility  Z02.2 V70.8       Forms completed by PCP, will update medication list  TB screen ordered

## 2023-01-19 ENCOUNTER — TELEPHONE (OUTPATIENT)
Dept: INTERNAL MEDICINE CLINIC | Age: 88
End: 2023-01-19

## 2023-01-19 NOTE — TELEPHONE ENCOUNTER
Reason for call:  TC from pt's daughter, Tatyana Rao - daughter on pt's Ceferino Records states she is returning Sho's call - Tatyana Rao states she needs to discuss Mother's paperwork with Olivier menendez.      Is this a new problem: no      Date of last appointment:  1/18/2023      Can we respond via Tintri: no     Best call back number: [phone]

## 2023-01-19 NOTE — TELEPHONE ENCOUNTER
Reason for call:  TC from pt's daughter, Enrique Mcduffie - daughter on pt's Tiarra Bolanos states she is returning Sho's call - Enrique Mcduffie states she needs to discuss Mother's paperwork with Olivier menendez.     Is this a new problem: no     Date of last appointment:  1/18/2023     Can we respond via GameGround: no    Best call back number: [phone]

## 2023-01-19 NOTE — TELEPHONE ENCOUNTER
UPDATE:    Called and spoke the patients daughter. She is faxing over new forms for us. The patients daughter is asking for a few things as well: We need a new script for the patients Zofran, it needs to say \"one a day\" or \"daily\". The facility will not do PRN. The patients daughter is asking if you could take over the patients MIRTARPIEN and her PANTOPRAZOLE     Will keep an eye out for the forms.  Please advise for further instruction if needed :)

## 2023-01-20 ENCOUNTER — TELEPHONE (OUTPATIENT)
Dept: INTERNAL MEDICINE CLINIC | Age: 88
End: 2023-01-20

## 2023-01-20 DIAGNOSIS — R11.0 NAUSEA: ICD-10-CM

## 2023-01-20 RX ORDER — ONDANSETRON 4 MG/1
4 TABLET, FILM COATED ORAL DAILY
Qty: 30 TABLET | Refills: 4
Start: 2023-01-20

## 2023-01-20 NOTE — TELEPHONE ENCOUNTER
Reason for call:    Patient's daughter, Reyna Babb, called. She would like to speak to Olivier menendez.     Is this a new problem: yes     Date of last appointment:  1/18/2023     Can we respond via Mingleverse: no    Best call back number:     Reyna Babb - [phone]

## 2023-01-22 LAB
GAMMA INTERFERON BACKGROUND BLD IA-ACNC: 0.03 IU/ML
M TB IFN-G BLD-IMP: NEGATIVE
M TB IFN-G CD4+ T-CELLS BLD-ACNC: 0.03 IU/ML
MITOGEN IGNF BLD-ACNC: >10 IU/ML
QUANTIFERON INCUBATION, QF1T: NORMAL
QUANTIFERON TB2 AG: 0.03 IU/ML
SERVICE CMNT-IMP: NORMAL

## 2023-01-23 ENCOUNTER — TELEPHONE (OUTPATIENT)
Dept: INTERNAL MEDICINE CLINIC | Age: 88
End: 2023-01-23

## 2023-01-23 NOTE — TELEPHONE ENCOUNTER
Reason for call:  Spoke with daughter.  She requested a call back from Olivier menendez In regards her mother med    Is this a new problem: yes     Date of last appointment:  1/18/2023     Can we respond via Xactium: no    Best call back number:    [place]      [phone]

## 2023-01-24 RX ORDER — HYDRALAZINE HYDROCHLORIDE 10 MG/1
TABLET, FILM COATED ORAL
Qty: 180 TABLET | Refills: 1 | Status: SHIPPED | OUTPATIENT
Start: 2023-01-24

## 2023-01-25 ENCOUNTER — TELEPHONE (OUTPATIENT)
Dept: INTERNAL MEDICINE CLINIC | Age: 88
End: 2023-01-25

## 2023-01-25 ENCOUNTER — DOCUMENTATION ONLY (OUTPATIENT)
Dept: INTERNAL MEDICINE CLINIC | Age: 88
End: 2023-01-25

## 2023-01-25 NOTE — TELEPHONE ENCOUNTER
Future Appointments   Date Time Provider Americo Sallie   2/10/2023  9:00 AM Addy KINNEY   3/8/2023  3:20 PM Darshan Domínguez MD [facility] SOCORRO SANCHEZ AMB

## 2023-01-30 ENCOUNTER — TELEPHONE (OUTPATIENT)
Dept: INTERNAL MEDICINE CLINIC | Age: 88
End: 2023-01-30

## 2023-01-30 NOTE — TELEPHONE ENCOUNTER
Reason for call:  patient is going to [facility] on [address] and her daughter needs to get her medications straight with the office [facility] has faxed some forms.   Please call    Is this a new problem: yes     Date of last appointment:  1/18/2023     Can we respond via Surround Appt: no    Best call back number:   Charlene Pascale [facility]) [phone] ([phone] (Mobile)  [phone] (Mobile) Remove

## 2023-01-31 ENCOUNTER — DOCUMENTATION ONLY (OUTPATIENT)
Dept: INTERNAL MEDICINE CLINIC | Age: 88
End: 2023-01-31

## 2023-01-31 NOTE — PROGRESS NOTES
Refaxed medication list to [facility] @ [phone] . Received confirmation same day. Will have documents scanned into chart. . please advise Jesse Bur

## 2023-02-02 ENCOUNTER — TELEPHONE (OUTPATIENT)
Dept: INTERNAL MEDICINE CLINIC | Age: 88
End: 2023-02-02

## 2023-02-02 DIAGNOSIS — F02.80 ALZHEIMER'S DISEASE (HCC): Primary | ICD-10-CM

## 2023-02-02 DIAGNOSIS — G30.9 ALZHEIMER'S DISEASE (HCC): Primary | ICD-10-CM

## 2023-02-02 NOTE — TELEPHONE ENCOUNTER
Reason for call:  TC from pt's daughterKandice Patient - daughter on [address] calling to talk with [facility] states Hospice referral needs to be faxed to fax #: [phone]    Is this a new problem: no     Date of last appointment:  1/18/2023     Can we respond via "[employer]: no    Best call back number: [address]  #: [phone]

## 2023-02-02 NOTE — TELEPHONE ENCOUNTER
Patients daughter called and she asking for a referral for hospice for the mother. This needs to be for [facility]. .please advise for further instruction if needed . .will fax when completed. . :)

## 2023-02-03 ENCOUNTER — TELEPHONE (OUTPATIENT)
Dept: INTERNAL MEDICINE CLINIC | Age: 88
End: 2023-02-03

## 2023-02-06 ENCOUNTER — TELEPHONE (OUTPATIENT)
Dept: INTERNAL MEDICINE CLINIC | Age: 88
End: 2023-02-06

## 2023-02-06 DIAGNOSIS — F41.9 ANXIETY: ICD-10-CM

## 2023-02-06 DIAGNOSIS — K59.00 CONSTIPATION, UNSPECIFIED CONSTIPATION TYPE: Primary | ICD-10-CM

## 2023-02-06 RX ORDER — LORAZEPAM 0.5 MG/1
TABLET ORAL
Qty: 135 TABLET | Refills: 0 | Status: SHIPPED | OUTPATIENT
Start: 2023-02-06

## 2023-02-06 RX ORDER — FACIAL-BODY WIPES
10 EACH TOPICAL
Qty: 30 EACH | Refills: 1 | Status: SHIPPED | OUTPATIENT
Start: 2023-02-06

## 2023-02-06 NOTE — TELEPHONE ENCOUNTER
Reason for call:  Wilfred Almazan with [facility] [phone]    Requesting a script for Ativan be faxed to [facility] at [facility]. Fax# [phone]    Ok to leave voice mail.     Is this a new problem: yes     Date of last appointment:  1/18/2023     Can we respond via Hybrid Paytechhart: no    Best call back number: Wilfred Almazan     [phone]

## 2023-02-06 NOTE — TELEPHONE ENCOUNTER
Spoke with Cristy Espinoza with [facility], request for medication refill Ativan. Family only supplied one day of Ativan. Need to have medication sent to [place] for pill pack.

## 2023-02-06 NOTE — TELEPHONE ENCOUNTER
Reason for call:  orders needed for pt to have dulcolax suppositories if needed for constipation at nursing facility    Is this a new problem: yes     Date of last appointment:  1/18/2023     Can we respond via AisleBuyert: no    Best call back number:    [facility]) [phone] [facility]

## 2023-02-06 NOTE — TELEPHONE ENCOUNTER
Cristy Espinoza with [facility] [phone]     Requesting a script for Ativan be faxed to [facility] at [facility].     Fax# [phone]

## 2023-02-06 NOTE — TELEPHONE ENCOUNTER
Chart & VA  reviewed.       Last visit with me:  11/8/2022   Last refilled on: 12/2/22 - #135    Future Appointments   Date Time Provider Americo Sallie   2/10/2023  9:00 AM MISAEL BOOGIE BS AMB   3/8/2023  3:20 PM MD BLAIRE Kc BS AMB        Last PDMP Ketan Mota as Reviewed:  Review User Review Instant Review Result   LEXEI GOLD 2/6/2023  4:25 PM Reviewed PDMP [1]

## 2023-02-07 ENCOUNTER — TELEPHONE (OUTPATIENT)
Dept: INTERNAL MEDICINE CLINIC | Age: 88
End: 2023-02-07

## 2023-02-07 NOTE — TELEPHONE ENCOUNTER
Reason for call:  Spoke with Tiera Garcia at home care. Per Juliocesar Madrigal order for service are out of date and they need new order. Tiera Garcia requested a call back to provide more information in regards the order.         Is this a new problem: yes     Date of last appointment:  1/18/2023     Can we respond via wriplt: no    Best call back number: Mott Jose  [phone]

## 2023-02-07 NOTE — TELEPHONE ENCOUNTER
Returned call to [employer] with [facility]. They received patients move in orders (scanned under media) which indicated patient would need [address]. Unfortunately the move in orders are over 3weeks old so they require a new order for [facility]. Also request a copy of patients most recent office note with the order. Can fax to them @ [phone].

## 2023-02-16 ENCOUNTER — TELEPHONE (OUTPATIENT)
Dept: INTERNAL MEDICINE CLINIC | Age: 88
End: 2023-02-16

## 2023-02-16 NOTE — TELEPHONE ENCOUNTER
Reason for call:  TC from [facility] will be seeing pt 2 times a day for 4 weeks, then 1 time a day for 3 weeks.     Is this a new problem: yes     Date of last appointment:  1/18/2023     Can we respond via Presidium Learningt: no    Best call back number: [facility]/Phone #: [phone]

## 2023-02-23 ENCOUNTER — TELEPHONE (OUTPATIENT)
Dept: INTERNAL MEDICINE CLINIC | Age: 88
End: 2023-02-23

## 2023-02-23 NOTE — TELEPHONE ENCOUNTER
Reason for call:    Irasema Patino from [facility] called. She said the patient's daughter is concerned about patient's increased anxiety. She would like to know if Dr. Denise Fisher can place an order for a Psych evaluation.     Is this a new problem: yes     Date of last appointment:  1/18/2023     Can we respond via STP Groupt: no    Best call back number:     Irasema Patino - [phone]

## 2023-02-23 NOTE — TELEPHONE ENCOUNTER
Reason for call:  TC from [facility] w/At [address] calling to let Dr. Sana Mccauley know that Nurse did pt's evaluation, and will be treating her for one month - [facility] also wanted to report to Dr. Sana Mccauley that pt's anxiety has been up since moving.      Is this a new problem: no     Date of last appointment:  1/18/2023     Can we respond via "[employer]t: no    Best call back number: [facility]/Phone #: [phone]

## 2023-02-24 NOTE — TELEPHONE ENCOUNTER
Noted. Pt with dementia. Moving will cause anxiety to go up. She has 61 med allergies. No good med options.

## 2023-02-27 ENCOUNTER — TELEPHONE (OUTPATIENT)
Dept: INTERNAL MEDICINE CLINIC | Age: 88
End: 2023-02-27

## 2023-02-27 DIAGNOSIS — G30.9 ALZHEIMER'S DISEASE (HCC): Primary | ICD-10-CM

## 2023-02-27 DIAGNOSIS — F02.80 ALZHEIMER'S DISEASE (HCC): Primary | ICD-10-CM

## 2023-02-27 NOTE — TELEPHONE ENCOUNTER
Reason for call:    Juan Diego Vigil from [facility] called to see if Dr. Josefina Rodriguez has put in an order for a Psych Evaluation for patient.     Is this a new problem: yes     Date of last appointment:  1/18/2023     Can we respond via StartupDigesthart: no    Best call back number:     Juan Diego Vigil - [phone]

## 2023-02-28 NOTE — TELEPHONE ENCOUNTER
I didn't know they needed one. I received something recently for them but was for medications.   Order placed

## 2023-02-28 NOTE — TELEPHONE ENCOUNTER
Returned call to [facility] and advised referral has been faxed to her attention

## 2023-03-01 ENCOUNTER — DOCUMENTATION ONLY (OUTPATIENT)
Dept: INTERNAL MEDICINE CLINIC | Age: 88
End: 2023-03-01

## 2023-03-01 NOTE — PROGRESS NOTES
Faxed referral to Celeste Cristina @[phone]. Received confirmation same day. Will have documents scanned into chart. . please advise Stacy Green

## 2023-03-08 ENCOUNTER — OFFICE VISIT (OUTPATIENT)
Dept: INTERNAL MEDICINE CLINIC | Age: 88
End: 2023-03-08
Payer: MEDICARE

## 2023-03-08 VITALS
SYSTOLIC BLOOD PRESSURE: 149 MMHG | WEIGHT: 105 LBS | HEART RATE: 76 BPM | BODY MASS INDEX: 19.83 KG/M2 | OXYGEN SATURATION: 98 % | TEMPERATURE: 98.3 F | RESPIRATION RATE: 18 BRPM | DIASTOLIC BLOOD PRESSURE: 72 MMHG | HEIGHT: 61 IN

## 2023-03-08 DIAGNOSIS — R42 DIZZINESS: Primary | ICD-10-CM

## 2023-03-08 DIAGNOSIS — F02.80 ALZHEIMER'S DISEASE (HCC): ICD-10-CM

## 2023-03-08 DIAGNOSIS — R42 DIZZINESS: ICD-10-CM

## 2023-03-08 DIAGNOSIS — G30.9 ALZHEIMER'S DISEASE (HCC): ICD-10-CM

## 2023-03-08 PROCEDURE — 1090F PRES/ABSN URINE INCON ASSESS: CPT | Performed by: INTERNAL MEDICINE

## 2023-03-08 PROCEDURE — G8420 CALC BMI NORM PARAMETERS: HCPCS | Performed by: INTERNAL MEDICINE

## 2023-03-08 PROCEDURE — 99213 OFFICE O/P EST LOW 20 MIN: CPT | Performed by: INTERNAL MEDICINE

## 2023-03-08 PROCEDURE — G0463 HOSPITAL OUTPT CLINIC VISIT: HCPCS | Performed by: INTERNAL MEDICINE

## 2023-03-08 PROCEDURE — G9717 DOC PT DX DEP/BP F/U NT REQ: HCPCS | Performed by: INTERNAL MEDICINE

## 2023-03-08 PROCEDURE — G8536 NO DOC ELDER MAL SCRN: HCPCS | Performed by: INTERNAL MEDICINE

## 2023-03-08 PROCEDURE — G8427 DOCREV CUR MEDS BY ELIG CLIN: HCPCS | Performed by: INTERNAL MEDICINE

## 2023-03-08 PROCEDURE — 1101F PT FALLS ASSESS-DOCD LE1/YR: CPT | Performed by: INTERNAL MEDICINE

## 2023-03-08 NOTE — PROGRESS NOTES
Dimple Boykin is a 80 y.o. female who was seen in clinic today (3/8/2023). She RTC with her daughter. Assessment & Plan:   Below is the assessment and plan developed based on review of pertinent history, physical exam, labs, studies, and medications. 1. Dizziness  Comments:  new dx, differential reviewed, unclear, will check labs  Orders:  -     URINALYSIS W/ REFLEX CULTURE; Future  -     CBC W/O DIFF; Future  -     METABOLIC PANEL, BASIC; Future  2. Alzheimer's disease [facility])  Assessment & Plan:  Worsening, agree w/ specialist evaluation, agree w/ re-evaluating level of care, no medication changes      [facility]  reviewed. Ativan 0.5mg last filled on 2/6/23. Follow-up and Dispositions    Return if symptoms worsen or fail to improve. Follow up TBD. Subjective/Objective:   [place] was seen today for Anxiety    Since last visit she moved into an [address] [facility] [facility]. There have been requests for PT/OT and psychiatry due to increased anxiety since moving. Per daughter she lays in bed all day. This is what she was doing at home prior to the move. She tells her daughter they are evil and there are angry & not friendly. She has a meeting with administration tomorrow about moving from [facility] to the next level. She reports medication is being given off cycle. She has had 4 falls and was found on the floor. She reports PT/OT has worked with her but has not reported to her how she did. Psychiatry referral was placed but has not been completed yet. Prior to Admission medications    Medication Sig Start Date End Date Taking? Authorizing Provider   LORazepam (ATIVAN) 0.5 mg tablet TAKE 1/2 TABLET BY MOUTH IN THE MORNING AS NEEDED FOR ANXIETY AND 1 TAB EVERY NIGHT FOR INSOMNIA/ANXIETY (hold for over sedation) 2/6/23  Yes Eveline Desai MD   bisacodyL (DULCOLAX) 10 mg supp Insert 10 mg into rectum daily as needed for Constipation.  Indications: constipation 2/6/23  Yes Lynn Handley Katerina Ricardo MD   hydrALAZINE (APRESOLINE) 10 mg tablet TAKE 2 TABLETS BY MOUTH THREE TIMES DAILY 1/24/23  Yes Lydia Madrid MD   ondansetron hcl (ZOFRAN) 4 mg tablet Take 1 Tablet by mouth daily. 1/20/23  Yes Lydia Madrid MD   nortriptyline (PAMELOR) 10 mg capsule Take 10 mg by mouth nightly. 4/15/21  Yes Provider, Historical   polyethylene glycol (MIRALAX) 17 gram packet Take 17 g by mouth daily as needed for Constipation. Yes Provider, Historical   mirtazapine (REMERON) 7.5 mg tablet Take 1 tablet by mouth at bedtime. 12/7/20  Yes Lydia Madrid MD   pantoprazole (PROTONIX) 40 mg tablet Take 40 mg by mouth daily. Yes Provider, Historical   acetaminophen (TYLENOL) 650 mg TbER Take 1 Tab by mouth every eight (8) hours. 8/30/19  Yes Lydia Madrid MD   aspirin delayed-release 81 mg tablet Take 1 Tab by mouth daily. 8/4/17  Yes Beni Torres, KRISTIN        Review of Systems   Constitutional:  Positive for malaise/fatigue. Negative for weight loss. Respiratory:  Negative for cough and shortness of breath. Cardiovascular:  Negative for chest pain and palpitations. Gastrointestinal:  Negative for abdominal pain, constipation, diarrhea, nausea and vomiting. Musculoskeletal:  Negative for back pain, joint pain and neck pain. Physical Exam  Constitutional:       Comments: Examined in [facility]   Cardiovascular:      Rate and Rhythm: Regular rhythm. Heart sounds: Normal heart sounds. No murmur heard. Pulmonary:      Effort: Pulmonary effort is normal.      Breath sounds: Normal breath sounds. No wheezing or rales. Abdominal:      General: Bowel sounds are normal.      Palpations: There is no hepatomegaly, splenomegaly or mass. Tenderness: There is no abdominal tenderness.      Visit Vitals  BP (!) 149/72   Pulse 76   Temp 98.3 °F (36.8 °C) (Temporal)   Resp 18   Ht 5' 1\" (1.549 m)   Wt 105 lb (47.6 kg)   SpO2 98%   BMI 19.84 kg/m²       Karlee Moreno MD

## 2023-03-09 DIAGNOSIS — E87.1 HYPONATREMIA: Primary | ICD-10-CM

## 2023-03-09 LAB
ANION GAP SERPL CALC-SCNC: 3 MMOL/L (ref 5–15)
BUN SERPL-MCNC: 26 MG/DL (ref 6–20)
BUN/CREAT SERPL: 17 (ref 12–20)
CALCIUM SERPL-MCNC: 9.3 MG/DL (ref 8.5–10.1)
CHLORIDE SERPL-SCNC: 96 MMOL/L (ref 97–108)
CO2 SERPL-SCNC: 30 MMOL/L (ref 21–32)
CREAT SERPL-MCNC: 1.49 MG/DL (ref 0.55–1.02)
ERYTHROCYTE [DISTWIDTH] IN BLOOD BY AUTOMATED COUNT: 13.6 % (ref 11.5–14.5)
GLUCOSE SERPL-MCNC: 100 MG/DL (ref 65–100)
HCT VFR BLD AUTO: 38.8 % (ref 35–47)
HGB BLD-MCNC: 12.5 G/DL (ref 11.5–16)
MCH RBC QN AUTO: 30.4 PG (ref 26–34)
MCHC RBC AUTO-ENTMCNC: 32.2 G/DL (ref 30–36.5)
MCV RBC AUTO: 94.4 FL (ref 80–99)
NRBC # BLD: 0 K/UL (ref 0–0.01)
NRBC BLD-RTO: 0 PER 100 WBC
PLATELET # BLD AUTO: 394 K/UL (ref 150–400)
PMV BLD AUTO: 9.1 FL (ref 8.9–12.9)
POTASSIUM SERPL-SCNC: 5.1 MMOL/L (ref 3.5–5.1)
RBC # BLD AUTO: 4.11 M/UL (ref 3.8–5.2)
SODIUM SERPL-SCNC: 129 MMOL/L (ref 136–145)
WBC # BLD AUTO: 8 K/UL (ref 3.6–11)

## 2023-03-09 NOTE — PROGRESS NOTES
Called and spoke to the patients daughter. Advised her of the results of the Na. Gave her Dr. Tamara León advice and she understood. Daughter stated that she would bring patient into have her labs re done  ASAP. My Hendrix please advise :)

## 2023-03-09 NOTE — PROGRESS NOTES
Please call patient's daughter and [facility] facility. All labs are stable or at goal except for worsening Na. This is likely etiology of her fatigue. Most likely drinking more water and not eating enough. Need to decrease slightly amount of water intake and make sure she is eating more. Need to repeat labs on Monday. Inquire if this can be done at [facility] or needs to come to the lab.

## 2023-03-09 NOTE — ASSESSMENT & PLAN NOTE
Worsening, agree w/ specialist evaluation, agree w/ re-evaluating level of care, no medication changes

## 2023-03-10 ENCOUNTER — TELEPHONE (OUTPATIENT)
Dept: INTERNAL MEDICINE CLINIC | Age: 88
End: 2023-03-10

## 2023-03-10 ENCOUNTER — OFFICE VISIT (OUTPATIENT)
Dept: CARDIOLOGY CLINIC | Age: 88
End: 2023-03-10

## 2023-03-10 DIAGNOSIS — Z95.818 STATUS POST PLACEMENT OF IMPLANTABLE LOOP RECORDER: Primary | ICD-10-CM

## 2023-03-10 DIAGNOSIS — K59.09 CHRONIC CONSTIPATION: Primary | ICD-10-CM

## 2023-03-10 RX ORDER — POLYETHYLENE GLYCOL 3350 17 G/17G
17 POWDER, FOR SOLUTION ORAL
Qty: 30 EACH | Refills: 5 | Status: SHIPPED | OUTPATIENT
Start: 2023-03-10

## 2023-03-10 NOTE — TELEPHONE ENCOUNTER
Reason for call:  Deo Esqueda from [facility] called, requesting for Mirakax to be change for 3 time at week. Per Daughter request for mother to be re- evaluated for Hospice     Is this a new problem: yes     Date of last appointment:  3/8/2023     Can we respond via Reclamador: no    Best call back number:   Deo Esqueda - [phone]

## 2023-03-10 NOTE — TELEPHONE ENCOUNTER
Mirilax Rx changed and sent to [facility]. I agree w/ re-evaluate by hospice. Please have them send over an order to sign.

## 2023-03-13 ENCOUNTER — DOCUMENTATION ONLY (OUTPATIENT)
Dept: INTERNAL MEDICINE CLINIC | Age: 88
End: 2023-03-13

## 2023-03-13 ENCOUNTER — TELEPHONE (OUTPATIENT)
Dept: INTERNAL MEDICINE CLINIC | Age: 88
End: 2023-03-13

## 2023-03-13 DIAGNOSIS — F02.80 ALZHEIMER'S DISEASE (HCC): Primary | ICD-10-CM

## 2023-03-13 DIAGNOSIS — G30.9 ALZHEIMER'S DISEASE (HCC): Primary | ICD-10-CM

## 2023-03-13 NOTE — TELEPHONE ENCOUNTER
Reason for call:  [facility] states she is in need of pt's OV notes for 03/08/23 to be faxed to: [phone].     Is this a new problem: yes     Date of last appointment:  3/8/2023     Can we respond via NanoLumenst: no    Best call back number: Nellie Orr [facility] Dread Vaughan #: [phone]

## 2023-03-13 NOTE — PROGRESS NOTES
Faxed requested referral for Hospice to evalute and treat patient. . Faxed to [phone].  Received confirmation same day :)

## 2023-03-16 ENCOUNTER — DOCUMENTATION ONLY (OUTPATIENT)
Dept: INTERNAL MEDICINE CLINIC | Age: 88
End: 2023-03-16

## 2023-03-16 ENCOUNTER — TELEPHONE (OUTPATIENT)
Dept: INTERNAL MEDICINE CLINIC | Age: 88
End: 2023-03-16

## 2023-03-16 RX ORDER — CIPROFLOXACIN 500 MG/1
500 TABLET ORAL 2 TIMES DAILY
Qty: 10 TABLET | Refills: 0 | Status: SHIPPED | OUTPATIENT
Start: 2023-03-16 | End: 2023-03-21

## 2023-03-16 NOTE — PROGRESS NOTES
Faxed referral/ order AT [address] [place] [address] @[phone] . Received confirmation same day. Will have documents scanned into chart. . please advise Nick Darden

## 2023-03-16 NOTE — PROGRESS NOTES
Faxed referral/ order [facility] [phone] . Received confirmation same day. Will have documents scanned into chart. . please advise Mike Mitchell

## 2023-03-16 NOTE — TELEPHONE ENCOUNTER
Reason for call:  TC from [facility] w/[facility] [address] states pt is showing signs of UTI and they tried to get a sample, but are not able to get one - [facility] inquiring if Dr. Dominic Mireles will order an antibiotic for pt.     Is this a new problem: yes     Date of last appointment:  3/8/2023     Can we respond via Lotamet: no    Best call back number: [facility]/Phone # [phone]        [id number] SZ [id number] [address]  [phone]

## 2023-03-16 NOTE — TELEPHONE ENCOUNTER
Order for cipro sent to pharmacy. Pt with 61+ allergies and multiple abx. All are rash or hives. Since we can't get UA/UC will start on cipro. Has an allergy to Moxifloxacin as a rash. Stop medication if this occurs. If they can do a straight cath that would be ideal prior to medication. I can send in an order.

## 2023-03-27 ENCOUNTER — TELEPHONE (OUTPATIENT)
Dept: INTERNAL MEDICINE CLINIC | Age: 88
End: 2023-03-27

## 2023-03-31 ENCOUNTER — TELEPHONE (OUTPATIENT)
Dept: INTERNAL MEDICINE CLINIC | Age: 88
End: 2023-03-31

## 2023-03-31 NOTE — TELEPHONE ENCOUNTER
Reason for call:  Coby Rodríguez from [facility] called. Coby Rodríguez would like to inform Dr. Ron amato on 3/27/23 no injury.      Is this a new problem: yes     Date of last appointment:  3/8/2023     Can we respond via ReNew Powert: no    Best call back number: Coby Rodríguez - [phone]

## 2023-04-19 ENCOUNTER — DOCUMENTATION ONLY (OUTPATIENT)
Dept: INTERNAL MEDICINE CLINIC | Age: 88
End: 2023-04-19

## 2023-04-19 NOTE — PROGRESS NOTES
Faxed referral/ order [facility] [phone]. Received confirmation same day. Will have documents scanned into chart. . please advise Guicho Matamoros

## 2023-04-26 ENCOUNTER — DOCUMENTATION ONLY (OUTPATIENT)
Dept: INTERNAL MEDICINE CLINIC | Age: 88
End: 2023-04-26

## 2023-04-26 NOTE — PROGRESS NOTES
Faxed referral/ order [facility] @[phone]. Received confirmation same day. Will have documents scanned into chart. . please advise Beth Goyal

## 2023-07-25 NOTE — PROGRESS NOTES
Balinda Moritz is a 80 y.o. female who was seen in clinic today (4/9/2019). Assessment & Plan:  
Diagnoses and all orders for this visit: 
 
1. Hyponatremia- has been on/off issue for her in the past, has been improving, will repeat 
-     BASIC METABOLIC PANEL (7) 2. History of TIA (transient ischemic attack)- neurologically intake, due to h/o #3 will repeat imaging to make sure there was no CVA to explain memory loss since it sounds like it was more of an acute change in the last few months. Reviewed previous imaging w/ daughter. 
-     MRI BRAIN WO CONT; Future 3. Memory loss- new dx, has been obvious w/ our interactions w/ her and family has noticed it also. Did not address directly w/ patient but plan was outlined w/ daughter. Will get MRI set up and if normal neuropsych testing. if abnormal will her see neurologist (daughter recommended Dr Rochelle Chong). Reviewed when to consider seeing neurologist.  Reviewed when to consider medications, and will defer at this time. Reviewed needs to limit driving and consider getting POA. 4. Essential hypertension, benign- medications refilled, offered to stop metoprolol since she is on 1/4 tab but declined. She brought in pill boxes and does appear that pill count is accurate or at least close. -     metoprolol tartrate (LOPRESSOR) 25 mg tablet; TAKE ONE-QUARTER TABLET BY MOUTH EVERY DAY 
-     amLODIPine (NORVASC) 5 mg tablet; TAKE ONE TABLET BY MOUTH ONE TIME DAILY Follow up - TBD Subjective:  
[place] was seen today for Memory Loss She RTC with her daughter who is a radiology tech at [employer] [facility]. Pt RTC today b/c \"my daughter made me\". She feels fine. No more sore throat or coughing. Daughter reports family has noticed she has been repeating herself more recently- telling the same store over and over. She is getting angry/defensive when they call her out on this.   She did not want use to mention dementia or memory loss to her mother today due to concerns about who she would react. Brief Labs:  
 
Lab Results Component Value Date/Time Sodium 133 03/15/2019 11:11 AM  
 Potassium 4.4 03/15/2019 11:11 AM  
 Creatinine 1.36 03/15/2019 11:11 AM  
 Creatinine, External 1.30 03/20/2018 Prior to Admission medications Medication Sig Start Date End Date Taking? Authorizing Provider  
sod chlor-bicarb-squeez bottle (SINUS RINSE PEDIATRIC STARTER) pkdv 1 Each by sinus irrigation route daily. 2/23/19  Yes Luna Small MD  
amLODIPine (NORVASC) 5 mg tablet TAKE ONE TABLET BY MOUTH ONE TIME DAILY 1/24/19  Yes Nena Ochoa MD  
bismuth subsalicylate (PEPTO-BISMOL PO) Take  by mouth as needed. Yes Provider, Historical  
Wheat Dextrin (BENEFIBER CLEAR) 3 gram/3.5 gram pwpk Take  by mouth daily. Yes Provider, Historical  
metoprolol tartrate (LOPRESSOR) 25 mg tablet TAKE ONE-QUARTER TABLET BY MOUTH EVERY DAY 5/22/18  Yes Nena Ochoa MD  
aspirin delayed-release 81 mg tablet Take 1 Tab by mouth daily. 8/4/17  Yes Minor, Renella Holter, KRISTIN  
LORazepam (ATIVAN) 0.5 mg tablet Take 0.5 mg by mouth every morning. Yes Provider, Historical  
hyoscyamine SL (LEVSIN/SL) 0.125 mg SL tablet 0.125 mg by SubLINGual route Before breakfast, lunch, and dinner. Yes Provider, Historical  
acetaminophen (TYLENOL ARTHRITIS PAIN) 650 mg CR tablet Take 650 mg by mouth two (2) times daily as needed for Pain. Yes Provider, Historical  
raNITIdine (ZANTAC) 150 mg tablet Take 150 mg by mouth Before breakfast and dinner. Yes Provider, Historical  
cholecalciferol (VITAMIN D3) 1,000 unit tablet Take 1,000 Units by mouth daily. Yes Provider, Historical  
FERROUS FUMARATE/VIT BCOMP,C (SUPER B COMPLEX PO) Take  by mouth daily (after dinner). Yes Provider, Historical  
  
 
 
Allergies Allergen Reactions  Latex Itching  Dilaudid [Hydromorphone (Bulk)] Other (comments) ?loss of consciousness  Altace [Ramipril] Unknown (comments)  Ambien [Zolpidem] Unknown (comments)  Ascensia Autodisc Test [Blood Sugar Diagnostic, Disc-Type] Unknown (comments)  Aspirin Unknown (comments)  Astelin [Azelastine] Unknown (comments)  Atenolol Unknown (comments)  Banex La Unknown (comments)  Benicar [Olmesartan] Unknown (comments)  Carbocaine [Mepivacaine] Unknown (comments)  Cleocin [Clindamycin Hcl] Hives  Codeine Nausea and Vomiting  Codeine-Asa-Salicyl-Acetam-Caf Other (comments) Stomach pain  Cortisporin [Neomy-Polymyxinb-Bacitracin-Hc] Unknown (comments)  Cymbalta [Duloxetine] Other (comments)  Cytotec [Misoprostol] Unknown (comments)  Dilaudid [Hydromorphone] Unknown (comments) Patient states that she was unable to respond, but was breathing  Effexor [Venlafaxine] Unknown (comments)  Epinephrine Unknown (comments) Heart racing  Estra-D Unknown (comments)  Estrace [Estradiol] Unknown (comments)  Fentanyl Nausea and Vomiting and Unknown (comments)  
  shakes  Flexeril [Cyclobenzaprine] Unable to Obtain \"drugs me\"  Flonase [Fluticasone] Unknown (comments)  Flunisolide Unknown (comments)  Guiatussin W/Codeine Unknown (comments)  Ketek [Telithromycin] Nausea Only  Klonopin [Clonazepam] Unknown (comments)  Lexapro [Escitalopram] Nausea and Vomiting  Lodine [Etodolac] Nausea and Vomiting  Morphine Unknown (comments)  Nsaids (Non-Steroidal Anti-Inflammatory Drug) Nausea and Vomiting  Other Medication Unknown (comments) Uma Zarate  Paxil [Paroxetine Hcl] Nausea and Vomiting  Pcn [Penicillins] Hives  Percocet [Oxycodone-Acetaminophen] Unknown (comments)  Percodan [Oxycodone Hcl-Oxycodone-Asa] Unknown (comments)  Pneumococcal 23-Mary Ps Vaccine Swelling  Proctocream-Hc [Hydrocortisone] Unknown (comments)  Prozac [Fluoxetine] Nausea Only Suicidal visions  Pseudoephedrine Other (comments)  Quinidine Unknown (comments)  Reglan [Metoclopramide] Unknown (comments)  Remeron [Mirtazapine] Other (comments)  Resaid Other (comments)  
  inflammed eyes  Robaxin [Methocarbamol] Unknown (comments)  Sonata [Zaleplon] Unknown (comments)  Sulfa (Sulfonamide Antibiotics) Hives  Surmontil [Trimipramine] Nausea and Vomiting  Talwin [Pentazocine Lactate] Unknown (comments)  Toradol [Ketorolac Tromethamine] Nausea and Vomiting  Ultram [Tramadol] Unknown (comments)  Vancomycin Nausea Only  Vasoconstrictor Drug Unknown (comments)  Vigamox [Moxifloxacin] Rash and Swelling  Vioxx [Rofecoxib] Unknown (comments)  Voltaren [Diclofenac Sodium] Itching  Wellbutrin [Bupropion Hcl] Nausea and Vomiting  Zelnorm [Tegaserod Hydrogen Maleate] Unknown (comments)  Zoloft [Sertraline] Nausea and Vomiting Review of Systems Respiratory: Negative for cough and shortness of breath. Cardiovascular: Negative for chest pain and palpitations. Gastrointestinal: Negative for abdominal pain, constipation, diarrhea, nausea and vomiting. Psychiatric/Behavioral: Negative for depression. The patient is not nervous/anxious and does not have insomnia. Objective:  
Physical Exam  
Constitutional: No distress. Cardiovascular: Regular rhythm and normal heart sounds. No murmur heard. Pulmonary/Chest: Effort normal and breath sounds normal. She has no wheezes. She has no rales. Neurological: She has normal strength. No cranial nerve deficit or sensory deficit. MMSE is 23/30 Psychiatric: She has a normal mood and affect. Her behavior is normal.  
 
 
 
Visit Vitals /66 Pulse 90 Temp 97.4 °F (36.3 °C) (Oral) Resp 20 Ht 5' 2\" (1.575 m) Wt 104 lb (47.2 kg) SpO2 96% BMI 19.02 kg/m² Disclaimer: 
Advised her to call back or return to office if symptoms worsen/change/persist. 
 Discussed expected course/resolution/complications of diagnosis in detail with patient. Medication risks/benefits/costs/interactions/alternatives discussed with patient. She was given an after visit summary which includes diagnoses, current medications, & vitals. She expressed understanding with the diagnosis and plan. Aspects of this note may have been generated using voice recognition software. Despite editing, there may be some syntax errors.   
 
 
Jenna Workman MD    
 Male

## 2023-08-14 ENCOUNTER — TELEPHONE (OUTPATIENT)
Age: 88
End: 2023-08-14

## 2023-08-14 NOTE — TELEPHONE ENCOUNTER
Reason for call:  Pt's daughter called. Pt is on hospice. Hospice is asking questions about liquid pain medications. Daughter stated pt has 72 \"allergies and sensitivities\". Daughter would like to discuss the medications and which is okay for her mother with the nurse. Please call daughter.      Is this a new problem: Yes    Date of last appointment:  3/8/2023     Can we respond via Impactia: No    Best call back number: [phone]

## 2023-08-15 ENCOUNTER — TELEPHONE (OUTPATIENT)
Age: 88
End: 2023-08-15

## 2023-08-15 NOTE — TELEPHONE ENCOUNTER
Pt's daughter stated hospice nurse wanted Joshua Andres' advice as to whether pt has a true allergy to liquid morphine, codeine and oxycontin. Pt's daughter reported pt is \"at that stage now. \" Explained to her I would discuss with  and get back with her. Pt's daughter verbalized understanding.

## 2023-08-15 NOTE — TELEPHONE ENCOUNTER
She was given Lortab (hydrocodone) once or twice in the past.  She has not received Morphine or Codeine. I don't believe her 72 \"allergies\" are true allergies. I also don't have what the exact side effects/concerns were with these medications. I will defer to hospice but I would try her on any of these. Tell [place] that I appreciate the update. I agree w/ her decline in the last few years that hospice is the best decision. Please keep me in the loop on how she is doing. My thoughts & prayers are with them.

## 2023-08-15 NOTE — TELEPHONE ENCOUNTER
Reason for call:  TC from [facility], pt's daughter. Daughter on PHI. Pt id verified. Ms. Carol Reid states her Mother has many allergies and she needs to know if her Mother has any allergies or sensitivities to Morphine, Oxycontin, or Codeine. Ms. Carol Reid states that her Mother is in Hospice care and they are looking to give her Mother something for comfort.          Is this a new problem: Yes    Date of last appointment:  3/8/2023     Can we respond via CoursePeer: No    Best call back number: [facility] at [phone]

## 2023-08-15 NOTE — TELEPHONE ENCOUNTER
Spoke with pt's daughter Alissa Gee, advised per Dr. Lemuel Nyhan' note regarding med allergies and his recommendations. Pt's daughter verbalized understanding.

## 2023-12-30 PROBLEM — Z51.5 HOSPICE CARE: Status: ACTIVE | Noted: 2023-12-30

## 2024-01-04 NOTE — TELEPHONE ENCOUNTER
Chief Complaint   Patient presents with    Medication Refill     Last Appointment with Dr. Neto Chu:    No future appointments.

## 2024-01-05 RX ORDER — ACETAMINOPHEN 325 MG/1
TABLET ORAL
Qty: 90 TABLET | Refills: 10 | OUTPATIENT
Start: 2024-01-05

## 2024-07-08 ENCOUNTER — TELEPHONE (OUTPATIENT)
Age: 89
End: 2024-07-08

## 2024-07-08 NOTE — TELEPHONE ENCOUNTER
See Aaronhart message I have been having with daughter.  I have not seen patient in about 18 months.  I thought she had transferred to another PCP or had passes as the last time I heard from her she was entering hospice.  I can't sign orders without up to date visit.

## 2024-07-08 NOTE — TELEPHONE ENCOUNTER
Clau with [facility] [phone]     Called to ask if you had received the faxed request for a hospice order that was sent last Friday.  Please send to number below:    Fax# [phone]

## 2024-07-09 ENCOUNTER — TELEMEDICINE (OUTPATIENT)
Age: 89
End: 2024-07-09
Payer: MEDICARE

## 2024-07-09 DIAGNOSIS — F41.9 ANXIETY: ICD-10-CM

## 2024-07-09 DIAGNOSIS — G30.9 ALZHEIMER'S DISEASE (HCC): Primary | ICD-10-CM

## 2024-07-09 DIAGNOSIS — Z51.5 HOSPICE CARE: ICD-10-CM

## 2024-07-09 DIAGNOSIS — F02.80 ALZHEIMER'S DISEASE (HCC): Primary | ICD-10-CM

## 2024-07-09 DIAGNOSIS — N18.30 STAGE 3 CHRONIC KIDNEY DISEASE, UNSPECIFIED WHETHER STAGE 3A OR 3B CKD (HCC): ICD-10-CM

## 2024-07-09 DIAGNOSIS — F33.9 RECURRENT MAJOR DEPRESSIVE DISORDER, REMISSION STATUS UNSPECIFIED (HCC): ICD-10-CM

## 2024-07-09 PROCEDURE — G8427 DOCREV CUR MEDS BY ELIG CLIN: HCPCS | Performed by: INTERNAL MEDICINE

## 2024-07-09 PROCEDURE — G9692 HOSP RECD BY PT DUR MSMT PER: HCPCS | Performed by: INTERNAL MEDICINE

## 2024-07-09 PROCEDURE — 99214 OFFICE O/P EST MOD 30 MIN: CPT | Performed by: INTERNAL MEDICINE

## 2024-07-09 RX ORDER — UREA 10 %
5 LOTION (ML) TOPICAL
COMMUNITY

## 2024-07-09 RX ORDER — SENNOSIDES 8.6 MG
1 TABLET ORAL 2 TIMES DAILY
COMMUNITY

## 2024-07-09 SDOH — ECONOMIC STABILITY: FOOD INSECURITY: WITHIN THE PAST 12 MONTHS, YOU WORRIED THAT YOUR FOOD WOULD RUN OUT BEFORE YOU GOT MONEY TO BUY MORE.: NEVER TRUE

## 2024-07-09 SDOH — ECONOMIC STABILITY: FOOD INSECURITY: WITHIN THE PAST 12 MONTHS, THE FOOD YOU BOUGHT JUST DIDN'T LAST AND YOU DIDN'T HAVE MONEY TO GET MORE.: NEVER TRUE

## 2024-07-09 SDOH — ECONOMIC STABILITY: HOUSING INSECURITY
IN THE LAST 12 MONTHS, WAS THERE A TIME WHEN YOU DID NOT HAVE A STEADY PLACE TO SLEEP OR SLEPT IN A SHELTER (INCLUDING NOW)?: YES

## 2024-07-09 SDOH — ECONOMIC STABILITY: TRANSPORTATION INSECURITY
IN THE PAST 12 MONTHS, HAS LACK OF TRANSPORTATION KEPT YOU FROM MEETINGS, WORK, OR FROM GETTING THINGS NEEDED FOR DAILY LIVING?: PATIENT DECLINED

## 2024-07-09 SDOH — ECONOMIC STABILITY: INCOME INSECURITY: HOW HARD IS IT FOR YOU TO PAY FOR THE VERY BASICS LIKE FOOD, HOUSING, MEDICAL CARE, AND HEATING?: NOT HARD AT ALL

## 2024-07-09 ASSESSMENT — PATIENT HEALTH QUESTIONNAIRE - PHQ9: DEPRESSION UNABLE TO ASSESS: PT REFUSES

## 2024-07-09 NOTE — ASSESSMENT & PLAN NOTE
Mostly non-verbal, going off facial expression and crying, she is intolerant to all medications (SSRI, SNRI, and Wellbutrin).  Has been on benzo for 20+ years.  Will defer to hospice going forward regarding dosing.  [facility]  reviewed.

## 2024-07-09 NOTE — ASSESSMENT & PLAN NOTE
Worsening, unclear to what degree as we were having technical difficulties with video link.  Sounds like it is progression.  We talked about life expectancy and in the last 1 year she is significantly worse.  It sounds like she has declined in the last 6 months.  Based on available data life expectancy would be at 6 months of less.

## 2024-07-09 NOTE — ASSESSMENT & PLAN NOTE
She entered hospice care ~1 year ago.  Health has declined significantly in the last 6 months per daughter.  Life expectancy is less 6 months based on evaluation of available information.  At this time no changed to medications, but will defer to hospice about reducing medication burden to only essential medications

## 2024-07-09 NOTE — PROGRESS NOTES
was some question about TIA or stroke in the last few months.  There was drooping of her lip but this has resolved.    [facility]  reviewed.  She has been getting ativan 0.5mg, either daily or twice a day.  The dose has been adjusted based on concerns she was under-medicated and at other times over-medicated.    Mariann Avalos says back in fall/winter of '23 she was able to use the bathroom on her own.  She is no longer able to walk.  Her memory is worse.        Prior to Admission medications    Medication Sig Start Date End Date Taking? Authorizing Provider   melatonin 5 MG TABS tablet Take 1 tablet by mouth nightly as needed   Yes Provider, MD Joseph   senna (SENOKOT) 8.6 MG TABS tablet Take 1 tablet by mouth in the morning and at bedtime   Yes Provider, MD Joseph   Diclofenac Sodium (VOLTAREN EX) Apply topically as needed   Yes Provider, MD Joseph   acetaminophen (TYLENOL) 650 MG extended release tablet Take by mouth every 8 hours 8/30/19  Yes Automatic Reconciliation, Ar   bisacodyl (DULCOLAX) 10 MG suppository Place rectally daily as needed 2/6/23  Yes Automatic Reconciliation, Ar   hydrALAZINE (APRESOLINE) 10 MG tablet TAKE 2 TABLETS BY MOUTH THREE TIMES DAILY 1/24/23  Yes Automatic Reconciliation, Ar   LORazepam (ATIVAN) 0.5 MG tablet Take 0.5 tablets by mouth 2 times daily. 2/6/23  Yes Automatic Reconciliation, Ar   mirtazapine (REMERON) 7.5 MG tablet Take 1 tablet by mouth at bedtime. 12/7/20  Yes Automatic Reconciliation, Ar   nortriptyline (PAMELOR) 10 MG capsule Take by mouth 4/15/21  Yes Automatic Reconciliation, Ar   ondansetron (ZOFRAN) 4 MG tablet Take by mouth daily 1/20/23  Yes Automatic Reconciliation, Ar   pantoprazole (PROTONIX) 40 MG tablet Take by mouth daily   Yes Automatic Reconciliation, Ar   polyethylene glycol (GLYCOLAX) 17 GM/SCOOP powder Take by mouth 3/10/23  Yes Automatic Reconciliation, Ar   aspirin 81 MG EC tablet Take by mouth daily  Patient not taking: Reported on

## 2024-07-09 NOTE — ASSESSMENT & PLAN NOTE
Mostly non-verbal, going off facial expression and crying, she is intolerant to all medications (SSRI, SNRI, and Wellbutrin).  With 50+ allergies it is unclear if these are true allergies but at her age & medical condition it is not worth experimenting on.  Will continue to monitor.

## 2024-07-09 NOTE — TELEPHONE ENCOUNTER
Hospice form signed and faxed back to [facility] at [phone], confirmation received. Copy scanned in media.

## 2024-07-15 ENCOUNTER — APPOINTMENT (OUTPATIENT)
Facility: HOSPITAL | Age: 89
DRG: 689 | End: 2024-07-15
Payer: MEDICARE

## 2024-07-15 ENCOUNTER — HOSPITAL ENCOUNTER (INPATIENT)
Facility: HOSPITAL | Age: 89
LOS: 1 days | Discharge: HOSPICE | DRG: 689 | End: 2024-07-17
Attending: EMERGENCY MEDICINE | Admitting: FAMILY MEDICINE
Payer: MEDICARE

## 2024-07-15 DIAGNOSIS — R41.82 ALTERED MENTAL STATUS, UNSPECIFIED ALTERED MENTAL STATUS TYPE: ICD-10-CM

## 2024-07-15 DIAGNOSIS — N39.0 COMPLICATED UTI (URINARY TRACT INFECTION): Primary | ICD-10-CM

## 2024-07-15 LAB
ALBUMIN SERPL-MCNC: 3.6 G/DL (ref 3.5–5)
ALBUMIN/GLOB SERPL: 0.9 (ref 1.1–2.2)
ALP SERPL-CCNC: 76 U/L (ref 45–117)
ALT SERPL-CCNC: 18 U/L (ref 12–78)
ANION GAP SERPL CALC-SCNC: 9 MMOL/L (ref 5–15)
APPEARANCE UR: ABNORMAL
AST SERPL-CCNC: 24 U/L (ref 15–37)
BACTERIA URNS QL MICRO: ABNORMAL /HPF
BASOPHILS # BLD: 0.1 K/UL (ref 0–0.1)
BASOPHILS NFR BLD: 0 % (ref 0–1)
BILIRUB SERPL-MCNC: 1 MG/DL (ref 0.2–1)
BILIRUB UR QL: NEGATIVE
BUN SERPL-MCNC: 22 MG/DL (ref 6–20)
BUN/CREAT SERPL: 17 (ref 12–20)
CALCIUM SERPL-MCNC: 9.3 MG/DL (ref 8.5–10.1)
CHLORIDE SERPL-SCNC: 99 MMOL/L (ref 97–108)
CO2 SERPL-SCNC: 26 MMOL/L (ref 21–32)
COLOR UR: ABNORMAL
COMMENT:: NORMAL
CREAT SERPL-MCNC: 1.28 MG/DL (ref 0.55–1.02)
DIFFERENTIAL METHOD BLD: ABNORMAL
EKG ATRIAL RATE: 89 BPM
EKG DIAGNOSIS: NORMAL
EKG P AXIS: 82 DEGREES
EKG P-R INTERVAL: 182 MS
EKG Q-T INTERVAL: 368 MS
EKG QRS DURATION: 70 MS
EKG QTC CALCULATION (BAZETT): 447 MS
EKG R AXIS: 54 DEGREES
EKG T AXIS: 68 DEGREES
EKG VENTRICULAR RATE: 89 BPM
EOSINOPHIL # BLD: 0 K/UL (ref 0–0.4)
EOSINOPHIL NFR BLD: 0 % (ref 0–7)
EPITH CASTS URNS QL MICRO: ABNORMAL /LPF
ERYTHROCYTE [DISTWIDTH] IN BLOOD BY AUTOMATED COUNT: 13.1 % (ref 11.5–14.5)
FOLATE SERPL-MCNC: 39.5 NG/ML (ref 5–21)
GLOBULIN SER CALC-MCNC: 4 G/DL (ref 2–4)
GLUCOSE SERPL-MCNC: 133 MG/DL (ref 65–100)
GLUCOSE UR STRIP.AUTO-MCNC: NEGATIVE MG/DL
HCT VFR BLD AUTO: 40.1 % (ref 35–47)
HGB BLD-MCNC: 12.9 G/DL (ref 11.5–16)
HGB UR QL STRIP: ABNORMAL
IMM GRANULOCYTES # BLD AUTO: 0.1 K/UL (ref 0–0.04)
IMM GRANULOCYTES NFR BLD AUTO: 1 % (ref 0–0.5)
KETONES UR QL STRIP.AUTO: 15 MG/DL
LEUKOCYTE ESTERASE UR QL STRIP.AUTO: ABNORMAL
LYMPHOCYTES # BLD: 2 K/UL (ref 0.8–3.5)
LYMPHOCYTES NFR BLD: 10 % (ref 12–49)
MCH RBC QN AUTO: 30.1 PG (ref 26–34)
MCHC RBC AUTO-ENTMCNC: 32.2 G/DL (ref 30–36.5)
MCV RBC AUTO: 93.5 FL (ref 80–99)
MONOCYTES # BLD: 1.7 K/UL (ref 0–1)
MONOCYTES NFR BLD: 8 % (ref 5–13)
NEUTS SEG # BLD: 16.8 K/UL (ref 1.8–8)
NEUTS SEG NFR BLD: 81 % (ref 32–75)
NITRITE UR QL STRIP.AUTO: POSITIVE
NRBC # BLD: 0 K/UL (ref 0–0.01)
NRBC BLD-RTO: 0 PER 100 WBC
PH UR STRIP: 5.5 (ref 5–8)
PLATELET # BLD AUTO: 292 K/UL (ref 150–400)
PMV BLD AUTO: 9.5 FL (ref 8.9–12.9)
POTASSIUM SERPL-SCNC: 4.2 MMOL/L (ref 3.5–5.1)
PROT SERPL-MCNC: 7.6 G/DL (ref 6.4–8.2)
PROT UR STRIP-MCNC: 100 MG/DL
RBC # BLD AUTO: 4.29 M/UL (ref 3.8–5.2)
RBC #/AREA URNS HPF: ABNORMAL /HPF (ref 0–5)
SODIUM SERPL-SCNC: 134 MMOL/L (ref 136–145)
SP GR UR REFRACTOMETRY: 1.02 (ref 1–1.03)
SPECIMEN HOLD: NORMAL
TSH SERPL DL<=0.05 MIU/L-ACNC: 2.27 UIU/ML (ref 0.36–3.74)
UROBILINOGEN UR QL STRIP.AUTO: 1 EU/DL (ref 0.2–1)
VIT B12 SERPL-MCNC: 513 PG/ML (ref 193–986)
WBC # BLD AUTO: 20.7 K/UL (ref 3.6–11)
WBC URNS QL MICRO: ABNORMAL /HPF (ref 0–4)

## 2024-07-15 PROCEDURE — 99285 EMERGENCY DEPT VISIT HI MDM: CPT

## 2024-07-15 PROCEDURE — G0378 HOSPITAL OBSERVATION PER HR: HCPCS

## 2024-07-15 PROCEDURE — 84443 ASSAY THYROID STIM HORMONE: CPT

## 2024-07-15 PROCEDURE — 87088 URINE BACTERIA CULTURE: CPT

## 2024-07-15 PROCEDURE — 96361 HYDRATE IV INFUSION ADD-ON: CPT

## 2024-07-15 PROCEDURE — 85025 COMPLETE CBC W/AUTO DIFF WBC: CPT

## 2024-07-15 PROCEDURE — 2580000003 HC RX 258: Performed by: EMERGENCY MEDICINE

## 2024-07-15 PROCEDURE — 82607 VITAMIN B-12: CPT

## 2024-07-15 PROCEDURE — 71045 X-RAY EXAM CHEST 1 VIEW: CPT

## 2024-07-15 PROCEDURE — 93005 ELECTROCARDIOGRAM TRACING: CPT | Performed by: EMERGENCY MEDICINE

## 2024-07-15 PROCEDURE — 96374 THER/PROPH/DIAG INJ IV PUSH: CPT

## 2024-07-15 PROCEDURE — 2580000003 HC RX 258: Performed by: STUDENT IN AN ORGANIZED HEALTH CARE EDUCATION/TRAINING PROGRAM

## 2024-07-15 PROCEDURE — 87186 SC STD MICRODIL/AGAR DIL: CPT

## 2024-07-15 PROCEDURE — 87086 URINE CULTURE/COLONY COUNT: CPT

## 2024-07-15 PROCEDURE — 70450 CT HEAD/BRAIN W/O DYE: CPT

## 2024-07-15 PROCEDURE — 6370000000 HC RX 637 (ALT 250 FOR IP): Performed by: STUDENT IN AN ORGANIZED HEALTH CARE EDUCATION/TRAINING PROGRAM

## 2024-07-15 PROCEDURE — 81001 URINALYSIS AUTO W/SCOPE: CPT

## 2024-07-15 PROCEDURE — 82746 ASSAY OF FOLIC ACID SERUM: CPT

## 2024-07-15 PROCEDURE — 6360000002 HC RX W HCPCS: Performed by: EMERGENCY MEDICINE

## 2024-07-15 PROCEDURE — 80053 COMPREHEN METABOLIC PANEL: CPT

## 2024-07-15 PROCEDURE — 36415 COLL VENOUS BLD VENIPUNCTURE: CPT

## 2024-07-15 PROCEDURE — 51701 INSERT BLADDER CATHETER: CPT

## 2024-07-15 RX ORDER — MIRTAZAPINE 15 MG/1
7.5 TABLET, FILM COATED ORAL NIGHTLY
Status: DISCONTINUED | OUTPATIENT
Start: 2024-07-15 | End: 2024-07-15

## 2024-07-15 RX ORDER — SODIUM CHLORIDE 0.9 % (FLUSH) 0.9 %
5-40 SYRINGE (ML) INJECTION EVERY 12 HOURS SCHEDULED
Status: DISCONTINUED | OUTPATIENT
Start: 2024-07-15 | End: 2024-07-17 | Stop reason: HOSPADM

## 2024-07-15 RX ORDER — SODIUM CHLORIDE 0.9 % (FLUSH) 0.9 %
5-40 SYRINGE (ML) INJECTION PRN
Status: DISCONTINUED | OUTPATIENT
Start: 2024-07-15 | End: 2024-07-17 | Stop reason: HOSPADM

## 2024-07-15 RX ORDER — SODIUM CHLORIDE 9 MG/ML
INJECTION, SOLUTION INTRAVENOUS PRN
Status: DISCONTINUED | OUTPATIENT
Start: 2024-07-15 | End: 2024-07-17 | Stop reason: HOSPADM

## 2024-07-15 RX ORDER — LANOLIN ALCOHOL/MO/W.PET/CERES
4.5 CREAM (GRAM) TOPICAL
Status: DISCONTINUED | OUTPATIENT
Start: 2024-07-15 | End: 2024-07-17 | Stop reason: HOSPADM

## 2024-07-15 RX ORDER — POLYETHYLENE GLYCOL 3350 17 G/17G
17 POWDER, FOR SOLUTION ORAL DAILY PRN
Status: DISCONTINUED | OUTPATIENT
Start: 2024-07-15 | End: 2024-07-17 | Stop reason: HOSPADM

## 2024-07-15 RX ORDER — HYDRALAZINE HYDROCHLORIDE 10 MG/1
10 TABLET, FILM COATED ORAL 3 TIMES DAILY
Status: DISCONTINUED | OUTPATIENT
Start: 2024-07-15 | End: 2024-07-17

## 2024-07-15 RX ORDER — SENNOSIDES A AND B 8.6 MG/1
1 TABLET, FILM COATED ORAL 2 TIMES DAILY
Status: DISCONTINUED | OUTPATIENT
Start: 2024-07-15 | End: 2024-07-17 | Stop reason: HOSPADM

## 2024-07-15 RX ORDER — 0.9 % SODIUM CHLORIDE 0.9 %
500 INTRAVENOUS SOLUTION INTRAVENOUS ONCE
Status: COMPLETED | OUTPATIENT
Start: 2024-07-15 | End: 2024-07-15

## 2024-07-15 RX ORDER — LORAZEPAM 0.5 MG/1
0.5 TABLET ORAL
COMMUNITY

## 2024-07-15 RX ORDER — POLYETHYLENE GLYCOL 3350 17 G/17G
17 POWDER, FOR SOLUTION ORAL
Status: DISCONTINUED | OUTPATIENT
Start: 2024-07-18 | End: 2024-07-17 | Stop reason: HOSPADM

## 2024-07-15 RX ORDER — ACETAMINOPHEN 325 MG/1
650 TABLET ORAL EVERY 6 HOURS PRN
Status: DISCONTINUED | OUTPATIENT
Start: 2024-07-15 | End: 2024-07-17 | Stop reason: HOSPADM

## 2024-07-15 RX ORDER — ENOXAPARIN SODIUM 100 MG/ML
30 INJECTION SUBCUTANEOUS EVERY EVENING
Status: DISCONTINUED | OUTPATIENT
Start: 2024-07-15 | End: 2024-07-17

## 2024-07-15 RX ORDER — ONDANSETRON 4 MG/1
4 TABLET, ORALLY DISINTEGRATING ORAL EVERY 8 HOURS PRN
Status: DISCONTINUED | OUTPATIENT
Start: 2024-07-15 | End: 2024-07-17 | Stop reason: HOSPADM

## 2024-07-15 RX ORDER — ACETAMINOPHEN 650 MG/1
650 SUPPOSITORY RECTAL EVERY 6 HOURS PRN
Status: DISCONTINUED | OUTPATIENT
Start: 2024-07-15 | End: 2024-07-17 | Stop reason: HOSPADM

## 2024-07-15 RX ORDER — NORTRIPTYLINE HYDROCHLORIDE 10 MG/1
10 CAPSULE ORAL NIGHTLY
Status: DISCONTINUED | OUTPATIENT
Start: 2024-07-15 | End: 2024-07-17 | Stop reason: HOSPADM

## 2024-07-15 RX ORDER — SODIUM CHLORIDE 9 MG/ML
INJECTION, SOLUTION INTRAVENOUS CONTINUOUS
Status: DISPENSED | OUTPATIENT
Start: 2024-07-15 | End: 2024-07-17

## 2024-07-15 RX ORDER — ONDANSETRON 4 MG/1
4 TABLET, FILM COATED ORAL DAILY
Status: DISCONTINUED | OUTPATIENT
Start: 2024-07-15 | End: 2024-07-15

## 2024-07-15 RX ORDER — LORAZEPAM 0.5 MG/1
0.5 TABLET ORAL NIGHTLY
Status: DISCONTINUED | OUTPATIENT
Start: 2024-07-15 | End: 2024-07-17 | Stop reason: HOSPADM

## 2024-07-15 RX ORDER — BISACODYL 10 MG
10 SUPPOSITORY, RECTAL RECTAL DAILY PRN
Status: DISCONTINUED | OUTPATIENT
Start: 2024-07-15 | End: 2024-07-17 | Stop reason: HOSPADM

## 2024-07-15 RX ORDER — PANTOPRAZOLE SODIUM 40 MG/1
40 TABLET, DELAYED RELEASE ORAL DAILY
Status: DISCONTINUED | OUTPATIENT
Start: 2024-07-16 | End: 2024-07-17 | Stop reason: HOSPADM

## 2024-07-15 RX ORDER — HYDRALAZINE HYDROCHLORIDE 20 MG/ML
20 INJECTION INTRAMUSCULAR; INTRAVENOUS EVERY 6 HOURS PRN
Status: DISCONTINUED | OUTPATIENT
Start: 2024-07-15 | End: 2024-07-17 | Stop reason: HOSPADM

## 2024-07-15 RX ORDER — LORAZEPAM 0.5 MG/1
0.25 TABLET ORAL 2 TIMES DAILY
Status: DISCONTINUED | OUTPATIENT
Start: 2024-07-16 | End: 2024-07-17 | Stop reason: HOSPADM

## 2024-07-15 RX ORDER — ONDANSETRON 2 MG/ML
4 INJECTION INTRAMUSCULAR; INTRAVENOUS EVERY 6 HOURS PRN
Status: DISCONTINUED | OUTPATIENT
Start: 2024-07-15 | End: 2024-07-17 | Stop reason: HOSPADM

## 2024-07-15 RX ORDER — MENTHOL AND ZINC OXIDE .44; 20.625 G/100G; G/100G
OINTMENT TOPICAL 2 TIMES DAILY
COMMUNITY

## 2024-07-15 RX ADMIN — HYDRALAZINE HYDROCHLORIDE 10 MG: 10 TABLET, FILM COATED ORAL at 21:58

## 2024-07-15 RX ADMIN — SENNOSIDES 8.6 MG: 8.6 TABLET, FILM COATED ORAL at 21:58

## 2024-07-15 RX ADMIN — DICLOFENAC SODIUM 4 G: 10 GEL TOPICAL at 22:04

## 2024-07-15 RX ADMIN — SODIUM CHLORIDE 500 ML: 9 INJECTION, SOLUTION INTRAVENOUS at 14:17

## 2024-07-15 RX ADMIN — NORTRIPTYLINE HYDROCHLORIDE 10 MG: 10 CAPSULE ORAL at 22:02

## 2024-07-15 RX ADMIN — WATER 1000 MG: 1 INJECTION INTRAMUSCULAR; INTRAVENOUS; SUBCUTANEOUS at 16:52

## 2024-07-15 RX ADMIN — SODIUM CHLORIDE: 9 INJECTION, SOLUTION INTRAVENOUS at 18:05

## 2024-07-15 ASSESSMENT — PAIN - FUNCTIONAL ASSESSMENT: PAIN_FUNCTIONAL_ASSESSMENT: ADULT NONVERBAL PAIN SCALE (NPVS)

## 2024-07-15 NOTE — ED NOTES
ED TO INPATIENT SBAR HANDOFF    Patient Name: Bailey Ba   :  1935  88 y.o.   MRN:  [id number]  ED Room #:  ER23/23  Family/Caregiver Present yes       Situation  Code Status: DNR     Allergies: Latex, Hydromorphone, Aspirin, Atenolol, Azelastine, Bacitra-neomycin-polymyxin-hc, Clindamycin hcl, Clonazepam, Cyclobenzaprine, Diclofenac sodium, Duloxetine, Epinephrine, Estradiol, Flunisolide, Fluoxetine, Fluticasone, Hydrocortisone, Mepivacaine, Methocarbamol, Metoclopramide, Mirtazapine, Misoprostol, Morphine, Olmesartan, Oxycodone-acetaminophen, Penicillins, Pentazocine, Pseudoephedrine, Quinidine, Ramipril, Rofecoxib, Sulfa antibiotics, Tegaserod, Telithromycin, Tramadol, Vancomycin, Venlafaxine, Zaleplon, Zolpidem, Bupropion, Codeine, Escitalopram, Etodolac, Fentanyl, Ketorolac tromethamine, Moxifloxacin, Nsaids, Paroxetine hcl, Sertraline, and Trimipramine  Weight: Patient Vitals for the past 96 hrs (Last 3 readings):   Weight   07/15/24 1315 51.6 kg (113 lb 12.1 oz)     Arrived from: nursing home  Chief Complaint:   Chief Complaint   Patient presents with    Altered Mental Status     Hospital Problem/Diagnosis:  Principal Problem:    UTI (urinary tract infection)  Resolved Problems:    * No resolved hospital problems. *    Imaging:   XR CHEST PORTABLE   Final Result   1. No acute disease         Electronically signed by Kamilah Lakhani      CT HEAD WO CONTRAST   Final Result   No acute intracranial process.      Imaging findings consistent with mild to moderate chronic microvascular ischemic   change. There is a moderate degree of cerebral atrophy.            Electronically signed by LIO LOPEZ        Abnormal labs:   Abnormal Labs Reviewed   COMPREHENSIVE METABOLIC PANEL - Abnormal; Notable for the following components:       Result Value    Sodium 134 (*)     Glucose 133 (*)     BUN 22 (*)     Creatinine 1.28 (*)     Est, Glom Filt Rate 40 (*)     Albumin/Globulin Ratio 0.9 (*)     All other    Pulse: 89 95 91 90   Resp: 16 18 17 18   Temp:       TempSrc:       SpO2: 95% 96% 97% 95%   Weight:       Height:         DI:   Predictive Model Details          59 (Warning)  Factor Value    Calculated 7/15/2024 18:14 41% Jose Alfredo coma scale 9    Deterioration Index Model 23% Age 88 years old     16% Neurological exam X     6% WBC count abnormal (20.7 K/uL)     4% Respiratory rate 18     4% Systolic 148     3% Sodium abnormal (134 mmol/L)     2% BUN abnormal (22 MG/DL)     2% Potassium 4.2 mmol/L     1% Pulse 90     0% Hematocrit 40.1 %     0% Pulse oximetry 95 %     0% Temperature 98.2 °F (36.8 °C)       FiO2 (%):   O2 Flow Rate:    Room Air  Cardiac Rhythm:    Pain Scale: Pain Assessment  Pain Assessment: Adult Nonverbal Pain Scale (NPVS)  Last documented pain score (0-10 scale)    Last documented pain medication administered:      Mental Status: disoriented and nonverbal; unable to access  Orientation Level: Orientation Level: Other (comment) (unable to access)  NIH Score:    C-SSRS:    Bedside swallow:  pass  [place] Coma Scale (GCS): Jose Alfredo Coma Scale  Eye Opening: To speech  Best Verbal Response: None  Best Motor Response: Localizes pain (Hx of dementia; smiles and looks when spoken to does not obey commands)  [place] Coma Scale Score: 9  Active LDA's:   Peripheral IV 07/15/24 Left Antecubital (Active)   Site Assessment Clean, dry & intact 07/15/24 1328   Line Status Blood return noted;Flushed;Specimen collected 07/15/24 1328   Line Care Connections checked and tightened 07/15/24 1328   Phlebitis Assessment No symptoms 07/15/24 1328   Infiltration Assessment 0 07/15/24 1328   Dressing Status Clean, dry & intact 07/15/24 1328   Dressing Type Transparent 07/15/24 1328   Dressing Intervention New 07/15/24 1328     PO Status: patient's family stated she has gastro-paresis; orders for GERD   Pertinent or High Risk Medications/Drips: no   If Yes, please provide details:   Titratable drips: no   Pending Blood

## 2024-07-15 NOTE — ED PROVIDER NOTES
[facility] 5W1 ORTHO SPINE  EMERGENCY DEPARTMENT ENCOUNTER      Pt Name: Bailey Ba  MRN: [id number]  Birthdate 1935  Date of evaluation: 7/15/2024  Provider: Alex Doe MD    CHIEF COMPLAINT       Chief Complaint   Patient presents with    Altered Mental Status         HISTORY OF PRESENT ILLNESS   (Location/Symptom, Timing/Onset, Context/Setting, Quality, Duration, Modifying Factors, Severity)  Note limiting factors.   88F w/ hx DM, CVA, PE, HTN, CKD, Alz dementia p/w change in MS. Pt transported to ED by EMS from SNF. Staff reported that pt slumped over while sitting at the dining table. Has hx of dementia but daughter reports that more sleepy than usual and not speaking when usually speaks in short phrases. No recent falls/trauma, new/changes to meds or drugs/etoh. No F/C, cough, labored/rapid breathing or vomiting/diarrhea. No anticoagulation.            Review of External Medical Records:     Nursing Notes were reviewed.    REVIEW OF SYSTEMS    (2-9 systems for level 4, 10 or more for level 5)     Review of Systems   Unable to perform ROS: Dementia       Except as noted above the remainder of the review of systems was reviewed and negative.       PAST MEDICAL HISTORY     Past Medical History:   Diagnosis Date    Allergic rhinitis, cause unspecified     Anxiety     Back pain     C. difficile diarrhea 8/2/2017    Admitted to [facility]    Cancer ([facility])     squamous cell leg    Cancer ([facility])     basal cell face, back, neck    Chronic kidney disease     Chronic pain     DJD (degenerative joint disease) of cervical spine     Fibromyalgia     GERD (gastroesophageal reflux disease)     Hypercholesterolemia     Hypertension     Hyponatremia     IBS (irritable bowel syndrome)     Irritable bowel syndrome with diarrhea 6/29/2016    GI- Dr Jones    Kidney disease, chronic, stage III (GFR 30-59 ml/min) ([facility]) 6/2011    Dr. Hossein ([place])    Nausea & vomiting     Neuropathy 6/22/2016    Both feet     Psychiatric  Specific Gravity, UA 07/15/2024 1.022  1.003 - 1.030   Final    pH, Urine 07/15/2024 5.5  5.0 - 8.0   Final    Protein, UA 07/15/2024 100 (A)  NEG mg/dL Final    Glucose, Ur 07/15/2024 Negative  NEG mg/dL Final    Ketones, Urine 07/15/2024 15 (A)  NEG mg/dL Final    Bilirubin, Urine 07/15/2024 Negative  NEG   Final    Blood, Urine 07/15/2024 TRACE (A)  NEG   Final    Urobilinogen, Urine 07/15/2024 1.0  0.2 - 1.0 EU/dL Final    Nitrite, Urine 07/15/2024 Positive (A)  NEG   Final    Leukocyte Esterase, Urine 07/15/2024 MODERATE (A)  NEG   Final    WBC, UA 07/15/2024   0 - 4 /hpf Final    RBC, UA 07/15/2024 0-5  0 - 5 /hpf Final    Epithelial Cells, UA 07/15/2024 MODERATE (A)  FEW /lpf Final    Epithelial cell category consists of squamous cells and /or transitional urothelial cells. Renal tubular cells, if present, are separately identified as such.    BACTERIA, URINE 07/15/2024 3+ (A)  NEG /hpf Final    Specimen HOld 07/15/2024 Urine on hold in Microbiology dept for 2 days.  If unpreserved urine is submitted, it cannot be used for addtional testing after 24 hours, recollection will be required.    Final    TSH, 3rd Generation 07/15/2024 2.27  0.36 - 3.74 uIU/mL Final    Comment:    Due to TSH heterogeneity, both structurally and degree of glycosylation, monoclonal antibodies used in the TSH assay may not accurately quantitate TSH. Therefore, this result should be correlated with clinical findings as well as with other assessments of thyroid function, e.g., free T4, free T3.      Vitamin B-12 07/15/2024 513  193 - 986 pg/mL Final    Folate 07/15/2024 39.5 (H)  5.0 - 21.0 ng/mL Final    WBC 07/16/2024 10.3  3.6 - 11.0 K/uL Final    RBC 07/16/2024 3.91  3.80 - 5.20 M/uL Final    Hemoglobin 07/16/2024 12.0  11.5 - 16.0 g/dL Final    Hematocrit 07/16/2024 35.4  35.0 - 47.0 % Final    MCV 07/16/2024 90.5  80.0 - 99.0 FL Final    MCH 07/16/2024 30.7  26.0 - 34.0 PG Final    MCHC 07/16/2024 33.9  30.0 - 36.5 g/dL Final

## 2024-07-15 NOTE — ED TRIAGE NOTES
Pt arrives from [place] VIA EMS. Altered Mental status.   Eyes opened, not responding    Hx of dementia

## 2024-07-15 NOTE — H&P
History and Physical    Date of Service:  7/15/2024  Primary Care Provider: Neto Chu MD  Source of information: family member - children, electronic medical record    Chief Complaint: Altered Mental Status      History of Presenting Illness:   Bailey Ba is a 88 y.o. female past medical history of gastroparesis, Alzheimer's dementia who presents with AMS from [facility].     The patient denies any headache, blurry vision, sore throat, trouble swallowing, trouble with speech, chest pain, SOB, cough, fever, chills, N/V/D, abd pain, urinary symptoms, constipation, recent travels, sick contacts, focal or generalized neurological symptoms, falls, injuries, rashes, contact with COVID-19 diagnosed patients, hematemesis, melena, hemoptysis, hematuria, rashes, denies starting any new medications and denies any other concerns or problems besides as mentioned above.      Vital signs stable patient not septic, UA + for UTI, given 1 g Rocephin in the ER. WBC 20, CT head neg.        REVIEW OF SYSTEMS:  A comprehensive review of systems was negative except for that written in the History of Present Illness.     Past Medical History:   Diagnosis Date    Allergic rhinitis, cause unspecified     Anxiety     Back pain     C. difficile diarrhea 8/2/2017    Admitted to [facility]    Cancer ([facility])     squamous cell leg    Cancer ([facility])     basal cell face, back, neck    Chronic kidney disease     Chronic pain     DJD (degenerative joint disease) of cervical spine     Fibromyalgia     GERD (gastroesophageal reflux disease)     Hypercholesterolemia     Hypertension     Hyponatremia     IBS (irritable bowel syndrome)     Irritable bowel syndrome with diarrhea 6/29/2016    GI- Dr Jones    Kidney disease, chronic, stage III (GFR 30-59 ml/min) ([facility]) 6/2011    Dr. Hossein ([place])    Nausea & vomiting     Neuropathy 6/22/2016    Both feet     Psychiatric disorder     DEPRESSION AND ANXIETY    Stroke ([facility]) 2017    TIA     Culture, Urine [[id number]]     Order Status: Sent Specimen: Urine, clean catch     Urine Culture Hold Sample [[id number]] Collected: 07/15/24 1511    Order Status: Completed Specimen: Urine Updated: 07/15/24 1530     Specimen HOld       Urine on hold in Microbiology dept for 2 days.  If unpreserved urine is submitted, it cannot be used for addtional testing after 24 hours, recollection will be required.                   IMAGING:   CT HEAD WO CONTRAST   Final Result   No acute intracranial process.      Imaging findings consistent with mild to moderate chronic microvascular ischemic   change. There is a moderate degree of cerebral atrophy.            Electronically signed by [employer]      XR CHEST PORTABLE    (Results Pending)        ECG/ECHO:    Encounter Date: 07/15/24   EKG 12 Lead   Result Value    Ventricular Rate 89    Atrial Rate 89    P-R Interval 182    QRS Duration 70    Q-T Interval 368    QTc Calculation (Bazett) 447    P Axis 82    R Axis 54    T Axis 68    Diagnosis      Normal sinus rhythm  Normal ECG  When compared with ECG of 14-OCT-2020 09:04,  Criteria for Septal infarct are no longer present  Confirmed by Sergey Herring ([id number]) on 7/15/2024 1:23:12 PM       No results found for this or any previous visit.        Notes reviewed from all clinical/nonclinical/nursing services involved in patient's clinical care. Care coordination discussions were held with appropriate clinical/nonclinical/ nursing providers based on care coordination needs.     Assessment + Plan    Given the patient's current clinical presentation, there is a high level of concern for decompensation if discharged from the emergency department. Complex decision making was performed, which includes reviewing the patient's available past medical records, laboratory results, and imaging studies.    UTI  Acute metabolic encephalopathy 2/2 to above  Alzheimer's dementia  CKD stage IIIa  HTN  History of gastroparesis/constipation

## 2024-07-15 NOTE — ACP (ADVANCE CARE PLANNING)
Advance Care Planning     Advance Care Planning (ACP) Physician/NP/PA Conversation    Date of Conversation: 7/15/2024  Conducted with: Healthcare Decision Maker  Other persons present: Daughter s: Carole Waite and Ankur   Son      Healthcare Decision Maker:   Primary Decision Maker: Mariann Torres - Child - [phone]    Secondary Decision Maker: Carole Ba - Child - [phone]    Secondary Decision Maker: Ankur Ba - [phone]  Click here to complete Healthcare Decision Makers including selection of the Healthcare Decision Maker Relationship (ie \"Primary\").   Today we documented Decision Maker(s) consistent with Legal Next of Kin hierarchy.    Care Preferences:    Hospitalization:  \"If your health worsens and it becomes clear that your chance of recovery is unlikely, what would be your preference regarding hospitalization?\"  The patient would prefer comfort-focused treatment without hospitalization.    Ventilation:  \"If you were unable to breath on your own and your chance of recovery was unlikely, what would be your preference about the use of a ventilator (breathing machine) if it was available to you?\"  The patient would NOT desire the use of a ventilator.    Resuscitation:  \"In the event your heart stopped as a result of an underlying serious health condition, would you want attempts made to restart your heart, or would you prefer a natural death?\"  No, do NOT attempt to resuscitate.    treatment goals, benefit/burden of treatment options, ventilation preferences, hospitalization preferences, resuscitation preferences, and hospice care    Conversation Outcomes / Follow-Up Plan:  ACP complete - no further action today  Reviewed DNR/DNI and patient confirms current DNR status - completed forms on file (place new order if needed)    Length of Voluntary ACP Conversation in minutes:  16 minutes    Lisa Galvez MD

## 2024-07-16 LAB
ANION GAP SERPL CALC-SCNC: 8 MMOL/L (ref 5–15)
BASOPHILS # BLD: 0.1 K/UL (ref 0–0.1)
BASOPHILS NFR BLD: 1 % (ref 0–1)
BUN SERPL-MCNC: 19 MG/DL (ref 6–20)
BUN/CREAT SERPL: 18 (ref 12–20)
CALCIUM SERPL-MCNC: 9 MG/DL (ref 8.5–10.1)
CHLORIDE SERPL-SCNC: 106 MMOL/L (ref 97–108)
CO2 SERPL-SCNC: 23 MMOL/L (ref 21–32)
CREAT SERPL-MCNC: 1.05 MG/DL (ref 0.55–1.02)
DIFFERENTIAL METHOD BLD: ABNORMAL
EOSINOPHIL # BLD: 0 K/UL (ref 0–0.4)
EOSINOPHIL NFR BLD: 0 % (ref 0–7)
ERYTHROCYTE [DISTWIDTH] IN BLOOD BY AUTOMATED COUNT: 13.1 % (ref 11.5–14.5)
GLUCOSE SERPL-MCNC: 139 MG/DL (ref 65–100)
HCT VFR BLD AUTO: 35.4 % (ref 35–47)
HGB BLD-MCNC: 12 G/DL (ref 11.5–16)
IMM GRANULOCYTES # BLD AUTO: 0 K/UL (ref 0–0.04)
IMM GRANULOCYTES NFR BLD AUTO: 0 % (ref 0–0.5)
LYMPHOCYTES # BLD: 1.7 K/UL (ref 0.8–3.5)
LYMPHOCYTES NFR BLD: 17 % (ref 12–49)
MCH RBC QN AUTO: 30.7 PG (ref 26–34)
MCHC RBC AUTO-ENTMCNC: 33.9 G/DL (ref 30–36.5)
MCV RBC AUTO: 90.5 FL (ref 80–99)
MONOCYTES # BLD: 1.1 K/UL (ref 0–1)
MONOCYTES NFR BLD: 11 % (ref 5–13)
NEUTS SEG # BLD: 7.4 K/UL (ref 1.8–8)
NEUTS SEG NFR BLD: 71 % (ref 32–75)
NRBC # BLD: 0 K/UL (ref 0–0.01)
NRBC BLD-RTO: 0 PER 100 WBC
PLATELET # BLD AUTO: 269 K/UL (ref 150–400)
PMV BLD AUTO: 9.3 FL (ref 8.9–12.9)
POTASSIUM SERPL-SCNC: 3.8 MMOL/L (ref 3.5–5.1)
RBC # BLD AUTO: 3.91 M/UL (ref 3.8–5.2)
SODIUM SERPL-SCNC: 137 MMOL/L (ref 136–145)
WBC # BLD AUTO: 10.3 K/UL (ref 3.6–11)

## 2024-07-16 PROCEDURE — G0378 HOSPITAL OBSERVATION PER HR: HCPCS

## 2024-07-16 PROCEDURE — 80048 BASIC METABOLIC PNL TOTAL CA: CPT

## 2024-07-16 PROCEDURE — 97165 OT EVAL LOW COMPLEX 30 MIN: CPT

## 2024-07-16 PROCEDURE — 97535 SELF CARE MNGMENT TRAINING: CPT

## 2024-07-16 PROCEDURE — 1100000000 HC RM PRIVATE

## 2024-07-16 PROCEDURE — 97161 PT EVAL LOW COMPLEX 20 MIN: CPT

## 2024-07-16 PROCEDURE — 6360000002 HC RX W HCPCS: Performed by: STUDENT IN AN ORGANIZED HEALTH CARE EDUCATION/TRAINING PROGRAM

## 2024-07-16 PROCEDURE — 85025 COMPLETE CBC W/AUTO DIFF WBC: CPT

## 2024-07-16 PROCEDURE — 96372 THER/PROPH/DIAG INJ SC/IM: CPT

## 2024-07-16 PROCEDURE — 36415 COLL VENOUS BLD VENIPUNCTURE: CPT

## 2024-07-16 PROCEDURE — 2580000003 HC RX 258: Performed by: STUDENT IN AN ORGANIZED HEALTH CARE EDUCATION/TRAINING PROGRAM

## 2024-07-16 PROCEDURE — 6370000000 HC RX 637 (ALT 250 FOR IP): Performed by: STUDENT IN AN ORGANIZED HEALTH CARE EDUCATION/TRAINING PROGRAM

## 2024-07-16 PROCEDURE — 96376 TX/PRO/DX INJ SAME DRUG ADON: CPT

## 2024-07-16 PROCEDURE — 6370000000 HC RX 637 (ALT 250 FOR IP)

## 2024-07-16 RX ADMIN — PANTOPRAZOLE SODIUM 40 MG: 40 TABLET, DELAYED RELEASE ORAL at 10:45

## 2024-07-16 RX ADMIN — DICLOFENAC SODIUM 4 G: 10 GEL TOPICAL at 20:49

## 2024-07-16 RX ADMIN — DICLOFENAC SODIUM 4 G: 10 GEL TOPICAL at 10:46

## 2024-07-16 RX ADMIN — NORTRIPTYLINE HYDROCHLORIDE 10 MG: 10 CAPSULE ORAL at 20:44

## 2024-07-16 RX ADMIN — SODIUM CHLORIDE, PRESERVATIVE FREE 10 ML: 5 INJECTION INTRAVENOUS at 10:49

## 2024-07-16 RX ADMIN — HYDRALAZINE HYDROCHLORIDE 10 MG: 10 TABLET, FILM COATED ORAL at 20:44

## 2024-07-16 RX ADMIN — WATER 1000 MG: 1 INJECTION INTRAMUSCULAR; INTRAVENOUS; SUBCUTANEOUS at 16:26

## 2024-07-16 RX ADMIN — ENOXAPARIN SODIUM 30 MG: 100 INJECTION SUBCUTANEOUS at 17:13

## 2024-07-16 RX ADMIN — SENNOSIDES 8.6 MG: 8.6 TABLET, FILM COATED ORAL at 20:44

## 2024-07-16 RX ADMIN — HYDRALAZINE HYDROCHLORIDE 10 MG: 10 TABLET, FILM COATED ORAL at 16:26

## 2024-07-16 RX ADMIN — SENNOSIDES 8.6 MG: 8.6 TABLET, FILM COATED ORAL at 10:45

## 2024-07-16 RX ADMIN — ACETAMINOPHEN 650 MG: 325 TABLET ORAL at 02:51

## 2024-07-16 RX ADMIN — LORAZEPAM 0.25 MG: 0.5 TABLET ORAL at 16:29

## 2024-07-16 RX ADMIN — SODIUM CHLORIDE, PRESERVATIVE FREE 10 ML: 5 INJECTION INTRAVENOUS at 20:50

## 2024-07-16 RX ADMIN — SODIUM CHLORIDE: 9 INJECTION, SOLUTION INTRAVENOUS at 05:53

## 2024-07-16 RX ADMIN — BISACODYL 10 MG: 10 SUPPOSITORY RECTAL at 17:13

## 2024-07-16 RX ADMIN — LORAZEPAM 0.5 MG: 0.5 TABLET ORAL at 20:45

## 2024-07-16 RX ADMIN — LORAZEPAM 0.25 MG: 0.5 TABLET ORAL at 10:45

## 2024-07-16 RX ADMIN — SODIUM CHLORIDE: 9 INJECTION, SOLUTION INTRAVENOUS at 17:26

## 2024-07-16 RX ADMIN — HYDRALAZINE HYDROCHLORIDE 10 MG: 10 TABLET, FILM COATED ORAL at 10:45

## 2024-07-16 NOTE — PROGRESS NOTES
OCCUPATIONAL THERAPY EVALUATION/DISCHARGE  Patient: Bailey Ba (88 y.o. female)  Date: 7/16/2024  Primary Diagnosis: UTI (urinary tract infection) [N39.0]  Complicated UTI (urinary tract infection) [N39.0]  Altered mental status, unspecified altered mental status type [R41.82]         Precautions: Bed Alarm, Fall Risk                  ASSESSMENT :  Based on the objective data below, the patient at dependent baseline following admission for complicated UTI and AMS. She was received sidelying in bed, poorly responsive to multimodal stimuli. She opened her eyes a few times and nodded \"yes\" when asked if her name is Virginia. Noted rigidity in extremities and overall non-functional strength and ROM. Attempted to have pt wash her face and she was able to grasp cloth when placed in hand, but did not engage further in task and moved face away from cloth. Spoke to PT who contacted [facility] where pt resides, and staff at [facility] report pt is dependent for ADLs and uses a ana lift from transfers. Pt is not appropriate for participation in acute OT services and is currently at her baseline. Will complete orders.     Functional Outcome Measure:  The patient scored 0/100 on the Barthel Index outcome measure.      Further skilled acute occupational therapy is not indicated at this time.     PLAN :    Recommendation for discharge: (in order for the patient to meet his/her long term goals): No skilled occupational therapy    Other factors to consider for discharge:  dependent at [facility], family considering hospice    IF patient discharges home will need the following DME: patient owns DME required for discharge     SUBJECTIVE:   Patient nodded \"yes\" when asked if her name is Virginia    OBJECTIVE DATA SUMMARY:     Past Medical History:   Diagnosis Date    Allergic rhinitis, cause unspecified     Anxiety     Back pain     C. difficile diarrhea 8/2/2017    Admitted to [facility]    Cancer (HCC)     squamous cell leg    Cancer (HCC)                                                       Barthel Index:    Barthel Index Scale  Feeding: Cannot perform activity  Bathing: Cannot perform activity  Grooming: Cannot perform activity  Dressing: Cannot perform activity  Bowel Control: Cannot perform activity  Bladder Control: Cannot perform activity  Toilet Transfers: Cannot perform activity  Chair/Bed Trannsfers: Cannot perform activity  Ambulation: Cannot perform activity  Stairs: Cannot perform activity  Total Barthel Index Score: 0       The Barthel ADL Index: Guidelines  1. The index should be used as a record of what a patient does, not as a record of what a patient could do.  2. The main aim is to establish degree of independence from any help, physical or verbal, however minor and for whatever reason.  3. The need for supervision renders the patient not independent.  4. A patient's performance should be established using the best available evidence. Asking the patient, friends/relatives and nurses are the usual sources, but direct observation and common sense are also important. However direct testing is not needed.  5. Usually the patient's performance over the preceding 24-48 hours is important, but occasionally longer periods will be relevant.  6. Middle categories imply that the patient supplies over 50 per cent of the effort.  7. Use of aids to be independent is allowed.    Score Interpretation (from Jim 1997)    Independent   60-79 Minimally independent   40-59 Partially dependent   20-39 Very dependent   <20 Totally dependent     -Isaura Jeffery., Barthel, D.W. (1965). Functional evaluation: the Barthel Index. Md State Med J 142.  -SEVERO Fernandez, DEJUAN Mosquera (1997). The Barthel activities of daily living index: self-reporting versus actual performance in the old (> or = 75 years). Journal of American Geriatric Society 45(7), 832-836.   -JONNATHAN JewellF, FORD Renteria., JENNIFER Feliz., Jeremy, A.SURESH. (1999). Measuring the change in

## 2024-07-16 NOTE — PROGRESS NOTES
PHYSICAL THERAPY EVALUATION/DISCHARGE    Patient: Bailey Ba (88 y.o. female)  Date: 7/16/2024  Primary Diagnosis: UTI (urinary tract infection) [N39.0]  Complicated UTI (urinary tract infection) [N39.0]  Altered mental status, unspecified altered mental status type [R41.82]       Precautions: Bed Alarm, Fall Risk                      ASSESSMENT AND RECOMMENDATIONS:  Based on the objective data below, the patient presents sleeping soundly in fetal position and poorly responsive to voice or light touch.  Pt opened her eyes only a few times, non-verbal to questions, only shook her head 1X when asked if her name is Virginia, and did not follow any commands.  Pt has non-functional ROM, strength, and dependent for all mobility.  Called [facility] and spoke to staff member who confirms that pt is dependent and requires a ana lift for transfers.  Per chart review, family has been considering hospice.  Pt is not appropriate for skilled PT services as she is dependent at baseline. Will discontinue PT services.     Functional Outcome Measure:  The patient scored 6/24 on the [facility] outcome measure which is indicative of dependency and high fall risk.          Further skilled acute physical therapy is not indicated at this time.       PLAN :  Recommendation for discharge: (in order for the patient to meet his/her long term goals): No skilled physical therapy    Other factors to consider for discharge: poor safety awareness, impaired cognition, and high risk for falls         SUBJECTIVE:   Patient non-verbal to questions.  Nodded head yes when asked if her name is Virginia.     OBJECTIVE DATA SUMMARY:     Past Medical History:   Diagnosis Date    Allergic rhinitis, cause unspecified     Anxiety     Back pain     C. difficile diarrhea 8/2/2017    Admitted to [facility]    Cancer (HCC)     squamous cell leg    Cancer (HCC)     basal cell face, back, neck    Chronic kidney disease     Chronic pain     DJD (degenerative joint disease)

## 2024-07-16 NOTE — PLAN OF CARE
Problem: Discharge Planning  Goal: Discharge to home or other facility with appropriate resources  7/16/2024 0229 by Geno Youssef RN  Outcome: Progressing  7/16/2024 0221 by Geno Youssef RN  Outcome: Progressing     Problem: Pain  Goal: Verbalizes/displays adequate comfort level or baseline comfort level  7/16/2024 0221 by Geno Youssef RN  Outcome: Progressing     Problem: Safety - Adult  Goal: Free from fall injury  7/16/2024 1131 by Toya Chandler RN  Outcome: Progressing  7/16/2024 0221 by Geno Youssef RN  Outcome: Progressing     Problem: Skin/Tissue Integrity  Goal: Absence of new skin breakdown  Description: 1.  Monitor for areas of redness and/or skin breakdown  2.  Assess vascular access sites hourly  3.  Every 4-6 hours minimum:  Change oxygen saturation probe site  4.  Every 4-6 hours:  If on nasal continuous positive airway pressure, respiratory therapy assess nares and determine need for appliance change or resting period.  7/16/2024 1131 by Toya Chandler RN  Outcome: Progressing  7/16/2024 0221 by Geno Youssef RN  Outcome: Progressing

## 2024-07-16 NOTE — PROGRESS NOTES
Admission Medication Reconciliation:    Information obtained from:  med list from [place] [facility] [place]  RxQuery data available¹:  Yes    Comments/Recommendations: Updated PTA meds/reviewed patient's allergies.    1)  unable to confirm allergies    2)  Medication changes (since last review):  Added  - Lorazepam qhs, Calmoseptine, diphenhydramine    Adjusted  - melatonin prn to qhs, Miralax, nortriptyline qhs, ondansetron qd to q8h prn    Removed  - mirtazapine, aspirin       ¹RxQuery pharmacy benefit data reflects medications filled and processed through the patient's insurance, however   this data does NOT capture whether the medication was picked up or is currently being taken by the patient.    Allergies:  Latex, Hydromorphone, Aspirin, Atenolol, Azelastine, Bacitra-neomycin-polymyxin-hc, Clindamycin hcl, Clonazepam, Cyclobenzaprine, Diclofenac sodium, Duloxetine, Epinephrine, Estradiol, Flunisolide, Fluoxetine, Fluticasone, Hydrocortisone, Mepivacaine, Methocarbamol, Metoclopramide, Mirtazapine, Misoprostol, Morphine, Olmesartan, Oxycodone-acetaminophen, Penicillins, Pentazocine, Pseudoephedrine, Quinidine, Ramipril, Rofecoxib, Sulfa antibiotics, Tegaserod, Telithromycin, Tramadol, Vancomycin, Venlafaxine, Zaleplon, Zolpidem, Bupropion, Codeine, Escitalopram, Etodolac, Fentanyl, Ketorolac tromethamine, Moxifloxacin, Nsaids, Paroxetine hcl, Sertraline, and Trimipramine    Significant PMH/Disease States:   Past Medical History:   Diagnosis Date    Allergic rhinitis, cause unspecified     Anxiety     Back pain     C. difficile diarrhea 8/2/2017    Admitted to [facility]    Cancer (HCC)     squamous cell leg    Cancer (HCC)     basal cell face, back, neck    Chronic kidney disease     Chronic pain     DJD (degenerative joint disease) of cervical spine     Fibromyalgia     GERD (gastroesophageal reflux disease)     Hypercholesterolemia     Hypertension     Hyponatremia     IBS (irritable bowel syndrome)     Irritable bowel  syndrome with diarrhea 6/29/2016    GI- Dr Jones    Kidney disease, chronic, stage III (GFR 30-59 ml/min) ([facility]) 6/2011    Dr. Hossein ([place])    Nausea & vomiting     Neuropathy 6/22/2016    Both feet     Psychiatric disorder     DEPRESSION AND ANXIETY    Stroke ([facility]) 2017    TIA    Thromboembolus ([facility]) 2013    PE X2     Chief Complaint for this Admission:    Chief Complaint   Patient presents with    Altered Mental Status     Prior to Admission Medications:   Prior to Admission Medications   Prescriptions Last Dose Informant   Diclofenac Sodium (VOLTAREN EX) 7/14/2024    Sig: Apply topically in the morning and at bedtime Bilateral knees   LORazepam (ATIVAN) 0.5 MG tablet 7/14/2024    Sig: Take 0.5 tablets by mouth 2 times daily.   LORazepam (ATIVAN) 0.5 MG tablet     Sig: Take 1 tablet by mouth nightly. Max Daily Amount: 0.5 mg   acetaminophen (TYLENOL) 650 MG extended release tablet     Sig: Take 1 tablet by mouth in the morning, at noon, and at bedtime   bisacodyl (DULCOLAX) 10 MG suppository     Sig: Place 1 suppository rectally daily as needed for Constipation   diphenhydrAMINE (BENYLIN) 12.5 MG/5ML liquid     Sig: Take 10 mLs by mouth every 6 hours as needed for Itching (or swelling)   hydrALAZINE (APRESOLINE) 10 MG tablet 7/14/2024    Sig: Take 1 tablet by mouth 3 times daily   melatonin 5 MG TABS tablet 7/14/2024    Sig: Take 1 tablet by mouth nightly   menthol-zinc oxide (CALMOSEPTINE) 0.44-20.625 % OINT ointment     Sig: Apply topically in the morning and at bedtime Apply to buttocks topically   nortriptyline (PAMELOR) 10 MG capsule 7/14/2024    Sig: Take 1 capsule by mouth nightly   ondansetron (ZOFRAN) 4 MG tablet     Sig: Take 1 tablet by mouth every 8 hours as needed for Nausea or Vomiting   pantoprazole (PROTONIX) 40 MG tablet 7/14/2024    Sig: Take 1 tablet by mouth daily   polyethylene glycol (GLYCOLAX) 17 GM/SCOOP powder 7/14/2024    Sig: Take 17 g by mouth Twice a Week On Mon and Thurs

## 2024-07-16 NOTE — PROGRESS NOTES
[facility] Adult  Hospitalist Group                                                                                          Hospitalist Progress Note  Fina Gilliland PA-C  Answering service: [phone] or [phone] from in house phone        Date of Service:  2024  NAME:  Bailey Ba  :  1935  MRN:  [id number]       Admission Summary:     Bailey Ba is a 88 y.o. female past medical history of gastroparesis, Alzheimer's dementia who presents with AMS from assisted.      The patient denies any headache, blurry vision, sore throat, trouble swallowing, trouble with speech, chest pain, SOB, cough, fever, chills, N/V/D, abd pain, urinary symptoms, constipation, recent travels, sick contacts, focal or generalized neurological symptoms, falls, injuries, rashes, contact with COVID-19 diagnosed patients, hematemesis, melena, hemoptysis, hematuria, rashes, denies starting any new medications and denies any other concerns or problems besides as mentioned above.       Vital signs stable patient not septic, UA + for UTI, given 1 g Rocephin in the ER. WBC 20, CT head neg.        Interval history / Subjective:     F/u AMS due to UTI, hx Alzheimer's dementia   Patient seen and examined this am. She is alert but pleasantly confused. Will nod yes/no to some of my questions though her answers are random/incorrect. Doesn't follow simple commands.   Patient family expressed interest in hospice on dc. Will consult CM  Awaiting PT/OT  Follow urine cx     Assessment & Plan:     UTI  Acute metabolic encephalopathy 2/2 to above  Alzheimer's dementia  - Continue Rocephin follow urine culture clinically not septic  - CT head not acute    - TSH WNR,  B12 WNR,  folate 39.5  - PT OT    Leukocytosis - resolved  - likely secondary to above  - Febrile at 100.8 early this am   - CXR no acute disease  - continue to monitor   - treatment as above     CKD stage IIIa  -ivf started yesterday, can dc if tolerating PO, cr

## 2024-07-16 NOTE — CARE COORDINATION
Transition of Care Plan:    RUR: 11%   Prior Level of Functioning: Dependent   Disposition: [facility] w/ Hospice Services   The pt is expected to transfer to [facility] upon discharge with Hospice services. Family reported that they've been been in communication with [facility].   Owner of the [facility]:   Rosalina Koehler [phone]  The pt was previously a resident at [facility]   Hospice referral sent to [facility]  Awaiting Clinical review to determine if the pt has a qualifying Dx.   Follow up appointments: PCP   DME needed: None   Transportation at discharge: [facility] \"the pt's son reported that they would cover the cost of the transport\"   IM/IMM Medicare/ letter given:   Caregiver Contact: Mariann Torres (Child) [phone] \"MPOA\"  Son: Ankur Ba [phone] Financial POA   Discharge Caregiver contacted prior to discharge? N/A   Care Conference needed? N/a   Barriers to discharge: Medical, confused, ABX      07/16/24 1615   Service Assessment   Cognition Dementia / Early Alzheimer's   History Provided By Child/Family   Primary Caregiver Other (Comment)   Support Systems Spouse/Significant Other   Patient's Healthcare Decision Maker is: Legal Next of Kin   PCP Verified by CM Yes   Last Visit to PCP Within last 3 months   Prior Functional Level Independent in ADLs/IADLs   Current Functional Level Independent in ADLs/IADLs   Can patient return to prior living arrangement Yes   Ability to make needs known: Unable   Family able to assist with home care needs: No   Financial Resources Medicare   Social/Functional History   Lives With Other (comment)  ([facility] facility: The pt was a resident at [facility])   Type of Home Assisted living   Home Layout One level   Bathroom Toilet Handicap height   Receives Help From Other (comment)  ([facility] Staff)   ADL Assistance Needs assistance   Bath Dependent/Total   Dressing Dependent/Total   Grooming Dependent/Total   Feeding

## 2024-07-16 NOTE — PROGRESS NOTES
Attempted to deliver and verbally explain the VOON with patient. Patient was with clinical staff. Mya Arias, Care Management Assistant

## 2024-07-17 VITALS
OXYGEN SATURATION: 93 % | HEART RATE: 119 BPM | SYSTOLIC BLOOD PRESSURE: 149 MMHG | DIASTOLIC BLOOD PRESSURE: 67 MMHG | WEIGHT: 113.76 LBS | BODY MASS INDEX: 20.93 KG/M2 | TEMPERATURE: 98.6 F | RESPIRATION RATE: 18 BRPM | HEIGHT: 62 IN

## 2024-07-17 LAB
ANION GAP SERPL CALC-SCNC: 9 MMOL/L (ref 5–15)
BACTERIA SPEC CULT: ABNORMAL
BASOPHILS # BLD: 0.1 K/UL (ref 0–0.1)
BASOPHILS NFR BLD: 1 % (ref 0–1)
BUN SERPL-MCNC: 13 MG/DL (ref 6–20)
BUN/CREAT SERPL: 18 (ref 12–20)
CALCIUM SERPL-MCNC: 8.4 MG/DL (ref 8.5–10.1)
CC UR VC: ABNORMAL
CHLORIDE SERPL-SCNC: 104 MMOL/L (ref 97–108)
CO2 SERPL-SCNC: 24 MMOL/L (ref 21–32)
CREAT SERPL-MCNC: 0.74 MG/DL (ref 0.55–1.02)
DIFFERENTIAL METHOD BLD: NORMAL
EOSINOPHIL # BLD: 0.1 K/UL (ref 0–0.4)
EOSINOPHIL NFR BLD: 1 % (ref 0–7)
ERYTHROCYTE [DISTWIDTH] IN BLOOD BY AUTOMATED COUNT: 12.8 % (ref 11.5–14.5)
GLUCOSE SERPL-MCNC: 107 MG/DL (ref 65–100)
HCT VFR BLD AUTO: 36.7 % (ref 35–47)
HGB BLD-MCNC: 12.1 G/DL (ref 11.5–16)
IMM GRANULOCYTES # BLD AUTO: 0 K/UL (ref 0–0.04)
IMM GRANULOCYTES NFR BLD AUTO: 0 % (ref 0–0.5)
LYMPHOCYTES # BLD: 1.7 K/UL (ref 0.8–3.5)
LYMPHOCYTES NFR BLD: 17 % (ref 12–49)
MCH RBC QN AUTO: 30 PG (ref 26–34)
MCHC RBC AUTO-ENTMCNC: 33 G/DL (ref 30–36.5)
MCV RBC AUTO: 90.8 FL (ref 80–99)
MONOCYTES # BLD: 0.9 K/UL (ref 0–1)
MONOCYTES NFR BLD: 9 % (ref 5–13)
NEUTS SEG # BLD: 7.2 K/UL (ref 1.8–8)
NEUTS SEG NFR BLD: 72 % (ref 32–75)
NRBC # BLD: 0 K/UL (ref 0–0.01)
NRBC BLD-RTO: 0 PER 100 WBC
PLATELET # BLD AUTO: 282 K/UL (ref 150–400)
PMV BLD AUTO: 10 FL (ref 8.9–12.9)
POTASSIUM SERPL-SCNC: 4 MMOL/L (ref 3.5–5.1)
RBC # BLD AUTO: 4.04 M/UL (ref 3.8–5.2)
SERVICE CMNT-IMP: ABNORMAL
SODIUM SERPL-SCNC: 137 MMOL/L (ref 136–145)
WBC # BLD AUTO: 10.1 K/UL (ref 3.6–11)

## 2024-07-17 PROCEDURE — 6370000000 HC RX 637 (ALT 250 FOR IP): Performed by: STUDENT IN AN ORGANIZED HEALTH CARE EDUCATION/TRAINING PROGRAM

## 2024-07-17 PROCEDURE — 6360000002 HC RX W HCPCS: Performed by: STUDENT IN AN ORGANIZED HEALTH CARE EDUCATION/TRAINING PROGRAM

## 2024-07-17 PROCEDURE — 96376 TX/PRO/DX INJ SAME DRUG ADON: CPT

## 2024-07-17 PROCEDURE — 96375 TX/PRO/DX INJ NEW DRUG ADDON: CPT

## 2024-07-17 PROCEDURE — 2580000003 HC RX 258: Performed by: STUDENT IN AN ORGANIZED HEALTH CARE EDUCATION/TRAINING PROGRAM

## 2024-07-17 PROCEDURE — 80048 BASIC METABOLIC PNL TOTAL CA: CPT

## 2024-07-17 PROCEDURE — 6370000000 HC RX 637 (ALT 250 FOR IP): Performed by: HOSPITALIST

## 2024-07-17 PROCEDURE — 36415 COLL VENOUS BLD VENIPUNCTURE: CPT

## 2024-07-17 PROCEDURE — 85025 COMPLETE CBC W/AUTO DIFF WBC: CPT

## 2024-07-17 RX ORDER — ENOXAPARIN SODIUM 100 MG/ML
40 INJECTION SUBCUTANEOUS EVERY EVENING
Status: DISCONTINUED | OUTPATIENT
Start: 2024-07-17 | End: 2024-07-17 | Stop reason: HOSPADM

## 2024-07-17 RX ORDER — HYDRALAZINE HYDROCHLORIDE 25 MG/1
25 TABLET, FILM COATED ORAL 3 TIMES DAILY
Status: DISCONTINUED | OUTPATIENT
Start: 2024-07-17 | End: 2024-07-17 | Stop reason: HOSPADM

## 2024-07-17 RX ORDER — HYDRALAZINE HYDROCHLORIDE 25 MG/1
25 TABLET, FILM COATED ORAL 3 TIMES DAILY
Qty: 90 TABLET | Refills: 3 | Status: SHIPPED | OUTPATIENT
Start: 2024-07-17 | End: 2024-07-17

## 2024-07-17 RX ORDER — HYDRALAZINE HYDROCHLORIDE 25 MG/1
25 TABLET, FILM COATED ORAL 3 TIMES DAILY
Qty: 90 TABLET | Refills: 3 | Status: SHIPPED | OUTPATIENT
Start: 2024-07-17

## 2024-07-17 RX ADMIN — PANTOPRAZOLE SODIUM 40 MG: 40 TABLET, DELAYED RELEASE ORAL at 09:01

## 2024-07-17 RX ADMIN — SODIUM CHLORIDE: 9 INJECTION, SOLUTION INTRAVENOUS at 05:44

## 2024-07-17 RX ADMIN — SODIUM CHLORIDE, PRESERVATIVE FREE 10 ML: 5 INJECTION INTRAVENOUS at 09:03

## 2024-07-17 RX ADMIN — HYDRALAZINE HYDROCHLORIDE 10 MG: 10 TABLET, FILM COATED ORAL at 09:01

## 2024-07-17 RX ADMIN — DICLOFENAC SODIUM 4 G: 10 GEL TOPICAL at 09:01

## 2024-07-17 RX ADMIN — LORAZEPAM 0.25 MG: 0.5 TABLET ORAL at 15:39

## 2024-07-17 RX ADMIN — HYDRALAZINE HYDROCHLORIDE 20 MG: 20 INJECTION INTRAMUSCULAR; INTRAVENOUS at 10:22

## 2024-07-17 RX ADMIN — LORAZEPAM 0.25 MG: 0.5 TABLET ORAL at 09:00

## 2024-07-17 RX ADMIN — WATER 1000 MG: 1 INJECTION INTRAMUSCULAR; INTRAVENOUS; SUBCUTANEOUS at 15:39

## 2024-07-17 RX ADMIN — HYDRALAZINE HYDROCHLORIDE 25 MG: 25 TABLET ORAL at 15:38

## 2024-07-17 RX ADMIN — ONDANSETRON 4 MG: 2 INJECTION INTRAMUSCULAR; INTRAVENOUS at 10:22

## 2024-07-17 NOTE — DISCHARGE INSTRUCTIONS
Discharge SNF/Rehab Instructions/LTAC       PATIENT ID: Bailey Ba  MRN: [id number]   YOB: 1935    DATE OF ADMISSION: [unfilled]    DATE OF DISCHARGE: 7/17/2024    PRIMARY CARE PROVIDER: @PCP@       ATTENDING PHYSICIAN: [unfilled]  DISCHARGING PROVIDER: Samuel Agosto MD     To contact this individual call [phone] and ask the  to page.   If unavailable ask to be transferred the Adult Hospitalist Department.    CONSULTATIONS: [unfilled]    PROCEDURES/SURGERIES: * No surgery found *    ADMITTING DIAGNOSES & HOSPITAL COURSE:   UTI  Acute metabolic encephalopathy 2/2 to above  Alzheimer's dementia  - Urine culture E coli  -on Ceftriaxone #3/3 days today  - CT head not acute    - TSH WNR,  B12 WNR,  folate 39.5  - PT OT     Leukocytosis - resolved  - likely secondary to above  - Febrile at 100.8 early this am   - CXR no acute disease  - continue to monitor   - treatment as above      CKD stage IIIa  -ivf started yesterday, can dc if tolerating PO, cr stable near baseline     HTN  -monitor bp at her age allow for some permissive htn as higher risk for orthostasis     History of gastroparesis/constipation   - Continue other home meds for gastroparesis/constipation    Spoke to one of the daughter in the room.  Called another daughter on phone but could not talk to her     PENDING TEST RESULTS:   At the time of discharge the following test results are still pending: none    FOLLOW UP APPOINTMENTS:    PCP       ADDITIONAL CARE RECOMMENDATIONS:   As above    DIET: comfort feeding    ACTIVITY: bedrest      DISCHARGE MEDICATIONS:   See Medication Reconciliation Form      NOTIFY YOUR PHYSICIAN FOR ANY OF THE FOLLOWING:   Fever over 101 degrees for 24 hours.   Chest pain, shortness of breath, fever, chills, nausea, vomiting, diarrhea, change in mentation, falling, weakness, bleeding. Severe pain or pain not relieved by medications.  Or, any other signs or symptoms that you may have questions

## 2024-07-17 NOTE — PROGRESS NOTES
the last 72 hours.    Invalid input(s): \"CPKMB\", \"CKNDX\", \"TROIQ\"  Lab Results   Component Value Date/Time    CHOL 158 08/21/2019 04:55 AM    HDL 53 08/21/2019 04:55 AM    LDL 89.4 08/21/2019 04:55 AM     No results found for: \"GLUCPOC\"  [unfilled]    Notes reviewed from all clinical/nonclinical/nursing services involved in patient's clinical care. Care coordination discussions were held with appropriate clinical/nonclinical/ nursing providers based on care coordination needs.         Patients current active Medications were reviewed, considered, added and adjusted based on the clinical condition today.      Home Medications were reconciled to the best of my ability given all available resources at the time of admission. Route is PO if not otherwise noted.      Admission Status:[id number]:::1}      Medications Reviewed:     Current Facility-Administered Medications   Medication Dose Route Frequency    sodium chloride flush 0.9 % injection 5-40 mL  5-40 mL IntraVENous 2 times per day    sodium chloride flush 0.9 % injection 5-40 mL  5-40 mL IntraVENous PRN    0.9 % sodium chloride infusion   IntraVENous PRN    enoxaparin Sodium (LOVENOX) injection 30 mg  30 mg SubCUTAneous QPM    ondansetron (ZOFRAN-ODT) disintegrating tablet 4 mg  4 mg Oral Q8H PRN    Or    ondansetron (ZOFRAN) injection 4 mg  4 mg IntraVENous Q6H PRN    polyethylene glycol (GLYCOLAX) packet 17 g  17 g Oral Daily PRN    acetaminophen (TYLENOL) tablet 650 mg  650 mg Oral Q6H PRN    Or    acetaminophen (TYLENOL) suppository 650 mg  650 mg Rectal Q6H PRN    0.9 % sodium chloride infusion   IntraVENous Continuous    cefTRIAXone (ROCEPHIN) 1,000 mg in sterile water 10 mL IV syringe  1,000 mg IntraVENous Q24H    bisacodyl (DULCOLAX) suppository 10 mg  10 mg Rectal Daily PRN    diclofenac sodium (VOLTAREN) 1 % gel 4 g  4 g Topical BID    hydrALAZINE (APRESOLINE) tablet 10 mg  10 mg Oral TID    LORazepam (ATIVAN) tablet 0.25 mg  0.25 mg Oral BID    melatonin  tablet 4.5 mg  4.5 mg Oral QHS PRN    nortriptyline (PAMELOR) capsule 10 mg  10 mg Oral Nightly    pantoprazole (PROTONIX) tablet 40 mg  40 mg Oral Daily    [START ON 7/18/2024] polyethylene glycol (GLYCOLAX) packet 17 g  17 g Oral Once per day on Mon Thu    senna (SENOKOT) tablet 8.6 mg  1 tablet Oral BID    hydrALAZINE (APRESOLINE) injection 20 mg  20 mg IntraVENous Q6H PRN    LORazepam (ATIVAN) tablet 0.5 mg  0.5 mg Oral Nightly     ______________________________________________________________________  EXPECTED LENGTH OF STAY: Unable to retrieve estimated LOS  ACTUAL LENGTH OF STAY:          1                 Samuel Agosto MD

## 2024-07-17 NOTE — PROGRESS NOTES
Report called to CHANCE Romano at [facility]. Assessment details, medications as well as continuing plan of care reviewed. All questions were answered. Discharge package given to transport team.

## 2024-07-17 NOTE — PLAN OF CARE
Problem: Discharge Planning  Goal: Discharge to home or other facility with appropriate resources  7/16/2024 2159 by Jeremy Arias  Outcome: Progressing     Problem: Pain  Goal: Verbalizes/displays adequate comfort level or baseline comfort level  7/16/2024 2159 by Jeremy Arias  Outcome: Progressing     Problem: Safety - Adult  Goal: Free from fall injury  7/17/2024 1005 by Anca Gomez  Outcome: Progressing  7/16/2024 2159 by Jeremy Arias  Outcome: Progressing     Problem: Skin/Tissue Integrity  Goal: Absence of new skin breakdown  Description: 1.  Monitor for areas of redness and/or skin breakdown  2.  Assess vascular access sites hourly  3.  Every 4-6 hours minimum:  Change oxygen saturation probe site  4.  Every 4-6 hours:  If on nasal continuous positive airway pressure, respiratory therapy assess nares and determine need for appliance change or resting period.  7/17/2024 1005 by Anca Gomez  Outcome: Progressing  7/16/2024 2159 by Jeremy Arias  Outcome: Progressing

## 2024-07-17 NOTE — CARE COORDINATION
Transition of Care Plan:     RUR: 11%   Prior Level of Functioning: Dependent   Disposition: [facility] w/ Hospice    [facility]: Accepted   [facility]   [address] [place], [address]  Owner of the IS[facility]ELLA:   Rosalina Koehler [phone]  FAMILY REPORTED THAT THE HOSPITAL IS NOT PERMITTED TO UPDATE [facility], THE PT'S PREVIOUS FACILITY. THEY REPORTED THAT THE CALLS SHOULD BE REDIRECTED TO FAMILY.    Hospice referral sent to [facility]  ACCEPTED   RECEIVED. WE WILL BE ADMITTING TO HOSPICE THIS EVENING. THANK YOU    Follow up appointments: PCP   DME needed: None   Transportation at discharge: [facility] 5PM \"MEDICARE\" FAMILY PREFERENCE   IM/IMM Medicare/ letter given:   Caregiver Contact: Mariann Torres (Child) [phone] \"MPOA\"  Son: Ankur Ba [phone] Financial POA   Discharge Caregiver contacted prior to discharge? YES  Care Conference needed? N/a

## 2024-07-17 NOTE — DISCHARGE SUMMARY
Discharge Summary       PATIENT ID: Bailey Ba  MRN: [id number]   YOB: 1935    DATE OF ADMISSION: 7/15/2024  1:18 PM    DATE OF DISCHARGE: 7/17/2024   PRIMARY CARE PROVIDER: Neto Chu MD     ATTENDING PHYSICIAN: Dr Samuel Agosto  DISCHARGING PROVIDER: Samuel Agosto MD    To contact this individual call [phone] and ask the  to page.  If unavailable ask to be transferred the Adult Hospitalist Department.    CONSULTATIONS: IP CONSULT TO CASE MANAGEMENT    PROCEDURES/SURGERIES: * No surgery found *    ADMITTING DIAGNOSES & HOSPITAL COURSE:   UTI  Acute metabolic encephalopathy 2/2 to above  Alzheimer's dementia  - Urine culture E coli  -on Ceftriaxone #3/3 days today  - CT head not acute    - TSH WNR,  B12 WNR,  folate 39.5  - PT OT     Leukocytosis - resolved  - likely secondary to above  - Febrile at 100.8 early this am   - CXR no acute disease  - continue to monitor   - treatment as above      CKD stage IIIa  -ivf started yesterday, can dc if tolerating PO, cr stable near baseline     HTN  -monitor bp at her age allow for some permissive htn as higher risk for orthostasis     History of gastroparesis/constipation   - Continue other home meds for gastroparesis/constipation    Spoke to one of the daughter in the room.  Called another daughter on phone but could not talk to her     PENDING TEST RESULTS:   At the time of discharge the following test results are still pending: none    FOLLOW UP APPOINTMENTS:    PCP       ADDITIONAL CARE RECOMMENDATIONS:   As above    DIET: comfort feeding    ACTIVITY: bedrest      DISCHARGE MEDICATIONS:   See Medication Reconciliation Form      NOTIFY YOUR PHYSICIAN FOR ANY OF THE FOLLOWING:   Fever over 101 degrees for 24 hours.   Chest pain, shortness of breath, fever, chills, nausea, vomiting, diarrhea, change in mentation, falling, weakness, bleeding. Severe pain or pain not relieved by medications.  Or, any other signs or symptoms that you

## 2024-07-17 NOTE — PLAN OF CARE
Problem: Discharge Planning  Goal: Discharge to home or other facility with appropriate resources  Outcome: Adequate for Discharge     Problem: Pain  Goal: Verbalizes/displays adequate comfort level or baseline comfort level  Outcome: Adequate for Discharge     Problem: Safety - Adult  Goal: Free from fall injury  7/17/2024 1551 by Anca Gomez  Outcome: Adequate for Discharge  7/17/2024 1005 by Anca Gomez  Outcome: Progressing     Problem: Skin/Tissue Integrity  Goal: Absence of new skin breakdown  Description: 1.  Monitor for areas of redness and/or skin breakdown  2.  Assess vascular access sites hourly  3.  Every 4-6 hours minimum:  Change oxygen saturation probe site  4.  Every 4-6 hours:  If on nasal continuous positive airway pressure, respiratory therapy assess nares and determine need for appliance change or resting period.  7/17/2024 1551 by Anca Gomez  Outcome: Adequate for Discharge  7/17/2024 1005 by Anca Gomez  Outcome: Progressing

## 2024-07-24 NOTE — PROGRESS NOTES
Physician Progress Note      PATIENT:               RENAN RIVERS  CSN #:                  [id number]  :                       1935  ADMIT DATE:       7/15/2024 1:18 PM  DISCH DATE:        2024 5:02 PM  RESPONDING  PROVIDER #:        Samuel Mcgovern MD          QUERY TEXT:    Pt admitted with UTI. Pt noted to have elevated WBC, temp 100.8 and AMS. If   possible, please document in the progress notes and discharge summary if you   are evaluating and /or treating any of the following:  The medical record reflects the following:  Risk Factors: UTI    Clinical Indicators:  H&P 7/15  UTI  Acute metabolic encephalopathy 2/2 to above  Alzheimer's dementia  on Ceftriaxone #3/3 days today    wbc 20.7  10.3    T max 100.8  HR       Treatment:  NS at 75  cefTRIAXone (ROCEPHIN) 1,000 mg in sterile water 10 mL IV syringe  sodium chloride 0.9 % bolus 500 mL      Thank you,  Mariann Lu RN CDI  CRCR  Clinical Documentation  [phone] or via [employer]  [email or web address]  Options provided:  -- UTI without Sepsis  -- Sepsis secondary to UTI, present on admission  -- Other - I will add my own diagnosis  -- Disagree - Not applicable / Not valid  -- Disagree - Clinically unable to determine / Unknown  -- Refer to Clinical Documentation Reviewer    PROVIDER RESPONSE TEXT:    This patient has UTI without Sepsis.    Query created by: Mariann Lu on 2024 8:24 AM      Electronically signed by:  Samuel Mcgovern MD 2024 5:13 PM           no loss of consciousness, no gait abnormality, no headache, no sensory deficits, and no weakness.

## 2024-08-09 ENCOUNTER — TELEPHONE (OUTPATIENT)
Age: 89
End: 2024-08-09

## 2024-08-09 NOTE — TELEPHONE ENCOUNTER
Medication Refill Request    Bailey Ba is requesting a refill of the following medication(s):   hydrALAZINE (APRESOLINE) 25 MG tablet       nortriptyline (PAMELOR) 10 MG capsule     pantoprazole (PROTONIX) 40 MG tablet             Please send refill to:     [facility] - [place], [place] - [address] -  [phone] - F [phone]  [address]  [facility] [address]  Phone: [phone] Fax: [phone]

## 2024-08-09 NOTE — TELEPHONE ENCOUNTER
Reason for call:  TC from Mariann Torres, Daughter. Daughter on PHI. PHI checked twice. Daughter states [facility] is going to fax a prescription request for three of her Mother's medications; see list below. Daughter states her Mother only has one week's worth of medication left and [facility] told her if her Mother's PCP can get scripts in soon, they can overnight her Mother's medication to her.     Is this a new problem: Yes    Date of last appointment:  7/9/2024     Can we respond via [employer]: No    Best call back number: Mariann Torres/Daughter/Phone Number [phone]        hydrALAZINE (APRESOLINE) 25 MG tablet       nortriptyline (PAMELOR) 10 MG capsule       pantoprazole (PROTONIX) 40 MG tablet         [facility] - [address] [phone] - F [phone]

## 2024-08-12 RX ORDER — PANTOPRAZOLE SODIUM 40 MG/1
40 TABLET, DELAYED RELEASE ORAL DAILY
Qty: 90 TABLET | Refills: 0 | Status: SHIPPED | OUTPATIENT
Start: 2024-08-12

## 2024-08-12 RX ORDER — HYDRALAZINE HYDROCHLORIDE 25 MG/1
25 TABLET, FILM COATED ORAL 3 TIMES DAILY
Qty: 270 TABLET | Refills: 0 | Status: SHIPPED | OUTPATIENT
Start: 2024-08-12

## 2024-08-12 RX ORDER — NORTRIPTYLINE HYDROCHLORIDE 10 MG/1
10 CAPSULE ORAL NIGHTLY
Qty: 90 CAPSULE | Refills: 0 | Status: SHIPPED | OUTPATIENT
Start: 2024-08-12

## 2024-08-14 PROBLEM — N39.0 UTI (URINARY TRACT INFECTION): Status: RESOLVED | Noted: 2024-07-15 | Resolved: 2024-08-14

## (undated) DEVICE — 3M™ STERI-DRAPE™ U-DRAPE 1015: Brand: STERI-DRAPE™

## (undated) DEVICE — IMMOBILIZER ORTH LIGHTWEIGHT LG UNIV 9X7 IN PREMIERPRO

## (undated) DEVICE — PLASMABLADE PS200-040 4.0: Brand: PLASMABLADE™

## (undated) DEVICE — 4-PORT MANIFOLD: Brand: NEPTUNE 2

## (undated) DEVICE — ABDOMINAL PAD: Brand: DERMACEA

## (undated) DEVICE — SUTURE ETHLN SZ 4-0 L18IN NONABSORBABLE BLK L19MM PS-2 3/8 1667H

## (undated) DEVICE — DRAPE,EXTREMITY,89X128,STERILE: Brand: MEDLINE

## (undated) DEVICE — STERILE POLYISOPRENE POWDER-FREE SURGICAL GLOVES: Brand: PROTEXIS

## (undated) DEVICE — PREP SKN PREVAIL 40ML APPL --

## (undated) DEVICE — SOLUTION IRRIG 3000ML LAC R FLX CONT

## (undated) DEVICE — CABLE PACE ALGTR CLP SAF 12FT --

## (undated) DEVICE — PAD,ABDOMINAL,5"X9",ST,LF,25/BX: Brand: MEDLINE INDUSTRIES, INC.

## (undated) DEVICE — TUR SERIES SET

## (undated) DEVICE — GOWN,PREVENTION PLUS,XLN/XL,ST,24/CS: Brand: MEDLINE

## (undated) DEVICE — SPONGE LAP 18X18IN STRL -- 5/PK

## (undated) DEVICE — SPONGE GZ W4XL4IN COT 12 PLY TYP VII WVN C FLD DSGN

## (undated) DEVICE — SUTURE PROL SZ 3-0 L18IN NONABSORBABLE BLU L19MM PC-5 3/8 8632G

## (undated) DEVICE — KENDALL RADIOLUCENT FOAM MONITORING ELECTRODE RECTANGULAR SHAPE: Brand: KENDALL

## (undated) DEVICE — PACK PROCEDURE SURG HRT CATH

## (undated) DEVICE — KENDALL SCD EXPRESS SLEEVES, KNEE LENGTH, MEDIUM: Brand: KENDALL SCD

## (undated) DEVICE — MAT SUCT W36XL48IN FLD CTRL DISP

## (undated) DEVICE — 3M™ DURAPORE™ SURGICAL TAPE 1538-3, 3 INCH X 10 YARD (7,5CM X 9,1M), 4 ROLLS/BOX: Brand: 3M™ DURAPORE™

## (undated) DEVICE — GAUZE SPONGES,12 PLY: Brand: CURITY

## (undated) DEVICE — 3M™ MEDIPORE™ H SOFT CLOTH SURGICAL TAPE, 2863, 3 IN X 10 YD, 12/CASE: Brand: 3M™ MEDIPORE™

## (undated) DEVICE — REM POLYHESIVE ADULT PATIENT RETURN ELECTRODE: Brand: VALLEYLAB

## (undated) DEVICE — GARMENT,MEDLINE,DVT,INT,CALF,MED, GEN2: Brand: MEDLINE

## (undated) DEVICE — SET IRRIG W 96IN TBNG 4 LN FLX BG

## (undated) DEVICE — SUTURE PDS II SZ 0 L27IN ABSRB VLT L36MM CT-1 1/2 CIR Z340H

## (undated) DEVICE — 5.0 MM I.D. UNIVERSAL CANNULA, 76                                    MM LONG, BLUE, LATEX SEAL,                                    SINGLE-USE STERILE PACKS, BOX OF 10.                                    INCLUDES OBTURATOR AND TROCAR

## (undated) DEVICE — 3M™ IOBAN™ 2 ANTIMICROBIAL INCISE DRAPE 6640EZ: Brand: IOBAN™ 2

## (undated) DEVICE — INFECTION CONTROL KIT SYS

## (undated) DEVICE — ARTHROSCOPY RICHMOND-LF: Brand: MEDLINE INDUSTRIES, INC.

## (undated) DEVICE — CLEAR-TRAC THREADED CANNULA WITH                                    OBTURATOR 8 MM I.D. X 76 MM,                                    STERILE, LATEX FREE, BOX OF 10: Brand: CLEAR-TRAC

## (undated) DEVICE — SUT ETHLN 3-0 18IN PS2 BLK --

## (undated) DEVICE — DRAPE SURG W25XL50IN E OPN CIR BND BG

## (undated) DEVICE — Z DISCONTINUED USE 2275686 GLOVE SURG SZ 8 L12IN FNGR THK13MIL WHT ISOLEX POLYISOPRENE

## (undated) DEVICE — 4.5 MM HELICUT STRAIGHT BURRS,                                    POWER/EP-1, SLATE, 5000 MAXIMUM RPM,                                    PACKAGED 6 PER BOX, STERILE: Brand: DYONICS HELICUT

## (undated) DEVICE — GRASPER SUT 60DEG SHRP TIP LO PROF FOR RAP ACCS IN SHLDR

## (undated) DEVICE — DRAPE,REIN 53X77,STERILE: Brand: MEDLINE

## (undated) DEVICE — PENCIL ES L3M BTTN SWCH S STL HEX LOK BLDE ELECTRD HOLSTER

## (undated) DEVICE — (D)PREP SKN CHLRAPRP APPL 26ML -- CONVERT TO ITEM 371833

## (undated) DEVICE — UNIV CANN 5MM/76MM LTX FREE (10) BLUE

## (undated) DEVICE — CURITY NON-ADHERENT STRIPS: Brand: CURITY

## (undated) DEVICE — SUTURE COAT VCRL PC 5 PRECIS COSM CONVENTIONAL CUT PRIM 3 8 J823H

## (undated) DEVICE — SHOULDER SUSPENSION KIT 6 PER BOX

## (undated) DEVICE — 4.5 MM INCISOR STRAIGHT BLADES,                                    POWER/EP-1, LIME GREEN, PACKAGED 6                                    PER BOX, STERILE

## (undated) DEVICE — TAPE,CLOTH/SILK,CURAD,3"X10YD,LF,40/CS: Brand: CURAD